# Patient Record
Sex: MALE | Race: WHITE | NOT HISPANIC OR LATINO | Employment: OTHER | URBAN - METROPOLITAN AREA
[De-identification: names, ages, dates, MRNs, and addresses within clinical notes are randomized per-mention and may not be internally consistent; named-entity substitution may affect disease eponyms.]

---

## 2017-01-09 ENCOUNTER — ANTICOAGULATION MONITORING (OUTPATIENT)
Dept: VASCULAR LAB | Facility: MEDICAL CENTER | Age: 82
End: 2017-01-09

## 2017-01-17 ENCOUNTER — HOSPITAL ENCOUNTER (OUTPATIENT)
Dept: LAB | Facility: MEDICAL CENTER | Age: 82
End: 2017-01-17
Attending: NURSE PRACTITIONER
Payer: MEDICARE

## 2017-01-17 DIAGNOSIS — I48.0 PAF (PAROXYSMAL ATRIAL FIBRILLATION) (HCC): ICD-10-CM

## 2017-01-17 LAB
INR PPP: 2.78 (ref 0.87–1.13)
PROTHROMBIN TIME: 30.2 SEC (ref 12–14.6)

## 2017-01-17 PROCEDURE — 85610 PROTHROMBIN TIME: CPT

## 2017-01-17 PROCEDURE — 36415 COLL VENOUS BLD VENIPUNCTURE: CPT

## 2017-01-18 ENCOUNTER — ANTICOAGULATION MONITORING (OUTPATIENT)
Dept: VASCULAR LAB | Facility: MEDICAL CENTER | Age: 82
End: 2017-01-18

## 2017-01-18 NOTE — PROGRESS NOTES
OP Anticoagulation Telephone Note    Date: 1/18/2017  Anticoagulation Summary as of 1/18/2017     INR goal 2.0-3.0   Selected INR 2.78 (1/17/2017)   Maintenance plan 5 mg (5 mg x 1) every day   Weekly total 35 mg   No change documented Melodie Toney, Med Ass't   Plan last modified Mahin Valdez, PHARMD (12/15/2016)   Next INR check 2/14/2017   Target end date Indefinite    Indications   A-fib (CMS-HCC) (Resolved) [I48.91]         Anticoagulation Episode Summary     INR check location Outside Lab    Preferred lab     Send INR reminders to     [facility]       Anticoagulation Care Providers     Provider Role Specialty Phone number    Anastacio Sunshine M.D. Referring Cardiology [phone]    Mahin Valdez Responsible          Anticoagulation Patient Findings   Negatives Missed Doses, Extra Doses, Medication Changes, Antibiotic Use, Diet Changes, Dental/Other Procedures, Hospitalization, Bleeding Gums, Nose Bleeds, Blood in Urine, Blood in Stool, Any Bruising, Other Complaints      Plan:  Left message on patient's answering machine/ voicemail. Instructed patient to call back with any concerns regarding any unusual bleeding or bruising, any medication or diet changes, or any signs or symptoms of thrombosis. Instructed patient to resume medication as outlined above. Patient to follow up in 4 weeks.     Melodie Toney, Medical Assistant    I have reviewed and concur with the above plan     Mahin Valdez, PHARMD

## 2017-01-30 DIAGNOSIS — M25.561 CHRONIC PAIN OF RIGHT KNEE: ICD-10-CM

## 2017-01-30 DIAGNOSIS — G89.29 CHRONIC PAIN OF RIGHT KNEE: ICD-10-CM

## 2017-01-30 RX ORDER — HYDROCODONE BITARTRATE AND ACETAMINOPHEN 10; 325 MG/1; MG/1
1 TABLET ORAL EVERY 8 HOURS PRN
Qty: 90 TAB | Refills: 0 | Status: CANCELLED | OUTPATIENT
Start: 2017-01-30

## 2017-01-30 NOTE — TELEPHONE ENCOUNTER
Was the patient seen in the last year in this department? Yes     Does patient have an active prescription for medications requested? No     Received Request Via: Pharmacy     in Media

## 2017-01-31 RX ORDER — HYDROCODONE BITARTRATE AND ACETAMINOPHEN 10; 325 MG/1; MG/1
1 TABLET ORAL EVERY 8 HOURS PRN
Qty: 90 TAB | Refills: 0 | Status: SHIPPED | OUTPATIENT
Start: 2017-01-31 | End: 2017-02-27 | Stop reason: SDUPTHER

## 2017-02-08 ENCOUNTER — OFFICE VISIT (OUTPATIENT)
Dept: CARDIOLOGY | Facility: MEDICAL CENTER | Age: 82
End: 2017-02-08
Payer: MEDICARE

## 2017-02-08 ENCOUNTER — NON-PROVIDER VISIT (OUTPATIENT)
Dept: CARDIOLOGY | Facility: MEDICAL CENTER | Age: 82
End: 2017-02-08
Payer: MEDICARE

## 2017-02-08 VITALS
HEIGHT: 68 IN | OXYGEN SATURATION: 93 % | DIASTOLIC BLOOD PRESSURE: 80 MMHG | BODY MASS INDEX: 30.46 KG/M2 | HEART RATE: 68 BPM | SYSTOLIC BLOOD PRESSURE: 138 MMHG | WEIGHT: 201 LBS

## 2017-02-08 DIAGNOSIS — Z95.0 CARDIAC PACEMAKER IN SITU: ICD-10-CM

## 2017-02-08 DIAGNOSIS — I25.84 CORONARY ARTERY DISEASE DUE TO CALCIFIED CORONARY LESION: ICD-10-CM

## 2017-02-08 DIAGNOSIS — E78.5 DYSLIPIDEMIA: Chronic | ICD-10-CM

## 2017-02-08 DIAGNOSIS — I25.10 CORONARY ARTERY DISEASE DUE TO CALCIFIED CORONARY LESION: ICD-10-CM

## 2017-02-08 DIAGNOSIS — I48.0 PAF (PAROXYSMAL ATRIAL FIBRILLATION) (HCC): ICD-10-CM

## 2017-02-08 PROCEDURE — 93280 PM DEVICE PROGR EVAL DUAL: CPT | Performed by: INTERNAL MEDICINE

## 2017-02-08 PROCEDURE — 1036F TOBACCO NON-USER: CPT | Performed by: INTERNAL MEDICINE

## 2017-02-08 PROCEDURE — G8598 ASA/ANTIPLAT THER USED: HCPCS | Performed by: INTERNAL MEDICINE

## 2017-02-08 PROCEDURE — G8482 FLU IMMUNIZE ORDER/ADMIN: HCPCS | Performed by: INTERNAL MEDICINE

## 2017-02-08 PROCEDURE — 99214 OFFICE O/P EST MOD 30 MIN: CPT | Performed by: INTERNAL MEDICINE

## 2017-02-08 PROCEDURE — G8419 CALC BMI OUT NRM PARAM NOF/U: HCPCS | Performed by: INTERNAL MEDICINE

## 2017-02-08 PROCEDURE — 4040F PNEUMOC VAC/ADMIN/RCVD: CPT | Mod: 8P | Performed by: INTERNAL MEDICINE

## 2017-02-08 PROCEDURE — 1101F PT FALLS ASSESS-DOCD LE1/YR: CPT | Performed by: INTERNAL MEDICINE

## 2017-02-08 PROCEDURE — G8432 DEP SCR NOT DOC, RNG: HCPCS | Performed by: INTERNAL MEDICINE

## 2017-02-08 NOTE — PROGRESS NOTES
Subjective:   Gennaro Richardson is a 81 y.o. male who presents today for followup of his coronary disease, hyperlipidemia and paroxysmal atrial fibrillation.  He had a pacemaker generator change in September 2016.    He has had no chest discomfort, dyspnea, edema or palpitations. He occasionally notes some orthostatic lightheadedness.          Past Medical History   Diagnosis Date   • Colonic polyps    • Diverticulosis    • CAD (coronary artery disease)    • Arteriosclerosis    • PVD (peripheral vascular disease) (CMS-HCC)    • CAD (coronary artery disease) 6/28/2011   • Pacer at end of battery life 6/28/2011   • A-fib (CMS-HCC) 6/28/2011   • Dyslipidemia 6/28/2011   • Peripheral vascular disease (CMS-HCC) 10/31/2011   • Hematoma of rectus sheath 6/13/2012     Delayed onset bleed, intraabdominal extension, left flank , ct abd active bleed , binder, vit k  6/14 Ex-lap     • Dizzy spells 11/20/2012   • Hematoma of abdominal wall    • Paroxysmal atrial fibrillation (CMS-HCC)    • Cardiac pacemaker in situ 11/19/2013   • PAF (paroxysmal atrial fibrillation) (CMS-HCC) 9/7/2014   • Myocardial infarct (CMS-HCC) 1989   • HERZOG (dyspnea on exertion) 9/7/2014 9/16/16- pt has no c/o     Past Surgical History   Procedure Laterality Date   • Pacemaker insertion     • Abdominal aortic aneurysm     • Colonoscopy with polyp  7/24/08     Performed by RUDOLPH BRENNER at SURGERY [facility] ORS   • Exploratory laparotomy  6/14/2012     Performed by CALLY MERCER at SURGERY [facility] ORS     Family History   Problem Relation Age of Onset   • Cancer Mother      History   Smoking status   • Former Smoker -- 35 years   • Quit date: 01/01/1988   Smokeless tobacco   • Never Used     Allergies   Allergen Reactions   • No Known Drug Allergy      Outpatient Encounter Prescriptions as of 2/8/2017   Medication Sig Dispense Refill   • hydrocodone/acetaminophen (NORCO)  MG Tab Take 1 Tab by mouth every 8 hours as needed (Must last 30  "days). 90 Tab 0   • clobetasol (TEMOVATE) 0.05 % Cream APPLY TO AFFECTED AREA(S) 2 TIMES A DAY. 60 g 1   • warfarin (COUMADIN) 5 MG Tab AS DIRECTED BY COUMADIN CLINIC 90 Tab 3   • atorvastatin (LIPITOR) 40 MG Tab Take 1 Tab by mouth every day. 90 Tab 3   • finasteride (PROSCAR) 5 MG TABS TAKE ONE TABLET BY MOUTH ONE TIME DAILY 90 Tab 2   • tamsulosin (FLOMAX) 0.4 MG capsule Take 1 Cap by mouth every day. 90 Cap 3     No facility-administered encounter medications on file as of 2/8/2017.     ROS       Objective:   /80 mmHg  Pulse 68  Ht 1.727 m (5' 8\")  Wt 91.173 kg (201 lb)  BMI 30.57 kg/m2  SpO2 93%    Physical Exam   Neck: No JVD present.   Cardiovascular: Normal rate, regular rhythm and intact distal pulses.    No murmur heard.  Pulmonary/Chest: Effort normal and breath sounds normal. No respiratory distress. He exhibits no tenderness.   Abdominal: Soft. There is no tenderness.   Musculoskeletal: He exhibits no edema.   Neurological: He has normal strength. He displays no tremor.   Vitals reviewed.    Lab Results   Component Value Date/Time    CHOLESTEROL,TOT 93* 06/08/2016 06:51 AM    LDL 37 06/08/2016 06:51 AM    HDL 39* 06/08/2016 06:51 AM    TRIGLYCERIDES 85 06/08/2016 06:51 AM       Lab Results   Component Value Date/Time    SODIUM 138 09/16/2016 11:45 AM    POTASSIUM 4.3 09/16/2016 11:45 AM    CHLORIDE 108 09/16/2016 11:45 AM    CO2 24 09/16/2016 11:45 AM    GLUCOSE 89 09/16/2016 11:45 AM    BUN 28* 09/16/2016 11:45 AM    CREATININE 0.92 09/16/2016 11:45 AM     Lab Results   Component Value Date/Time    ALKALINE PHOSPHATASE 91 06/08/2016 06:51 AM    AST(SGOT) 19 06/08/2016 06:51 AM    ALT(SGPT) 16 06/08/2016 06:51 AM    TOTAL BILIRUBIN 0.8 06/08/2016 06:51 AM      Pacemaker check: The patient's pacemaker is functioning normally. He does have approximately a percent of the time that he is in atrial fibrillation.      Patient Active Problem List    Diagnosis Date Noted     1. Coronary artery " disease due to calcified coronary lesion:Occluded left circumflex     2. Dyslipidemia     3. Cardiac pacemaker in situ     4. PAF (paroxysmal atrial fibrillation) (CMS-HCC)         Medical Decision Making:  Today's Assessment / Status / Plan:     Mr. Richardson is clinically stable. He is asymptomatic from a cardiovascular standpoint. He does need his lipid and hepatic function rechecked. His pacemaker is functioning normally. He continues on anticoagulation and rate control for his PAF. We discussed the direct dural anticoagulants and he would prefer to stay on warfarin because of the cost. He will follow-up in 6 months.

## 2017-02-08 NOTE — MR AVS SNAPSHOT
"        Gennaro Richardson   2017 1:45 PM   Office Visit   MRN: [id number]    Department:  [facility]   Dept Phone:  [phone]    Description:  Male : 1935   Provider:  Josef Retana M.D.           Reason for Visit     Follow-Up Pt states no recent lab work      Allergies as of 2017     Allergen Noted Reactions    No Known Drug Allergy 2008         You were diagnosed with     Coronary artery disease due to calcified coronary lesion   [4948762]       Dyslipidemia   [067170]       Cardiac pacemaker in situ   [V45.01.ICD-9-CM]       PAF (paroxysmal atrial fibrillation) (CMS-HCC)   [506203]         Vital Signs     Blood Pressure Pulse Height Weight Body Mass Index Oxygen Saturation    138/80 mmHg 68 1.727 m (5' 8\") 91.173 kg (201 lb) 30.57 kg/m2 93%    Smoking Status                   Former Smoker           Basic Information     Date Of Birth Sex Race Ethnicity Preferred Language    1935 Male White Non- English      Your appointments     2017  4:20 PM   Established Patient with Shorty Denny M.D.   [facility] (--)    [address]  [address]  [place] [id number] [phone]           You will be receiving a confirmation call a few days before your appointment from our automated call confirmation system.              Problem List              ICD-10-CM Priority Class Noted - Resolved    Coronary artery disease due to calcified coronary lesion:Occluded left circumflex I25.10, I25.84 High  2011 - Present    Dyslipidemia (Chronic) E78.5 High  2011 - Present    Cardiac pacemaker in situ Z95.0 High  2013 - Present    PVD (peripheral vascular disease) (CMS-HCC) (Chronic) I73.9   2014 - Present    Benign prostatic hypertrophy with lower urinary tract symptoms (LUTS) (Chronic) N40.1 Low  2014 - Present    PAF (paroxysmal atrial fibrillation) (CMS-HCC) I48.0 Medium  2014 - Present    Eczema L30.9 Low  3/17/2015 - " Present    History of MI (myocardial infarction) I25.2   3/17/2015 - Present    Chronic pain of right knee M25.561, G89.29   8/26/2015 - Present    Chronic back pain M54.9, G89.29   11/25/2015 - Present    Encounter for long-term (current) use of other high-risk medications Z79.899   2/26/2016 - Present    Opioid type dependence, continuous (CMS-HCC) F11.20   2/26/2016 - Present      Health Maintenance        Date Due Completion Dates    IMM ZOSTER VACCINE 8/31/1995 ---    IMM PNEUMOCOCCAL 65+ (ADULT) LOW/MEDIUM RISK SERIES (1 of 2 - PCV13) 8/31/2000 ---    COLONOSCOPY 2/18/2019 2/18/2014, 10/23/2012, 7/24/2008    IMM DTaP/Tdap/Td Vaccine (2 - Td) 8/21/2023 8/21/2013            Current Immunizations     Influenza TIV (IM) 10/9/2014, 10/29/2012  3:20 PM, 10/31/2011    Influenza Vaccine Adult HD 10/11/2016    Pneumococcal Vaccine (UF)Historical Data 10/31/2010    Tdap Vaccine 8/21/2013    Tetanus Vaccine 10/31/2010      Below and/or attached are the medications your provider expects you to take. Review all of your home medications and newly ordered medications with your provider and/or pharmacist. Follow medication instructions as directed by your provider and/or pharmacist. Please keep your medication list with you and share with your provider. Update the information when medications are discontinued, doses are changed, or new medications (including over-the-counter products) are added; and carry medication information at all times in the event of emergency situations     Allergies:  NO KNOWN DRUG ALLERGY - (reactions not documented)               Medications  Valid as of: February 08, 2017 -  2:26 PM    Generic Name Brand Name Tablet Size Instructions for use    Atorvastatin Calcium (Tab) LIPITOR 40 MG Take 1 Tab by mouth every day.        Clobetasol Propionate (Cream) TEMOVATE 0.05 % APPLY TO AFFECTED AREA(S) 2 TIMES A DAY.        Finasteride (Tab) PROSCAR 5 MG TAKE ONE TABLET BY MOUTH ONE TIME DAILY         Hydrocodone-Acetaminophen (Tab) NORCO  MG Take 1 Tab by mouth every 8 hours as needed (Must last 30 days).        Tamsulosin HCl (Cap) FLOMAX 0.4 MG Take 1 Cap by mouth every day.        Warfarin Sodium (Tab) COUMADIN 5 MG AS DIRECTED BY COUMADIN CLINIC        .                 Medicines prescribed today were sent to:     [facility] - [place], [place] - [address]    [address] [place] [address]    Phone: [phone] Fax: [phone]    Open 24 Hours?: No      Medication refill instructions:       If your prescription bottle indicates you have medication refills left, it is not necessary to call your provider’s office. Please contact your pharmacy and they will refill your medication.    If your prescription bottle indicates you do not have any refills left, you may request refills at any time through one of the following ways: The online [email or web address] system (except Urgent Care), by calling your provider’s office, or by asking your pharmacy to contact your provider’s office with a refill request. Medication refills are processed only during regular business hours and may not be available until the next business day. Your provider may request additional information or to have a follow-up visit with you prior to refilling your medication.   *Please Note: Medication refills are assigned a new Rx number when refilled electronically. Your pharmacy may indicate that no refills were authorized even though a new prescription for the same medication is available at the pharmacy. Please request the medicine by name with the pharmacy before contacting your provider for a refill.        Your To Do List     Future Labs/Procedures Complete By Expires    COMP METABOLIC PANEL  As directed 2/8/2018    HEPATIC FUNCTION PANEL  As directed 2/8/2018    LIPID PROFILE  As directed 2/8/2018    LIPID PROFILE  As directed 2/8/2018         [email or web address] Access Code: [email or web address]  Expires: 3/10/2017  2:26 PM    [email or web address]  A secure, online tool  to manage your health information     [employer]’s [employer]® is a secure, online tool that connects you to your personalized health information from the privacy of your home -- day or night - making it very easy for you to manage your healthcare. Once the activation process is completed, you can even access your medical information using the [employer] janeen, which is available for free in the Apple Janeen store or Google Play store.     [employer] provides the following levels of access (as shown below):   My Chart Features   [facility] Doctor [facility]  Specialists [facility]  Urgent  Care Non[facility]  Primary Care  Doctor   Email your healthcare team securely and privately 24/7 X X X    Manage appointments: schedule your next appointment; view details of past/upcoming appointments X      Request prescription refills. X      View recent personal medical records, including lab and immunizations X X X X   View health record, including health history, allergies, medications X X X X   Read reports about your outpatient visits, procedures, consult and ER notes X X X X   See your discharge summary, which is a recap of your hospital and/or ER visit that includes your diagnosis, lab results, and care plan. X X       How to register for [employer]:  1. Go to  [email or web address].  2. Click on the Sign Up Now box, which takes you to the New Member Sign Up page. You will need to provide the following information:  a. Enter your [employer] Access Code exactly as it appears at the top of this page. (You will not need to use this code after you’ve completed the sign-up process. If you do not sign up before the expiration date, you must request a new code.)   b. Enter your date of birth.   c. Enter your home email address.   d. Click Submit, and follow the next screen’s instructions.  3. Create a [employer] ID. This will be your [employer] login ID and cannot be changed, so think of one that is secure and easy to remember.  4. Create a  Pocket Gems password. You can change your password at any time.  5. Enter your Password Reset Question and Answer. This can be used at a later time if you forget your password.   6. Enter your e-mail address. This allows you to receive e-mail notifications when new information is available in [employer].  7. Click Sign Up. You can now view your health information.    For assistance activating your [employer] account, call [phone]

## 2017-02-08 NOTE — Clinical Note
[facility]   [address]  [place] [address]  Phone: [phone]  Fax: [phone]              Gennaro Richardson  1935    Encounter Date: 2/8/2017    Josef Retana M.D.          PROGRESS NOTE:  Subjective:   Gennaro Richardson is a 81 y.o. male who presents today for followup of his coronary disease, hyperlipidemia and paroxysmal atrial fibrillation.  He had a pacemaker generator change in September 2016.    He has had no chest discomfort, dyspnea, edema or palpitations. He occasionally notes some orthostatic lightheadedness.          Past Medical History   Diagnosis Date   • Colonic polyps    • Diverticulosis    • CAD (coronary artery disease)    • Arteriosclerosis    • PVD (peripheral vascular disease) (CMS-HCC)    • CAD (coronary artery disease) 6/28/2011   • Pacer at end of battery life 6/28/2011   • A-fib (CMS-HCC) 6/28/2011   • Dyslipidemia 6/28/2011   • Peripheral vascular disease (CMS-HCC) 10/31/2011   • Hematoma of rectus sheath 6/13/2012     Delayed onset bleed, intraabdominal extension, left flank , ct abd active bleed , binder, vit k  6/14 Ex-lap     • Dizzy spells 11/20/2012   • Hematoma of abdominal wall    • Paroxysmal atrial fibrillation (CMS-HCC)    • Cardiac pacemaker in situ 11/19/2013   • PAF (paroxysmal atrial fibrillation) (CMS-HCC) 9/7/2014   • Myocardial infarct (CMS-HCC) 1989   • HERZOG (dyspnea on exertion) 9/7/2014 9/16/16- pt has no c/o     Past Surgical History   Procedure Laterality Date   • Pacemaker insertion     • Abdominal aortic aneurysm     • Colonoscopy with polyp  7/24/08     Performed by RUDOLPH BRENNER at SURGERY [facility] ORS   • Exploratory laparotomy  6/14/2012     Performed by CALLY MERCER at SURGERY [facility] ORS     Family History   Problem Relation Age of Onset   • Cancer Mother      History   Smoking status   • Former Smoker -- 35 years   • Quit date: 01/01/1988   Smokeless tobacco   •  "Never Used     Allergies   Allergen Reactions   • No Known Drug Allergy      Outpatient Encounter Prescriptions as of 2/8/2017   Medication Sig Dispense Refill   • hydrocodone/acetaminophen (NORCO)  MG Tab Take 1 Tab by mouth every 8 hours as needed (Must last 30 days). 90 Tab 0   • clobetasol (TEMOVATE) 0.05 % Cream APPLY TO AFFECTED AREA(S) 2 TIMES A DAY. 60 g 1   • warfarin (COUMADIN) 5 MG Tab AS DIRECTED BY COUMADIN CLINIC 90 Tab 3   • atorvastatin (LIPITOR) 40 MG Tab Take 1 Tab by mouth every day. 90 Tab 3   • finasteride (PROSCAR) 5 MG TABS TAKE ONE TABLET BY MOUTH ONE TIME DAILY 90 Tab 2   • tamsulosin (FLOMAX) 0.4 MG capsule Take 1 Cap by mouth every day. 90 Cap 3     No facility-administered encounter medications on file as of 2/8/2017.     ROS       Objective:   /80 mmHg  Pulse 68  Ht 1.727 m (5' 8\")  Wt 91.173 kg (201 lb)  BMI 30.57 kg/m2  SpO2 93%    Physical Exam   Neck: No JVD present.   Cardiovascular: Normal rate, regular rhythm and intact distal pulses.    No murmur heard.  Pulmonary/Chest: Effort normal and breath sounds normal. No respiratory distress. He exhibits no tenderness.   Abdominal: Soft. There is no tenderness.   Musculoskeletal: He exhibits no edema.   Neurological: He has normal strength. He displays no tremor.   Vitals reviewed.    Lab Results   Component Value Date/Time    CHOLESTEROL,TOT 93* 06/08/2016 06:51 AM    LDL 37 06/08/2016 06:51 AM    HDL 39* 06/08/2016 06:51 AM    TRIGLYCERIDES 85 06/08/2016 06:51 AM       Lab Results   Component Value Date/Time    SODIUM 138 09/16/2016 11:45 AM    POTASSIUM 4.3 09/16/2016 11:45 AM    CHLORIDE 108 09/16/2016 11:45 AM    CO2 24 09/16/2016 11:45 AM    GLUCOSE 89 09/16/2016 11:45 AM    BUN 28* 09/16/2016 11:45 AM    CREATININE 0.92 09/16/2016 11:45 AM     Lab Results   Component Value Date/Time    ALKALINE PHOSPHATASE 91 06/08/2016 06:51 AM    AST(SGOT) 19 06/08/2016 06:51 AM    ALT(SGPT) 16 06/08/2016 06:51 AM    TOTAL " BILIRUBIN 0.8 06/08/2016 06:51 AM      Pacemaker check: The patient's pacemaker is functioning normally. He does have approximately a percent of the time that he is in atrial fibrillation.      Patient Active Problem List    Diagnosis Date Noted     1. Coronary artery disease due to calcified coronary lesion:Occluded left circumflex     2. Dyslipidemia     3. Cardiac pacemaker in situ     4. PAF (paroxysmal atrial fibrillation) (CMS-HCC)         Medical Decision Making:  Today's Assessment / Status / Plan:     Mr. Richardson is clinically stable. He is asymptomatic from a cardiovascular standpoint. He does need his lipid and hepatic function rechecked. His pacemaker is functioning normally. He continues on anticoagulation and rate control for his PAF. We discussed the direct dural anticoagulants and he would prefer to stay on warfarin because of the cost. He will follow-up in 6 months.      Shorty Denny M.D.  [address]  [address] [place] [id number]  VIA In Basket

## 2017-02-09 ENCOUNTER — TELEPHONE (OUTPATIENT)
Dept: CARDIOLOGY | Facility: MEDICAL CENTER | Age: 82
End: 2017-02-09

## 2017-02-09 NOTE — TELEPHONE ENCOUNTER
Dr. Retana would like the patient to switch to a different anticoagulant however the patient is hesitant in paying more than $20/month co-pay    With the patient's plan Pradaxa is on a Tier4  Tier 3 options are Eliquis and Xarelto, when the patient is not in the deductible stage or the doughnut hole the co-pay will be at $45/month    I left a message for the patient to call back to see if he would like to switch to one of these options and apply for patient assistance    Patient Assistance options:  [employer] Patient Assistance Program [phone] (Xarelto)     [employer] Patient [employer] [phone] (ELIQUIS)

## 2017-02-14 ENCOUNTER — HOSPITAL ENCOUNTER (OUTPATIENT)
Dept: LAB | Facility: MEDICAL CENTER | Age: 82
End: 2017-02-14
Attending: NURSE PRACTITIONER
Payer: MEDICARE

## 2017-02-14 DIAGNOSIS — I48.0 PAF (PAROXYSMAL ATRIAL FIBRILLATION) (HCC): ICD-10-CM

## 2017-02-14 LAB
INR PPP: 3 (ref 0.87–1.13)
PROTHROMBIN TIME: 32.1 SEC (ref 12–14.6)

## 2017-02-14 PROCEDURE — 85610 PROTHROMBIN TIME: CPT

## 2017-02-14 PROCEDURE — 36415 COLL VENOUS BLD VENIPUNCTURE: CPT

## 2017-02-15 ENCOUNTER — ANTICOAGULATION MONITORING (OUTPATIENT)
Dept: VASCULAR LAB | Facility: MEDICAL CENTER | Age: 82
End: 2017-02-15

## 2017-02-15 NOTE — PROGRESS NOTES
Anticoagulation Summary as of 2/15/2017     INR goal 2.0-3.0   Selected INR 3.00 (2/14/2017)   Maintenance plan 5 mg (5 mg x 1) every day   Weekly total 35 mg   Plan last modified Mahin Valdez, PHARMD (12/15/2016)   Next INR check 3/29/2017   Target end date Indefinite    Indications   A-fib (CMS-HCC) (Resolved) [I48.91]         Anticoagulation Episode Summary     INR check location Outside Lab    Preferred lab     Send INR reminders to     [facility]       Anticoagulation Care Providers     Provider Role Specialty Phone number    Anastacio Sunshine M.D. Referring Cardiology [phone]    Mahin Valdez Responsible          Anticoagulation Patient Findings    Spoke with patients wife today regarding therapeutic INR of 3.0.  Patient denies any signs/symptoms of bruising or bleeding or any changes in diet and medications.  Instructed patient to call clinic with any questions or concerns.  Pt is to continue with current warfarin dosing regimen.  Follow up in 6 weeks.    Antwon Dejesus, JANIED

## 2017-02-23 ENCOUNTER — TELEPHONE (OUTPATIENT)
Dept: MEDICAL GROUP | Facility: MEDICAL CENTER | Age: 82
End: 2017-02-23

## 2017-02-24 NOTE — TELEPHONE ENCOUNTER
Future Appointments       Provider Department Center    2/27/2017 4:20 PM Shorty Denny M.D. [facility]       Pt. Had labs ordered 2/7/2017 by a different provider and was advised to repeat these in 6 weeks.

## 2017-02-27 ENCOUNTER — OFFICE VISIT (OUTPATIENT)
Dept: MEDICAL GROUP | Facility: MEDICAL CENTER | Age: 82
End: 2017-02-27
Payer: MEDICARE

## 2017-02-27 VITALS
SYSTOLIC BLOOD PRESSURE: 128 MMHG | TEMPERATURE: 98.3 F | DIASTOLIC BLOOD PRESSURE: 80 MMHG | RESPIRATION RATE: 20 BRPM | OXYGEN SATURATION: 97 % | HEART RATE: 76 BPM | BODY MASS INDEX: 31.37 KG/M2 | WEIGHT: 207 LBS | HEIGHT: 68 IN

## 2017-02-27 DIAGNOSIS — M25.561 CHRONIC PAIN OF RIGHT KNEE: ICD-10-CM

## 2017-02-27 DIAGNOSIS — F11.20 OPIOID TYPE DEPENDENCE, CONTINUOUS (HCC): ICD-10-CM

## 2017-02-27 DIAGNOSIS — I48.0 PAF (PAROXYSMAL ATRIAL FIBRILLATION) (HCC): ICD-10-CM

## 2017-02-27 DIAGNOSIS — I73.9 PVD (PERIPHERAL VASCULAR DISEASE) (HCC): Chronic | ICD-10-CM

## 2017-02-27 DIAGNOSIS — G89.29 CHRONIC PAIN OF RIGHT KNEE: ICD-10-CM

## 2017-02-27 DIAGNOSIS — Z79.891 CHRONIC USE OF OPIATE DRUGS THERAPEUTIC PURPOSES: ICD-10-CM

## 2017-02-27 PROCEDURE — G8419 CALC BMI OUT NRM PARAM NOF/U: HCPCS | Performed by: FAMILY MEDICINE

## 2017-02-27 PROCEDURE — G8432 DEP SCR NOT DOC, RNG: HCPCS | Performed by: FAMILY MEDICINE

## 2017-02-27 PROCEDURE — 99214 OFFICE O/P EST MOD 30 MIN: CPT | Performed by: FAMILY MEDICINE

## 2017-02-27 PROCEDURE — 4040F PNEUMOC VAC/ADMIN/RCVD: CPT | Mod: 8P | Performed by: FAMILY MEDICINE

## 2017-02-27 PROCEDURE — 1036F TOBACCO NON-USER: CPT | Performed by: FAMILY MEDICINE

## 2017-02-27 PROCEDURE — G8598 ASA/ANTIPLAT THER USED: HCPCS | Performed by: FAMILY MEDICINE

## 2017-02-27 PROCEDURE — 1101F PT FALLS ASSESS-DOCD LE1/YR: CPT | Performed by: FAMILY MEDICINE

## 2017-02-27 PROCEDURE — G8482 FLU IMMUNIZE ORDER/ADMIN: HCPCS | Performed by: FAMILY MEDICINE

## 2017-02-27 RX ORDER — HYDROCODONE BITARTRATE AND ACETAMINOPHEN 10; 325 MG/1; MG/1
1 TABLET ORAL EVERY 8 HOURS PRN
Qty: 90 TAB | Refills: 0 | Status: SHIPPED | OUTPATIENT
Start: 2017-02-27 | End: 2017-02-27 | Stop reason: SDUPTHER

## 2017-02-27 RX ORDER — HYDROCODONE BITARTRATE AND ACETAMINOPHEN 10; 325 MG/1; MG/1
1 TABLET ORAL EVERY 8 HOURS PRN
Qty: 90 TAB | Refills: 0 | Status: SHIPPED | OUTPATIENT
Start: 2017-02-27 | End: 2017-05-25 | Stop reason: SDUPTHER

## 2017-02-27 ASSESSMENT — LIFESTYLE VARIABLES: HISTORY_ALCOHOL_USE: 0

## 2017-02-27 ASSESSMENT — ENCOUNTER SYMPTOMS: DEPRESSION: 0

## 2017-02-27 NOTE — MR AVS SNAPSHOT
"        Gennaro Richardson   2017 4:20 PM   Office Visit   MRN: [id number]    Department:  [facility]   Dept Phone:  [phone]    Description:  Male : 1935   Provider:  Shorty Denny M.D.           Reason for Visit     Medication Refill patient states he is here for a medication refill      Allergies as of 2017     Allergen Noted Reactions    No Known Drug Allergy 2008         You were diagnosed with     Chronic use of opiate drugs therapeutic purposes   [1200877]       Chronic pain of right knee   [4420554]         Vital Signs     Blood Pressure Pulse Temperature Respirations Height Weight    128/80 mmHg 76 36.8 °C (98.3 °F) 20 1.727 m (5' 7.99\") 93.895 kg (207 lb)    Body Mass Index Oxygen Saturation Smoking Status             31.48 kg/m2 97% Former Smoker         Basic Information     Date Of Birth Sex Race Ethnicity Preferred Language    1935 Male White Non- English      Problem List              ICD-10-CM Priority Class Noted - Resolved    Coronary artery disease due to calcified coronary lesion:Occluded left circumflex I25.10, I25.84 High  2011 - Present    Dyslipidemia (Chronic) E78.5 High  2011 - Present    Cardiac pacemaker in situ Z95.0 High  2013 - Present    PVD (peripheral vascular disease) (CMS-HCC) (Chronic) I73.9   2014 - Present    Benign prostatic hypertrophy with lower urinary tract symptoms (LUTS) (Chronic) N40.1 Low  2014 - Present    PAF (paroxysmal atrial fibrillation) (CMS-HCC) I48.0 Medium  2014 - Present    Eczema L30.9 Low  3/17/2015 - Present    History of MI (myocardial infarction) I25.2   3/17/2015 - Present    Chronic pain of right knee M25.561, G89.29   2015 - Present    Chronic back pain M54.9, G89.29   2015 - Present    Opioid type dependence, continuous (CMS-HCC) F11.20   2016 - Present    Chronic use of opiate drugs therapeutic purposes Z79.899   2017 - Present      Health " Maintenance        Date Due Completion Dates    IMM PNEUMOCOCCAL 65+ (ADULT) LOW/MEDIUM RISK SERIES (1 of 2 - PCV13) 2/25/2018 (Originally 8/31/2000) ---    IMM ZOSTER VACCINE 2/25/2018 (Originally 8/31/1995) ---    COLONOSCOPY 2/18/2019 2/18/2014, 10/23/2012, 7/24/2008    IMM DTaP/Tdap/Td Vaccine (2 - Td) 8/21/2023 8/21/2013            Current Immunizations     Influenza TIV (IM) 10/9/2014, 10/29/2012  3:20 PM, 10/31/2011    Influenza Vaccine Adult HD 10/11/2016    Pneumococcal Vaccine (UF)Historical Data 10/31/2010    Tdap Vaccine 8/21/2013    Tetanus Vaccine 10/31/2010      Below and/or attached are the medications your provider expects you to take. Review all of your home medications and newly ordered medications with your provider and/or pharmacist. Follow medication instructions as directed by your provider and/or pharmacist. Please keep your medication list with you and share with your provider. Update the information when medications are discontinued, doses are changed, or new medications (including over-the-counter products) are added; and carry medication information at all times in the event of emergency situations     Allergies:  NO KNOWN DRUG ALLERGY - (reactions not documented)               Medications  Valid as of: February 27, 2017 -  4:40 PM    Generic Name Brand Name Tablet Size Instructions for use    Atorvastatin Calcium (Tab) LIPITOR 40 MG Take 1 Tab by mouth every day.        Clobetasol Propionate (Cream) TEMOVATE 0.05 % APPLY TO AFFECTED AREA(S) 2 TIMES A DAY.        Finasteride (Tab) PROSCAR 5 MG TAKE ONE TABLET BY MOUTH ONE TIME DAILY        Hydrocodone-Acetaminophen (Tab) NORCO  MG Take 1 Tab by mouth every 8 hours as needed (Must last 30 days).        Tamsulosin HCl (Cap) FLOMAX 0.4 MG Take 1 Cap by mouth every day.        Warfarin Sodium (Tab) COUMADIN 5 MG AS DIRECTED BY COUMADIN CLINIC        .                 Medicines prescribed today were sent to:     [facility] [place]  [place] - [address]    [address] [place] [address]    Phone: [phone] Fax: [phone]    Open 24 Hours?: No      Medication refill instructions:       If your prescription bottle indicates you have medication refills left, it is not necessary to call your provider’s office. Please contact your pharmacy and they will refill your medication.    If your prescription bottle indicates you do not have any refills left, you may request refills at any time through one of the following ways: The online Mobbles system (except Urgent Care), by calling your provider’s office, or by asking your pharmacy to contact your provider’s office with a refill request. Medication refills are processed only during regular business hours and may not be available until the next business day. Your provider may request additional information or to have a follow-up visit with you prior to refilling your medication.   *Please Note: Medication refills are assigned a new Rx number when refilled electronically. Your pharmacy may indicate that no refills were authorized even though a new prescription for the same medication is available at the pharmacy. Please request the medicine by name with the pharmacy before contacting your provider for a refill.        Your To Do List     Future Labs/Procedures Complete By [employer] PAIN MANAGEMENT SCREEN  As directed 2/27/2018    Comments:    Current Meds (name, sig, last dose):   Current outpatient prescriptions:   •  hydrocodone/acetaminophen, 1 Tab, Oral, Q8HRS PRN, 2/27/2017  •  clobetasol, APPLY TO AFFECTED AREA(S) 2 TIMES A DAY., 2/27/2017  •  warfarin, AS DIRECTED BY COUMADIN CLINIC, 2/27/2017  •  atorvastatin, 40 mg, Oral, DAILY, 2/27/2017 at Unknown time  •  finasteride, TAKE ONE TABLET BY MOUTH ONE TIME DAILY, 2/27/2017 at Unknown time  •  tamsulosin, 0.4 mg, Oral, DAILY, 2/27/2017 at Unknown time         Mobbles Access Code: [email or web address]  Expires: 3/10/2017  2:26  PM    "[employer]hart  A secure, online tool to manage your health information     [employer]’s Elecsnet® is a secure, online tool that connects you to your personalized health information from the privacy of your home -- day or night - making it very easy for you to manage your healthcare. Once the activation process is completed, you can even access your medical information using the Elecsnet janeen, which is available for free in the Apple Janeen store or Google Play store.     Elecsnet provides the following levels of access (as shown below):   My Chart Features   [facility] Primary [facility] Doctor [facility]  Specialists [facility]  Urgent  Care Non-[facility]  Primary Care  Doctor   Email your healthcare team securely and privately 24/7 X X X    Manage appointments: schedule your next appointment; view details of past/upcoming appointments X      Request prescription refills. X      View recent personal medical records, including lab and immunizations X X X X   View health record, including health history, allergies, medications X X X X   Read reports about your outpatient visits, procedures, consult and ER notes X X X X   See your discharge summary, which is a recap of your hospital and/or ER visit that includes your diagnosis, lab results, and care plan. X X       How to register for Elecsnet:  1. Go to  [email or web address].  2. Click on the Sign Up Now box, which takes you to the New Member Sign Up page. You will need to provide the following information:  a. Enter your Elecsnet Access Code exactly as it appears at the top of this page. (You will not need to use this code after you’ve completed the sign-up process. If you do not sign up before the expiration date, you must request a new code.)   b. Enter your date of birth.   c. Enter your home email address.   d. Click Submit, and follow the next screen’s instructions.  3. Create a Elecsnet ID. This will be your Elecsnet login ID and cannot be changed, so think of one that is secure and  easy to remember.  4. Create a [employer] password. You can change your password at any time.  5. Enter your Password Reset Question and Answer. This can be used at a later time if you forget your password.   6. Enter your e-mail address. This allows you to receive e-mail notifications when new information is available in [employer].  7. Click Sign Up. You can now view your health information.    For assistance activating your [employer] account, call [phone]

## 2017-02-28 NOTE — PROGRESS NOTES
Chief Complaint   Patient presents with   • Medication Refill     patient states he is here for a medication refill     Multiple medical problems    HISTORY OF PRESENT ILLNESS: Patient is a 81 y.o. male established patient who presents today for the following.      1. Chronic use of opiate drugs therapeutic purposes  This is a chronic and ongoing problem for the patient. We discussed risks benefits alternatives to opiate use. Patient understands, but pain is well controlled with the opiates with improvement in quality of life.        2. Chronic pain of right knee  The patient has chronic arthritic pain.  Has been on pain medications for several years.  Pain is dull, sharp at time with certain movement.  No red flag signs. No loss of bowel or bladder. No discrete change in symptoms. Some occasional radiation into the leg. Discussed extensively the risks benefits and alternatives to the current medication regimen including the interactions with other potential medications. Patient understands this.  Patient understands that they're not to drive while under the influence. Patient understands they're not to take other sedating medications or drink alcohol while taking his medications.  Consequences of Chronic Opiate therapy:    Analgesia:  Improved   Activity:  Improved   Adverse Events:  None   Aberrant Behaviors:  None   Affect/Mood: normal affect and mood.  No signs of AMS  Last CMP:   6 months ago  Appropriate Imaging done:   Yes         3. PVD (peripheral vascular disease) (CMS-HCC)  Chronic and ongoing problem. Continue to monitor. Continue anticoagulation and hypertensive management continue statin    4. Opioid type dependence, continuous (CMS-HCC)  This is a chronic and ongoing problem for the patient. We discussed risks benefits alternatives to opiate use. Patient understands, but pain is well controlled with the opiates with improvement in quality of life.            5. PAF (paroxysmal atrial fibrillation)  (CMS-HCC)  Doing well. Taking medications as prescribed. No chest pain palpitations or shortness of breath.  No bleeding events.        No problem-specific assessment & plan notes found for this encounter.      He  has a past medical history of Colonic polyps; Diverticulosis; CAD (coronary artery disease); Arteriosclerosis; PVD (peripheral vascular disease) (CMS-HCC); CAD (coronary artery disease) (6/28/2011); Pacer at end of battery life (6/28/2011); A-fib (CMS-HCC) (6/28/2011); Dyslipidemia (6/28/2011); Peripheral vascular disease (CMS-HCC) (10/31/2011); Hematoma of rectus sheath (6/13/2012); Dizzy spells (11/20/2012); Hematoma of abdominal wall; Paroxysmal atrial fibrillation (CMS-HCC); Cardiac pacemaker in situ (11/19/2013); PAF (paroxysmal atrial fibrillation) (CMS-HCC) (9/7/2014); Myocardial infarct (CMS-HCC) (1989); and HERZOG (dyspnea on exertion) (9/7/2014).    Patient Active Problem List    Diagnosis Date Noted   • Cardiac pacemaker in situ 11/19/2013     Priority: High   • Coronary artery disease due to calcified coronary lesion:Occluded left circumflex 06/28/2011     Priority: High   • Dyslipidemia 06/28/2011     Priority: High   • PAF (paroxysmal atrial fibrillation) (CMS-HCC) 11/04/2014     Priority: Medium   • Eczema 03/17/2015     Priority: Low   • Benign prostatic hypertrophy with lower urinary tract symptoms (LUTS) 09/17/2014     Priority: Low   • Chronic use of opiate drugs therapeutic purposes 02/27/2017   • Opioid type dependence, continuous (CMS-HCC) 02/26/2016   • Chronic back pain 11/25/2015   • Chronic pain of right knee 08/26/2015   • History of MI (myocardial infarction) 03/17/2015   • PVD (peripheral vascular disease) (CMS-HCC) 04/30/2014       Allergies:No known drug allergy    Current Outpatient Prescriptions   Medication Sig Dispense Refill   • hydrocodone/acetaminophen (NORCO)  MG Tab Take 1 Tab by mouth every 8 hours as needed (Must last 30 days). 90 Tab 0   • clobetasol  "(TEMOVATE) 0.05 % Cream APPLY TO AFFECTED AREA(S) 2 TIMES A DAY. 60 g 1   • warfarin (COUMADIN) 5 MG Tab AS DIRECTED BY COUMADIN CLINIC 90 Tab 3   • atorvastatin (LIPITOR) 40 MG Tab Take 1 Tab by mouth every day. 90 Tab 3   • finasteride (PROSCAR) 5 MG TABS TAKE ONE TABLET BY MOUTH ONE TIME DAILY 90 Tab 2   • tamsulosin (FLOMAX) 0.4 MG capsule Take 1 Cap by mouth every day. 90 Cap 3     No current facility-administered medications for this visit.       Social History   Substance Use Topics   • Smoking status: Former Smoker -- 35 years     Quit date: 01/01/1988   • Smokeless tobacco: Never Used   • Alcohol Use: No       Family History   Problem Relation Age of Onset   • Cancer Mother        Health Maintenance:      Review of Systems   No fever, chills, nausea, vomiting, diarrhea, chest pain or shortness of breath.  See HPI    Exam:  Blood pressure 128/80, pulse 76, temperature 36.8 °C (98.3 °F), resp. rate 20, height 1.727 m (5' 7.99\"), weight 93.895 kg (207 lb), SpO2 97 %. Body mass index is 31.48 kg/(m^2).  Constitutional:  NAD, well appearing.  HEENT:   NC/AT, PERRLA, EOMI, OP clear, no lymphadenopathy, no thyromegaly.    Cardiovascular: Irregular.   No m/r/g. No carotid bruits.       Lungs:   CTAB, no w/r/r, no respiratory distress.  Abdomen: Soft, NT/ND + BS, no masses, no suprapubic tenderness, no hepatomegaly.  Extremities:  2+ DP and radial pulses bilaterally.  No c/c/e.  Skin:  Warm and dry.    Neurologic: Alert & oriented x 3, CN II-XII grossly intact, strength and sensation grossly intact.  No focal deficits noted.  Psychiatric:  Affect normal, mood normal, judgment normal.    Assessment/Plan:     1. Chronic use of opiate drugs therapeutic purposes  This is a chronic and stable problem. UDS is up to date. Pain contract is up-to-date.   reviewed.   Monitor      - Controlled Substance Treatment Agreement  - [facility] PAIN MANAGEMENT SCREEN; Future    2. Chronic pain of right knee  Chronic and stable " problem. Discussed risks benefits alternatives to the medication as discussed above. No red flags. Continue to monitor.  [facility] and [facility] are being [facility].      - hydrocodone/acetaminophen (NORCO)  MG Tab; Take 1 Tab by mouth every 8 hours as needed (Must last 30 days).  Dispense: 90 Tab; Refill: 0    3. PVD (peripheral vascular disease) (CMS-HCC)  Chronic and stable. Continue aspirin and statin and blood pressure management    4. Opioid type dependence, continuous (CMS-HCC)  This is a chronic and stable problem. UDS is up to date. Pain contract is up-to-date.   reviewed.   Monitor        5. PAF (paroxysmal atrial fibrillation) (CMS-HCC)  Stable. Taking medications as prescribed. No chest pain palpitations or shortness of breath.  No bleeding events.  Continue medications and anticoagulation.          Followup: No Follow-up on file.    Please note that this dictation was created using voice recognition software. I have made every reasonable attempt to correct obvious errors, but I expect that there are errors of grammar and possibly content that I did not discover before finalizing the note.

## 2017-03-22 ENCOUNTER — HOSPITAL ENCOUNTER (OUTPATIENT)
Dept: LAB | Facility: MEDICAL CENTER | Age: 82
End: 2017-03-22
Attending: INTERNAL MEDICINE
Payer: MEDICARE

## 2017-03-22 ENCOUNTER — HOSPITAL ENCOUNTER (OUTPATIENT)
Dept: LAB | Facility: MEDICAL CENTER | Age: 82
End: 2017-03-22
Attending: NURSE PRACTITIONER
Payer: MEDICARE

## 2017-03-22 DIAGNOSIS — I25.10 CORONARY ARTERY DISEASE DUE TO CALCIFIED CORONARY LESION: ICD-10-CM

## 2017-03-22 DIAGNOSIS — I48.0 PAF (PAROXYSMAL ATRIAL FIBRILLATION) (HCC): ICD-10-CM

## 2017-03-22 DIAGNOSIS — I25.84 CORONARY ARTERY DISEASE DUE TO CALCIFIED CORONARY LESION: ICD-10-CM

## 2017-03-22 DIAGNOSIS — E78.5 DYSLIPIDEMIA: Chronic | ICD-10-CM

## 2017-03-22 LAB
ALBUMIN SERPL BCP-MCNC: 3.8 G/DL (ref 3.2–4.9)
ALP SERPL-CCNC: 89 U/L (ref 30–99)
ALT SERPL-CCNC: 16 U/L (ref 2–50)
AST SERPL-CCNC: 20 U/L (ref 12–45)
BILIRUB CONJ SERPL-MCNC: 0.3 MG/DL (ref 0.1–0.5)
BILIRUB INDIRECT SERPL-MCNC: 0.8 MG/DL (ref 0–1)
BILIRUB SERPL-MCNC: 1.1 MG/DL (ref 0.1–1.5)
CHOLEST SERPL-MCNC: 105 MG/DL (ref 100–199)
HDLC SERPL-MCNC: 43 MG/DL
INR PPP: 2.63 (ref 0.87–1.13)
LDLC SERPL CALC-MCNC: 46 MG/DL
PROT SERPL-MCNC: 7.4 G/DL (ref 6–8.2)
PROTHROMBIN TIME: 28.9 SEC (ref 12–14.6)
TRIGL SERPL-MCNC: 78 MG/DL (ref 0–149)

## 2017-03-22 PROCEDURE — 85610 PROTHROMBIN TIME: CPT

## 2017-03-27 ENCOUNTER — TELEPHONE (OUTPATIENT)
Dept: MEDICAL GROUP | Facility: MEDICAL CENTER | Age: 82
End: 2017-03-27

## 2017-03-27 NOTE — TELEPHONE ENCOUNTER
"Pharmacy called reg pt's rx for \"[place]\". Pt. Last filled 2/27/2017 (Per pharmacy), 2/27/2017 (Per ). They asked if it'd be ok to fill pt's rx today since today is the 28th day as opposed to the 30th when it was approved to be filled?  Dr. Denny, please advise....  "

## 2017-05-09 RX ORDER — CLOBETASOL PROPIONATE 0.5 MG/G
CREAM TOPICAL
Qty: 60 G | Refills: 2 | Status: SHIPPED | OUTPATIENT
Start: 2017-05-09 | End: 2018-04-21 | Stop reason: SDUPTHER

## 2017-05-17 ENCOUNTER — HOSPITAL ENCOUNTER (OUTPATIENT)
Dept: LAB | Facility: MEDICAL CENTER | Age: 82
End: 2017-05-17
Attending: NURSE PRACTITIONER
Payer: MEDICARE

## 2017-05-17 DIAGNOSIS — I48.0 PAF (PAROXYSMAL ATRIAL FIBRILLATION) (HCC): ICD-10-CM

## 2017-05-17 LAB
INR PPP: 3.1 (ref 0.87–1.13)
PROTHROMBIN TIME: 32.9 SEC (ref 12–14.6)

## 2017-05-17 PROCEDURE — 85610 PROTHROMBIN TIME: CPT

## 2017-05-17 PROCEDURE — 36415 COLL VENOUS BLD VENIPUNCTURE: CPT

## 2017-05-18 ENCOUNTER — ANTICOAGULATION MONITORING (OUTPATIENT)
Dept: VASCULAR LAB | Facility: MEDICAL CENTER | Age: 82
End: 2017-05-18

## 2017-05-18 NOTE — PROGRESS NOTES
Anticoagulation Summary as of 5/18/2017     INR goal 2.0-3.0   Selected INR 3.10! (5/17/2017)   Maintenance plan 5 mg (5 mg x 1) every day   Weekly total 35 mg   Plan last modified Mahin Valdez, PHARMD (12/15/2016)   Next INR check 6/29/2017   Target end date Indefinite    Indications   A-fib (CMS-HCC) (Resolved) [I48.91]         Anticoagulation Episode Summary     INR check location Outside Lab    Preferred lab     Send INR reminders to     [facility]       Anticoagulation Care Providers     Provider Role Specialty Phone number    Anastacio Sunshine M.D. Referring Cardiology [phone]    Mahin Valdez Responsible          Anticoagulation Patient Findings    Patient's INR was SUPRA therapeutic.   Denies any unusual s/s of bleeding, bruising, clotting.  Denies any changes to:   Diet   Medications  Confirmed dosing regimen.    Pt is to reduce today then continue with current warfarin dosing regimen.    Follow up in 6 week(s)    Jus Young, JANIED

## 2017-05-25 ENCOUNTER — OFFICE VISIT (OUTPATIENT)
Dept: MEDICAL GROUP | Facility: MEDICAL CENTER | Age: 82
End: 2017-05-25
Payer: MEDICARE

## 2017-05-25 VITALS
TEMPERATURE: 98 F | HEIGHT: 67 IN | BODY MASS INDEX: 31.39 KG/M2 | HEART RATE: 70 BPM | WEIGHT: 200 LBS | OXYGEN SATURATION: 96 % | RESPIRATION RATE: 20 BRPM | SYSTOLIC BLOOD PRESSURE: 100 MMHG | DIASTOLIC BLOOD PRESSURE: 70 MMHG

## 2017-05-25 DIAGNOSIS — G89.29 CHRONIC PAIN OF RIGHT KNEE: ICD-10-CM

## 2017-05-25 DIAGNOSIS — I73.9 PVD (PERIPHERAL VASCULAR DISEASE) (HCC): Chronic | ICD-10-CM

## 2017-05-25 DIAGNOSIS — M25.561 CHRONIC PAIN OF RIGHT KNEE: ICD-10-CM

## 2017-05-25 DIAGNOSIS — F11.20 OPIOID TYPE DEPENDENCE, CONTINUOUS (HCC): ICD-10-CM

## 2017-05-25 DIAGNOSIS — I48.0 PAF (PAROXYSMAL ATRIAL FIBRILLATION) (HCC): ICD-10-CM

## 2017-05-25 PROCEDURE — 1101F PT FALLS ASSESS-DOCD LE1/YR: CPT | Performed by: FAMILY MEDICINE

## 2017-05-25 PROCEDURE — 4040F PNEUMOC VAC/ADMIN/RCVD: CPT | Mod: 8P | Performed by: FAMILY MEDICINE

## 2017-05-25 PROCEDURE — 1036F TOBACCO NON-USER: CPT | Performed by: FAMILY MEDICINE

## 2017-05-25 PROCEDURE — G8432 DEP SCR NOT DOC, RNG: HCPCS | Performed by: FAMILY MEDICINE

## 2017-05-25 PROCEDURE — G8598 ASA/ANTIPLAT THER USED: HCPCS | Performed by: FAMILY MEDICINE

## 2017-05-25 PROCEDURE — G8419 CALC BMI OUT NRM PARAM NOF/U: HCPCS | Performed by: FAMILY MEDICINE

## 2017-05-25 PROCEDURE — 99214 OFFICE O/P EST MOD 30 MIN: CPT | Performed by: FAMILY MEDICINE

## 2017-05-25 RX ORDER — HYDROCODONE BITARTRATE AND ACETAMINOPHEN 10; 325 MG/1; MG/1
1 TABLET ORAL EVERY 8 HOURS PRN
Qty: 120 TAB | Refills: 0 | Status: SHIPPED | OUTPATIENT
Start: 2017-05-25 | End: 2017-05-25 | Stop reason: SDUPTHER

## 2017-05-25 RX ORDER — HYDROCODONE BITARTRATE AND ACETAMINOPHEN 10; 325 MG/1; MG/1
1 TABLET ORAL EVERY 8 HOURS PRN
Qty: 120 TAB | Refills: 0 | Status: SHIPPED | OUTPATIENT
Start: 2017-05-25 | End: 2017-08-24 | Stop reason: SDUPTHER

## 2017-05-25 NOTE — MR AVS SNAPSHOT
"        Gennaro Richardson   2017 10:00 AM   Office Visit   MRN: [id number]    Department:  [facility]   Dept Phone:  [phone]    Description:  Male : 1935   Provider:  Shorty Denny M.D.           Reason for Visit     Medication Refill patient states he is here for medication refills      Allergies as of 2017     Allergen Noted Reactions    No Known Drug Allergy 2008         You were diagnosed with     Chronic pain of right knee   [[id number]]         Vital Signs     Blood Pressure Pulse Temperature Respirations Height Weight    100/70 mmHg 70 36.7 °C (98 °F) 20 1.702 m (5' 7.01\") 90.719 kg (200 lb)    Body Mass Index Oxygen Saturation Smoking Status             31.32 kg/m2 96% Former Smoker         Basic Information     Date Of Birth Sex Race Ethnicity Preferred Language    1935 Male White Non- English      Your appointments     Aug 24, 2017  9:00 AM   Established Patient with Shorty Denny M.D.   [facility] (--)    [address]  [address]  [place] [address]   [phone]           You will be receiving a confirmation call a few days before your appointment from our automated call confirmation system.              Problem List              ICD-10-CM Priority Class Noted - Resolved    Coronary artery disease due to calcified coronary lesion:Occluded left circumflex I25.10, I25.84 High  2011 - Present    Dyslipidemia (Chronic) E78.5 High  2011 - Present    Cardiac pacemaker in situ Z95.0 High  2013 - Present    PVD (peripheral vascular disease) (CMS-HCC) (Chronic) I73.9   2014 - Present    Benign prostatic hypertrophy with lower urinary tract symptoms (LUTS) (Chronic) N40.1 Low  2014 - Present    PAF (paroxysmal atrial fibrillation) (CMS-HCC) I48.0 Medium  2014 - Present    Eczema L30.9 Low  3/17/2015 - Present    History of MI (myocardial infarction) I25.2   3/17/2015 - Present    Chronic pain of right " knee M25.561, G89.29   8/26/2015 - Present    Chronic back pain M54.9, G89.29   11/25/2015 - Present    Opioid type dependence, continuous (CMS-HCC) F11.20   2/26/2016 - Present    Chronic use of opiate drugs therapeutic purposes Z79.891   2/27/2017 - Present      Health Maintenance        Date Due Completion Dates    IMM PNEUMOCOCCAL 65+ (ADULT) LOW/MEDIUM RISK SERIES (1 of 2 - PCV13) 2/25/2018 (Originally 8/31/2000) ---    IMM ZOSTER VACCINE 2/25/2018 (Originally 8/31/1995) ---    COLONOSCOPY 2/18/2019 2/18/2014, 10/23/2012, 7/24/2008    IMM DTaP/Tdap/Td Vaccine (2 - Td) 8/21/2023 8/21/2013            Current Immunizations     Influenza TIV (IM) 10/9/2014, 10/29/2012  3:20 PM, 10/31/2011    Influenza Vaccine Adult HD 10/11/2016    Pneumococcal Vaccine (UF)Historical Data 10/31/2010    Tdap Vaccine 8/21/2013    Tetanus Vaccine 10/31/2010      Below and/or attached are the medications your provider expects you to take. Review all of your home medications and newly ordered medications with your provider and/or pharmacist. Follow medication instructions as directed by your provider and/or pharmacist. Please keep your medication list with you and share with your provider. Update the information when medications are discontinued, doses are changed, or new medications (including over-the-counter products) are added; and carry medication information at all times in the event of emergency situations     Allergies:  NO KNOWN DRUG ALLERGY - (reactions not documented)               Medications  Valid as of: May 25, 2017 - 10:34 AM    Generic Name Brand Name Tablet Size Instructions for use    Atorvastatin Calcium (Tab) LIPITOR 40 MG Take 1 Tab by mouth every day.        Clobetasol Propionate (Cream) TEMOVATE 0.05 % Apply to affected area(s) 2 times a day.        Finasteride (Tab) PROSCAR 5 MG TAKE ONE TABLET BY MOUTH ONE TIME DAILY        Hydrocodone-Acetaminophen (Tab) NORCO  MG Take 1 Tab by mouth every 8 hours as  needed (Must last 30 days).        Tamsulosin HCl (Cap) FLOMAX 0.4 MG Take 1 Cap by mouth every day.        Warfarin Sodium (Tab) COUMADIN 5 MG AS DIRECTED BY COUMADIN CLINIC        .                 Medicines prescribed today were sent to:     [facility] - [place], [place] - [address]    [address] [place] [address]    Phone: [phone] Fax: [phone]    Open 24 Hours?: No      Medication refill instructions:       If your prescription bottle indicates you have medication refills left, it is not necessary to call your provider’s office. Please contact your pharmacy and they will refill your medication.    If your prescription bottle indicates you do not have any refills left, you may request refills at any time through one of the following ways: The online [employer] system (except Urgent Care), by calling your provider’s office, or by asking your pharmacy to contact your provider’s office with a refill request. Medication refills are processed only during regular business hours and may not be available until the next business day. Your provider may request additional information or to have a follow-up visit with you prior to refilling your medication.   *Please Note: Medication refills are assigned a new Rx number when refilled electronically. Your pharmacy may indicate that no refills were authorized even though a new prescription for the same medication is available at the pharmacy. Please request the medicine by name with the pharmacy before contacting your provider for a refill.           [employer] Access Code: [email or web address]  Expires: 6/24/2017 10:34 AM    [employer]  A secure, online tool to manage your health information     [employer]’s [employer]® is a secure, online tool that connects you to your personalized health information from the privacy of your home -- day or night - making it very easy for you to manage your healthcare. Once the activation process is completed, you can even access your medical  information using the [employer] janeen, which is available for free in the Apple Janeen store or Google Play store.     [employer] provides the following levels of access (as shown below):   My Chart Features   [facility] Doctor [facility]  Specialists [facility]  Urgent  Care Non-[facility]  Primary Care  Doctor   Email your healthcare team securely and privately 24/7 X X X    Manage appointments: schedule your next appointment; view details of past/upcoming appointments X      Request prescription refills. X      View recent personal medical records, including lab and immunizations X X X X   View health record, including health history, allergies, medications X X X X   Read reports about your outpatient visits, procedures, consult and ER notes X X X X   See your discharge summary, which is a recap of your hospital and/or ER visit that includes your diagnosis, lab results, and care plan. X X       How to register for [email or web address]:  1. Go to  [email or web address].  2. Click on the Sign Up Now box, which takes you to the New Member Sign Up page. You will need to provide the following information:  a. Enter your [employer] Access Code exactly as it appears at the top of this page. (You will not need to use this code after you’ve completed the sign-up process. If you do not sign up before the expiration date, you must request a new code.)   b. Enter your date of birth.   c. Enter your home email address.   d. Click Submit, and follow the next screen’s instructions.  3. Create a [employer] ID. This will be your [employer] login ID and cannot be changed, so think of one that is secure and easy to remember.  4. Create a [employer] password. You can change your password at any time.  5. Enter your Password Reset Question and Answer. This can be used at a later time if you forget your password.   6. Enter your e-mail address. This allows you to receive e-mail notifications when new information is available in [employer].  7. Click Sign Up. You can now  view your health information.    For assistance activating your [employer] account, call [phone]

## 2017-05-25 NOTE — PROGRESS NOTES
Chief Complaint   Patient presents with   • Medication Refill     patient states he is here for medication refills     Multiple medical problems    HISTORY OF PRESENT ILLNESS: Patient is a 81 y.o. male established patient who presents today for the following.      1. Chronic pain of right knee  The patient has chronic knee pain.  Has been on pain medications for several years.  Pain is dull, sharp at time with certain movement.  No red flag signs.  No discrete change in symptoms. Some occasional radiation into the leg. Discussed extensively the risks benefits and alternatives to the current medication regimen including the interactions with other potential medications. Patient understands this.  Patient understands that they're not to drive while under the influence. Patient understands they're not to take other sedating medications or drink alcohol while taking his medications.  Consequences of Chronic Opiate therapy:    Analgesia:  Improved   Activity:  Improved   Adverse Events:  None   Aberrant Behaviors:  None   Affect/Mood: normal affect and mood.  No signs of AMS  Last CMP:   6 months ago  Appropriate Imaging done:   Yes         2. Opioid type dependence, continuous (CMS-[facility])  This is a chronic and ongoing problem for the patient. We discussed risks benefits alternatives to opiate use. Patient understands, but pain is well controlled with the opiates with improvement in quality of life.      3. PAF (paroxysmal atrial fibrillation) (CMS-[facility])  Doing well. Taking medications as prescribed. No chest pain palpitations or shortness of breath.  No bleeding events.        4. PVD (peripheral vascular disease) (CMS-[facility])  This is a chronic and stable problem. Patient is taking Coumadin and antihypertensive medications along with a statin as prescribed.      No problem-specific assessment & plan notes found for this encounter.      He  has a past medical history of Colonic polyps; Diverticulosis; CAD (coronary artery  disease); Arteriosclerosis; PVD (peripheral vascular disease) (CMS-HCC); CAD (coronary artery disease) (6/28/2011); Pacer at end of battery life (6/28/2011); A-fib (CMS-HCC) (6/28/2011); Dyslipidemia (6/28/2011); Peripheral vascular disease (CMS-HCC) (10/31/2011); Hematoma of rectus sheath (6/13/2012); Dizzy spells (11/20/2012); Hematoma of abdominal wall; Paroxysmal atrial fibrillation (CMS-HCC); Cardiac pacemaker in situ (11/19/2013); PAF (paroxysmal atrial fibrillation) (CMS-HCC) (9/7/2014); Myocardial infarct (CMS-HCC) (1989); and HERZOG (dyspnea on exertion) (9/7/2014).    Patient Active Problem List    Diagnosis Date Noted   • Cardiac pacemaker in situ 11/19/2013     Priority: High   • Coronary artery disease due to calcified coronary lesion:Occluded left circumflex 06/28/2011     Priority: High   • Dyslipidemia 06/28/2011     Priority: High   • PAF (paroxysmal atrial fibrillation) (CMS-HCC) 11/04/2014     Priority: Medium   • Eczema 03/17/2015     Priority: Low   • Benign prostatic hypertrophy with lower urinary tract symptoms (LUTS) 09/17/2014     Priority: Low   • Chronic use of opiate drugs therapeutic purposes 02/27/2017   • Opioid type dependence, continuous (CMS-HCC) 02/26/2016   • Chronic back pain 11/25/2015   • Chronic pain of right knee 08/26/2015   • History of MI (myocardial infarction) 03/17/2015   • PVD (peripheral vascular disease) (CMS-HCC) 04/30/2014       Allergies:No known drug allergy    Current Outpatient Prescriptions   Medication Sig Dispense Refill   • hydrocodone/acetaminophen (NORCO)  MG Tab Take 1 Tab by mouth every 8 hours as needed (Must last 30 days). 120 Tab 0   • clobetasol (TEMOVATE) 0.05 % Cream Apply to affected area(s) 2 times a day. 60 g 2   • warfarin (COUMADIN) 5 MG Tab AS DIRECTED BY COUMADIN CLINIC 90 Tab 3   • atorvastatin (LIPITOR) 40 MG Tab Take 1 Tab by mouth every day. 90 Tab 3   • finasteride (PROSCAR) 5 MG TABS TAKE ONE TABLET BY MOUTH ONE TIME DAILY 90 Tab 2  "  • tamsulosin (FLOMAX) 0.4 MG capsule Take 1 Cap by mouth every day. 90 Cap 3     No current facility-administered medications for this visit.       Social History   Substance Use Topics   • Smoking status: Former Smoker -- 35 years     Quit date: 01/01/1988   • Smokeless tobacco: Never Used   • Alcohol Use: No       Family History   Problem Relation Age of Onset   • Cancer Mother        Health Maintenance:      Review of Systems   No fever, chills, nausea, vomiting, diarrhea, chest pain or shortness of breath.  See HPI    Exam:  Blood pressure 100/70, pulse 70, temperature 36.7 °C (98 °F), resp. rate 20, height 1.702 m (5' 7.01\"), weight 90.719 kg (200 lb), SpO2 96 %. Body mass index is 31.32 kg/(m^2).  Constitutional:  NAD, well appearing.  HEENT:   NC/AT, PERRLA, EOMI, OP clear, no lymphadenopathy, no thyromegaly.    Cardiovascular: RRR.   No m/r/g. No carotid bruits.       Lungs:   CTAB, no w/r/r, no respiratory distress.  Abdomen: Soft, NT/ND + BS, no masses, no suprapubic tenderness, no hepatomegaly.  Extremities:  2+ DP and radial pulses bilaterally.  No c/c/e.  Patient walks with a limping gait due to right knee pain. Mild effusion.  Skin:  Warm and dry.    Neurologic: Alert & oriented x 3, CN II-XII grossly intact, strength and sensation grossly intact.  No focal deficits noted.  Psychiatric:  Affect normal, mood normal, judgment normal.    Assessment/Plan:     1. Chronic pain of right knee  Chronic and stable problem. Discussed risks benefits alternatives to the medication as discussed above. No red flags. Continue to monitor.  [facility] and [facility] are being [facility] appropriately.      - hydrocodone/acetaminophen (NORCO)  MG Tab; Take 1 Tab by mouth every 8 hours as needed (Must last 30 days).  Dispense: 120 Tab; Refill: 0    2. Opioid type dependence, continuous (CMS-HCC)  This is a chronic and stable problem. UDS is up to date. Pain contract is up-to-date.   " reviewed.   Monitor      3. PAF (paroxysmal atrial fibrillation) (CMS-[facility])  Stable. Taking medications as prescribed. No chest pain palpitations or shortness of breath.  No bleeding events.  Continue medications and anticoagulation.      4. PVD (peripheral vascular disease) (CMS-HCC)  Chronic and stable. Continue anticoagulation, statin        Followup: No Follow-up on file.    Please note that this dictation was created using voice recognition software. I have made every reasonable attempt to correct obvious errors, but I expect that there are errors of grammar and possibly content that I did not discover before finalizing the note.

## 2017-06-06 ENCOUNTER — HOSPITAL ENCOUNTER (OUTPATIENT)
Dept: LAB | Facility: MEDICAL CENTER | Age: 82
End: 2017-06-06
Attending: INTERNAL MEDICINE
Payer: MEDICARE

## 2017-06-06 DIAGNOSIS — I25.84 CORONARY ARTERY DISEASE DUE TO CALCIFIED CORONARY LESION: ICD-10-CM

## 2017-06-06 DIAGNOSIS — I48.0 PAF (PAROXYSMAL ATRIAL FIBRILLATION) (HCC): ICD-10-CM

## 2017-06-06 DIAGNOSIS — E78.5 DYSLIPIDEMIA: Chronic | ICD-10-CM

## 2017-06-06 DIAGNOSIS — I25.10 CORONARY ARTERY DISEASE DUE TO CALCIFIED CORONARY LESION: ICD-10-CM

## 2017-06-06 LAB
ALBUMIN SERPL BCP-MCNC: 3.6 G/DL (ref 3.2–4.9)
ALBUMIN/GLOB SERPL: 1.3 G/DL
ALP SERPL-CCNC: 84 U/L (ref 30–99)
ALT SERPL-CCNC: 17 U/L (ref 2–50)
ANION GAP SERPL CALC-SCNC: 4 MMOL/L (ref 0–11.9)
AST SERPL-CCNC: 20 U/L (ref 12–45)
BILIRUB SERPL-MCNC: 1 MG/DL (ref 0.1–1.5)
BUN SERPL-MCNC: 19 MG/DL (ref 8–22)
CALCIUM SERPL-MCNC: 8.8 MG/DL (ref 8.5–10.5)
CHLORIDE SERPL-SCNC: 108 MMOL/L (ref 96–112)
CHOLEST SERPL-MCNC: 89 MG/DL (ref 100–199)
CO2 SERPL-SCNC: 27 MMOL/L (ref 20–33)
CREAT SERPL-MCNC: 0.96 MG/DL (ref 0.5–1.4)
GFR SERPL CREATININE-BSD FRML MDRD: >60 ML/MIN/1.73 M 2
GLOBULIN SER CALC-MCNC: 2.8 G/DL (ref 1.9–3.5)
GLUCOSE SERPL-MCNC: 89 MG/DL (ref 65–99)
HDLC SERPL-MCNC: 36 MG/DL
LDLC SERPL CALC-MCNC: 35 MG/DL
POTASSIUM SERPL-SCNC: 3.9 MMOL/L (ref 3.6–5.5)
PROT SERPL-MCNC: 6.4 G/DL (ref 6–8.2)
SODIUM SERPL-SCNC: 139 MMOL/L (ref 135–145)
TRIGL SERPL-MCNC: 90 MG/DL (ref 0–149)

## 2017-06-06 PROCEDURE — 80061 LIPID PANEL: CPT

## 2017-06-06 PROCEDURE — 36415 COLL VENOUS BLD VENIPUNCTURE: CPT

## 2017-06-06 PROCEDURE — 80053 COMPREHEN METABOLIC PANEL: CPT

## 2017-06-28 ENCOUNTER — ANTICOAGULATION MONITORING (OUTPATIENT)
Dept: VASCULAR LAB | Facility: MEDICAL CENTER | Age: 82
End: 2017-06-28

## 2017-06-28 ENCOUNTER — HOSPITAL ENCOUNTER (OUTPATIENT)
Dept: LAB | Facility: MEDICAL CENTER | Age: 82
End: 2017-06-28
Attending: NURSE PRACTITIONER
Payer: MEDICARE

## 2017-06-28 LAB
INR PPP: 3.17 (ref 0.87–1.13)
PROTHROMBIN TIME: 33.5 SEC (ref 12–14.6)

## 2017-06-28 PROCEDURE — 85610 PROTHROMBIN TIME: CPT

## 2017-06-28 PROCEDURE — 36415 COLL VENOUS BLD VENIPUNCTURE: CPT

## 2017-06-28 NOTE — PROGRESS NOTES
Anticoagulation Summary as of 6/28/2017     INR goal 2.0-3.0   Selected INR 3.17! (6/28/2017)   Maintenance plan 2.5 mg (5 mg x 0.5) on Wed; 5 mg (5 mg x 1) all other days   Weekly total 32.5 mg   Plan last modified Antwon Dejesus, PHARMD (6/28/2017)   Next INR check 7/26/2017   Target end date Indefinite    Indications   A-fib (CMS-HCC) (Resolved) [I48.91]         Anticoagulation Episode Summary     INR check location Outside Lab    Preferred lab     Send INR reminders to     [facility]       Anticoagulation Care Providers     Provider Role Specialty Phone number    Anastacio uSnshine M.D. Referring Cardiology [phone]    Mahin Valdez Responsible          Anticoagulation Patient Findings   Negatives Missed Doses, Extra Doses, Medication Changes, Antibiotic Use, Diet Changes, Dental/Other Procedures, Hospitalization, Bleeding Gums, Nose Bleeds, Blood in Urine, Blood in Stool, Any Bruising, Other Complaints        Spoke with patient today regarding supratherapeutic INR of 3.17.  Patient denies any signs/symptoms of bruising or bleeding or any changes in diet and medications.  Instructed patient to call clinic with any questions or concerns.  Instructed patient to decrease weekly warfarin regimen by ~7% as detailed above.  Follow up in 4 weeks per patient preference.    Antwon Dejesus, JANIED

## 2017-07-26 ENCOUNTER — ANTICOAGULATION MONITORING (OUTPATIENT)
Dept: VASCULAR LAB | Facility: MEDICAL CENTER | Age: 82
End: 2017-07-26

## 2017-07-26 ENCOUNTER — HOSPITAL ENCOUNTER (OUTPATIENT)
Dept: LAB | Facility: MEDICAL CENTER | Age: 82
End: 2017-07-26
Attending: NURSE PRACTITIONER
Payer: MEDICARE

## 2017-07-26 LAB
INR PPP: 2.98 (ref 0.87–1.13)
PROTHROMBIN TIME: 31.9 SEC (ref 12–14.6)

## 2017-07-26 PROCEDURE — 36415 COLL VENOUS BLD VENIPUNCTURE: CPT

## 2017-07-26 PROCEDURE — 85610 PROTHROMBIN TIME: CPT

## 2017-07-26 NOTE — PROGRESS NOTES
Anticoagulation Summary as of 7/26/2017     INR goal 2.0-3.0   Selected INR 2.98 (7/26/2017)   Maintenance plan 2.5 mg (5 mg x 0.5) on Wed; 5 mg (5 mg x 1) all other days   Weekly total 32.5 mg   Plan last modified Antwon Dejesus, PHARMD (6/28/2017)   Next INR check 9/6/2017   Target end date Indefinite    Indications   A-fib (CMS-HCC) (Resolved) [I48.91]         Anticoagulation Episode Summary     INR check location Outside Lab    Preferred lab     Send INR reminders to     [facility]       Anticoagulation Care Providers     Provider Role Specialty Phone number    Anastacio Sunshine M.D. Referring Cardiology [phone]    Mahin Valdez Responsible          Anticoagulation Patient Findings      Spoke with patient to report a therapeutic INR.    Pt instructed to continue with current warfarin dosing regimen, confirms dosing.   Pt denies any s/s of bleeding, bruising, clotting or any changes to diet or medication.    Will follow up in 6 weeks per pt preference.     Kelsy Bloom, PHARMD

## 2017-08-01 ENCOUNTER — OFFICE VISIT (OUTPATIENT)
Dept: CARDIOLOGY | Facility: MEDICAL CENTER | Age: 82
End: 2017-08-01
Payer: MEDICARE

## 2017-08-01 ENCOUNTER — NON-PROVIDER VISIT (OUTPATIENT)
Dept: CARDIOLOGY | Facility: MEDICAL CENTER | Age: 82
End: 2017-08-01
Payer: MEDICARE

## 2017-08-01 VITALS
HEIGHT: 67 IN | BODY MASS INDEX: 31.86 KG/M2 | SYSTOLIC BLOOD PRESSURE: 116 MMHG | DIASTOLIC BLOOD PRESSURE: 62 MMHG | OXYGEN SATURATION: 94 % | WEIGHT: 203 LBS | HEART RATE: 72 BPM

## 2017-08-01 DIAGNOSIS — Z95.0 CARDIAC PACEMAKER IN SITU: ICD-10-CM

## 2017-08-01 DIAGNOSIS — I25.84 CORONARY ARTERY DISEASE DUE TO CALCIFIED CORONARY LESION: ICD-10-CM

## 2017-08-01 DIAGNOSIS — E78.5 DYSLIPIDEMIA: Chronic | ICD-10-CM

## 2017-08-01 DIAGNOSIS — I25.10 CORONARY ARTERY DISEASE DUE TO CALCIFIED CORONARY LESION: ICD-10-CM

## 2017-08-01 DIAGNOSIS — I48.0 PAF (PAROXYSMAL ATRIAL FIBRILLATION) (HCC): ICD-10-CM

## 2017-08-01 PROCEDURE — 93280 PM DEVICE PROGR EVAL DUAL: CPT | Performed by: INTERNAL MEDICINE

## 2017-08-01 PROCEDURE — 99214 OFFICE O/P EST MOD 30 MIN: CPT | Performed by: NURSE PRACTITIONER

## 2017-08-01 RX ORDER — ATORVASTATIN CALCIUM 40 MG/1
40 TABLET, FILM COATED ORAL DAILY
Qty: 90 TAB | Refills: 3 | Status: SHIPPED | OUTPATIENT
Start: 2017-08-01 | End: 2018-07-17 | Stop reason: SDUPTHER

## 2017-08-01 ASSESSMENT — ENCOUNTER SYMPTOMS
ORTHOPNEA: 0
FALLS: 0
WHEEZING: 0
LOSS OF CONSCIOUSNESS: 0
BLURRED VISION: 0
DOUBLE VISION: 0
FOCAL WEAKNESS: 0
COUGH: 0
DIZZINESS: 1
HEADACHES: 0
SHORTNESS OF BREATH: 0
WEAKNESS: 0
PALPITATIONS: 0

## 2017-08-01 NOTE — PROGRESS NOTES
Subjective:   Gennaro Richardson is a 81 y.o. male who presents today for follow up.    He is patient of Dr. Retana in our clinic, HX: CAD with known left circumflex occlusion, hyperlipidemia, paroxysmal atrial fibrillation, and pacemaker with generator change in September 2016.    Patient occasionally gets positional dizziness. He has to be cautious on changing position. He has been getting dizzy spells for years now.    He has had no episodes of chest pain, palpitations,  shortness of breath, orthopnea, or peripheral edema.    Past Medical History   Diagnosis Date   • Colonic polyps    • Diverticulosis    • CAD (coronary artery disease)    • Arteriosclerosis    • PVD (peripheral vascular disease) (CMS-HCC)    • CAD (coronary artery disease) 6/28/2011   • Pacer at end of battery life 6/28/2011   • A-fib (CMS-HCC) 6/28/2011   • Dyslipidemia 6/28/2011   • Peripheral vascular disease (CMS-HCC) 10/31/2011   • Hematoma of rectus sheath 6/13/2012     Delayed onset bleed, intraabdominal extension, left flank , ct abd active bleed , binder, vit k  6/14 Ex-lap     • Dizzy spells 11/20/2012   • Hematoma of abdominal wall    • Paroxysmal atrial fibrillation (CMS-HCC)    • Cardiac pacemaker in situ 11/19/2013   • PAF (paroxysmal atrial fibrillation) (CMS-HCC) 9/7/2014   • Myocardial infarct (CMS-HCC) 1989   • HERZOG (dyspnea on exertion) 9/7/2014 9/16/16- pt has no c/o     Past Surgical History   Procedure Laterality Date   • Pacemaker insertion     • Abdominal aortic aneurysm     • Colonoscopy with polyp  7/24/08     Performed by RUDOLPH BRENNER at SURGERY [facility] ORS   • Exploratory laparotomy  6/14/2012     Performed by CALLY MERCER at SURGERY [facility] ORS     Family History   Problem Relation Age of Onset   • Cancer Mother      History   Smoking status   • Former Smoker -- 35 years   • Quit date: 01/01/1988   Smokeless tobacco   • Never Used     Allergies   Allergen Reactions   • No Known Drug Allergy   "    Outpatient Encounter Prescriptions as of 8/1/2017   Medication Sig Dispense Refill   • atorvastatin (LIPITOR) 40 MG Tab Take 1 Tab by mouth every day. 90 Tab 3   • clobetasol (TEMOVATE) 0.05 % Cream Apply to affected area(s) 2 times a day. 60 g 2   • warfarin (COUMADIN) 5 MG Tab AS DIRECTED BY COUMADIN CLINIC 90 Tab 3   • finasteride (PROSCAR) 5 MG TABS TAKE ONE TABLET BY MOUTH ONE TIME DAILY 90 Tab 2   • tamsulosin (FLOMAX) 0.4 MG capsule Take 1 Cap by mouth every day. 90 Cap 3   • hydrocodone/acetaminophen (NORCO)  MG Tab Take 1 Tab by mouth every 8 hours as needed (Must last 30 days). 120 Tab 0   • [DISCONTINUED] atorvastatin (LIPITOR) 40 MG Tab Take 1 Tab by mouth every day. 90 Tab 3     No facility-administered encounter medications on file as of 8/1/2017.     Review of Systems   Constitutional: Negative for malaise/fatigue.   Eyes: Negative for blurred vision and double vision.   Respiratory: Negative for cough, shortness of breath and wheezing.    Cardiovascular: Negative for chest pain, palpitations, orthopnea and leg swelling.   Musculoskeletal: Negative for falls.   Neurological: Positive for dizziness. Negative for focal weakness, loss of consciousness, weakness and headaches.   All other systems reviewed and are negative.       Objective:   /62 mmHg  Pulse 72  Ht 1.702 m (5' 7.01\")  Wt 92.08 kg (203 lb)  BMI 31.79 kg/m2  SpO2 94%    Physical Exam   Constitutional: He is oriented to person, place, and time. He appears well-developed and well-nourished.   HENT:   Head: Normocephalic.   Neck: Normal range of motion. No JVD present.   Cardiovascular: Normal rate, regular rhythm and normal heart sounds.    No murmur heard.  Pacemaker present on the left upper chest    Pulmonary/Chest: Effort normal and breath sounds normal. No respiratory distress. He has no wheezes.   Abdominal: Bowel sounds are normal.   Musculoskeletal: He exhibits no edema or tenderness.   Neurological: He is alert " and oriented to person, place, and time.   Skin: Skin is warm and dry.   Psychiatric: His behavior is normal. Judgment normal.   Nursing note and vitals reviewed.      Echocardiography Laboratory  9/7/2014  Normal left ventricular systolic function.  Left ventricular ejection fraction is 50% to 55%.  Asymmetric septal hypertrophy.  Mitral annular calcification.  Mild mitral regurgitation.  Aortic sclerosis without stenosis.  Mild aortic insufficiency.  Mild tricuspid regurgitation.  Right ventricular systolic pressure is estimated to be 34-49 mmHg   consistent with mild to moderate pulmonary hypertension.    Assessment:     1. Coronary artery disease due to calcified coronary lesion:Occluded left circumflex  atorvastatin (LIPITOR) 40 MG Tab   2. PAF (paroxysmal atrial fibrillation) (CMS-[facility])     3. Cardiac pacemaker in situ     4. Dyslipidemia  atorvastatin (LIPITOR) 40 MG Tab       Medical Decision Making:  Today's Assessment / Status / Plan:     1. CAD:  - denies any chest pain or HERZOG  - continue atorvastatin, tolerated well. Denies any myalgia.    2. PAF:  - pacemaker check today reveal mode switch 6% in the last 6 month.  - oral anticoagulation with coumadin, no sign of bleeding noted    3. Pacemaker:  - today  - good battery life and normal function     Follow up in 6 months with Dr. Retana and pacemaker check, sooner as needed.     Dizzy spell, recommend follow up with PCP.    Collaborating Provider: Dr. Swift

## 2017-08-01 NOTE — MR AVS SNAPSHOT
"        Gennaro Richardson   2017 1:20 PM   Office Visit   MRN: [id number]    Department:  [facility]   Dept Phone:  [phone]    Description:  Male : 1935   Provider:  THOMAS Maldonado           Reason for Visit     Follow-Up           Allergies as of 2017     Allergen Noted Reactions    No Known Drug Allergy 2008         You were diagnosed with     Coronary artery disease due to calcified coronary lesion   [9711129]       PAF (paroxysmal atrial fibrillation) (CMS-HCC)   [940493]       Cardiac pacemaker in situ   [V45.01.ICD-9-CM]       Dyslipidemia   [821679]         Vital Signs     Blood Pressure Pulse Height Weight Body Mass Index Oxygen Saturation    116/62 mmHg 72 1.702 m (5' 7.01\") 92.08 kg (203 lb) 31.79 kg/m2 94%    Smoking Status                   Former Smoker           Basic Information     Date Of Birth Sex Race Ethnicity Preferred Language    1935 Male White Non- English      Your appointments     Aug 24, 2017  9:00 AM   Established Patient with Angelica Hauser PA-C   [facility] (--)    [address]  [address]  [place] [address] [phone]           You will be receiving a confirmation call a few days before your appointment from our automated call confirmation system.            2018  1:15 PM   PACER CHECK ONLY with PACER CHECK-[facility] 2   [facility] (--)    [address]  [place] [address] [phone]            2018  1:45 PM   FOLLOW UP with Josef Retana M.D.   [facility] (--)    [address]  [place] [address] [phone]              Problem List              ICD-10-CM Priority Class Noted - Resolved    Coronary artery disease due to calcified coronary lesion:Occluded left circumflex I25.10, I25.84 High  2011 - Present    Dyslipidemia (Chronic) E78.5 High  2011 - Present   " Cardiac pacemaker in situ Z95.0 High  11/19/2013 - Present    PVD (peripheral vascular disease) (CMS-HCC) (Chronic) I73.9   4/30/2014 - Present    Benign prostatic hypertrophy with lower urinary tract symptoms (LUTS) (Chronic) N40.1 Low  9/17/2014 - Present    PAF (paroxysmal atrial fibrillation) (CMS-HCC) I48.0 Medium  11/4/2014 - Present    Eczema L30.9 Low  3/17/2015 - Present    History of MI (myocardial infarction) I25.2   3/17/2015 - Present    Chronic pain of right knee M25.561, G89.29   8/26/2015 - Present    Chronic back pain M54.9, G89.29   11/25/2015 - Present    Opioid type dependence, continuous (CMS-HCC) F11.20   2/26/2016 - Present    Chronic use of opiate drugs therapeutic purposes Z79.891   2/27/2017 - Present      Health Maintenance        Date Due Completion Dates    IMM PNEUMOCOCCAL 65+ (ADULT) LOW/MEDIUM RISK SERIES (1 of 2 - PCV13) 2/25/2018 (Originally 8/31/2000) ---    IMM ZOSTER VACCINE 2/25/2018 (Originally 8/31/1995) ---    IMM INFLUENZA (1) 9/1/2017 10/11/2016, 10/9/2014, 10/29/2012, 10/31/2011    COLONOSCOPY 2/18/2019 2/18/2014, 10/23/2012, 7/24/2008    IMM DTaP/Tdap/Td Vaccine (2 - Td) 8/21/2023 8/21/2013            Current Immunizations     Influenza TIV (IM) 10/9/2014, 10/29/2012  3:20 PM, 10/31/2011    Influenza Vaccine Adult HD 10/11/2016    Pneumococcal Vaccine (UF)Historical Data 10/31/2010    Tdap Vaccine 8/21/2013    Tetanus Vaccine 10/31/2010      Below and/or attached are the medications your provider expects you to take. Review all of your home medications and newly ordered medications with your provider and/or pharmacist. Follow medication instructions as directed by your provider and/or pharmacist. Please keep your medication list with you and share with your provider. Update the information when medications are discontinued, doses are changed, or new medications (including over-the-counter products) are added; and carry medication information at all times in the event of  emergency situations     Allergies:  NO KNOWN DRUG ALLERGY - (reactions not documented)               Medications  Valid as of: August 01, 2017 -  1:34 PM    Generic Name Brand Name Tablet Size Instructions for use    Atorvastatin Calcium (Tab) LIPITOR 40 MG Take 1 Tab by mouth every day.        Clobetasol Propionate (Cream) TEMOVATE 0.05 % Apply to affected area(s) 2 times a day.        Finasteride (Tab) PROSCAR 5 MG TAKE ONE TABLET BY MOUTH ONE TIME DAILY        Hydrocodone-Acetaminophen (Tab) NORCO  MG Take 1 Tab by mouth every 8 hours as needed (Must last 30 days).        Tamsulosin HCl (Cap) FLOMAX 0.4 MG Take 1 Cap by mouth every day.        Warfarin Sodium (Tab) COUMADIN 5 MG AS DIRECTED BY COUMADIN CLINIC        .                 Medicines prescribed today were sent to:     [facility] [place], [place] - [address]    [address] [place] [address]    Phone: [phone] Fax: [phone]    Open 24 Hours?: No      Medication refill instructions:       If your prescription bottle indicates you have medication refills left, it is not necessary to call your provider’s office. Please contact your pharmacy and they will refill your medication.    If your prescription bottle indicates you do not have any refills left, you may request refills at any time through one of the following ways: The online StartMe system (except Urgent Care), by calling your provider’s office, or by asking your pharmacy to contact your provider’s office with a refill request. Medication refills are processed only during regular business hours and may not be available until the next business day. Your provider may request additional information or to have a follow-up visit with you prior to refilling your medication.   *Please Note: Medication refills are assigned a new Rx number when refilled electronically. Your pharmacy may indicate that no refills were authorized even though a new prescription for the same medication is available  at the pharmacy. Please request the medicine by name with the pharmacy before contacting your provider for a refill.           [employer] Access Code: [email or web address]  Expires: 8/31/2017  1:34 PM    [employer]  A secure, online tool to manage your health information     [employer]® is a secure, online tool that connects you to your personalized health information from the privacy of your home -- day or night - making it very easy for you to manage your healthcare. Once the activation process is completed, you can even access your medical information using the [employer] janeen, which is available for free in the Apple Janeen store or Google Play store.     [employer] provides the following levels of access (as shown below):   My Chart Features   [facility] Doctor [facility]  Urgent  Care Non-[facility]  Primary Care  Doctor   Email your healthcare team securely and privately 24/7 X X X    Manage appointments: schedule your next appointment; view details of past/upcoming appointments X      Request prescription refills. X      View recent personal medical records, including lab and immunizations X X X X   View health record, including health history, allergies, medications X X X X   Read reports about your outpatient visits, procedures, consult and ER notes X X X X   See your discharge summary, which is a recap of your hospital and/or ER visit that includes your diagnosis, lab results, and care plan. X X       How to register for [employer]:  1. Go to  [email or web address].  2. Click on the Sign Up Now box, which takes you to the New Member Sign Up page. You will need to provide the following information:  a. Enter your [employer] Access Code exactly as it appears at the top of this page. (You will not need to use this code after you’ve completed the sign-up process. If you do not sign up before the expiration date, you must request a new code.)   b. Enter your date of birth.   c. Enter your home  email address.   d. Click Submit, and follow the next screen’s instructions.  3. Create a [employer] ID. This will be your [employer] login ID and cannot be changed, so think of one that is secure and easy to remember.  4. Create a [employer] password. You can change your password at any time.  5. Enter your Password Reset Question and Answer. This can be used at a later time if you forget your password.   6. Enter your e-mail address. This allows you to receive e-mail notifications when new information is available in [employer].  7. Click Sign Up. You can now view your health information.    For assistance activating your [employer] account, call [phone]

## 2017-08-09 ENCOUNTER — HOSPITAL ENCOUNTER (OUTPATIENT)
Dept: LAB | Facility: MEDICAL CENTER | Age: 82
End: 2017-08-09
Attending: PHYSICIAN ASSISTANT
Payer: MEDICARE

## 2017-08-09 LAB — PSA SERPL-MCNC: 0.27 NG/ML (ref 0–4)

## 2017-08-09 PROCEDURE — 84153 ASSAY OF PSA TOTAL: CPT

## 2017-08-15 ENCOUNTER — PATIENT OUTREACH (OUTPATIENT)
Dept: HEALTH INFORMATION MANAGEMENT | Facility: OTHER | Age: 82
End: 2017-08-15

## 2017-08-15 NOTE — PROGRESS NOTES
Attempt #:2    WebIZ Checked & Epic Updated: yes  HealthConnect Verified: yes  Verify PCP: yes    Communication Preference Obtained: yes     Review Care Team: yes    Annual Wellness Visit Scheduling  1. Scheduling Status:Scheduled       Care Gap Scheduling (Attempt to Schedule EACH Overdue Care Gap!)     Health Maintenance Due   Topic Date Due   • Annual Wellness Visit  Scheduled          Intelli[employer]Thart Activation: sent activation code  Teradici Janeen: no  Virtual Visits: no  Opt In to Text Messages: no

## 2017-08-24 ENCOUNTER — OFFICE VISIT (OUTPATIENT)
Dept: MEDICAL GROUP | Facility: MEDICAL CENTER | Age: 82
End: 2017-08-24
Payer: MEDICARE

## 2017-08-24 ENCOUNTER — TELEPHONE (OUTPATIENT)
Dept: MEDICAL GROUP | Facility: MEDICAL CENTER | Age: 82
End: 2017-08-24

## 2017-08-24 VITALS
OXYGEN SATURATION: 94 % | TEMPERATURE: 97.8 F | HEIGHT: 67 IN | BODY MASS INDEX: 32.49 KG/M2 | SYSTOLIC BLOOD PRESSURE: 112 MMHG | HEART RATE: 65 BPM | DIASTOLIC BLOOD PRESSURE: 68 MMHG | RESPIRATION RATE: 18 BRPM | WEIGHT: 207 LBS

## 2017-08-24 DIAGNOSIS — F11.20 OPIOID TYPE DEPENDENCE, CONTINUOUS (HCC): ICD-10-CM

## 2017-08-24 DIAGNOSIS — N40.1 BENIGN PROSTATIC HYPERPLASIA WITH LOWER URINARY TRACT SYMPTOMS, UNSPECIFIED MORPHOLOGY: Chronic | ICD-10-CM

## 2017-08-24 DIAGNOSIS — G89.29 CHRONIC PAIN OF RIGHT KNEE: ICD-10-CM

## 2017-08-24 DIAGNOSIS — M25.561 CHRONIC PAIN OF RIGHT KNEE: ICD-10-CM

## 2017-08-24 DIAGNOSIS — M54.50 CHRONIC BILATERAL LOW BACK PAIN WITHOUT SCIATICA: ICD-10-CM

## 2017-08-24 DIAGNOSIS — E78.5 DYSLIPIDEMIA: Chronic | ICD-10-CM

## 2017-08-24 DIAGNOSIS — Z79.891 CHRONIC USE OF OPIATE DRUGS THERAPEUTIC PURPOSES: ICD-10-CM

## 2017-08-24 DIAGNOSIS — G89.29 CHRONIC BILATERAL LOW BACK PAIN WITHOUT SCIATICA: ICD-10-CM

## 2017-08-24 DIAGNOSIS — I73.9 PVD (PERIPHERAL VASCULAR DISEASE) (HCC): Chronic | ICD-10-CM

## 2017-08-24 DIAGNOSIS — I48.0 PAF (PAROXYSMAL ATRIAL FIBRILLATION) (HCC): ICD-10-CM

## 2017-08-24 PROCEDURE — 99214 OFFICE O/P EST MOD 30 MIN: CPT | Performed by: PHYSICIAN ASSISTANT

## 2017-08-24 RX ORDER — HYDROCODONE BITARTRATE AND ACETAMINOPHEN 10; 325 MG/1; MG/1
1 TABLET ORAL EVERY 6 HOURS PRN
Qty: 120 TAB | Refills: 0 | Status: SHIPPED | OUTPATIENT
Start: 2017-08-24 | End: 2017-09-23

## 2017-08-24 RX ORDER — HYDROCODONE BITARTRATE AND ACETAMINOPHEN 10; 325 MG/1; MG/1
1 TABLET ORAL EVERY 6 HOURS PRN
Qty: 120 TAB | Refills: 0 | Status: SHIPPED | OUTPATIENT
Start: 2017-10-24 | End: 2017-11-23

## 2017-08-24 RX ORDER — HYDROCODONE BITARTRATE AND ACETAMINOPHEN 10; 325 MG/1; MG/1
1 TABLET ORAL EVERY 6 HOURS PRN
Qty: 120 TAB | Refills: 0 | Status: SHIPPED | OUTPATIENT
Start: 2017-09-24 | End: 2017-10-24

## 2017-08-24 ASSESSMENT — PAIN SCALES - GENERAL: PAINLEVEL: 7=MODERATE-SEVERE PAIN

## 2017-08-24 NOTE — PROGRESS NOTES
Chief Complaint/HPI:  Gennaro Richardson is a 81 y.o.  who presents for follow up/evaluation of chronic pain and medication management.    Location of pain  1. Right knee  2. Lower back      Location  1( as above) 2( as above) 3 ( as above)   Onset, When did your symptoms start? chronic chronic    Quality: constant or intermittent constant constant    Descriptors: aching, burning, throbbing,shooting,sharp,stabbing,squeezing,pressure,soreness aching aching    Changed since last visit? no no    Present level(1-10), average in last week 3-4 3-4    Best level in the last week      Worst level in the last week      Worsens with: activity activity    Improves with: medication medication    Associated symptoms      Is the amount of pain relief you are now obtaining from your current pain relievers enough to make a real difference in your life? Are you satisfied with your current level of pain control? yes yes          Since last assessment level of function(ADL) has    Physical Functioning: not changed   Mood:not changed     Family Relationships:not changed   Sleep pattern:not changed   Social Relationships:not changed   Overall functioning:not changed     Potential aberrant behavior Unkempt/impaired:  No Changes route of administration:  No Abusing alcohol or illicit drugs:  No    Involved in accident:  No Use medication in response to situational stress:  No Purposeful over sedation:  No      Requesting early renewals:  No Insists on certain med by name:  No Negative mood change:  No    Appears intoxicated:  No Multiple prescribers:  No Reports lost or stolen meds:  No    Hoarding Medication:  No Arrested by police:  No Contact with street culture:  No               Comments:           ROS  Any side effects from current medications?               Nausea:none        Vomiting:none        Constipation:none        Itching:none        Mental status changes:none      Sweating:none        Fatigue:none        Drowsiness:none    "     Overall severity of side effects:none            Current medications, problem list, allergies, tobacco and alcohol use were reviewed in [facility] today      PHYSICAL EXAMINATION:  /68 mmHg  Pulse 65  Temp(Src) 36.6 °C (97.8 °F)  Resp 18  Ht 1.702 m (5' 7.01\")  Wt 93.895 kg (207 lb)  BMI 32.41 kg/m2  SpO2 94%  General: Well-developed, well-nourished,well kept, no apparent distress, pleasant and cooperative with the examination.  Skin: Warm and dry. No rashes or lesions in visible areas.  Extremities: No cyanosis, clubbing or edema  Musculoskeletal: right knee with brace, obvious bilat bowing with standing  Psychiatric: Alert and oriented x3, Normal affect and mood.    Aware reviewed yes, scanned into EPIC.      ASSESSMENT/Plan:  1. Chronic pain of right knee    - hydrocodone/acetaminophen (NORCO)  MG Tab; Take 1 Tab by mouth every 6 hours as needed (Must last 30 days) for up to 30 days.  Dispense: 120 Tab; Refill: 0  - hydrocodone/acetaminophen (NORCO)  MG Tab; Take 1 Tab by mouth every 6 hours as needed for Moderate Pain or Severe Pain for up to 30 days.  Dispense: 120 Tab; Refill: 0  - hydrocodone/acetaminophen (NORCO)  MG Tab; Take 1 Tab by mouth every 6 hours as needed for Moderate Pain or Severe Pain for up to 30 days.  Dispense: 120 Tab; Refill: 0    2. Opioid type dependence, continuous (CMS-[facility])    - hydrocodone/acetaminophen (NORCO)  MG Tab; Take 1 Tab by mouth every 6 hours as needed (Must last 30 days) for up to 30 days.  Dispense: 120 Tab; Refill: 0  - hydrocodone/acetaminophen (NORCO)  MG Tab; Take 1 Tab by mouth every 6 hours as needed for Moderate Pain or Severe Pain for up to 30 days.  Dispense: 120 Tab; Refill: 0  - hydrocodone/acetaminophen (NORCO)  MG Tab; Take 1 Tab by mouth every 6 hours as needed for Moderate Pain or Severe Pain for up to 30 days.  Dispense: 120 Tab; Refill: 0    3. Chronic bilateral low back pain without sciatica    - " hydrocodone/acetaminophen (NORCO)  MG Tab; Take 1 Tab by mouth every 6 hours as needed (Must last 30 days) for up to 30 days.  Dispense: 120 Tab; Refill: 0  - hydrocodone/acetaminophen (NORCO)  MG Tab; Take 1 Tab by mouth every 6 hours as needed for Moderate Pain or Severe Pain for up to 30 days.  Dispense: 120 Tab; Refill: 0  - hydrocodone/acetaminophen (NORCO)  MG Tab; Take 1 Tab by mouth every 6 hours as needed for Moderate Pain or Severe Pain for up to 30 days.  Dispense: 120 Tab; Refill: 0      F/u 3 months

## 2017-08-24 NOTE — ASSESSMENT & PLAN NOTE
reviewed. No sign of aberrant behavior. Last millenium 2/2017. No early refills or lost prescriptions.

## 2017-08-24 NOTE — PROGRESS NOTES
Subjective:   Gennaro Richardson is a 81 y.o. male here today for pain medication refill and follow up on chronic conditions    PVD (peripheral vascular disease)  Current with cardiology. No change    PAF (paroxysmal atrial fibrillation)  Current with cardiology and warfarin clinic    Opioid type dependence, continuous   reviewed. No early refills. Only one provider (Dr. Denny). Pain states pain is well controlled and would like to continue current regimen.    Dyslipidemia  Labs up to date. On 40mg lipitor per cardiology    Chronic use of opiate drugs therapeutic purposes   reviewed. No sign of aberrant behavior. Last millenium 2/2017. No early refills or lost prescriptions.    Chronic pain of right knee  No change. Wearing brace. Pain well controlled on norco    Chronic back pain  No change. States pain well controlled with the norco    Benign prostatic hypertrophy with lower urinary tract symptoms (LUTS)  Doing well on finasteride and flomax. Had recent f/u with urology     Recent labs and cardiology note reviewed    Current medicines (including changes today)  Current Outpatient Prescriptions   Medication Sig Dispense Refill   • hydrocodone/acetaminophen (NORCO)  MG Tab Take 1 Tab by mouth every 6 hours as needed (Must last 30 days) for up to 30 days. 120 Tab 0   • [START ON 9/24/2017] hydrocodone/acetaminophen (NORCO)  MG Tab Take 1 Tab by mouth every 6 hours as needed for Moderate Pain or Severe Pain for up to 30 days. 120 Tab 0   • [START ON 10/24/2017] hydrocodone/acetaminophen (NORCO)  MG Tab Take 1 Tab by mouth every 6 hours as needed for Moderate Pain or Severe Pain for up to 30 days. 120 Tab 0   • atorvastatin (LIPITOR) 40 MG Tab Take 1 Tab by mouth every day. 90 Tab 3   • clobetasol (TEMOVATE) 0.05 % Cream Apply to affected area(s) 2 times a day. 60 g 2   • warfarin (COUMADIN) 5 MG Tab AS DIRECTED BY COUMADIN CLINIC 90 Tab 3   • finasteride (PROSCAR) 5 MG TABS TAKE ONE  "TABLET BY MOUTH ONE TIME DAILY 90 Tab 2   • tamsulosin (FLOMAX) 0.4 MG capsule Take 1 Cap by mouth every day. (Patient taking differently: Take 0.4 mg by mouth 2 Times a Day.) 90 Cap 3     No current facility-administered medications for this visit.     He  has a past medical history of Colonic polyps; Diverticulosis; CAD (coronary artery disease); Arteriosclerosis; PVD (peripheral vascular disease) (CMS-HCC); CAD (coronary artery disease) (6/28/2011); Pacer at end of battery life (6/28/2011); A-fib (CMS-HCC) (6/28/2011); Dyslipidemia (6/28/2011); Peripheral vascular disease (CMS-HCC) (10/31/2011); Hematoma of rectus sheath (6/13/2012); Dizzy spells (11/20/2012); Hematoma of abdominal wall; Paroxysmal atrial fibrillation (CMS-HCC); Cardiac pacemaker in situ (11/19/2013); PAF (paroxysmal atrial fibrillation) (CMS-HCC) (9/7/2014); Myocardial infarct (CMS-HCC) (1989); and HERZOG (dyspnea on exertion) (9/7/2014).    ROS   No fever/chills. No weight change. No headache/dizziness. No focal weakness. No sore throat, nasal congestion, ear pain. No chest pain, no shortness of breath, difficulty breathing. No n/v/d/c or abdominal pain. No urinary complaint. No rash or skin lesion.     Objective:     Blood pressure 112/68, pulse 65, temperature 36.6 °C (97.8 °F), resp. rate 18, height 1.702 m (5' 7.01\"), weight 93.895 kg (207 lb), SpO2 94 %. Body mass index is 32.41 kg/(m^2).   Physical Exam:  Constitutional: WDWN, NAD  Skin: Warm, dry, good turgor, no rashes in visible areas.  Eye: Equal, round and reactive, conjunctiva clear, lids normal.  ENMT: Lips without lesions, good dentition, oropharynx clear.  Neck: Trachea midline, no masses, no thyromegaly. No cervical or supraclavicular lymphadenopathy  Respiratory: Unlabored respiratory effort, lungs clear to auscultation, no wheezes, no ronchi.  Cardiovascular: Normal S1, S2 no leg edema.  Psych: Alert and oriented x3, normal affect and mood.        Assessment and Plan:   The " following treatment plan was discussed    1. Chronic pain of right knee    - hydrocodone/acetaminophen (NORCO)  MG Tab; Take 1 Tab by mouth every 6 hours as needed (Must last 30 days) for up to 30 days.  Dispense: 120 Tab; Refill: 0  - hydrocodone/acetaminophen (NORCO)  MG Tab; Take 1 Tab by mouth every 6 hours as needed for Moderate Pain or Severe Pain for up to 30 days.  Dispense: 120 Tab; Refill: 0  - hydrocodone/acetaminophen (NORCO)  MG Tab; Take 1 Tab by mouth every 6 hours as needed for Moderate Pain or Severe Pain for up to 30 days.  Dispense: 120 Tab; Refill: 0    2. Opioid type dependence, continuous (CMS-[facility])    - hydrocodone/acetaminophen (NORCO)  MG Tab; Take 1 Tab by mouth every 6 hours as needed (Must last 30 days) for up to 30 days.  Dispense: 120 Tab; Refill: 0  - hydrocodone/acetaminophen (NORCO)  MG Tab; Take 1 Tab by mouth every 6 hours as needed for Moderate Pain or Severe Pain for up to 30 days.  Dispense: 120 Tab; Refill: 0  - hydrocodone/acetaminophen (NORCO)  MG Tab; Take 1 Tab by mouth every 6 hours as needed for Moderate Pain or Severe Pain for up to 30 days.  Dispense: 120 Tab; Refill: 0    3. Chronic bilateral low back pain without sciatica    - hydrocodone/acetaminophen (NORCO)  MG Tab; Take 1 Tab by mouth every 6 hours as needed (Must last 30 days) for up to 30 days.  Dispense: 120 Tab; Refill: 0  - hydrocodone/acetaminophen (NORCO)  MG Tab; Take 1 Tab by mouth every 6 hours as needed for Moderate Pain or Severe Pain for up to 30 days.  Dispense: 120 Tab; Refill: 0  - hydrocodone/acetaminophen (NORCO)  MG Tab; Take 1 Tab by mouth every 6 hours as needed for Moderate Pain or Severe Pain for up to 30 days.  Dispense: 120 Tab; Refill: 0    4. PVD (peripheral vascular disease) (CMS-[facility])      5. PAF (paroxysmal atrial fibrillation) (CMS-[facility])      6. Dyslipidemia      7. Chronic use of opiate drugs therapeutic purposes  [facility] Aware  web site evaluation: I have obtained and reviewed patient utilization report from [facility] pharmacy database prior to writing prescription for controlled substance II, III or IV. Based on the report and my clinical assessment the prescription is medically necessary.   Patient is cautioned on sedation potential of narcotic medication; no drinking, driving or operating heavy machinery while on this medication.    8. Benign prostatic hyperplasia with lower urinary tract symptoms, unspecified morphology        Followup: Return in about 3 months (around 11/24/2017) for Pain Med Refill.

## 2017-08-24 NOTE — MR AVS SNAPSHOT
"        Gennaro Richardson   2017 9:00 AM   Office Visit   MRN: [id number]    Department:  [facility]   Dept Phone:  [phone]    Description:  Male : 1935   Provider:  Angelica Hauser PA-C           Reason for Visit     Follow-Up Rt. knee and lower back chronic pain      Allergies as of 2017     Allergen Noted Reactions    No Known Drug Allergy 2008         You were diagnosed with     Chronic pain of right knee   [0054847]       Opioid type dependence, continuous (CMS-[facility])   [304.01.ICD-9-CM]       Chronic bilateral low back pain without sciatica   [8091428]       PVD (peripheral vascular disease) (CMS-[facility])   [002626]       PAF (paroxysmal atrial fibrillation) (CMS-[facility])   [141409]       Dyslipidemia   [659272]       Chronic use of opiate drugs therapeutic purposes   [0686793]       Benign prostatic hyperplasia with lower urinary tract symptoms, unspecified morphology   [8399323]         Vital Signs     Blood Pressure Pulse Temperature Respirations Height Weight    112/68 mmHg 65 36.6 °C (97.8 °F) 18 1.702 m (5' 7.01\") 93.895 kg (207 lb)    Body Mass Index Oxygen Saturation Smoking Status             32.41 kg/m2 94% Former Smoker         Basic Information     Date Of Birth Sex Race Ethnicity Preferred Language    1935 Male White Non- English      Your appointments     Sep 06, 2017 11:00 AM   ANNUAL WELLNESS with Angelica Hauser PA-C, [facility]    [facility] (--)    [address]  [address]  [place] [facility] [id number] [phone]            2017  2:00 PM   Established Patient with Angelica Hauser PA-C   [facility] (--)    [address]  [address]  [place] [address] [phone]           You will be receiving a confirmation call a few days before your appointment from our automated call confirmation system.            2018  1:15 PM   PACER CHECK ONLY with PACER CHECK-CAM B 2   [facility]" [facility] (--)    [address]  [place] [address]   [phone]            Jan 03, 2018  1:30 PM   FOLLOW UP with Josef Retana M.D.   [facility] (--)    [address]  [place] [address]   [phone]              Problem List              ICD-10-CM Priority Class Noted - Resolved    Coronary artery disease due to calcified coronary lesion:Occluded left circumflex I25.10, I25.84 High  6/28/2011 - Present    Dyslipidemia (Chronic) E78.5 High  6/28/2011 - Present    Cardiac pacemaker in situ Z95.0 High  11/19/2013 - Present    PVD (peripheral vascular disease) (CMS-HCC) (Chronic) I73.9   4/30/2014 - Present    Benign prostatic hypertrophy with lower urinary tract symptoms (LUTS) (Chronic) N40.1 Low  9/17/2014 - Present    PAF (paroxysmal atrial fibrillation) (CMS-HCC) I48.0 Medium  11/4/2014 - Present    Eczema L30.9 Low  3/17/2015 - Present    History of MI (myocardial infarction) I25.2   3/17/2015 - Present    Chronic pain of right knee M25.561, G89.29   8/26/2015 - Present    Chronic back pain M54.9, G89.29   11/25/2015 - Present    Opioid type dependence, continuous (CMS-HCC) F11.20   2/26/2016 - Present    Chronic use of opiate drugs therapeutic purposes Z79.891   2/27/2017 - Present      Health Maintenance        Date Due Completion Dates    IMM INFLUENZA (1) 9/1/2017 10/11/2016, 10/9/2014, 10/29/2012, 10/31/2011    IMM PNEUMOCOCCAL 65+ (ADULT) LOW/MEDIUM RISK SERIES (1 of 2 - PCV13) 2/25/2018 (Originally 8/31/2000) ---    IMM ZOSTER VACCINE 2/25/2018 (Originally 8/31/1995) ---    COLONOSCOPY 2/18/2019 2/18/2014, 10/23/2012, 7/24/2008    IMM DTaP/Tdap/Td Vaccine (2 - Td) 8/21/2023 8/21/2013            Current Immunizations     Influenza TIV (IM) 10/9/2014, 10/29/2012  3:20 PM, 10/31/2011    Influenza Vaccine Adult HD 10/11/2016    Pneumococcal Vaccine (UF)Historical Data 10/31/2010    Tdap Vaccine 8/21/2013       Tetanus Vaccine 10/31/2010      Below and/or attached are the medications your provider expects you to take. Review all of your home medications and newly ordered medications with your provider and/or pharmacist. Follow medication instructions as directed by your provider and/or pharmacist. Please keep your medication list with you and share with your provider. Update the information when medications are discontinued, doses are changed, or new medications (including over-the-counter products) are added; and carry medication information at all times in the event of emergency situations     Allergies:  NO KNOWN DRUG ALLERGY - (reactions not documented)               Medications  Valid as of: August 24, 2017 -  9:25 AM    Generic Name Brand Name Tablet Size Instructions for use    Atorvastatin Calcium (Tab) LIPITOR 40 MG Take 1 Tab by mouth every day.        Clobetasol Propionate (Cream) TEMOVATE 0.05 % Apply to affected area(s) 2 times a day.        Finasteride (Tab) PROSCAR 5 MG TAKE ONE TABLET BY MOUTH ONE TIME DAILY        Hydrocodone-Acetaminophen (Tab) NORCO  MG Take 1 Tab by mouth every 6 hours as needed (Must last 30 days) for up to 30 days.        Hydrocodone-Acetaminophen (Tab) NORCO  MG Take 1 Tab by mouth every 6 hours as needed for Moderate Pain or Severe Pain for up to 30 days.        Hydrocodone-Acetaminophen (Tab) NORCO  MG Take 1 Tab by mouth every 6 hours as needed for Moderate Pain or Severe Pain for up to 30 days.        Tamsulosin HCl (Cap) FLOMAX 0.4 MG Take 1 Cap by mouth every day.        Warfarin Sodium (Tab) COUMADIN 5 MG AS DIRECTED BY COUMADIN CLINIC        .                 Medicines prescribed today were sent to:     [facility] - [address] [address]    Phone: [phone] Fax: [phone]    Open 24 Hours?: No      Medication refill instructions:       If your prescription bottle indicates you have medication refills left, it  is not necessary to call your provider’s office. Please contact your pharmacy and they will refill your medication.    If your prescription bottle indicates you do not have any refills left, you may request refills at any time through one of the following ways: The online [employer] system (except Urgent Care), by calling your provider’s office, or by asking your pharmacy to contact your provider’s office with a refill request. Medication refills are processed only during regular business hours and may not be available until the next business day. Your provider may request additional information or to have a follow-up visit with you prior to refilling your medication.   *Please Note: Medication refills are assigned a new Rx number when refilled electronically. Your pharmacy may indicate that no refills were authorized even though a new prescription for the same medication is available at the pharmacy. Please request the medicine by name with the pharmacy before contacting your provider for a refill.           [employer] Access Code: [email or web address]  Expires: 8/31/2017  1:34 PM    [employer]  A secure, online tool to manage your health information     [employer]® is a secure, online tool that connects you to your personalized health information from the privacy of your home -- day or night - making it very easy for you to manage your healthcare. Once the activation process is completed, you can even access your medical information using the [employer] janeen, which is available for free in the Apple Janeen store or Google Play store.     [employer] provides the following levels of access (as shown below):   My Chart Features   [facility] Primary [facility] Doctor [facility]  Specialists [facility]  Urgent  Care Non-[facility]  Primary Care  Doctor   Email your healthcare team securely and privately 24/7 X X X    Manage appointments: schedule your next appointment; view details of past/upcoming appointments X      Request prescription refills. X       View recent personal medical records, including lab and immunizations X X X X   View health record, including health history, allergies, medications X X X X   Read reports about your outpatient visits, procedures, consult and ER notes X X X X   See your discharge summary, which is a recap of your hospital and/or ER visit that includes your diagnosis, lab results, and care plan. X X       How to register for [employer]:  1. Go to  [email or web address].  2. Click on the Sign Up Now box, which takes you to the New Member Sign Up page. You will need to provide the following information:  a. Enter your [employer] Access Code exactly as it appears at the top of this page. (You will not need to use this code after you’ve completed the sign-up process. If you do not sign up before the expiration date, you must request a new code.)   b. Enter your date of birth.   c. Enter your home email address.   d. Click Submit, and follow the next screen’s instructions.  3. Create a VeriShow ID. This will be your [employer] login ID and cannot be changed, so think of one that is secure and easy to remember.  4. Create a [employer] password. You can change your password at any time.  5. Enter your Password Reset Question and Answer. This can be used at a later time if you forget your password.   6. Enter your e-mail address. This allows you to receive e-mail notifications when new information is available in [employer].  7. Click Sign Up. You can now view your health information.    For assistance activating your [employer] account, call [phone]

## 2017-08-24 NOTE — TELEPHONE ENCOUNTER
Future Appointments       Provider Department    9/6/2017 11:00 AM Angelica Hauser PA-C; [facility]    11/27/2017 2:00 PM Angelica Hauser PA-C [facility]    1/3/2018 1:15 PM PACER CHECK-CAM B 2 [facility] and [facility] B    1/3/2018 1:30 PM Josef Retana M.D. [facility] B        ANNUAL WELLNESS VISIT PRE-VISIT PLANNING     1.  Reviewed note from last office visit with PCP: YES Last office visit: 8/24/2017    2.  If any orders were placed at last visit, do we have Results/Consult Notes?        •  Labs - Labs were not ordered at last office visit.        •  Imaging - Imaging was not ordered at last office visit.        •  Referrals - No referrals were ordered at last office visit.     3.  Immunizations were updated in [employer] using [employer]?: Yes       •  [employer] Recommendations:PREVNAR (PCV13)  and ZOSTAVAX (Shingles)       •  Is patient due for Tdap? NO       •  Is patient due for Shingles? YES. Patient was notified of copay.     4.  Patient is due for the following Health Maintenance Topics:   Health Maintenance Due   Topic Date Due   • Annual Wellness Visit  08/22/2014   • IMM INFLUENZA (1) 09/01/2017       - Patient is up-to-date on all Health Maintenance topics. No records have been requested at this time.    5.  Reviewed/Updated the following with patient:       •   Preferred Pharmacy? YES       •   Preferred Lab? YES       •   Medications? Reviewed medications at his mabel for today.       •   Social History?Updated today at the mabel.       •   Family History?Updated today at the mabel.    6.  Care Team Updated:       •   DME Company (gait device, O2, CPAP, etc.): N\A       •   Other Specialists (eye doctor, derm, GYN, cardiology, endo, etc): YES       •   Last eye exam:will review at the mabel.    7. DPA/Advanced Directive:  Pt. Will be given a packet at the mabel.    8.  Patient has the following  Care Path diagnoses on Problem List:  NONE    9.  Specialty Comments was updated with diagnosis information provided by [facility]: YES     10.  Patient will be advised: “This is a free wellness visit. The provider will screen for medical conditions to help you stay healthy. If you have other concerns to address you may be asked to discuss these at a separate visit or there may be an additional fee.”     11.  Patient was informed to arrive 15 min prior to their scheduled appointment and bring in their medication bottles?  YES

## 2017-08-24 NOTE — ASSESSMENT & PLAN NOTE
reviewed. No early refills. Only one provider (Dr. Denny). Pain states pain is well controlled and would like to continue current regimen.

## 2017-09-05 ENCOUNTER — HOSPITAL ENCOUNTER (OUTPATIENT)
Dept: LAB | Facility: MEDICAL CENTER | Age: 82
End: 2017-09-05
Attending: NURSE PRACTITIONER
Payer: MEDICARE

## 2017-09-05 PROCEDURE — 36415 COLL VENOUS BLD VENIPUNCTURE: CPT

## 2017-09-05 PROCEDURE — 85610 PROTHROMBIN TIME: CPT

## 2017-09-06 ENCOUNTER — OFFICE VISIT (OUTPATIENT)
Dept: MEDICAL GROUP | Facility: MEDICAL CENTER | Age: 82
End: 2017-09-06
Payer: MEDICARE

## 2017-09-06 ENCOUNTER — ANTICOAGULATION MONITORING (OUTPATIENT)
Dept: VASCULAR LAB | Facility: MEDICAL CENTER | Age: 82
End: 2017-09-06

## 2017-09-06 VITALS
RESPIRATION RATE: 16 BRPM | HEART RATE: 64 BPM | OXYGEN SATURATION: 95 % | BODY MASS INDEX: 32.36 KG/M2 | TEMPERATURE: 98.5 F | HEIGHT: 67 IN | WEIGHT: 206.2 LBS | SYSTOLIC BLOOD PRESSURE: 110 MMHG | DIASTOLIC BLOOD PRESSURE: 70 MMHG

## 2017-09-06 DIAGNOSIS — F11.20 OPIOID TYPE DEPENDENCE, CONTINUOUS (HCC): ICD-10-CM

## 2017-09-06 DIAGNOSIS — L30.9 ECZEMA, UNSPECIFIED TYPE: ICD-10-CM

## 2017-09-06 DIAGNOSIS — M54.50 CHRONIC BILATERAL LOW BACK PAIN WITHOUT SCIATICA: ICD-10-CM

## 2017-09-06 DIAGNOSIS — Z79.891 CHRONIC USE OF OPIATE DRUGS THERAPEUTIC PURPOSES: ICD-10-CM

## 2017-09-06 DIAGNOSIS — Z23 NEED FOR PNEUMOCOCCAL VACCINATION: ICD-10-CM

## 2017-09-06 DIAGNOSIS — I73.9 PVD (PERIPHERAL VASCULAR DISEASE) (HCC): Chronic | ICD-10-CM

## 2017-09-06 DIAGNOSIS — Z95.0 CARDIAC PACEMAKER IN SITU: ICD-10-CM

## 2017-09-06 DIAGNOSIS — N40.1 BENIGN PROSTATIC HYPERPLASIA WITH LOWER URINARY TRACT SYMPTOMS, UNSPECIFIED MORPHOLOGY: Chronic | ICD-10-CM

## 2017-09-06 DIAGNOSIS — I25.10 CORONARY ARTERY DISEASE DUE TO CALCIFIED CORONARY LESION: ICD-10-CM

## 2017-09-06 DIAGNOSIS — I48.0 PAF (PAROXYSMAL ATRIAL FIBRILLATION) (HCC): ICD-10-CM

## 2017-09-06 DIAGNOSIS — M25.561 CHRONIC PAIN OF RIGHT KNEE: ICD-10-CM

## 2017-09-06 DIAGNOSIS — I25.84 CORONARY ARTERY DISEASE DUE TO CALCIFIED CORONARY LESION: ICD-10-CM

## 2017-09-06 DIAGNOSIS — Z00.00 MEDICARE ANNUAL WELLNESS VISIT, SUBSEQUENT: ICD-10-CM

## 2017-09-06 DIAGNOSIS — G89.29 CHRONIC PAIN OF RIGHT KNEE: ICD-10-CM

## 2017-09-06 DIAGNOSIS — Z23 NEED FOR INFLUENZA VACCINATION: ICD-10-CM

## 2017-09-06 DIAGNOSIS — I25.2 HISTORY OF MI (MYOCARDIAL INFARCTION): ICD-10-CM

## 2017-09-06 DIAGNOSIS — E78.5 DYSLIPIDEMIA: Chronic | ICD-10-CM

## 2017-09-06 DIAGNOSIS — G89.29 CHRONIC BILATERAL LOW BACK PAIN WITHOUT SCIATICA: ICD-10-CM

## 2017-09-06 DIAGNOSIS — E66.9 OBESITY (BMI 30-39.9): ICD-10-CM

## 2017-09-06 LAB
INR PPP: 4.18 (ref 0.87–1.13)
PROTHROMBIN TIME: 41.6 SEC (ref 12–14.6)

## 2017-09-06 PROCEDURE — 90670 PCV13 VACCINE IM: CPT | Performed by: PHYSICIAN ASSISTANT

## 2017-09-06 PROCEDURE — 85610 PROTHROMBIN TIME: CPT

## 2017-09-06 PROCEDURE — 90662 IIV NO PRSV INCREASED AG IM: CPT | Performed by: PHYSICIAN ASSISTANT

## 2017-09-06 PROCEDURE — 36415 COLL VENOUS BLD VENIPUNCTURE: CPT

## 2017-09-06 PROCEDURE — G0009 ADMIN PNEUMOCOCCAL VACCINE: HCPCS | Performed by: PHYSICIAN ASSISTANT

## 2017-09-06 PROCEDURE — G0008 ADMIN INFLUENZA VIRUS VAC: HCPCS | Performed by: PHYSICIAN ASSISTANT

## 2017-09-06 PROCEDURE — G0439 PPPS, SUBSEQ VISIT: HCPCS | Mod: 25 | Performed by: PHYSICIAN ASSISTANT

## 2017-09-06 ASSESSMENT — PATIENT HEALTH QUESTIONNAIRE - PHQ9
SUM OF ALL RESPONSES TO PHQ QUESTIONS 1-9: 0
CLINICAL INTERPRETATION OF PHQ2 SCORE: 0

## 2017-09-06 NOTE — PROGRESS NOTES
Anticoagulation Summary  As of 9/6/2017    INR goal:   2.0-3.0   TTR:   78.0 % (2.2 y)   Today's INR:   4.18!   Maintenance plan:   2.5 mg (5 mg x 0.5) on Wed; 5 mg (5 mg x 1) all other days   Weekly total:   32.5 mg   Plan last modified:   Antwon Dejesus PharmD (6/28/2017)   Next INR check:   9/20/2017   Target end date:   Indefinite    Indications    A-fib (CMS-HCC) (Resolved) [I48.91]             Anticoagulation Episode Summary     INR check location:   Outside Lab    Preferred lab:       Send INR reminders to:       Comments:   [facility]       Anticoagulation Care Providers     Provider Role Specialty Phone number    Anastacio Sunshine M.D. Referring Cardiology [phone]    Mahin Valdez Responsible          Anticoagulation Patient Findings  Patient Findings     Negatives:   Signs/symptoms of thrombosis, Signs/symptoms of bleeding, Laboratory test error suspected, Change in health, Change in alcohol use, Change in activity, Upcoming invasive procedure, Emergency department visit, Upcoming dental procedure, Missed doses, Extra doses, Change in medications, Change in diet/appetite, Hospital admission, Bruising, Other complaints        Spoke with patient today regarding supratherapeutic INR of 4.18.  Patient denies any signs/symptoms of bruising or bleeding or any changes in diet and medications.  Instructed patient to call clinic with any questions or concerns.  Instructed patient to HOLD X 1, 2.5mg X 1, then resume current warfarin regimen.  Follow up in 2 weeks.    Antwon Dejesus PharmD

## 2017-09-06 NOTE — PROGRESS NOTES
Chief Complaint   Patient presents with   • Annual Wellness Visit         HPI:  Gennaro is a 82 y.o. here for Medicare Annual Wellness Visit    Problem List Items Addressed This Visit     Dyslipidemia (Chronic)     Chronic, stable on atorvastatin, labs up to date         PVD (peripheral vascular disease) (CMS-HCC) (Chronic)     Chronic, stable, on coumadin, managed by coumadin clinic         Benign prostatic hypertrophy with lower urinary tract symptoms (LUTS) (Chronic)     Chronic, stable on 2 meds, followed by urology         Coronary artery disease due to calcified coronary lesion:Occluded left circumflex     Followed in cardiology, on statin         Cardiac pacemaker in situ    PAF (paroxysmal atrial fibrillation) (CMS-HCC)     Chronic, stable, on coumadin managed by coumadin clinic         History of MI (myocardial infarction)     No current chest pain, SOB, difficulty breathing. Has regular f/u with cardiology         Chronic pain of right knee    Opioid type dependence, continuous (CMS-HCC)     Chronic, stable, no change         Chronic use of opiate drugs therapeutic purposes    Obesity (BMI 30-39.9)     Chronic, stable, works on diet and exercise as tolerated         Relevant Orders    Patient identified as having weight management issue.  Appropriate orders and counseling given.    Eczema      Other Visit Diagnoses     Medicare annual wellness visit, subsequent        Need for pneumococcal vaccination        Relevant Orders    PNEUMOCOCCAL CONJUGATE VACCINE 13-VALENT    Need for influenza vaccination        Relevant Orders    INFLUENZA VACCINE, HIGH DOSE (65+ ONLY)    Chronic bilateral low back pain without sciatica              Patient Active Problem List    Diagnosis Date Noted   • Cardiac pacemaker in situ 11/19/2013     Priority: High   • Coronary artery disease due to calcified coronary lesion:Occluded left circumflex 06/28/2011     Priority: High   • Dyslipidemia 06/28/2011     Priority: High   • PAF  (paroxysmal atrial fibrillation) (CMS-HCC) 11/04/2014     Priority: Medium   • Eczema 03/17/2015     Priority: Low   • Benign prostatic hypertrophy with lower urinary tract symptoms (LUTS) 09/17/2014     Priority: Low   • Obesity (BMI 30-39.9) 09/06/2017   • Chronic use of opiate drugs therapeutic purposes 02/27/2017   • Opioid type dependence, continuous (CMS-HCC) 02/26/2016   • Chronic back pain 11/25/2015   • Chronic pain of right knee 08/26/2015   • History of MI (myocardial infarction) 03/17/2015   • PVD (peripheral vascular disease) (CMS-HCC) 04/30/2014       Current Outpatient Prescriptions   Medication Sig Dispense Refill   • hydrocodone/acetaminophen (NORCO)  MG Tab Take 1 Tab by mouth every 6 hours as needed (Must last 30 days) for up to 30 days. 120 Tab 0   • [START ON 9/24/2017] hydrocodone/acetaminophen (NORCO)  MG Tab Take 1 Tab by mouth every 6 hours as needed for Moderate Pain or Severe Pain for up to 30 days. 120 Tab 0   • [START ON 10/24/2017] hydrocodone/acetaminophen (NORCO)  MG Tab Take 1 Tab by mouth every 6 hours as needed for Moderate Pain or Severe Pain for up to 30 days. 120 Tab 0   • atorvastatin (LIPITOR) 40 MG Tab Take 1 Tab by mouth every day. 90 Tab 3   • clobetasol (TEMOVATE) 0.05 % Cream Apply to affected area(s) 2 times a day. 60 g 2   • warfarin (COUMADIN) 5 MG Tab AS DIRECTED BY COUMADIN CLINIC 90 Tab 3   • finasteride (PROSCAR) 5 MG TABS TAKE ONE TABLET BY MOUTH ONE TIME DAILY 90 Tab 2   • tamsulosin (FLOMAX) 0.4 MG capsule Take 1 Cap by mouth every day. (Patient taking differently: Take 0.4 mg by mouth 2 Times a Day.) 90 Cap 3     No current facility-administered medications for this visit.         Patient is taking medications as noted in medication list.  Current supplements as per medication list.     Allergies: No known drug allergy    Current social contact/activities: Pt. Enjoys spending time with his sister.     Is patient current with immunizations? Pt.  Is due for Prevnar 13, shingles and Influenza. Pt. Would like to have Prevnar 13 and influenza today.     He  reports that he quit smoking about 29 years ago. He quit after 35.00 years of use. He has never used smokeless tobacco. He reports that he does not drink alcohol or use drugs.  Counseling given: No        DPA/Advanced directive: Patient does not have an Advanced Directive.  A packet and workshop information was given on Advanced Directives.    ROS:    Gait: Uses no assistive device   Ostomy: no   Other tubes: no   Amputations: no   Chronic oxygen use no   Last eye exam > 1 year ago   Wears hearing aids: no   : Denies incontinence.       Depression Screening    Little interest or pleasure in doing things?  0 - not at all  Feeling down, depressed, or hopeless? 0 - not at all  Patient Health Questionnaire Score: 0    If depressive symptoms identified deferred to follow up visit unless specifically addressed in assessment and plan.    Interpretation of PHQ-9 Total Score   Score Severity   1-4 No Depression   5-9 Mild Depression   10-14 Moderate Depression   15-19 Moderately Severe Depression   20-27 Severe Depression    Screening for Cognitive Impairment    Three Minute Recall (apple, watch, meredith)  3/3    Draw clock face with all 12 numbers set to the hand to show 10 minutes past 11 o'clock  1 5/5  If cognitive concerns identified, deferred for follow up unless specifically addressed in assessment and plan.    Fall Risk Assessment    Has the patient had two or more falls in the last year or any fall with injury in the last year?  No  If fall risk identified, deferred for follow up unless specifically addressed in assessment and plan.    Safety Assessment    Throw rugs on floor.  Yes  Handrails on all stairs.  Yes  Good lighting in all hallways.  Yes  Difficulty hearing.  No  Patient counseled about all safety risks that were identified.    Functional Assessment ADLs    Are there any barriers preventing you  from cooking for yourself or meeting nutritional needs?  No.    Are there any barriers preventing you from driving safely or obtaining transportation?  No.    Are there any barriers preventing you from using a telephone or calling for help?  No.    Are there any barriers preventing you from shopping?  No.    Are there any barriers preventing you from taking care of your own finances?  No.    Are there any barriers preventing you from managing your medications?  No.    Are you currently engaging any exercise or physical activity?  Yes.  Pt. Walks every day for a couple of miles a day.    Health Maintenance Summary                Annual Wellness Visit Overdue 8/22/2014      Done 8/21/2013     IMM INFLUENZA Overdue 9/1/2017      Done 10/11/2016 Imm Admin: Influenza Vaccine Adult HD     Patient has more history with this topic...    IMM PNEUMOCOCCAL 65+ (ADULT) LOW/MEDIUM RISK SERIES Postponed 2/25/2018 Originally 8/31/2000. Patient Refused    IMM ZOSTER VACCINE Postponed 2/25/2018 Originally 8/31/1995. Patient Refused    URINE DRUG SCREEN Next Due 2/22/2018      Done 2/27/2017 [facility] PAIN MANAGEMENT SCREEN    COLONOSCOPY Next Due 2/18/2019      Done 2/18/2014 AMB REFERRAL TO GI FOR COLONOSCOPY     Patient has more history with this topic...    IMM DTaP/Tdap/Td Vaccine Next Due 8/21/2023      Done 8/21/2013 Imm Admin: Tdap Vaccine          Patient Care Team:  Shorty Denny M.D. as PCP - General (Family Medicine)  Josef Retana M.D. as Consulting Physician (Cardiology)  ЕКАТЕРИНА Lugo as Consulting Physician (Urology)    Social History   Substance Use Topics   • Smoking status: Former Smoker     Years: 35.00     Quit date: 1/1/1988   • Smokeless tobacco: Never Used   • Alcohol use No     Family History   Problem Relation Age of Onset   • Cancer Mother      He  has a past medical history of A-fib (CMS-HCC) (6/28/2011); Arteriosclerosis; CAD (coronary artery disease); CAD (coronary artery disease)  "(6/28/2011); Cardiac pacemaker in situ (11/19/2013); Colonic polyps; Diverticulosis; Dizzy spells (11/20/2012); HERZOG (dyspnea on exertion) (9/7/2014); Dyslipidemia (6/28/2011); Hematoma of abdominal wall; Hematoma of rectus sheath (6/13/2012); Myocardial infarct (CMS-HCC) (1989); Pacer at end of battery life (6/28/2011); PAF (paroxysmal atrial fibrillation) (CMS-HCC) (9/7/2014); Paroxysmal atrial fibrillation (CMS-HCC); Peripheral vascular disease (CMS-HCC) (10/31/2011); and PVD (peripheral vascular disease) (CMS-HCC).   Past Surgical History:   Procedure Laterality Date   • EXPLORATORY LAPAROTOMY  6/14/2012    Performed by CALLY MERCER at SURGERY [facility] ORS   • COLONOSCOPY WITH POLYP  7/24/08    Performed by RUDOLPH BRENNER at SURGERY [facility] ORS   • ABDOMINAL AORTIC ANEURYSM     • PACEMAKER INSERTION             Exam:     Blood pressure 110/70, pulse 64, temperature 36.9 °C (98.5 °F), resp. rate 16, height 1.708 m (5' 7.25\"), weight 93.5 kg (206 lb 3.2 oz), SpO2 95 %. Body mass index is 32.06 kg/m².    Hearing fair.    Dentition fair  Alert, oriented in no acute distress.  Eye contact is good, speech goal directed, affect calm      Assessment and Plan. The following treatment and monitoring plan is recommended:    1. Medicare annual wellness visit, subsequent     2. Need for pneumococcal vaccination  PNEUMOCOCCAL CONJUGATE VACCINE 13-VALENT   3. Need for influenza vaccination  INFLUENZA VACCINE, HIGH DOSE (65+ ONLY)   4. Obesity (BMI 30-39.9)  Patient identified as having weight management issue.  Appropriate orders and counseling given.   5. PVD (peripheral vascular disease) (CMS-HCC)     6. PAF (paroxysmal atrial fibrillation) (CMS-HCC)     7. Opioid type dependence, continuous (CMS-HCC)     8. History of MI (myocardial infarction)     9. Dyslipidemia     10. Coronary artery disease due to calcified coronary lesion:Occluded left circumflex     11. Benign prostatic hyperplasia with lower urinary " tract symptoms, unspecified morphology     12. Cardiac pacemaker in situ     13. Chronic bilateral low back pain without sciatica     14. Chronic pain of right knee     15. Chronic use of opiate drugs therapeutic purposes     16. Eczema, unspecified type       1. F/u one year  2. Given as directed  3. Given as directed  4. Cont to work on diet and exerices  5. F/u with vascular and cardiology, cont current meds  6. PAF, cont with coumadin, coumadin clinic, cardiology  7. F/u in November, cont current meds  8. ER precautions discussed, f/u with cardiology as scheduled  9. Cont statin as rec by cardiology  10. CAD - cont statin as rec by cardiology  11. Cont meds, PSA up to date, f/u with urology as scheduled  12. F/u with cardiology  13. Cont current meds  14. Cont current meds  15. Cont current meds  16. No med at this time    Services suggested: No services needed at this time  Health Care Screening recommendations as per orders if indicated.  Referrals offered: PT/OT/Nutrition counseling/Behavioral Health/Smoking cessation as per orders if indicated.    Discussion today about general wellness and lifestyle habits:    · Prevent falls and reduce trip hazards; Cautioned about securing or removing rugs.  · Have a working fire alarm and carbon monoxide detector;   · Engage in regular physical activity and social activities       Follow-up: Return for has appointment november 2017.

## 2017-09-19 ENCOUNTER — HOSPITAL ENCOUNTER (OUTPATIENT)
Dept: LAB | Facility: MEDICAL CENTER | Age: 82
End: 2017-09-19
Attending: NURSE PRACTITIONER
Payer: MEDICARE

## 2017-09-19 DIAGNOSIS — I48.0 PAF (PAROXYSMAL ATRIAL FIBRILLATION) (HCC): ICD-10-CM

## 2017-09-19 LAB
INR PPP: 3.45 (ref 0.87–1.13)
PROTHROMBIN TIME: 35.8 SEC (ref 12–14.6)

## 2017-09-19 PROCEDURE — 85610 PROTHROMBIN TIME: CPT

## 2017-09-19 PROCEDURE — 36415 COLL VENOUS BLD VENIPUNCTURE: CPT

## 2017-09-20 ENCOUNTER — ANTICOAGULATION MONITORING (OUTPATIENT)
Dept: VASCULAR LAB | Facility: MEDICAL CENTER | Age: 82
End: 2017-09-20

## 2017-09-20 NOTE — PROGRESS NOTES
Anticoagulation Summary  As of 9/20/2017    INR goal:   2.0-3.0   TTR:   76.7 % (2.2 y)   Today's INR:   3.45! (9/19/2017)   Maintenance plan:   2.5 mg (5 mg x 0.5) on Wed, Sat; 5 mg (5 mg x 1) all other days   Weekly total:   30 mg   Plan last modified:   Jus Young, PharmD (9/20/2017)   Next INR check:   10/4/2017   Target end date:   Indefinite    Indications    A-fib (CMS-HCC) (Resolved) [I48.91]             Anticoagulation Episode Summary     INR check location:   Outside Lab    Preferred lab:       Send INR reminders to:       Comments:   [facility]       Anticoagulation Care Providers     Provider Role Specialty Phone number    Anastacio Sunshine M.D. Referring Cardiology [phone]    Mahin Valdez Responsible          Anticoagulation Patient Findings    Left voicemail message to report a SUPRA therapeutic INR of 3.45.  Pt to begin reduced warfarin dosing regimen. Requested pt contact the clinic for any s/s of unusual bleeding, bruising, clotting or any changes to diet or medication. FU 2 weeks.    Jus Young, PharmD

## 2017-09-29 ENCOUNTER — TELEPHONE (OUTPATIENT)
Dept: VASCULAR LAB | Facility: MEDICAL CENTER | Age: 82
End: 2017-09-29

## 2017-09-29 DIAGNOSIS — Z79.01 CHRONIC ANTICOAGULATION: ICD-10-CM

## 2017-09-29 RX ORDER — WARFARIN SODIUM 5 MG/1
TABLET ORAL
Qty: 90 TAB | Refills: 3 | Status: SHIPPED | OUTPATIENT
Start: 2017-09-29 | End: 2018-09-16 | Stop reason: SDUPTHER

## 2017-10-03 ENCOUNTER — HOSPITAL ENCOUNTER (OUTPATIENT)
Dept: LAB | Facility: MEDICAL CENTER | Age: 82
End: 2017-10-03
Attending: NURSE PRACTITIONER
Payer: MEDICARE

## 2017-10-03 DIAGNOSIS — I48.0 PAF (PAROXYSMAL ATRIAL FIBRILLATION) (HCC): ICD-10-CM

## 2017-10-03 LAB
INR PPP: 2.07 (ref 0.87–1.13)
PROTHROMBIN TIME: 23.9 SEC (ref 12–14.6)

## 2017-10-03 PROCEDURE — 36415 COLL VENOUS BLD VENIPUNCTURE: CPT

## 2017-10-03 PROCEDURE — 85610 PROTHROMBIN TIME: CPT

## 2017-10-10 ENCOUNTER — ANTICOAGULATION MONITORING (OUTPATIENT)
Dept: VASCULAR LAB | Facility: MEDICAL CENTER | Age: 82
End: 2017-10-10

## 2017-10-10 NOTE — PROGRESS NOTES
Anticoagulation Summary  As of 10/10/2017    INR goal:   2.0-3.0   TTR:   76.6 % (2.3 y)   Today's INR:   2.07 (10/3/2017)   Maintenance plan:   2.5 mg (5 mg x 0.5) on Wed, Sat; 5 mg (5 mg x 1) all other days   Weekly total:   30 mg   Plan last modified:   Jus Young, PharmD (9/20/2017)   Next INR check:      Target end date:   Indefinite    Indications    A-fib (CMS-HCC) (Resolved) [I48.91]             Anticoagulation Episode Summary     INR check location:   Outside Lab    Preferred lab:       Send INR reminders to:       Comments:   [facility]       Anticoagulation Care Providers     Provider Role Specialty Phone number    Anastacio Sunshine M.D. Referring Cardiology [phone]    Mahin Valdez Responsible          Anticoagulation Patient Findings    See note by Gi Leach 04/29/2014  Verito Zafar, PharmD

## 2017-10-18 ENCOUNTER — HOSPITAL ENCOUNTER (OUTPATIENT)
Dept: LAB | Facility: MEDICAL CENTER | Age: 82
End: 2017-10-18
Attending: NURSE PRACTITIONER
Payer: MEDICARE

## 2017-10-18 ENCOUNTER — APPOINTMENT (OUTPATIENT)
Dept: LAB | Facility: MEDICAL CENTER | Age: 82
End: 2017-10-18
Payer: MEDICARE

## 2017-10-18 DIAGNOSIS — I48.0 PAF (PAROXYSMAL ATRIAL FIBRILLATION) (HCC): ICD-10-CM

## 2017-10-18 LAB
INR PPP: 2.66 (ref 0.87–1.13)
PROTHROMBIN TIME: 29.2 SEC (ref 12–14.6)

## 2017-10-18 PROCEDURE — 85610 PROTHROMBIN TIME: CPT

## 2017-10-18 PROCEDURE — 36415 COLL VENOUS BLD VENIPUNCTURE: CPT

## 2017-10-19 ENCOUNTER — ANTICOAGULATION MONITORING (OUTPATIENT)
Dept: VASCULAR LAB | Facility: MEDICAL CENTER | Age: 82
End: 2017-10-19

## 2017-10-19 NOTE — PROGRESS NOTES
Anticoagulation Summary  As of 10/19/2017    INR goal:   2.0-3.0   TTR:   77.0 % (2.3 y)   Today's INR:   2.66 (10/18/2017)   Maintenance plan:   2.5 mg (5 mg x 0.5) on Wed, Sat; 5 mg (5 mg x 1) all other days   Weekly total:   30 mg   Plan last modified:   Jus Young, PharmD (9/20/2017)   Next INR check:   11/16/2017   Target end date:   Indefinite    Indications    A-fib (CMS-HCC) (Resolved) [I48.91]             Anticoagulation Episode Summary     INR check location:   Outside Lab    Preferred lab:       Send INR reminders to:       Comments:   [facility]       Anticoagulation Care Providers     Provider Role Specialty Phone number    Anastacio Sunshine M.D. Referring Cardiology [phone]    Mahin Valdez Responsible          Anticoagulation Patient Findings    Spoke with patient to report a therapeutic INR.    Pt instructed to continue with current warfarin dosing regimen, confirms dosing.   Pt denies any s/s of bleeding, bruising, clotting or any changes to diet or medication.    Will follow up in 4 weeks.     Kelsy Bloom, PharmD

## 2017-11-15 ENCOUNTER — HOSPITAL ENCOUNTER (OUTPATIENT)
Dept: LAB | Facility: MEDICAL CENTER | Age: 82
End: 2017-11-15
Attending: NURSE PRACTITIONER
Payer: MEDICARE

## 2017-11-15 DIAGNOSIS — I48.0 PAF (PAROXYSMAL ATRIAL FIBRILLATION) (HCC): ICD-10-CM

## 2017-11-15 LAB
INR PPP: 2.41 (ref 0.87–1.13)
PROTHROMBIN TIME: 25.9 SEC (ref 12–14.6)

## 2017-11-15 PROCEDURE — 36415 COLL VENOUS BLD VENIPUNCTURE: CPT

## 2017-11-15 PROCEDURE — 85610 PROTHROMBIN TIME: CPT

## 2017-11-16 ENCOUNTER — ANTICOAGULATION MONITORING (OUTPATIENT)
Dept: VASCULAR LAB | Facility: MEDICAL CENTER | Age: 82
End: 2017-11-16

## 2017-11-16 NOTE — PROGRESS NOTES
OP Anticoagulation Telephone Note    Date: 11/16/2017  Anticoagulation Summary  As of 11/16/2017    INR goal:   2.0-3.0   TTR:   77.7 % (2.4 y)   Today's INR:   2.41 (11/15/2017)   Maintenance plan:   2.5 mg (5 mg x 0.5) on Wed, Sat; 5 mg (5 mg x 1) all other days   Weekly total:   30 mg   No change documented:   Melodie Toney, Med Ass't   Plan last modified:   Jus Young, PharmD (9/20/2017)   Next INR check:   12/13/2017   Target end date:   Indefinite    Indications    A-fib (CMS-HCC) (Resolved) [I48.91]             Anticoagulation Episode Summary     INR check location:   Outside Lab    Preferred lab:       Send INR reminders to:       Comments:   [facility]       Anticoagulation Care Providers     Provider Role Specialty Phone number    Anastacio Sunshine M.D. Referring Cardiology [phone]    Mahin Valdez Responsible          Anticoagulation Patient Findings  Patient Findings     Negatives:   Signs/symptoms of thrombosis, Signs/symptoms of bleeding, Laboratory test error suspected, Change in health, Change in alcohol use, Change in activity, Upcoming invasive procedure, Emergency department visit, Upcoming dental procedure, Missed doses, Extra doses, Change in medications, Change in diet/appetite, Hospital admission, Bruising, Other complaints      Plan:  Spoke with patient on the phone. Patient is therapeutic today. Patient denies any changes in medications or diet. Patient denies any signs or symptoms of bleeding or clotting. Instructed patient to call clinic if any unusual bleeding or bruising occurs. Will continue dosing as outlined above. Will follow-up with patient in 4 weeks.    Melodie Toney, Medical Assistant    I have reviewed and concur with the above plan     Mahin Valdez, PharmD

## 2017-11-27 ENCOUNTER — OFFICE VISIT (OUTPATIENT)
Dept: MEDICAL GROUP | Facility: MEDICAL CENTER | Age: 82
End: 2017-11-27
Payer: MEDICARE

## 2017-11-27 VITALS
SYSTOLIC BLOOD PRESSURE: 130 MMHG | HEART RATE: 60 BPM | TEMPERATURE: 98.4 F | HEIGHT: 67 IN | OXYGEN SATURATION: 94 % | BODY MASS INDEX: 33.43 KG/M2 | WEIGHT: 213 LBS | DIASTOLIC BLOOD PRESSURE: 60 MMHG

## 2017-11-27 DIAGNOSIS — M54.50 CHRONIC BILATERAL LOW BACK PAIN WITHOUT SCIATICA: ICD-10-CM

## 2017-11-27 DIAGNOSIS — F11.20 OPIOID TYPE DEPENDENCE, CONTINUOUS (HCC): ICD-10-CM

## 2017-11-27 DIAGNOSIS — G89.29 CHRONIC PAIN OF RIGHT KNEE: ICD-10-CM

## 2017-11-27 DIAGNOSIS — M25.561 CHRONIC PAIN OF RIGHT KNEE: ICD-10-CM

## 2017-11-27 DIAGNOSIS — G89.29 CHRONIC BILATERAL LOW BACK PAIN WITHOUT SCIATICA: ICD-10-CM

## 2017-11-27 PROCEDURE — 99214 OFFICE O/P EST MOD 30 MIN: CPT | Performed by: PHYSICIAN ASSISTANT

## 2017-11-27 RX ORDER — HYDROCODONE BITARTRATE AND ACETAMINOPHEN 10; 325 MG/1; MG/1
1 TABLET ORAL EVERY 6 HOURS PRN
Qty: 120 TAB | Refills: 0 | Status: SHIPPED | OUTPATIENT
Start: 2018-01-27 | End: 2018-02-26

## 2017-11-27 RX ORDER — HYDROCODONE BITARTRATE AND ACETAMINOPHEN 10; 325 MG/1; MG/1
1 TABLET ORAL EVERY 6 HOURS PRN
Qty: 120 TAB | Refills: 0 | Status: SHIPPED | OUTPATIENT
Start: 2017-11-27 | End: 2017-12-27

## 2017-11-27 RX ORDER — HYDROCODONE BITARTRATE AND ACETAMINOPHEN 10; 325 MG/1; MG/1
1 TABLET ORAL EVERY 6 HOURS PRN
Qty: 120 TAB | Refills: 0 | Status: SHIPPED | OUTPATIENT
Start: 2017-12-27 | End: 2018-01-26

## 2017-11-27 NOTE — ASSESSMENT & PLAN NOTE
Takes norco 10/325 4 times a day. No lost prescriptions. No early refill. States it works well for pain. Denies dizziness, weakness, or any other adverse reaction to medication. Can't take NSAIDs as he is on coumadin. Not able to have knee replacement surgery d/t heart disease. Has tried PT which didn't help pain. Last UDS 2/2017. Controlled substance agreement up to date.

## 2017-11-27 NOTE — PROGRESS NOTES
Subjective:   Gennaro Richardson is a 82 y.o. male here today for pain medication, chronic pain    Chronic back pain  Degenerative disc disease, chronic, no surgery    Chronic pain of right knee  Chronic. Arthritis. Would need replacement, not appropriate for surgery.    Opioid type dependence, continuous  Takes norco 10/325 4 times a day. No lost prescriptions. No early refill. States it works well for pain. Denies dizziness, weakness, or any other adverse reaction to medication. Can't take NSAIDs as he is on coumadin. Not able to have knee replacement surgery d/t heart disease. Has tried PT which didn't help pain. Last UDS 2/2017. Controlled substance agreement up to date.       Current medicines (including changes today)  Current Outpatient Prescriptions   Medication Sig Dispense Refill   • hydrocodone/acetaminophen (NORCO)  MG Tab Take 1 Tab by mouth every 6 hours as needed for Severe Pain for up to 30 days. 120 Tab 0   • [START ON 12/27/2017] hydrocodone/acetaminophen (NORCO)  MG Tab Take 1 Tab by mouth every 6 hours as needed for Severe Pain for up to 30 days. 120 Tab 0   • [START ON 1/27/2018] hydrocodone/acetaminophen (NORCO)  MG Tab Take 1 Tab by mouth every 6 hours as needed for up to 30 days. 120 Tab 0   • warfarin (COUMADIN) 5 MG Tab Take 1/2-1 tab po q day or as directed by clinic 90 Tab 3   • atorvastatin (LIPITOR) 40 MG Tab Take 1 Tab by mouth every day. 90 Tab 3   • finasteride (PROSCAR) 5 MG TABS TAKE ONE TABLET BY MOUTH ONE TIME DAILY 90 Tab 2   • tamsulosin (FLOMAX) 0.4 MG capsule Take 1 Cap by mouth every day. (Patient taking differently: Take 0.4 mg by mouth 2 Times a Day.) 90 Cap 3   • clobetasol (TEMOVATE) 0.05 % Cream Apply to affected area(s) 2 times a day. 60 g 2     No current facility-administered medications for this visit.      He  has a past medical history of A-fib (CMS-[facility]) (6/28/2011); Arteriosclerosis; CAD (coronary artery disease); CAD (coronary artery disease)  "(6/28/2011); Cardiac pacemaker in situ (11/19/2013); Colonic polyps; Diverticulosis; Dizzy spells (11/20/2012); HERZOG (dyspnea on exertion) (9/7/2014); Dyslipidemia (6/28/2011); Hematoma of abdominal wall; Hematoma of rectus sheath (6/13/2012); Myocardial infarct (1989); Pacer at end of battery life (6/28/2011); PAF (paroxysmal atrial fibrillation) (CMS-HCC) (9/7/2014); Paroxysmal atrial fibrillation (CMS-HCC); Peripheral vascular disease (CMS-HCC) (10/31/2011); and PVD (peripheral vascular disease) (CMS-HCC).    ROS   No fever/chills. No weight change. No headache/dizziness. No focal weakness. No sore throat, nasal congestion, ear pain. No chest pain, no shortness of breath, difficulty breathing. No n/v/d/c or abdominal pain. No urinary complaint. No rash or skin lesion.      Objective:     Blood pressure 130/60, pulse 60, temperature 36.9 °C (98.4 °F), height 1.702 m (5' 7\"), weight 96.6 kg (213 lb), SpO2 94 %. Body mass index is 33.36 kg/m².   Physical Exam:  Constitutional: WDWN, NAD  Skin: Warm, dry, good turgor, no rashes in visible areas.  Psych: Alert and oriented x3, normal affect and mood.        Assessment and Plan:   The following treatment plan was discussed    [facility] Aware web site evaluation: I have obtained and reviewed patient utilization report from [facility] pharmacy database prior to writing prescription for controlled substance II, III or IV. Based on the report and my clinical assessment the prescription is medically necessary.   Patient is cautioned on sedation potential of narcotic medication; no drinking, driving or operating heavy machinery while on this medication.    1. Chronic bilateral low back pain without sciatica    - hydrocodone/acetaminophen (NORCO)  MG Tab; Take 1 Tab by mouth every 6 hours as needed for Severe Pain for up to 30 days.  Dispense: 120 Tab; Refill: 0  - hydrocodone/acetaminophen (NORCO)  MG Tab; Take 1 Tab by mouth every 6 hours as needed for Severe " Pain for up to 30 days.  Dispense: 120 Tab; Refill: 0  - hydrocodone/acetaminophen (NORCO)  MG Tab; Take 1 Tab by mouth every 6 hours as needed for up to 30 days.  Dispense: 120 Tab; Refill: 0    2. Chronic pain of right knee    - hydrocodone/acetaminophen (NORCO)  MG Tab; Take 1 Tab by mouth every 6 hours as needed for Severe Pain for up to 30 days.  Dispense: 120 Tab; Refill: 0  - hydrocodone/acetaminophen (NORCO)  MG Tab; Take 1 Tab by mouth every 6 hours as needed for Severe Pain for up to 30 days.  Dispense: 120 Tab; Refill: 0  - hydrocodone/acetaminophen (NORCO)  MG Tab; Take 1 Tab by mouth every 6 hours as needed for up to 30 days.  Dispense: 120 Tab; Refill: 0    3. Opioid type dependence, continuous (CMS-HCC)    - hydrocodone/acetaminophen (NORCO)  MG Tab; Take 1 Tab by mouth every 6 hours as needed for Severe Pain for up to 30 days.  Dispense: 120 Tab; Refill: 0  - hydrocodone/acetaminophen (NORCO)  MG Tab; Take 1 Tab by mouth every 6 hours as needed for Severe Pain for up to 30 days.  Dispense: 120 Tab; Refill: 0  - hydrocodone/acetaminophen (NORCO)  MG Tab; Take 1 Tab by mouth every 6 hours as needed for up to 30 days.  Dispense: 120 Tab; Refill: 0      Followup: Return in about 3 months (around 2/27/2018).

## 2017-12-13 ENCOUNTER — HOSPITAL ENCOUNTER (OUTPATIENT)
Dept: LAB | Facility: MEDICAL CENTER | Age: 82
End: 2017-12-13
Attending: NURSE PRACTITIONER
Payer: MEDICARE

## 2017-12-13 ENCOUNTER — ANTICOAGULATION MONITORING (OUTPATIENT)
Dept: VASCULAR LAB | Facility: MEDICAL CENTER | Age: 82
End: 2017-12-13

## 2017-12-13 DIAGNOSIS — I48.0 PAF (PAROXYSMAL ATRIAL FIBRILLATION) (HCC): ICD-10-CM

## 2017-12-13 LAB
INR PPP: 3.48 (ref 0.87–1.13)
PROTHROMBIN TIME: 34.7 SEC (ref 12–14.6)

## 2017-12-13 PROCEDURE — 85610 PROTHROMBIN TIME: CPT

## 2017-12-13 PROCEDURE — 36415 COLL VENOUS BLD VENIPUNCTURE: CPT

## 2017-12-14 NOTE — PROGRESS NOTES
Anticoagulation Summary  As of 12/13/2017    INR goal:   2.0-3.0   TTR:   77.0 % (2.5 y)   Today's INR:   3.48!   Maintenance plan:   2.5 mg (5 mg x 0.5) on Wed, Sat; 5 mg (5 mg x 1) all other days   Weekly total:   30 mg   Plan last modified:   Jus Young, PharmD (9/20/2017)   Next INR check:   1/3/2018   Target end date:   Indefinite    Indications    A-fib (CMS-HCC) (Resolved) [I48.91]             Anticoagulation Episode Summary     INR check location:   Outside Lab    Preferred lab:       Send INR reminders to:       Comments:   [facility]       Anticoagulation Care Providers     Provider Role Specialty Phone number    Anastacio Sunshine M.D. Referring Cardiology [phone]    Mahin Valdez Responsible          Anticoagulation Patient Findings      HPI:  Gennaro Richardson, on anticoagulation therapy with warfarin for Afib.   Changes to current medical/health status since last appt: none.   Denies signs/symptoms of bleeding and/or thrombosis since the last appt.    Denies any interval changes to diet  Denies any interval changes to medications since last appt.   Denies any complications or cost restrictions with current therapy.   Denies alcohol or cranberry use.     A/P   INR  SUPRA-therapeutic.   Hold today then Pt is to continue with current warfarin dosing regimen.     Next INR in 3 week(s).    Jus Young, PharmD

## 2018-01-03 ENCOUNTER — NON-PROVIDER VISIT (OUTPATIENT)
Dept: CARDIOLOGY | Facility: MEDICAL CENTER | Age: 83
End: 2018-01-03
Payer: MEDICARE

## 2018-01-03 ENCOUNTER — OFFICE VISIT (OUTPATIENT)
Dept: CARDIOLOGY | Facility: MEDICAL CENTER | Age: 83
End: 2018-01-03
Payer: MEDICARE

## 2018-01-03 VITALS
BODY MASS INDEX: 33.9 KG/M2 | HEIGHT: 67 IN | SYSTOLIC BLOOD PRESSURE: 120 MMHG | HEART RATE: 66 BPM | WEIGHT: 216 LBS | DIASTOLIC BLOOD PRESSURE: 58 MMHG | OXYGEN SATURATION: 92 %

## 2018-01-03 DIAGNOSIS — I49.5 SICK SINUS SYNDROME (HCC): ICD-10-CM

## 2018-01-03 DIAGNOSIS — Z95.0 CARDIAC PACEMAKER IN SITU: ICD-10-CM

## 2018-01-03 DIAGNOSIS — E78.5 DYSLIPIDEMIA: Chronic | ICD-10-CM

## 2018-01-03 DIAGNOSIS — I25.84 CORONARY ARTERY DISEASE DUE TO CALCIFIED CORONARY LESION: ICD-10-CM

## 2018-01-03 DIAGNOSIS — I25.10 CORONARY ARTERY DISEASE DUE TO CALCIFIED CORONARY LESION: ICD-10-CM

## 2018-01-03 DIAGNOSIS — I48.0 PAF (PAROXYSMAL ATRIAL FIBRILLATION) (HCC): ICD-10-CM

## 2018-01-03 PROCEDURE — 93280 PM DEVICE PROGR EVAL DUAL: CPT | Performed by: INTERNAL MEDICINE

## 2018-01-03 PROCEDURE — 99214 OFFICE O/P EST MOD 30 MIN: CPT | Performed by: INTERNAL MEDICINE

## 2018-01-03 NOTE — LETTER
[facility]   [address], [address]  [place] [address]  Phone: [phone]  Fax: [phone]              Gennaro Richardson  1935    Encounter Date: 1/3/2018    Josef Retana M.D.          PROGRESS NOTE:  Subjective:   Gennaro Richardson is a 81 y.o. male who presents today for followup of his coronary disease, hyperlipidemia and paroxysmal atrial fibrillation.  He had a pacemaker generator change in September 2016.    He has had no chest discomfort, dyspnea, edema or palpitations. He was having some difficulty with orthostatic lightheadedness. However, this seemed to resolve about a month ago.      Past Medical History:   asdfasdfads Date   • A-fib (CMS-HCC) 6/28/2011   • Arteriosclerosis    • CAD (coronary artery disease)    • CAD (coronary artery disease) 6/28/2011   • Cardiac pacemaker in situ 11/19/2013   • Colonic polyps    • Diverticulosis    • Dizzy spells 11/20/2012   • HERZOG (dyspnea on exertion) 9/7/2014 9/16/16- pt has no c/o   • Dyslipidemia 6/28/2011   • Hematoma of abdominal wall    • Hematoma of rectus sheath 6/13/2012    Delayed onset bleed, intraabdominal extension, left flank , ct abd active bleed , binder, vit k  6/14 Ex-lap     • Myocardial infarct 1989   • Pacer at end of battery life 6/28/2011   • PAF (paroxysmal atrial fibrillation) (CMS-HCC) 9/7/2014   • Paroxysmal atrial fibrillation (CMS-HCC)    • Peripheral vascular disease (CMS-HCC) 10/31/2011   • PVD (peripheral vascular disease) (CMS-HCC)      Past Surgical History:   Procedure Laterality Date   • ABDOMINAL AORTIC ANEURYSM     • COLONOSCOPY WITH POLYP  7/24/08    Performed by RUDOLPH BRENNER at SURGERY [facility] ORS   • EXPLORATORY LAPAROTOMY  6/14/2012    Performed by CALLY MERCER at SURGERY [facility] ORS   • PACEMAKER INSERTION       Family History   Problem Relation Age of Onset   • Cancer Mother      History   Smoking Status   • Former Smoker   • Years: 35.00    "  • Quit date: 1/1/1988   Smokeless Tobacco   • Never Used     Allergies   Allergen Reactions   • No Known Drug Allergy      Outpatient Encounter Prescriptions as of 1/3/2018   Medication Sig Dispense Refill   • hydrocodone/acetaminophen (NORCO)  MG Tab Take 1 Tab by mouth every 6 hours as needed for Severe Pain for up to 30 days. 120 Tab 0   • warfarin (COUMADIN) 5 MG Tab Take 1/2-1 tab po q day or as directed by clinic 90 Tab 3   • atorvastatin (LIPITOR) 40 MG Tab Take 1 Tab by mouth every day. 90 Tab 3   • clobetasol (TEMOVATE) 0.05 % Cream Apply to affected area(s) 2 times a day. 60 g 2   • finasteride (PROSCAR) 5 MG TABS TAKE ONE TABLET BY MOUTH ONE TIME DAILY 90 Tab 2   • tamsulosin (FLOMAX) 0.4 MG capsule Take 1 Cap by mouth every day. (Patient taking differently: Take 0.4 mg by mouth 2 Times a Day.) 90 Cap 3   • [START ON 1/27/2018] hydrocodone/acetaminophen (NORCO)  MG Tab Take 1 Tab by mouth every 6 hours as needed for up to 30 days. 120 Tab 0     No facility-administered encounter medications on file as of 1/3/2018.      ROS       Objective:   /58   Pulse 66   Ht 1.702 m (5' 7\")   Wt 98 kg (216 lb)   SpO2 92%   BMI 33.83 kg/m²      Physical Exam   Neck: No JVD present.   Cardiovascular: Normal rate, regular rhythm and intact distal pulses.    No murmur heard.  Pulmonary/Chest: Effort normal and breath sounds normal. No respiratory distress. He exhibits no tenderness.   Abdominal: Soft. There is no tenderness.   Musculoskeletal: He exhibits edema (1+ pretibial).   Neurological: He has normal strength. He displays no tremor.   Vitals reviewed.    Lab Results   Component Value Date/Time    CHOLSTRLTOT 89 (L) 06/06/2017 07:02 AM    LDL 35 06/06/2017 07:02 AM    HDL 36 (A) 06/06/2017 07:02 AM    TRIGLYCERIDE 90 06/06/2017 07:02 AM       Lab Results   Component Value Date/Time    SODIUM 139 06/06/2017 07:02 AM    POTASSIUM 3.9 06/06/2017 07:02 AM    CHLORIDE 108 06/06/2017 07:02 AM    CO2 " 27 06/06/2017 07:02 AM    GLUCOSE 89 06/06/2017 07:02 AM    BUN 19 06/06/2017 07:02 AM    CREATININE 0.96 06/06/2017 07:02 AM    CREATININE 1.3 12/01/2008 10:45 AM     Lab Results   Component Value Date/Time    ALKPHOSPHAT 84 06/06/2017 07:02 AM    ASTSGOT 20 06/06/2017 07:02 AM    ALTSGPT 17 06/06/2017 07:02 AM    TBILIRUBIN 1.0 06/06/2017 07:02 AM      Pacemaker check: The patient's pacemaker is functioning normally. He does have approximately a percent of the time that he is in atrial fibrillation.      Patient Active Problem List    Diagnosis Date Noted     1. Coronary artery disease due to calcified coronary lesion:Occluded left circumflex     2. Dyslipidemia     3. Cardiac pacemaker in situ     4. PAF (paroxysmal atrial fibrillation) (CMS-[facility])         Medical Decision Making:  Today's Assessment / Status / Plan:     Mr. Richardson is clinically stable. He is asymptomatic from a cardiovascular standpoint. He did not get his lipid status rechecked after his last follow-up. We will reorder a lipid and CMP. He had his pacemaker checked in August and it was functioning normally. He continues on anticoagulation for his PAF. He was most switching about 6% of the time at his last pacemaker check. He will otherwise follow-up in about 6 months.      ANN Wilson.JuvenalC.  [address]  [address]  [facility] [address]  VIA In Basket

## 2018-01-03 NOTE — PROGRESS NOTES
Subjective:   Gennaro Richardson is a 82 y.o. male who presents today for followup of his coronary disease, hyperlipidemia and paroxysmal atrial fibrillation.  He had a pacemaker generator change in September 2016.    He has had no chest discomfort, dyspnea, edema or palpitations. He was having some difficulty with orthostatic lightheadedness. However, this seemed to resolve about a month ago.      Past Medical History:   asdfasdfads Date   • A-fib (CMS-HCC) 6/28/2011   • Arteriosclerosis    • CAD (coronary artery disease)    • CAD (coronary artery disease) 6/28/2011   • Cardiac pacemaker in situ 11/19/2013   • Colonic polyps    • Diverticulosis    • Dizzy spells 11/20/2012   • HERZOG (dyspnea on exertion) 9/7/2014 9/16/16- pt has no c/o   • Dyslipidemia 6/28/2011   • Hematoma of abdominal wall    • Hematoma of rectus sheath 6/13/2012    Delayed onset bleed, intraabdominal extension, left flank , ct abd active bleed , binder, vit k  6/14 Ex-lap     • Myocardial infarct 1989   • Pacer at end of battery life 6/28/2011   • PAF (paroxysmal atrial fibrillation) (CMS-HCC) 9/7/2014   • Paroxysmal atrial fibrillation (CMS-HCC)    • Peripheral vascular disease (CMS-HCC) 10/31/2011   • PVD (peripheral vascular disease) (CMS-HCC)      Past Surgical History:   Procedure Laterality Date   • ABDOMINAL AORTIC ANEURYSM     • COLONOSCOPY WITH POLYP  7/24/08    Performed by RUDOLPH BRENNER at SURGERY [facility] ORS   • EXPLORATORY LAPAROTOMY  6/14/2012    Performed by CALLY MERCER at SURGERY [facility] ORS   • PACEMAKER INSERTION       Family History   Problem Relation Age of Onset   • Cancer Mother      History   Smoking Status   • Former Smoker   • Years: 35.00   • Quit date: 1/1/1988   Smokeless Tobacco   • Never Used     Allergies   Allergen Reactions   • No Known Drug Allergy      Outpatient Encounter Prescriptions as of 1/3/2018   Medication Sig Dispense Refill   • hydrocodone/acetaminophen (NORCO)  MG Tab Take 1  "Tab by mouth every 6 hours as needed for Severe Pain for up to 30 days. 120 Tab 0   • warfarin (COUMADIN) 5 MG Tab Take 1/2-1 tab po q day or as directed by clinic 90 Tab 3   • atorvastatin (LIPITOR) 40 MG Tab Take 1 Tab by mouth every day. 90 Tab 3   • clobetasol (TEMOVATE) 0.05 % Cream Apply to affected area(s) 2 times a day. 60 g 2   • finasteride (PROSCAR) 5 MG TABS TAKE ONE TABLET BY MOUTH ONE TIME DAILY 90 Tab 2   • tamsulosin (FLOMAX) 0.4 MG capsule Take 1 Cap by mouth every day. (Patient taking differently: Take 0.4 mg by mouth 2 Times a Day.) 90 Cap 3   • [START ON 1/27/2018] hydrocodone/acetaminophen (NORCO)  MG Tab Take 1 Tab by mouth every 6 hours as needed for up to 30 days. 120 Tab 0     No facility-administered encounter medications on file as of 1/3/2018.      ROS       Objective:   /58   Pulse 66   Ht 1.702 m (5' 7\")   Wt 98 kg (216 lb)   SpO2 92%   BMI 33.83 kg/m²     Physical Exam   Neck: No JVD present.   Cardiovascular: Normal rate, regular rhythm and intact distal pulses.    No murmur heard.  Pulmonary/Chest: Effort normal and breath sounds normal. No respiratory distress. He exhibits no tenderness.   Abdominal: Soft. There is no tenderness.   Musculoskeletal: He exhibits edema (1+ pretibial).   Neurological: He has normal strength. He displays no tremor.   Vitals reviewed.    Lab Results   Component Value Date/Time    CHOLSTRLTOT 89 (L) 06/06/2017 07:02 AM    LDL 35 06/06/2017 07:02 AM    HDL 36 (A) 06/06/2017 07:02 AM    TRIGLYCERIDE 90 06/06/2017 07:02 AM       Lab Results   Component Value Date/Time    SODIUM 139 06/06/2017 07:02 AM    POTASSIUM 3.9 06/06/2017 07:02 AM    CHLORIDE 108 06/06/2017 07:02 AM    CO2 27 06/06/2017 07:02 AM    GLUCOSE 89 06/06/2017 07:02 AM    BUN 19 06/06/2017 07:02 AM    CREATININE 0.96 06/06/2017 07:02 AM    CREATININE 1.3 12/01/2008 10:45 AM     Lab Results   Component Value Date/Time    ALKPHOSPHAT 84 06/06/2017 07:02 AM    ASTSGOT 20 " 06/06/2017 07:02 AM    ALTSGPT 17 06/06/2017 07:02 AM    TBILIRUBIN 1.0 06/06/2017 07:02 AM      Pacemaker check: The patient's pacemaker is functioning normally. He does have approximately a percent of the time that he is in atrial fibrillation.      Patient Active Problem List    Diagnosis Date Noted     1. Coronary artery disease due to calcified coronary lesion:Occluded left circumflex     2. Dyslipidemia     3. Cardiac pacemaker in situ     4. PAF (paroxysmal atrial fibrillation) (CMS-[facility])         Medical Decision Making:  Today's Assessment / Status / Plan:     Mr. Richardson is clinically stable. He is asymptomatic from a cardiovascular standpoint. He did not get his lipid status rechecked after his last follow-up. We will reorder a lipid and CMP. He had his pacemaker was checked in August and it was functioning normally. He continues on anticoagulation for his PAF. He was most switching about 6% of the time at his last pacemaker check. He will otherwise follow-up in about 6 months.

## 2018-01-17 ENCOUNTER — HOSPITAL ENCOUNTER (OUTPATIENT)
Dept: LAB | Facility: MEDICAL CENTER | Age: 83
End: 2018-01-17
Attending: INTERNAL MEDICINE
Payer: MEDICARE

## 2018-01-17 ENCOUNTER — HOSPITAL ENCOUNTER (OUTPATIENT)
Dept: LAB | Facility: MEDICAL CENTER | Age: 83
End: 2018-01-17
Attending: NURSE PRACTITIONER
Payer: MEDICARE

## 2018-01-17 DIAGNOSIS — E78.5 DYSLIPIDEMIA: Chronic | ICD-10-CM

## 2018-01-17 DIAGNOSIS — I25.10 CORONARY ARTERY DISEASE DUE TO CALCIFIED CORONARY LESION: ICD-10-CM

## 2018-01-17 DIAGNOSIS — I48.0 PAF (PAROXYSMAL ATRIAL FIBRILLATION) (HCC): ICD-10-CM

## 2018-01-17 DIAGNOSIS — I25.84 CORONARY ARTERY DISEASE DUE TO CALCIFIED CORONARY LESION: ICD-10-CM

## 2018-01-17 LAB
ALBUMIN SERPL BCP-MCNC: 3.9 G/DL (ref 3.2–4.9)
ALBUMIN/GLOB SERPL: 1.3 G/DL
ALP SERPL-CCNC: 66 U/L (ref 30–99)
ALT SERPL-CCNC: 16 U/L (ref 2–50)
ANION GAP SERPL CALC-SCNC: 7 MMOL/L (ref 0–11.9)
AST SERPL-CCNC: 21 U/L (ref 12–45)
BILIRUB SERPL-MCNC: 1.2 MG/DL (ref 0.1–1.5)
BUN SERPL-MCNC: 23 MG/DL (ref 8–22)
CALCIUM SERPL-MCNC: 8.7 MG/DL (ref 8.5–10.5)
CHLORIDE SERPL-SCNC: 109 MMOL/L (ref 96–112)
CHOLEST SERPL-MCNC: 95 MG/DL (ref 100–199)
CO2 SERPL-SCNC: 23 MMOL/L (ref 20–33)
CREAT SERPL-MCNC: 1.04 MG/DL (ref 0.5–1.4)
GLOBULIN SER CALC-MCNC: 3.1 G/DL (ref 1.9–3.5)
GLUCOSE SERPL-MCNC: 85 MG/DL (ref 65–99)
HDLC SERPL-MCNC: 39 MG/DL
INR PPP: 2.23 (ref 0.87–1.13)
LDLC SERPL CALC-MCNC: 36 MG/DL
POTASSIUM SERPL-SCNC: 4 MMOL/L (ref 3.6–5.5)
PROT SERPL-MCNC: 7 G/DL (ref 6–8.2)
PROTHROMBIN TIME: 24.4 SEC (ref 12–14.6)
SODIUM SERPL-SCNC: 139 MMOL/L (ref 135–145)
TRIGL SERPL-MCNC: 101 MG/DL (ref 0–149)

## 2018-01-17 PROCEDURE — 80061 LIPID PANEL: CPT

## 2018-01-17 PROCEDURE — 36415 COLL VENOUS BLD VENIPUNCTURE: CPT

## 2018-01-17 PROCEDURE — 85610 PROTHROMBIN TIME: CPT

## 2018-01-17 PROCEDURE — 80053 COMPREHEN METABOLIC PANEL: CPT

## 2018-01-18 ENCOUNTER — ANTICOAGULATION MONITORING (OUTPATIENT)
Dept: VASCULAR LAB | Facility: MEDICAL CENTER | Age: 83
End: 2018-01-18

## 2018-01-18 NOTE — PROGRESS NOTES
Anticoagulation Summary  As of 1/18/2018    INR goal:   2.0-3.0   TTR:   76.5 % (2.6 y)   Today's INR:   2.23 (1/17/2018)   Maintenance plan:   2.5 mg (5 mg x 0.5) on Wed, Sat; 5 mg (5 mg x 1) all other days   Weekly total:   30 mg   Plan last modified:   Jus Young, PharmD (9/20/2017)   Next INR check:   3/1/2018   Target end date:   Indefinite    Indications    A-fib (CMS-HCC) (Resolved) [I48.91]             Anticoagulation Episode Summary     INR check location:   Outside Lab    Preferred lab:       Send INR reminders to:       Comments:   [facility]       Anticoagulation Care Providers     Provider Role Specialty Phone number    Anastacio Sunshine M.D. Referring Cardiology [phone]    Mahin Valdez Responsible          Anticoagulation Patient Findings      Spoke with patient to report a therapeutic INR.    Pt instructed to continue with current warfarin dosing regimen, confirms dosing.   Pt denies any s/s of bleeding, bruising, clotting or any changes to diet or medication.    Will follow up in 6 weeks.     Kelsy Bloom, PharmD

## 2018-02-01 ENCOUNTER — PATIENT OUTREACH (OUTPATIENT)
Dept: HEALTH INFORMATION MANAGEMENT | Facility: OTHER | Age: 83
End: 2018-02-01

## 2018-02-01 NOTE — PROGRESS NOTES
1. Attempt #: 1    2. [facility] Verified: yes    3. Verify PCP: yes    4. Care Team Updated:       •   DME Company (gait device, O2, CPAP, etc.): YES       •   Other Specialists (eye doctor, derm, GYN, cardiology, endo, etc): YES    5.  Reviewed/Updated the following with patient:       •   Communication Preference Obtained? YES       •   Preferred Pharmacy? YES       •   Preferred Lab? YES       •   Family History (document living status of immediate family members and if + hx of cancer, diabetes, hypertension, hyperlipidemia, heart attack, stroke) YES. Was Abstract Encounter opened and chart updated? YES    6. [employer] Activation: declined    7. [employer] Janeen: no    8. Annual Wellness Visit Scheduling  Scheduling Status:Scheduled      9. Care Gap Scheduling (Attempt to Schedule EACH Overdue Care Gap!)     Health Maintenance Due   Topic Date Due   • URINE DRUG SCREEN  02/22/2018        Scheduled patient for Annual Wellness Visit    10. Patient was advised: “This is a free wellness visit. The provider will screen for medical conditions to help you stay healthy. If you have other concerns to address you may be asked to discuss these at a separate visit or there may be an additional fee.”     11. Patient was informed to arrive 15 min prior to their scheduled appointment and bring in their medication bottles.

## 2018-02-26 ENCOUNTER — TELEPHONE (OUTPATIENT)
Dept: HEALTH INFORMATION MANAGEMENT | Facility: OTHER | Age: 83
End: 2018-02-26

## 2018-02-27 ENCOUNTER — OFFICE VISIT (OUTPATIENT)
Dept: MEDICAL GROUP | Facility: MEDICAL CENTER | Age: 83
End: 2018-02-27
Payer: MEDICARE

## 2018-02-27 VITALS
TEMPERATURE: 98.6 F | SYSTOLIC BLOOD PRESSURE: 122 MMHG | WEIGHT: 213 LBS | HEIGHT: 67 IN | BODY MASS INDEX: 33.43 KG/M2 | DIASTOLIC BLOOD PRESSURE: 68 MMHG | RESPIRATION RATE: 16 BRPM | HEART RATE: 81 BPM | OXYGEN SATURATION: 90 %

## 2018-02-27 DIAGNOSIS — Z79.891 CHRONIC USE OF OPIATE DRUGS THERAPEUTIC PURPOSES: ICD-10-CM

## 2018-02-27 DIAGNOSIS — G89.29 CHRONIC PAIN OF RIGHT KNEE: ICD-10-CM

## 2018-02-27 DIAGNOSIS — M54.50 CHRONIC BILATERAL LOW BACK PAIN WITHOUT SCIATICA: ICD-10-CM

## 2018-02-27 DIAGNOSIS — M25.561 CHRONIC PAIN OF RIGHT KNEE: ICD-10-CM

## 2018-02-27 DIAGNOSIS — G89.29 CHRONIC BILATERAL LOW BACK PAIN WITHOUT SCIATICA: ICD-10-CM

## 2018-02-27 PROCEDURE — 99214 OFFICE O/P EST MOD 30 MIN: CPT | Performed by: PHYSICIAN ASSISTANT

## 2018-02-27 RX ORDER — HYDROCODONE BITARTRATE AND ACETAMINOPHEN 10; 325 MG/1; MG/1
1 TABLET ORAL EVERY 6 HOURS PRN
Qty: 120 TAB | Refills: 0 | Status: SHIPPED | OUTPATIENT
Start: 2018-03-27 | End: 2018-04-26

## 2018-02-27 RX ORDER — HYDROCODONE BITARTRATE AND ACETAMINOPHEN 10; 325 MG/1; MG/1
1 TABLET ORAL EVERY 6 HOURS PRN
Qty: 120 TAB | Refills: 0 | Status: SHIPPED | OUTPATIENT
Start: 2018-04-27 | End: 2018-05-25 | Stop reason: SDUPTHER

## 2018-02-27 RX ORDER — HYDROCODONE BITARTRATE AND ACETAMINOPHEN 10; 325 MG/1; MG/1
1 TABLET ORAL EVERY 6 HOURS PRN
Qty: 120 TAB | Refills: 0 | Status: SHIPPED | OUTPATIENT
Start: 2018-02-27 | End: 2018-03-29

## 2018-02-27 NOTE — PROGRESS NOTES
Subjective:   Gennaro Richardson is a 82 y.o. male here today for chronic pain and opiate drug prescription    Chronic pain of right knee  Chronic, no change. No recent falls. Only option is replacement and he doesn't qualify d/t heart disease.    Chronic back pain  Chronic, arthritis, stable on current. Can't take NSAIDs as he is on coumadin. Can't have surgery d/t heart disease.    Chronic use of opiate drugs therapeutic purposes  Chronic back and knee  Current pain control: good, 4/10  Adverse effects: denies any adverse reactions or side effects  Physical functioning: fair - chronic heart disease and age prevent very much activity  Mood: good  Family and social relationships: good  Sleep pattern: good  Overall functioning: good  Nonnarcotic treatments that are being used: none - unable to take NSAIDs d/t chronic coumadin tx    Pain management agreement initiated and signed on: 2/27/2018  Last dose of narcotic medication: this morning  Most recent urine drug screen done 2/2017, which was reviewed today and is consistent with prescribed medications without any concerns.  Aberrant Behaviors: None  I have reviewed the medical records, the Prescription Monitoring Program and I have determined that norco is medically indicated.    I have advised patient to keep medication in a safe place and to not drive with medication.         Current medicines (including changes today)  Current Outpatient Prescriptions   Medication Sig Dispense Refill   • HYDROcodone/acetaminophen (NORCO)  MG Tab Take 1 Tab by mouth every 6 hours as needed for up to 30 days. 120 Tab 0   • [START ON 3/27/2018] HYDROcodone/acetaminophen (NORCO)  MG Tab Take 1 Tab by mouth every 6 hours as needed for up to 30 days. 120 Tab 0   • [START ON 4/27/2018] HYDROcodone/acetaminophen (NORCO)  MG Tab Take 1 Tab by mouth every 6 hours as needed for up to 30 days. 120 Tab 0   • warfarin (COUMADIN) 5 MG Tab Take 1/2-1 tab po q day or as  "directed by clinic 90 Tab 3   • atorvastatin (LIPITOR) 40 MG Tab Take 1 Tab by mouth every day. 90 Tab 3   • finasteride (PROSCAR) 5 MG TABS TAKE ONE TABLET BY MOUTH ONE TIME DAILY 90 Tab 2   • tamsulosin (FLOMAX) 0.4 MG capsule Take 1 Cap by mouth every day. (Patient taking differently: Take 0.4 mg by mouth 2 Times a Day.) 90 Cap 3   • clobetasol (TEMOVATE) 0.05 % Cream Apply to affected area(s) 2 times a day. 60 g 2     No current facility-administered medications for this visit.      He  has a past medical history of A-fib (CMS-HCC) (6/28/2011); Arteriosclerosis; CAD (coronary artery disease); CAD (coronary artery disease) (6/28/2011); Cardiac pacemaker in situ (11/19/2013); Colonic polyps; Diverticulosis; Dizzy spells (11/20/2012); HERZOG (dyspnea on exertion) (9/7/2014); Dyslipidemia (6/28/2011); Hematoma of abdominal wall; Hematoma of rectus sheath (6/13/2012); Myocardial infarct (1989); Pacer at end of battery life (6/28/2011); PAF (paroxysmal atrial fibrillation) (CMS-HCC) (9/7/2014); Paroxysmal atrial fibrillation (CMS-HCC); Peripheral vascular disease (CMS-HCC) (10/31/2011); and PVD (peripheral vascular disease) (CMS-HCC).    ROS   No fever/chills. No weight change. No headache/dizziness. No focal weakness. No sore throat, nasal congestion, ear pain. No chest pain, no shortness of breath, difficulty breathing. No n/v/d/c or abdominal pain.      Objective:     Blood pressure 122/68, pulse 81, temperature 37 °C (98.6 °F), resp. rate 16, height 1.702 m (5' 7\"), weight 96.6 kg (213 lb), SpO2 90 %. Body mass index is 33.36 kg/m².   Physical Exam:  Constitutional: WDWN, NAD  Skin: Warm, dry, good turgor, no rashes in visible areas.  Eye: Equal, round and reactive, conjunctiva clear, lids normal.  ENMT: Lips without lesions, good dentition, oropharynx clear.  Neck: Trachea midline, no masses, no thyromegaly. No cervical or supraclavicular lymphadenopathy  Respiratory: Unlabored respiratory effort, lungs clear to " auscultation, no wheezes, no ronchi.  Cardiovascular: Normal S1, S2  Psych: Alert and oriented x3, normal affect and mood.        Assessment and Plan:   The following treatment plan was discussed    1. Chronic use of opiate drugs therapeutic purposes  [facility] Aware web site evaluation: I have obtained and reviewed patient utilization report from [facility] pharmacy database prior to writing prescription for controlled substance II, III or IV. Based on the report and my clinical assessment the prescription is medically necessary.   Patient is cautioned on sedation potential of narcotic medication; no drinking, driving or operating heavy machinery while on this medication.  - CONTROLLED SUBSTANCE TREATMENT AGREEMENT  - HYDROcodone/acetaminophen (NORCO)  MG Tab; Take 1 Tab by mouth every 6 hours as needed for up to 30 days.  Dispense: 120 Tab; Refill: 0  - HYDROcodone/acetaminophen (NORCO)  MG Tab; Take 1 Tab by mouth every 6 hours as needed for up to 30 days.  Dispense: 120 Tab; Refill: 0  - HYDROcodone/acetaminophen (NORCO)  MG Tab; Take 1 Tab by mouth every 6 hours as needed for up to 30 days.  Dispense: 120 Tab; Refill: 0  - CONSENT FOR OPIATE PRESCRIPTION    2. Chronic bilateral low back pain without sciatica    - HYDROcodone/acetaminophen (NORCO)  MG Tab; Take 1 Tab by mouth every 6 hours as needed for up to 30 days.  Dispense: 120 Tab; Refill: 0  - HYDROcodone/acetaminophen (NORCO)  MG Tab; Take 1 Tab by mouth every 6 hours as needed for up to 30 days.  Dispense: 120 Tab; Refill: 0  - HYDROcodone/acetaminophen (NORCO)  MG Tab; Take 1 Tab by mouth every 6 hours as needed for up to 30 days.  Dispense: 120 Tab; Refill: 0  - CONSENT FOR OPIATE PRESCRIPTION    3. Chronic pain of right knee    - HYDROcodone/acetaminophen (NORCO)  MG Tab; Take 1 Tab by mouth every 6 hours as needed for up to 30 days.  Dispense: 120 Tab; Refill: 0  - HYDROcodone/acetaminophen (NORCO)   MG Tab; Take 1 Tab by mouth every 6 hours as needed for up to 30 days.  Dispense: 120 Tab; Refill: 0  - HYDROcodone/acetaminophen (NORCO)  MG Tab; Take 1 Tab by mouth every 6 hours as needed for up to 30 days.  Dispense: 120 Tab; Refill: 0  - CONSENT FOR OPIATE PRESCRIPTION      Followup: Return in about 3 months (around 5/27/2018).

## 2018-02-27 NOTE — ASSESSMENT & PLAN NOTE
Chronic back and knee  Current pain control: good, 4/10  Adverse effects: denies any adverse reactions or side effects  Physical functioning: fair - chronic heart disease and age prevent very much activity  Mood: good  Family and social relationships: good  Sleep pattern: good  Overall functioning: good  Nonnarcotic treatments that are being used: none - unable to take NSAIDs d/t chronic coumadin tx    Pain management agreement initiated and signed on: 2/27/2018  Last dose of narcotic medication: this morning  Most recent urine drug screen done 2/2017, which was reviewed today and is consistent with prescribed medications without any concerns.  Aberrant Behaviors: None  I have reviewed the medical records, the Prescription Monitoring Program and I have determined that norco is medically indicated.    I have advised patient to keep medication in a safe place and to not drive with medication.

## 2018-02-27 NOTE — ASSESSMENT & PLAN NOTE
Chronic, arthritis, stable on current. Can't take NSAIDs as he is on coumadin. Can't have surgery d/t heart disease.

## 2018-02-27 NOTE — ASSESSMENT & PLAN NOTE
Chronic, no change. No recent falls. Only option is replacement and he doesn't qualify d/t heart disease.

## 2018-02-28 ENCOUNTER — HOSPITAL ENCOUNTER (OUTPATIENT)
Dept: LAB | Facility: MEDICAL CENTER | Age: 83
End: 2018-02-28
Attending: NURSE PRACTITIONER
Payer: MEDICARE

## 2018-02-28 DIAGNOSIS — I48.0 PAF (PAROXYSMAL ATRIAL FIBRILLATION) (HCC): ICD-10-CM

## 2018-02-28 LAB
INR PPP: 2.31 (ref 0.87–1.13)
PROTHROMBIN TIME: 25.1 SEC (ref 12–14.6)

## 2018-02-28 PROCEDURE — 85610 PROTHROMBIN TIME: CPT

## 2018-02-28 PROCEDURE — 36415 COLL VENOUS BLD VENIPUNCTURE: CPT

## 2018-03-01 ENCOUNTER — ANTICOAGULATION MONITORING (OUTPATIENT)
Dept: VASCULAR LAB | Facility: MEDICAL CENTER | Age: 83
End: 2018-03-01

## 2018-03-01 NOTE — PROGRESS NOTES
OP Anticoagulation Service Note    Date: 3/1/2018  Anticoagulation Summary  As of 3/1/2018    INR goal:   2.0-3.0   TTR:   77.5 % (2.7 y)   Today's INR:   2.31 (2/28/2018)   Maintenance plan:   2.5 mg (5 mg x 0.5) on Wed, Sat; 5 mg (5 mg x 1) all other days   Weekly total:   30 mg   No change documented:   Bishnu Camejo, Med Ass't   Plan last modified:   Shila GibsonD (9/20/2017)   Next INR check:   4/12/2018   Target end date:   Indefinite    Indications    A-fib (CMS-HCC) (Resolved) [I48.91]             Anticoagulation Episode Summary     INR check location:   Outside Lab    Preferred lab:       Send INR reminders to:       Comments:   [facility]       Anticoagulation Care Providers     Provider Role Specialty Phone number    Anastacio Sunshine M.D. Referring Cardiology [phone]    Mahin Valdez Responsible          Anticoagulation Patient Findings  Patient Findings     Negatives:   Signs/symptoms of thrombosis, Signs/symptoms of bleeding, Laboratory test error suspected, Change in health, Change in alcohol use, Change in activity, Upcoming invasive procedure, Emergency department visit, Upcoming dental procedure, Missed doses, Extra doses, Change in medications, Change in diet/appetite, Hospital admission, Bruising, Other complaints         Plan: Left patient a message. Patient is therapeutic and will remain on the same dose. Patient was instructed to call back if needed to report any unusual bleeding or bruising or any changes to medication or diet. Patient is to be checked again in 6 weeks.             Bishnu Camejo    I have reviewed and agree with the plan above on 03/01/2018      Verito Zafar, ShilaD

## 2018-04-10 ENCOUNTER — HOSPITAL ENCOUNTER (OUTPATIENT)
Dept: LAB | Facility: MEDICAL CENTER | Age: 83
End: 2018-04-10
Attending: NURSE PRACTITIONER
Payer: MEDICARE

## 2018-04-10 DIAGNOSIS — I48.0 PAF (PAROXYSMAL ATRIAL FIBRILLATION) (HCC): ICD-10-CM

## 2018-04-10 LAB
INR PPP: 2.56 (ref 0.87–1.13)
PROTHROMBIN TIME: 27.2 SEC (ref 12–14.6)

## 2018-04-10 PROCEDURE — 36415 COLL VENOUS BLD VENIPUNCTURE: CPT

## 2018-04-10 PROCEDURE — 85610 PROTHROMBIN TIME: CPT

## 2018-04-11 LAB — INR PPP: 2.56 (ref 2–3.5)

## 2018-04-16 ENCOUNTER — TELEPHONE (OUTPATIENT)
Dept: MEDICAL GROUP | Facility: MEDICAL CENTER | Age: 83
End: 2018-04-16

## 2018-04-16 NOTE — TELEPHONE ENCOUNTER
Future Appointments       Provider Department [place]    4/19/2018 1:00 PM Angelica Hauser P.A.-C.; [facility]  [facility]     5/25/2018 1:00 PM Angelica Hauser P.A.-C. [facility]     6/27/2018 2:15 PM PACER CHECK-[facility] 2 [facility]     6/27/2018 2:45 PM Josef Retana M.D. [facility]          ANNUAL WELLNESS VISIT PRE-VISIT PLANNING WITH OUTREACH    1.  Immunizations were updated in Epic using WebIZ?:Yes       •  WebIZ Recommendations: TD and ZOSTAVAX (Shingles)       •  Is patient due for Tdap? NO       •  Is patient due for Shingles?YES. Patient was not notified of copay/out of pocket cost.    2.  MDX printed for Provider? YES

## 2018-04-19 ENCOUNTER — OFFICE VISIT (OUTPATIENT)
Dept: MEDICAL GROUP | Facility: MEDICAL CENTER | Age: 83
End: 2018-04-19
Payer: MEDICARE

## 2018-04-19 VITALS
TEMPERATURE: 98 F | DIASTOLIC BLOOD PRESSURE: 68 MMHG | HEIGHT: 67 IN | BODY MASS INDEX: 33.59 KG/M2 | OXYGEN SATURATION: 92 % | WEIGHT: 214 LBS | SYSTOLIC BLOOD PRESSURE: 112 MMHG | HEART RATE: 60 BPM

## 2018-04-19 DIAGNOSIS — I48.0 PAF (PAROXYSMAL ATRIAL FIBRILLATION) (HCC): ICD-10-CM

## 2018-04-19 DIAGNOSIS — M54.50 CHRONIC BILATERAL LOW BACK PAIN WITHOUT SCIATICA: ICD-10-CM

## 2018-04-19 DIAGNOSIS — G89.29 CHRONIC BILATERAL LOW BACK PAIN WITHOUT SCIATICA: ICD-10-CM

## 2018-04-19 DIAGNOSIS — E78.5 DYSLIPIDEMIA: Chronic | ICD-10-CM

## 2018-04-19 DIAGNOSIS — N40.1 BENIGN PROSTATIC HYPERPLASIA WITH LOWER URINARY TRACT SYMPTOMS, SYMPTOM DETAILS UNSPECIFIED: Chronic | ICD-10-CM

## 2018-04-19 DIAGNOSIS — I73.9 PVD (PERIPHERAL VASCULAR DISEASE) (HCC): Chronic | ICD-10-CM

## 2018-04-19 DIAGNOSIS — F11.20 OPIOID TYPE DEPENDENCE, CONTINUOUS (HCC): ICD-10-CM

## 2018-04-19 DIAGNOSIS — Z95.0 CARDIAC PACEMAKER IN SITU: ICD-10-CM

## 2018-04-19 DIAGNOSIS — M25.561 CHRONIC PAIN OF RIGHT KNEE: ICD-10-CM

## 2018-04-19 DIAGNOSIS — Z79.891 CHRONIC USE OF OPIATE DRUGS THERAPEUTIC PURPOSES: ICD-10-CM

## 2018-04-19 DIAGNOSIS — G89.29 CHRONIC PAIN OF RIGHT KNEE: ICD-10-CM

## 2018-04-19 DIAGNOSIS — I25.84 CORONARY ARTERY DISEASE DUE TO CALCIFIED CORONARY LESION: ICD-10-CM

## 2018-04-19 DIAGNOSIS — I25.10 CORONARY ARTERY DISEASE DUE TO CALCIFIED CORONARY LESION: ICD-10-CM

## 2018-04-19 PROCEDURE — G0439 PPPS, SUBSEQ VISIT: HCPCS | Performed by: PHYSICIAN ASSISTANT

## 2018-04-19 ASSESSMENT — PATIENT HEALTH QUESTIONNAIRE - PHQ9: CLINICAL INTERPRETATION OF PHQ2 SCORE: 0

## 2018-04-19 ASSESSMENT — ACTIVITIES OF DAILY LIVING (ADL): BATHING_REQUIRES_ASSISTANCE: 0

## 2018-04-19 NOTE — PROGRESS NOTES
Chief Complaint   Patient presents with   • Annual Wellness Visit         HPI:  Gennaro is a 82 y.o. here for Medicare Annual Wellness Visit    Problem List Items Addressed This Visit     Dyslipidemia (Chronic)     Chronic, stable on current statin, labs up to date         PVD (peripheral vascular disease) (CMS-HCC) (Chronic)     Chronic, stable on coumadin, managed by coumadin clinic         Benign prostatic hypertrophy with lower urinary tract symptoms (LUTS) (Chronic)     Chronic, stable, managed by urology, taking flomax and finasteride         Coronary artery disease due to calcified coronary lesion:Occluded left circumflex    Cardiac pacemaker in situ     Chronic, stable, managed by cardiology         PAF (paroxysmal atrial fibrillation) (CMS-HCC)     Chronic, stable, no new symptoms, on coumadin, managed by coumadin clinic         Chronic pain of right knee     Getting worse, wearing brace, refuses ortho appointment because he doesn't want to go to any more appointments. Asked about more pain medication but explained that he is at the highest dose and frequency that I am able to prescribe.         Chronic back pain    Opioid type dependence, continuous (CMS-HCC)     Chronic, stable on current - actually worsening pain but refuses ortho or pain management referrals. Will continue current med         Chronic use of opiate drugs therapeutic purposes          Patient Active Problem List    Diagnosis Date Noted   • Cardiac pacemaker in situ 11/19/2013     Priority: High   • Coronary artery disease due to calcified coronary lesion:Occluded left circumflex 06/28/2011     Priority: High   • Dyslipidemia 06/28/2011     Priority: High   • PAF (paroxysmal atrial fibrillation) (CMS-HCC) 11/04/2014     Priority: Medium   • Eczema 03/17/2015     Priority: Low   • Benign prostatic hypertrophy with lower urinary tract symptoms (LUTS) 09/17/2014     Priority: Low   • Obesity (BMI 30-39.9) 09/06/2017   • Chronic use of opiate  drugs therapeutic purposes 02/27/2017   • Opioid type dependence, continuous (CMS-HCC) 02/26/2016   • Chronic back pain 11/25/2015   • Chronic pain of right knee 08/26/2015   • History of MI (myocardial infarction) 03/17/2015   • PVD (peripheral vascular disease) (CMS-HCC) 04/30/2014       Current Outpatient Prescriptions   Medication Sig Dispense Refill   • [START ON 4/27/2018] HYDROcodone/acetaminophen (NORCO)  MG Tab Take 1 Tab by mouth every 6 hours as needed for up to 30 days. 120 Tab 0   • warfarin (COUMADIN) 5 MG Tab Take 1/2-1 tab po q day or as directed by clinic 90 Tab 3   • atorvastatin (LIPITOR) 40 MG Tab Take 1 Tab by mouth every day. 90 Tab 3   • finasteride (PROSCAR) 5 MG TABS TAKE ONE TABLET BY MOUTH ONE TIME DAILY 90 Tab 2   • tamsulosin (FLOMAX) 0.4 MG capsule Take 1 Cap by mouth every day. (Patient taking differently: Take 0.4 mg by mouth 2 Times a Day.) 90 Cap 3   • HYDROcodone/acetaminophen (NORCO)  MG Tab Take 1 Tab by mouth every 6 hours as needed for up to 30 days. 120 Tab 0   • clobetasol (TEMOVATE) 0.05 % Cream Apply to affected area(s) 2 times a day. 60 g 2     No current facility-administered medications for this visit.         Patient is taking medications as noted in medication list.  Current supplements as per medication list.     Allergies: No known drug allergy    Current social contact/activities: Pt states that he goes out to dinner occasionally with his sister.    Patient's perception of their health: fair    Is patient current with immunizations? Yes.    He  reports that he quit smoking about 30 years ago. He quit after 35.00 years of use. He has never used smokeless tobacco. He reports that he does not drink alcohol or use drugs.  Counseling given: No        DPA/Advanced directive: Patient does not have an Advanced Directive.  A packet and workshop information was given on Advanced Directives.    ROS:    Gait: Uses no assistive device   Ostomy: No   Other tubes: No    Amputations: No   Chronic oxygen use: No   Last eye exam: April 2018   Wears hearing aids: No   : Reports urinary leakage during the last 6 months that has not interfered at all with their daily activities or sleep.        Depression Screening  Little interest or pleasure in doing things?  0 - not at all  Feeling down, depressed, or hopeless? 0 - not at all  Patient Health Questionnaire Score: 0    If depressive symptoms identified deferred to follow up visit unless specifically addressed in assessment and plan.    Interpretation of PHQ-9 Total Score   Score Severity   1-4 No Depression   5-9 Mild Depression   10-14 Moderate Depression   15-19 Moderately Severe Depression   20-27 Severe Depression    Screening for Cognitive Impairment  Three Minute Recall (apple, watch, meredith)  1/3    Draw clock face with all 12 numbers set to the hand to show 10 minutes past 11 o'clock  1 5/5  If cognitive concerns identified, deferred for follow up unless specifically addressed in assessment and plan.    Fall Risk Assessment  Has the patient had two or more falls in the last year or any fall with injury in the last year?  No  If fall risk identified, deferred for follow up unless specifically addressed in assessment and plan.    Safety Assessment  Throw rugs on floor.  Yes  Handrails on all stairs.  Yes  Good lighting in all hallways.  Yes  Difficulty hearing.  No  Patient counseled about all safety risks that were identified.    Functional Assessment ADLs  Are there any barriers preventing you from cooking for yourself or meeting nutritional needs?  No.    Are there any barriers preventing you from driving safely or obtaining transportation?  No.    Are there any barriers preventing you from using a telephone or calling for help?  No.    Are there any barriers preventing you from shopping?  No.    Are there any barriers preventing you from taking care of your own finances?  No.    Are there any barriers preventing you from  managing your medications?  No.    Are there any barriers preventing you from showering, bathing or dressing yourself?  No.    Are you currently engaging any exercise or physical activity?  Yes.  Pt states that he walks a couple of miles daily.     Health Maintenance Summary                URINE DRUG SCREEN Overdue 2/22/2018      Done 2/27/2017 [facility] PAIN MANAGEMENT SCREEN    IMM PNEUMOCOCCAL 65+ (ADULT) LOW/MEDIUM RISK SERIES Next Due 9/6/2018      Done 9/6/2017 Imm Admin: Pneumococcal Conjugate Vaccine (Prevnar/PCV-13)    Annual Wellness Visit Next Due 9/7/2018      Done 9/6/2017 Visit Dx: Medicare annual wellness visit, subsequent     Patient has more history with this topic...    COLONOSCOPY Next Due 2/18/2019      Done 2/18/2014 AMB REFERRAL TO GI FOR COLONOSCOPY     Patient has more history with this topic...    IMM DTaP/Tdap/Td Vaccine Next Due 8/21/2023      Done 8/21/2013 Imm Admin: Tdap Vaccine          Patient Care Team:  Angelica Hauser P.A.-C. as PCP - General (Family Medicine)  Josef Retana M.D. as Consulting Physician (Cardiology)  ЕКАТЕРИНА Lugo as Consulting Physician (Urology)    Social History   Substance Use Topics   • Smoking status: Former Smoker     Years: 35.00     Quit date: 1/1/1988   • Smokeless tobacco: Never Used   • Alcohol use No     Family History   Problem Relation Age of Onset   • Cancer Mother      He  has a past medical history of A-fib (CMS-HCC) (6/28/2011); Arteriosclerosis; CAD (coronary artery disease); CAD (coronary artery disease) (6/28/2011); Cardiac pacemaker in situ (11/19/2013); Colonic polyps; Diverticulosis; Dizzy spells (11/20/2012); HERZOG (dyspnea on exertion) (9/7/2014); Dyslipidemia (6/28/2011); Hematoma of abdominal wall; Hematoma of rectus sheath (6/13/2012); Myocardial infarct (1989); Pacer at end of battery life (6/28/2011); PAF (paroxysmal atrial fibrillation) (CMS-HCC) (9/7/2014); Paroxysmal atrial fibrillation (CMS-HCC); Peripheral vascular  "disease (CMS-HCC) (10/31/2011); and PVD (peripheral vascular disease) (CMS-HCC).   Past Surgical History:   Procedure Laterality Date   • EXPLORATORY LAPAROTOMY  6/14/2012    Performed by CALLY MERCER at SURGERY [facility] ORS   • COLONOSCOPY WITH POLYP  7/24/08    Performed by RUDOLPH BRENNER at SURGERY [facility] ORS   • ABDOMINAL AORTIC ANEURYSM     • PACEMAKER INSERTION           Exam:   Blood pressure 112/68, pulse 60, temperature 36.7 °C (98 °F), height 1.702 m (5' 7\"), weight 97.1 kg (214 lb), SpO2 92 %. Body mass index is 33.52 kg/m².    Hearing fair.    Dentition fair  Alert, oriented in no acute distress.  Eye contact is good, speech goal directed, affect calm      Assessment and Plan. The following treatment and monitoring plan is recommended:    1. Dyslipidemia     2. PVD (peripheral vascular disease) (CMS-HCC)     3. Benign prostatic hyperplasia with lower urinary tract symptoms, symptom details unspecified     4. Cardiac pacemaker in situ     5. PAF (paroxysmal atrial fibrillation) (CMS-HCC)     6. Chronic pain of right knee     7. Opioid type dependence, continuous (CMS-HCC)     8. Chronic bilateral low back pain without sciatica     9. Chronic use of opiate drugs therapeutic purposes     10. Coronary artery disease due to calcified coronary lesion       Continue current meds. F/u with specialists as scheduled.    Services suggested: No services needed at this time  Health Care Screening: Age-appropriate preventive services recommended by USPTF and ACIP covered by Medicare were discussed today. Services ordered if indicated and agreed upon by the patient.  Referrals offered: PT/OT/Nutrition counseling/Behavioral Health/Smoking cessation as per orders if indicated.    Discussion today about general wellness and lifestyle habits:    · Prevent falls and reduce trip hazards; Cautioned about securing or removing rugs.  · Have a working fire alarm and carbon monoxide detector;   · Engage in " regular physical activity and social activities     Follow-up: Return in about 2 months (around 6/19/2018).

## 2018-04-20 NOTE — ASSESSMENT & PLAN NOTE
Chronic, stable on current - actually worsening pain but refuses ortho or pain management referrals. Will continue current med

## 2018-04-20 NOTE — ASSESSMENT & PLAN NOTE
Getting worse, wearing brace, refuses ortho appointment because he doesn't want to go to any more appointments. Asked about more pain medication but explained that he is at the highest dose and frequency that I am able to prescribe.

## 2018-04-23 RX ORDER — CLOBETASOL PROPIONATE 0.5 MG/G
CREAM TOPICAL
Qty: 60 G | Refills: 1 | Status: SHIPPED | OUTPATIENT
Start: 2018-04-23 | End: 2019-01-06 | Stop reason: SDUPTHER

## 2018-04-26 ENCOUNTER — ANTICOAGULATION MONITORING (OUTPATIENT)
Dept: VASCULAR LAB | Facility: MEDICAL CENTER | Age: 83
End: 2018-04-26

## 2018-04-26 NOTE — PROGRESS NOTES
Anticoagulation Summary  As of 4/26/2018    INR goal:   2.0-3.0   TTR:   78.4 % (2.8 y)   Today's INR:   2.56 (4/11/2018)   Warfarin maintenance plan:   2.5 mg (5 mg x 0.5) on Wed, Sat; 5 mg (5 mg x 1) all other days   Weekly warfarin total:   30 mg   Plan last modified:   Jus Young, PharmD (9/20/2017)   Next INR check:   6/6/2018   Target end date:   Indefinite    Indications    A-fib (CMS-HCC) (Resolved) [I48.91]             Anticoagulation Episode Summary     INR check location:   Outside Lab    Preferred lab:       Send INR reminders to:       Comments:   [facility]       Anticoagulation Care Providers     Provider Role Specialty Phone number    Anastacio Sunshine M.D. Referring Cardiology [phone]    Mahin Valdez Responsible          Anticoagulation Patient Findings    See note by kelvin Zafar, PharmD

## 2018-05-25 ENCOUNTER — OFFICE VISIT (OUTPATIENT)
Dept: MEDICAL GROUP | Facility: MEDICAL CENTER | Age: 83
End: 2018-05-25
Payer: MEDICARE

## 2018-05-25 VITALS
DIASTOLIC BLOOD PRESSURE: 60 MMHG | HEART RATE: 57 BPM | OXYGEN SATURATION: 91 % | SYSTOLIC BLOOD PRESSURE: 120 MMHG | WEIGHT: 214.4 LBS | HEIGHT: 67 IN | RESPIRATION RATE: 14 BRPM | BODY MASS INDEX: 33.65 KG/M2

## 2018-05-25 DIAGNOSIS — Z79.891 CHRONIC USE OF OPIATE DRUGS THERAPEUTIC PURPOSES: ICD-10-CM

## 2018-05-25 DIAGNOSIS — I25.84 CORONARY ARTERY DISEASE DUE TO CALCIFIED CORONARY LESION: ICD-10-CM

## 2018-05-25 DIAGNOSIS — G89.29 CHRONIC PAIN OF RIGHT KNEE: ICD-10-CM

## 2018-05-25 DIAGNOSIS — G89.29 CHRONIC BILATERAL LOW BACK PAIN WITHOUT SCIATICA: ICD-10-CM

## 2018-05-25 DIAGNOSIS — M25.561 CHRONIC PAIN OF RIGHT KNEE: ICD-10-CM

## 2018-05-25 DIAGNOSIS — F11.20 OPIOID TYPE DEPENDENCE, CONTINUOUS (HCC): ICD-10-CM

## 2018-05-25 DIAGNOSIS — M54.50 CHRONIC BILATERAL LOW BACK PAIN WITHOUT SCIATICA: ICD-10-CM

## 2018-05-25 DIAGNOSIS — I25.10 CORONARY ARTERY DISEASE DUE TO CALCIFIED CORONARY LESION: ICD-10-CM

## 2018-05-25 PROCEDURE — 99214 OFFICE O/P EST MOD 30 MIN: CPT | Performed by: PHYSICIAN ASSISTANT

## 2018-05-25 RX ORDER — HYDROCODONE BITARTRATE AND ACETAMINOPHEN 10; 325 MG/1; MG/1
1 TABLET ORAL EVERY 6 HOURS PRN
Qty: 120 TAB | Refills: 0 | Status: SHIPPED | OUTPATIENT
Start: 2018-07-25 | End: 2018-08-24

## 2018-05-25 RX ORDER — HYDROCODONE BITARTRATE AND ACETAMINOPHEN 10; 325 MG/1; MG/1
1 TABLET ORAL EVERY 6 HOURS PRN
Qty: 120 TAB | Refills: 0 | Status: SHIPPED | OUTPATIENT
Start: 2018-05-25 | End: 2018-06-24

## 2018-05-25 RX ORDER — HYDROCODONE BITARTRATE AND ACETAMINOPHEN 10; 325 MG/1; MG/1
1 TABLET ORAL EVERY 6 HOURS PRN
Qty: 120 TAB | Refills: 0 | Status: SHIPPED | OUTPATIENT
Start: 2018-05-25 | End: 2018-05-25 | Stop reason: SDUPTHER

## 2018-05-25 RX ORDER — HYDROCODONE BITARTRATE AND ACETAMINOPHEN 10; 325 MG/1; MG/1
1 TABLET ORAL EVERY 6 HOURS PRN
Qty: 120 TAB | Refills: 0 | Status: SHIPPED | OUTPATIENT
Start: 2018-06-25 | End: 2018-07-25

## 2018-05-25 NOTE — ASSESSMENT & PLAN NOTE
Chronic back and knee pain  Current pain control: fair, 4/10, mostly right knee, doesn't want to do injections at this point, just managing with knee brace  Adverse effects: denies any adverse reactions or side effects  Physical functioning: fair  Mood: good  Family and social relationships: good  Sleep pattern: good  Overall functioning: good      Pain management agreement initiated and signed on: 2/27/2018  Last dose of narcotic medication: this morning  Aberrant Behaviors: None  I have reviewed the medical records, the Prescription Monitoring Program and I have determined that is medically indicated.    I have advised patient to keep medication in a safe place and to not drive with medication.

## 2018-05-25 NOTE — PROGRESS NOTES
Subjective:   Gennaro Richardson is a 82 y.o. male here today for pain medication follow up    Coronary artery disease due to calcified coronary lesion:Occluded left circumflex  On medication for BP, blood thinner, statin.     Opioid type dependence, continuous  Chronic back and knee pain  Current pain control: fair, 4/10, mostly right knee, doesn't want to do injections at this point, just managing with knee brace  Adverse effects: denies any adverse reactions or side effects  Physical functioning: fair  Mood: good  Family and social relationships: good  Sleep pattern: good  Overall functioning: good      Pain management agreement initiated and signed on: 2/27/2018  Last dose of narcotic medication: this morning  Aberrant Behaviors: None  I have reviewed the medical records, the Prescription Monitoring Program and I have determined that is medically indicated.    I have advised patient to keep medication in a safe place and to not drive with medication.         Current medicines (including changes today)  Current Outpatient Prescriptions   Medication Sig Dispense Refill   • HYDROcodone/acetaminophen (NORCO)  MG Tab Take 1 Tab by mouth every 6 hours as needed for up to 30 days. 120 Tab 0   • [START ON 6/25/2018] HYDROcodone/acetaminophen (NORCO)  MG Tab Take 1 Tab by mouth every 6 hours as needed for up to 30 days. 120 Tab 0   • [START ON 7/25/2018] HYDROcodone/acetaminophen (NORCO)  MG Tab Take 1 Tab by mouth every 6 hours as needed for up to 30 days. 120 Tab 0   • clobetasol (TEMOVATE) 0.05 % Cream Apply TOPICALLY to affected area  twice daily 60 g 1   • warfarin (COUMADIN) 5 MG Tab Take 1/2-1 tab po q day or as directed by clinic 90 Tab 3   • atorvastatin (LIPITOR) 40 MG Tab Take 1 Tab by mouth every day. 90 Tab 3   • finasteride (PROSCAR) 5 MG TABS TAKE ONE TABLET BY MOUTH ONE TIME DAILY 90 Tab 2   • tamsulosin (FLOMAX) 0.4 MG capsule Take 1 Cap by mouth every day. (Patient taking  "differently: Take 0.4 mg by mouth 2 Times a Day.) 90 Cap 3     No current facility-administered medications for this visit.      He  has a past medical history of A-fib ([facility]) (6/28/2011); Arteriosclerosis; CAD (coronary artery disease); CAD (coronary artery disease) (6/28/2011); Cardiac pacemaker in situ (11/19/2013); Colonic polyps; Diverticulosis; Dizzy spells (11/20/2012); HERZOG (dyspnea on exertion) (9/7/2014); Dyslipidemia (6/28/2011); Hematoma of abdominal wall; Hematoma of rectus sheath (6/13/2012); Myocardial infarct ([facility]) (1989); Pacer at end of battery life (6/28/2011); PAF (paroxysmal atrial fibrillation) ([facility]) (9/7/2014); Paroxysmal atrial fibrillation ([facility]); Peripheral vascular disease ([facility]) (10/31/2011); and PVD (peripheral vascular disease) ([facility]).    ROS   No fever/chills. No weight change. No headache/dizziness. No focal weakness. No sore throat, nasal congestion, ear pain. No chest pain, no shortness of breath, difficulty breathing. No n/v/d/c or abdominal pain. No urinary complaint. No rash or skin lesion.      Objective:     Blood pressure 120/60, pulse (!) 57, resp. rate 14, height 1.702 m (5' 7\"), weight 97.3 kg (214 lb 6.4 oz), SpO2 91 %. Body mass index is 33.58 kg/m².   Physical Exam:  Constitutional: WDWN, NAD  Skin: Warm, dry, good turgor, no rashes in visible areas.  Psych: Alert and oriented x3, normal affect and mood.        Assessment and Plan:   The following treatment plan was discussed    1. Chronic use of opiate drugs therapeutic purposes  Consent on file.   [facility] Aware web site evaluation: I have obtained and reviewed patient utilization report from [facility] pharmacy database prior to writing prescription for controlled substance II, III or IV. Based on the report and my clinical assessment the prescription is medically necessary.   Patient is cautioned on sedation potential of narcotic medication; no drinking, driving or operating heavy machinery while on this " medication.  - HYDROcodone/acetaminophen (NORCO)  MG Tab; Take 1 Tab by mouth every 6 hours as needed for up to 30 days.  Dispense: 120 Tab; Refill: 0  - HYDROcodone/acetaminophen (NORCO)  MG Tab; Take 1 Tab by mouth every 6 hours as needed for up to 30 days.  Dispense: 120 Tab; Refill: 0  - HYDROcodone/acetaminophen (NORCO)  MG Tab; Take 1 Tab by mouth every 6 hours as needed for up to 30 days.  Dispense: 120 Tab; Refill: 0    2. Chronic bilateral low back pain without sciatica    - HYDROcodone/acetaminophen (NORCO)  MG Tab; Take 1 Tab by mouth every 6 hours as needed for up to 30 days.  Dispense: 120 Tab; Refill: 0  - HYDROcodone/acetaminophen (NORCO)  MG Tab; Take 1 Tab by mouth every 6 hours as needed for up to 30 days.  Dispense: 120 Tab; Refill: 0  - HYDROcodone/acetaminophen (NORCO)  MG Tab; Take 1 Tab by mouth every 6 hours as needed for up to 30 days.  Dispense: 120 Tab; Refill: 0    3. Chronic pain of right knee    - HYDROcodone/acetaminophen (NORCO)  MG Tab; Take 1 Tab by mouth every 6 hours as needed for up to 30 days.  Dispense: 120 Tab; Refill: 0  - HYDROcodone/acetaminophen (NORCO)  MG Tab; Take 1 Tab by mouth every 6 hours as needed for up to 30 days.  Dispense: 120 Tab; Refill: 0  - HYDROcodone/acetaminophen (NORCO)  MG Tab; Take 1 Tab by mouth every 6 hours as needed for up to 30 days.  Dispense: 120 Tab; Refill: 0    4. Coronary artery disease due to calcified coronary lesion:Occluded left circumflex      5. Opioid type dependence, continuous (HCC)        Followup: Return in about 3 months (around 8/25/2018).

## 2018-06-06 ENCOUNTER — HOSPITAL ENCOUNTER (OUTPATIENT)
Dept: LAB | Facility: MEDICAL CENTER | Age: 83
End: 2018-06-06
Attending: NURSE PRACTITIONER
Payer: MEDICARE

## 2018-06-06 LAB
INR PPP: 3.2 (ref 0.87–1.13)
PROTHROMBIN TIME: 32.5 SEC (ref 12–14.6)

## 2018-06-06 PROCEDURE — 36415 COLL VENOUS BLD VENIPUNCTURE: CPT

## 2018-06-06 PROCEDURE — 85610 PROTHROMBIN TIME: CPT

## 2018-06-07 ENCOUNTER — ANTICOAGULATION MONITORING (OUTPATIENT)
Dept: VASCULAR LAB | Facility: MEDICAL CENTER | Age: 83
End: 2018-06-07

## 2018-06-07 NOTE — PROGRESS NOTES
Anticoagulation Summary  As of 6/7/2018    INR goal:   2.0-3.0   TTR:   77.9 % (3 y)   Today's INR:   3.20! (6/6/2018)   Warfarin maintenance plan:   2.5 mg (5 mg x 0.5) on Mon, Wed, Fri; 5 mg (5 mg x 1) all other days   Weekly warfarin total:   27.5 mg   Plan last modified:   Verito Zafar PharmD (6/7/2018)   Next INR check:   6/20/2018   Target end date:   Indefinite    Indications    A-fib (HCC) (Resolved) [I48.91]             Anticoagulation Episode Summary     INR check location:   Outside Lab    Preferred lab:       Send INR reminders to:       Comments:   [facility]       Anticoagulation Care Providers     Provider Role Specialty Phone number    Anastacio Sunshine M.D. Referring Cardiology [phone]    Mahin Valdez Responsible          Anticoagulation Patient Findings    Spoke with Philip to report a supra therapeutic INR of 3.2.  Will reduce weekly dose by 8%. Pt denies any unusual s/s of bleeding, bruising, clotting or any changes to diet or medications.  Follow up in 2 weeks, to reduce risk of adverse events related to this high risk medication,  Warfarin.    Verito Zafar, PharmD

## 2018-06-21 ENCOUNTER — HOSPITAL ENCOUNTER (OUTPATIENT)
Dept: LAB | Facility: MEDICAL CENTER | Age: 83
End: 2018-06-21
Attending: NURSE PRACTITIONER
Payer: MEDICARE

## 2018-06-21 ENCOUNTER — ANTICOAGULATION MONITORING (OUTPATIENT)
Dept: VASCULAR LAB | Facility: MEDICAL CENTER | Age: 83
End: 2018-06-21

## 2018-06-21 LAB
INR PPP: 2.85 (ref 0.87–1.13)
PROTHROMBIN TIME: 29.6 SEC (ref 12–14.6)

## 2018-06-21 PROCEDURE — 36415 COLL VENOUS BLD VENIPUNCTURE: CPT

## 2018-06-21 PROCEDURE — 85610 PROTHROMBIN TIME: CPT

## 2018-06-22 NOTE — PROGRESS NOTES
OP Anticoagulation Telephone Note    Date: 6/21/2018  Anticoagulation Summary  As of 6/21/2018    INR goal:   2.0-3.0   TTR:   77.4 % (3 y)   Today's INR:   2.85   Warfarin maintenance plan:   2.5 mg (5 mg x 0.5) on Mon, Wed, Fri; 5 mg (5 mg x 1) all other days   Weekly warfarin total:   27.5 mg   No change documented:   Melodie Toney, Med Ass't   Plan last modified:   Verito Zafar, PharmD (6/7/2018)   Next INR check:   7/5/2018   Target end date:   Indefinite    Indications    A-fib (HCC) (Resolved) [I48.91]             Anticoagulation Episode Summary     INR check location:   Outside Lab    Preferred lab:       Send INR reminders to:       Comments:   [facility]       Anticoagulation Care Providers     Provider Role Specialty Phone number    Anastacio Sunshine M.D. Referring Cardiology [phone]    Mahin Valdez Responsible          Anticoagulation Patient Findings  Patient Findings     Negatives:   Signs/symptoms of thrombosis, Signs/symptoms of bleeding, Laboratory test error suspected, Change in health, Change in alcohol use, Change in activity, Upcoming invasive procedure, Emergency department visit, Upcoming dental procedure, Missed doses, Extra doses, Change in medications, Change in diet/appetite, Hospital admission, Bruising, Other complaints      Plan:  Spoke with patient's wife on the phone. Patient is therapeutic today. Patient denies any changes in medications or diet. Patient denies any signs or symptoms of bleeding or clotting. Instructed patient to call clinic if any unusual bleeding or bruising occurs. Will continue dosing as outlined above. Will follow-up with patient in 2 weeks.    Melodie Toney, Medical Assistant    I have reviewed and agree with the plan above on 06/21/2018      Verito Zafar, PharmD

## 2018-06-27 ENCOUNTER — NON-PROVIDER VISIT (OUTPATIENT)
Dept: CARDIOLOGY | Facility: MEDICAL CENTER | Age: 83
End: 2018-06-27
Payer: MEDICARE

## 2018-06-27 ENCOUNTER — OFFICE VISIT (OUTPATIENT)
Dept: CARDIOLOGY | Facility: MEDICAL CENTER | Age: 83
End: 2018-06-27
Payer: MEDICARE

## 2018-06-27 VITALS
SYSTOLIC BLOOD PRESSURE: 108 MMHG | WEIGHT: 209 LBS | HEIGHT: 67 IN | HEART RATE: 66 BPM | OXYGEN SATURATION: 93 % | DIASTOLIC BLOOD PRESSURE: 60 MMHG | BODY MASS INDEX: 32.8 KG/M2

## 2018-06-27 DIAGNOSIS — Z95.0 CARDIAC PACEMAKER IN SITU: ICD-10-CM

## 2018-06-27 DIAGNOSIS — E78.5 DYSLIPIDEMIA: Chronic | ICD-10-CM

## 2018-06-27 DIAGNOSIS — I25.84 CORONARY ARTERY DISEASE DUE TO CALCIFIED CORONARY LESION: ICD-10-CM

## 2018-06-27 DIAGNOSIS — I49.5 SICK SINUS SYNDROME (HCC): ICD-10-CM

## 2018-06-27 DIAGNOSIS — I25.10 CORONARY ARTERY DISEASE DUE TO CALCIFIED CORONARY LESION: ICD-10-CM

## 2018-06-27 DIAGNOSIS — I48.0 PAF (PAROXYSMAL ATRIAL FIBRILLATION) (HCC): ICD-10-CM

## 2018-06-27 PROCEDURE — 99214 OFFICE O/P EST MOD 30 MIN: CPT | Performed by: INTERNAL MEDICINE

## 2018-06-27 PROCEDURE — 93280 PM DEVICE PROGR EVAL DUAL: CPT | Performed by: INTERNAL MEDICINE

## 2018-06-27 NOTE — LETTER
[facility]   [address], [address]  [place] [address]  Phone: [phone]  Fax: [phone]              Gennaro Richardson  1935    Encounter Date: 6/27/2018    Josef Retana M.D.          PROGRESS NOTE:  Chief Complaint   Patient presents with   • Coronary Artery Disease     F/V: 6 MO/LABS IN EPIC       Subjective:   Gennaro Richardson is a 82 y.o. male who presents today for followup of his coronary disease, hyperlipidemia and paroxysmal atrial fibrillation.  He had a pacemaker generator change in September 2016.    He denies any chest discomfort, dyspnea, edema, palpitations or lightheadedness.  He tries to go for a walk on a daily basis.      Past Medical History:   Diagnosis Date   • A-fib (Formerly [facility]) 6/28/2011   • Arteriosclerosis    • CAD (coronary artery disease)    • CAD (coronary artery disease) 6/28/2011   • Cardiac pacemaker in situ 11/19/2013   • Colonic polyps    • Diverticulosis    • Dizzy spells 11/20/2012   • HERZOG (dyspnea on exertion) 9/7/2014 9/16/16- pt has no c/o   • Dyslipidemia 6/28/2011   • Hematoma of abdominal wall    • Hematoma of rectus sheath 6/13/2012    Delayed onset bleed, intraabdominal extension, left flank , ct abd active bleed , binder, vit k  6/14 Ex-lap     • Myocardial infarct (Formerly [facility]) 1989   • Pacer at end of battery life 6/28/2011   • PAF (paroxysmal atrial fibrillation) (Formerly [facility]) 9/7/2014   • Paroxysmal atrial fibrillation (Formerly [facility])    • Peripheral vascular disease (Formerly [facility]) 10/31/2011   • PVD (peripheral vascular disease) (Formerly [facility])      Past Surgical History:   Procedure Laterality Date   • EXPLORATORY LAPAROTOMY  6/14/2012    Performed by CALLY MERCER at SURGERY [facility] ORS   • COLONOSCOPY WITH POLYP  7/24/08    Performed by RUDOLPH BRENNER at SURGERY [facility] ORS   • ABDOMINAL AORTIC ANEURYSM     • PACEMAKER INSERTION       Family History   Problem Relation Age of Onset   • Cancer Mother      Social History      "    Social History   • Marital status: Single     Spouse name: N/A   • Number of children: N/A   • Years of education: N/A     Occupational History   • Not on file.     Social History Main Topics   • Smoking status: Former Smoker     Years: 35.00     Quit date: 1/1/1988   • Smokeless tobacco: Never Used   • Alcohol use No   • Drug use: No   • Sexual activity: No      Comment: , has children, retired.     Other Topics Concern   • Not on file     Social History Narrative   • No narrative on file     Allergies   Allergen Reactions   • No Known Drug Allergy      Outpatient Encounter Prescriptions as of 6/27/2018   Medication Sig Dispense Refill   • HYDROcodone/acetaminophen (NORCO)  MG Tab Take 1 Tab by mouth every 6 hours as needed for up to 30 days. 120 Tab 0   • clobetasol (TEMOVATE) 0.05 % Cream Apply TOPICALLY to affected area  twice daily 60 g 1   • warfarin (COUMADIN) 5 MG Tab Take 1/2-1 tab po q day or as directed by clinic 90 Tab 3   • atorvastatin (LIPITOR) 40 MG Tab Take 1 Tab by mouth every day. 90 Tab 3   • finasteride (PROSCAR) 5 MG TABS TAKE ONE TABLET BY MOUTH ONE TIME DAILY 90 Tab 2   • tamsulosin (FLOMAX) 0.4 MG capsule Take 1 Cap by mouth every day. (Patient taking differently: Take 0.4 mg by mouth 2 Times a Day.) 90 Cap 3   • [START ON 7/25/2018] HYDROcodone/acetaminophen (NORCO)  MG Tab Take 1 Tab by mouth every 6 hours as needed for up to 30 days. 120 Tab 0     No facility-administered encounter medications on file as of 6/27/2018.      ROS     Objective:   /60   Pulse 66   Ht 1.702 m (5' 7\")   Wt 94.8 kg (209 lb)   SpO2 93%   BMI 32.73 kg/m²      Physical Exam   Constitutional: He appears well-developed and well-nourished.   Neck: No JVD present.   Cardiovascular: Normal rate and regular rhythm.    No murmur heard.  Pulmonary/Chest: Effort normal and breath sounds normal. He has no rales.   Abdominal: Soft. There is no tenderness.   Musculoskeletal: He exhibits edema " (1+ pretibial).     Lab Results   Component Value Date/Time    CHOLSTRLTOT 95 (L) 01/17/2018 11:11 AM    LDL 36 01/17/2018 11:11 AM    HDL 39 (A) 01/17/2018 11:11 AM    TRIGLYCERIDE 101 01/17/2018 11:11 AM       Lab Results   Component Value Date/Time    SODIUM 139 01/17/2018 11:11 AM    POTASSIUM 4.0 01/17/2018 11:11 AM    CHLORIDE 109 01/17/2018 11:11 AM    CO2 23 01/17/2018 11:11 AM    GLUCOSE 85 01/17/2018 11:11 AM    BUN 23 (H) 01/17/2018 11:11 AM    CREATININE 1.04 01/17/2018 11:11 AM    CREATININE 1.3 12/01/2008 10:45 AM     Lab Results   Component Value Date/Time    ALKPHOSPHAT 66 01/17/2018 11:11 AM    ASTSGOT 21 01/17/2018 11:11 AM    ALTSGPT 16 01/17/2018 11:11 AM    TBILIRUBIN 1.2 01/17/2018 11:11 AM      Lab Results   Component Value Date/Time    BNPBTYPENAT 93 09/06/2014 01:46 PM          Assessment:     1. Coronary artery disease due to calcified coronary lesion:Occluded left circumflex     2. Dyslipidemia     3. Cardiac pacemaker in situ     4. PAF (paroxysmal atrial fibrillation) (HCC)         Medical Decision Making:  Today's Assessment / Status / Plan:     Mr. Richardson is clinically stable.  His lipid status is been under excellent control.  His pacemaker check today shows 6 years of battery life remaining.  He continues have episodes of PAF but is asymptomatic from this.  His lipid status appears to be under excellent control.  He is asymptomatic from a cardiovascular standpoint.  He will follow-up in 6 months with repeat lab work.  He has not had his LV function reassessed in about 4 years and an echocardiogram will be obtained prior to his follow-up appointment.      ANN Wilson.-C.  [address]  [address]  [place] [facility] [address]  VIA In Basket

## 2018-06-27 NOTE — PROGRESS NOTES
Chief Complaint   Patient presents with   • Coronary Artery Disease     F/V: 6 MO/LABS IN EPIC       Subjective:   Gennaro Richardson is a 82 y.o. male who presents today for followup of his coronary disease, hyperlipidemia and paroxysmal atrial fibrillation.  He had a pacemaker generator change in September 2016.    He denies any chest discomfort, dyspnea, edema, palpitations or lightheadedness.  He tries to go for a walk on a daily basis.      Past Medical History:   Diagnosis Date   • A-fib ([facility]) 6/28/2011   • Arteriosclerosis    • CAD (coronary artery disease)    • CAD (coronary artery disease) 6/28/2011   • Cardiac pacemaker in situ 11/19/2013   • Colonic polyps    • Diverticulosis    • Dizzy spells 11/20/2012   • HERZOG (dyspnea on exertion) 9/7/2014 9/16/16- pt has no c/o   • Dyslipidemia 6/28/2011   • Hematoma of abdominal wall    • Hematoma of rectus sheath 6/13/2012    Delayed onset bleed, intraabdominal extension, left flank , ct abd active bleed , binder, vit k  6/14 Ex-lap     • Myocardial infarct ([facility]) 1989   • Pacer at end of battery life 6/28/2011   • PAF (paroxysmal atrial fibrillation) ([facility]) 9/7/2014   • Paroxysmal atrial fibrillation ([facility])    • Peripheral vascular disease ([facility]) 10/31/2011   • PVD (peripheral vascular disease) ([facility])      Past Surgical History:   Procedure Laterality Date   • EXPLORATORY LAPAROTOMY  6/14/2012    Performed by CALLY MERCER at SURGERY [facility] ORS   • COLONOSCOPY WITH POLYP  7/24/08    Performed by RUDOLPH BRENNER at SURGERY [facility] ORS   • ABDOMINAL AORTIC ANEURYSM     • PACEMAKER INSERTION       Family History   Problem Relation Age of Onset   • Cancer Mother      Social History     Social History   • Marital status: Single     Spouse name: N/A   • Number of children: N/A   • Years of education: N/A     Occupational History   • Not on file.     Social History Main Topics   • Smoking status: Former Smoker     Years: 35.00     Quit date: 1/1/1988   •  "Smokeless tobacco: Never Used   • Alcohol use No   • Drug use: No   • Sexual activity: No      Comment: , has children, retired.     Other Topics Concern   • Not on file     Social History Narrative   • No narrative on file     Allergies   Allergen Reactions   • No Known Drug Allergy      Outpatient Encounter Prescriptions as of 6/27/2018   Medication Sig Dispense Refill   • HYDROcodone/acetaminophen (NORCO)  MG Tab Take 1 Tab by mouth every 6 hours as needed for up to 30 days. 120 Tab 0   • clobetasol (TEMOVATE) 0.05 % Cream Apply TOPICALLY to affected area  twice daily 60 g 1   • warfarin (COUMADIN) 5 MG Tab Take 1/2-1 tab po q day or as directed by clinic 90 Tab 3   • atorvastatin (LIPITOR) 40 MG Tab Take 1 Tab by mouth every day. 90 Tab 3   • finasteride (PROSCAR) 5 MG TABS TAKE ONE TABLET BY MOUTH ONE TIME DAILY 90 Tab 2   • tamsulosin (FLOMAX) 0.4 MG capsule Take 1 Cap by mouth every day. (Patient taking differently: Take 0.4 mg by mouth 2 Times a Day.) 90 Cap 3   • [START ON 7/25/2018] HYDROcodone/acetaminophen (NORCO)  MG Tab Take 1 Tab by mouth every 6 hours as needed for up to 30 days. 120 Tab 0     No facility-administered encounter medications on file as of 6/27/2018.      ROS     Objective:   /60   Pulse 66   Ht 1.702 m (5' 7\")   Wt 94.8 kg (209 lb)   SpO2 93%   BMI 32.73 kg/m²     Physical Exam   Constitutional: He appears well-developed and well-nourished.   Neck: No JVD present.   Cardiovascular: Normal rate and regular rhythm.    No murmur heard.  Pulmonary/Chest: Effort normal and breath sounds normal. He has no rales.   Abdominal: Soft. There is no tenderness.   Musculoskeletal: He exhibits edema (1+ pretibial).     Lab Results   Component Value Date/Time    CHOLSTRLTOT 95 (L) 01/17/2018 11:11 AM    LDL 36 01/17/2018 11:11 AM    HDL 39 (A) 01/17/2018 11:11 AM    TRIGLYCERIDE 101 01/17/2018 11:11 AM       Lab Results   Component Value Date/Time    SODIUM 139 " 01/17/2018 11:11 AM    POTASSIUM 4.0 01/17/2018 11:11 AM    CHLORIDE 109 01/17/2018 11:11 AM    CO2 23 01/17/2018 11:11 AM    GLUCOSE 85 01/17/2018 11:11 AM    BUN 23 (H) 01/17/2018 11:11 AM    CREATININE 1.04 01/17/2018 11:11 AM    CREATININE 1.3 12/01/2008 10:45 AM     Lab Results   Component Value Date/Time    ALKPHOSPHAT 66 01/17/2018 11:11 AM    ASTSGOT 21 01/17/2018 11:11 AM    ALTSGPT 16 01/17/2018 11:11 AM    TBILIRUBIN 1.2 01/17/2018 11:11 AM      Lab Results   Component Value Date/Time    BNPBTYPENAT 93 09/06/2014 01:46 PM          Assessment:     1. Coronary artery disease due to calcified coronary lesion:Occluded left circumflex     2. Dyslipidemia     3. Cardiac pacemaker in situ     4. PAF (paroxysmal atrial fibrillation) ([facility])         Medical Decision Making:  Today's Assessment / Status / Plan:     Mr. Richardson is clinically stable.  His lipid status is been under excellent control.  His pacemaker check today shows 6 years of battery life remaining.  He continues have episodes of PAF but is asymptomatic from this. He is asymptomatic from a cardiovascular standpoint.  He will follow-up in 6 months with repeat lab work.  He has not had his LV function reassessed in about 4 years and an echocardiogram will be obtained prior to his follow-up appointment.

## 2018-07-05 ENCOUNTER — HOSPITAL ENCOUNTER (OUTPATIENT)
Dept: LAB | Facility: MEDICAL CENTER | Age: 83
End: 2018-07-05
Attending: NURSE PRACTITIONER
Payer: MEDICARE

## 2018-07-05 DIAGNOSIS — Z79.01 CHRONIC ANTICOAGULATION: ICD-10-CM

## 2018-07-05 LAB
INR PPP: 2.71 (ref 0.87–1.13)
PROTHROMBIN TIME: 28.5 SEC (ref 12–14.6)

## 2018-07-05 PROCEDURE — 85610 PROTHROMBIN TIME: CPT

## 2018-07-05 PROCEDURE — 36415 COLL VENOUS BLD VENIPUNCTURE: CPT

## 2018-07-06 ENCOUNTER — ANTICOAGULATION MONITORING (OUTPATIENT)
Dept: VASCULAR LAB | Facility: MEDICAL CENTER | Age: 83
End: 2018-07-06

## 2018-07-06 NOTE — PROGRESS NOTES
Anticoagulation Summary  As of 7/6/2018    INR goal:   2.0-3.0   TTR:   77.7 % (3 y)   Today's INR:   2.71 (7/5/2018)   Warfarin maintenance plan:   2.5 mg (5 mg x 0.5) on Mon, Wed, Fri; 5 mg (5 mg x 1) all other days   Weekly warfarin total:   27.5 mg   Plan last modified:   Shila RogersD (6/7/2018)   Next INR check:   8/3/2018   Target end date:   Indefinite    Indications    A-fib (HCC) (Resolved) [I48.91]             Anticoagulation Episode Summary     INR check location:   Outside Lab    Preferred lab:       Send INR reminders to:       Comments:   [facility]       Anticoagulation Care Providers     Provider Role Specialty Phone number    Anastacio Sunshine M.D. Referring Cardiology [phone]    Mahin Valdez Responsible          Anticoagulation Patient Findings  Patient Findings     Negatives:   Signs/symptoms of thrombosis, Signs/symptoms of bleeding, Laboratory test error suspected, Change in health, Change in alcohol use, Change in activity, Upcoming invasive procedure, Emergency department visit, Upcoming dental procedure, Missed doses, Extra doses, Change in medications, Change in diet/appetite, Hospital admission, Bruising, Other complaints        Spoke with patient today regarding therapeutic INR of 2.71.  Patient denies any signs/symptoms of bruising or bleeding or any changes in diet and medications.  Instructed patient to call clinic with any questions or concerns.  Pt is to continue with current warfarin dosing regimen.  Follow up in 4 weeks, to reduce risk of adverse events related to this high risk medication,  Warfarin.    Antwon Dejesus, PharmD

## 2018-07-17 DIAGNOSIS — E78.5 DYSLIPIDEMIA: Chronic | ICD-10-CM

## 2018-07-17 DIAGNOSIS — I25.10 CORONARY ARTERY DISEASE DUE TO CALCIFIED CORONARY LESION: ICD-10-CM

## 2018-07-17 DIAGNOSIS — I25.84 CORONARY ARTERY DISEASE DUE TO CALCIFIED CORONARY LESION: ICD-10-CM

## 2018-07-18 RX ORDER — ATORVASTATIN CALCIUM 40 MG/1
TABLET, FILM COATED ORAL
Qty: 90 TAB | Refills: 3 | Status: SHIPPED | OUTPATIENT
Start: 2018-07-18 | End: 2019-04-15 | Stop reason: SDUPTHER

## 2018-08-01 ENCOUNTER — ANTICOAGULATION MONITORING (OUTPATIENT)
Dept: VASCULAR LAB | Facility: MEDICAL CENTER | Age: 83
End: 2018-08-01

## 2018-08-01 ENCOUNTER — HOSPITAL ENCOUNTER (OUTPATIENT)
Dept: LAB | Facility: MEDICAL CENTER | Age: 83
End: 2018-08-01
Attending: NURSE PRACTITIONER
Payer: MEDICARE

## 2018-08-01 DIAGNOSIS — Z79.01 CHRONIC ANTICOAGULATION: ICD-10-CM

## 2018-08-01 LAB
INR PPP: 2.92 (ref 0.87–1.13)
INR PPP: 2.92 (ref 2–3.5)
PROTHROMBIN TIME: 30.2 SEC (ref 12–14.6)

## 2018-08-01 PROCEDURE — 36415 COLL VENOUS BLD VENIPUNCTURE: CPT

## 2018-08-01 PROCEDURE — 85610 PROTHROMBIN TIME: CPT

## 2018-08-02 NOTE — PROGRESS NOTES
Anticoagulation Summary  As of 8/1/2018    INR goal:   2.0-3.0   TTR:   78.2 % (3.1 y)   Today's INR:   2.92   Warfarin maintenance plan:   2.5 mg (5 mg x 0.5) on Mon, Wed, Fri; 5 mg (5 mg x 1) all other days   Weekly warfarin total:   27.5 mg   Plan last modified:   Verito Zafar, PharmD (6/7/2018)   Next INR check:   8/29/2018   Target end date:   Indefinite    Indications    A-fib (HCC) (Resolved) [I48.91]             Anticoagulation Episode Summary     INR check location:   Outside Lab    Preferred lab:       Send INR reminders to:       Comments:   [facility]       Anticoagulation Care Providers     Provider Role Specialty Phone number    Anastacio Sunshine M.D. Referring Cardiology [phone]    Mahin Valdez Responsible          Anticoagulation Patient Findings        Spoke with patient by phone.     INR  is-therapeutic.    Changes to current medical/health status since last appt: none.   Denies signs/symptoms of bleeding and/or thrombosis since the last appt.   Denies any interval changes to diet  Denies any interval changes to medications since last appt.   Denies any complications or cost restrictions with current therapy  Follow up appointment in 4 week(s).      Continue current medication regimen.         Patient is on a high risk medication and therefore requires close monitoring and follow up.     CHEST guidelines recommend frequent INR monitoring at regular intervals (a few days up to a max of 12 weeks) to ensure they are on the proper dose of warfarin and not having any complications from therapy.  INRs can dramatically change over a short time period due to diet, medications, and medical conditions.   The patient instructed to go to the ER for falls with a head injury,  blood in urine or stool or any bleeding that last longer than 20 min.        KELLI Herrera.

## 2018-08-27 ENCOUNTER — OFFICE VISIT (OUTPATIENT)
Dept: MEDICAL GROUP | Facility: MEDICAL CENTER | Age: 83
End: 2018-08-27
Payer: MEDICARE

## 2018-08-27 ENCOUNTER — HOSPITAL ENCOUNTER (OUTPATIENT)
Facility: MEDICAL CENTER | Age: 83
End: 2018-08-27
Attending: PHYSICIAN ASSISTANT
Payer: MEDICARE

## 2018-08-27 VITALS
OXYGEN SATURATION: 91 % | DIASTOLIC BLOOD PRESSURE: 68 MMHG | HEART RATE: 60 BPM | WEIGHT: 209.4 LBS | RESPIRATION RATE: 16 BRPM | HEIGHT: 67 IN | SYSTOLIC BLOOD PRESSURE: 130 MMHG | BODY MASS INDEX: 32.87 KG/M2

## 2018-08-27 DIAGNOSIS — Z79.891 CHRONIC USE OF OPIATE DRUGS THERAPEUTIC PURPOSES: ICD-10-CM

## 2018-08-27 DIAGNOSIS — G89.29 CHRONIC BILATERAL LOW BACK PAIN WITHOUT SCIATICA: ICD-10-CM

## 2018-08-27 DIAGNOSIS — M54.50 CHRONIC BILATERAL LOW BACK PAIN WITHOUT SCIATICA: ICD-10-CM

## 2018-08-27 DIAGNOSIS — M25.561 CHRONIC PAIN OF RIGHT KNEE: ICD-10-CM

## 2018-08-27 DIAGNOSIS — G89.29 CHRONIC PAIN OF RIGHT KNEE: ICD-10-CM

## 2018-08-27 PROCEDURE — 80307 DRUG TEST PRSMV CHEM ANLYZR: CPT

## 2018-08-27 PROCEDURE — 99214 OFFICE O/P EST MOD 30 MIN: CPT | Performed by: PHYSICIAN ASSISTANT

## 2018-08-27 PROCEDURE — G0480 DRUG TEST DEF 1-7 CLASSES: HCPCS

## 2018-08-27 RX ORDER — HYDROCODONE BITARTRATE AND ACETAMINOPHEN 10; 325 MG/1; MG/1
1 TABLET ORAL EVERY 6 HOURS PRN
Qty: 120 TAB | Refills: 0 | Status: SHIPPED | OUTPATIENT
Start: 2018-09-27 | End: 2018-10-27

## 2018-08-27 RX ORDER — HYDROCODONE BITARTRATE AND ACETAMINOPHEN 10; 325 MG/1; MG/1
1 TABLET ORAL EVERY 6 HOURS PRN
Qty: 120 TAB | Refills: 0 | Status: SHIPPED | OUTPATIENT
Start: 2018-10-27 | End: 2018-11-26

## 2018-08-27 RX ORDER — HYDROCODONE BITARTRATE AND ACETAMINOPHEN 10; 325 MG/1; MG/1
1 TABLET ORAL EVERY 6 HOURS PRN
Qty: 120 TAB | Refills: 0 | Status: SHIPPED | OUTPATIENT
Start: 2018-08-27 | End: 2018-11-27 | Stop reason: SDUPTHER

## 2018-08-28 NOTE — ASSESSMENT & PLAN NOTE
Chronic low back, right knee pain - severe degenerative joint disease, not candidate for surgery, no improvement with PT or steroid injections  Current pain control: good, 4-8/10, would like more pain medication but understands this is the max I can prescribe, he doesn't want to see a pain specialist or other specialist  Adverse effects: denies any side effects  Physical functioning: good, able to ambulate, doesn't want to use a cane although he does have some at home  Mood: good, lives with sister, they go out for errands and/or casino most days  Family and social relationships: excellent  Sleep pattern: good  Overall functioning: good  Nonnarcotic treatments that are being used: not able to take NSAIDs. Wears brace on right knee    Pain management agreement initiated and signed on: 2/2018  Last dose of narcotic medication: this am  Most recent urine drug screen done today,   Aberrant Behaviors: None  I have reviewed the medical records, the Prescription Monitoring Program and I have determined that is medically indicated.    I have advised patient to keep medication in a safe place and to not drive with medication.

## 2018-08-28 NOTE — PROGRESS NOTES
Subjective:   Gennaro Richardson is a 82 y.o. male here today for chronic narcotic medication follow up    Chronic use of opiate drugs therapeutic purposes  Chronic low back, right knee pain - severe degenerative joint disease, not candidate for surgery, no improvement with PT or steroid injections  Current pain control: good, 4-8/10, would like more pain medication but understands this is the max I can prescribe, he doesn't want to see a pain specialist or other specialist  Adverse effects: denies any side effects  Physical functioning: good, able to ambulate, doesn't want to use a cane although he does have some at home  Mood: good, lives with sister, they go out for errands and/or casino most days  Family and social relationships: excellent  Sleep pattern: good  Overall functioning: good  Nonnarcotic treatments that are being used: not able to take NSAIDs. Wears brace on right knee    Pain management agreement initiated and signed on: 2/2018  Last dose of narcotic medication: this am  Most recent urine drug screen done today,   Aberrant Behaviors: None  I have reviewed the medical records, the Prescription Monitoring Program and I have determined that is medically indicated.    I have advised patient to keep medication in a safe place and to not drive with medication.      Chronic pain of right knee  No change, wearing brace,not interested in further PT or steroid injections at this time    Chronic back pain  Stable, no new weakness, no recent falls, no urinary or bowel incontinence.       Current medicines (including changes today)  Current Outpatient Prescriptions   Medication Sig Dispense Refill   • HYDROcodone/acetaminophen (NORCO)  MG Tab Take 1 Tab by mouth every 6 hours as needed for up to 30 days. 120 Tab 0   • [START ON 9/27/2018] HYDROcodone/acetaminophen (NORCO)  MG Tab Take 1 Tab by mouth every 6 hours as needed for up to 30 days. 120 Tab 0   • [START ON 10/27/2018]  "HYDROcodone/acetaminophen (NORCO)  MG Tab Take 1 Tab by mouth every 6 hours as needed for up to 30 days. 120 Tab 0   • atorvastatin (LIPITOR) 40 MG Tab TAKE ONE TABLET BY MOUTH ONE TIME DAILY  90 Tab 3   • clobetasol (TEMOVATE) 0.05 % Cream Apply TOPICALLY to affected area  twice daily 60 g 1   • warfarin (COUMADIN) 5 MG Tab Take 1/2-1 tab po q day or as directed by clinic 90 Tab 3   • finasteride (PROSCAR) 5 MG TABS TAKE ONE TABLET BY MOUTH ONE TIME DAILY 90 Tab 2     No current facility-administered medications for this visit.      He  has a past medical history of A-fib (formerly [facility]) (6/28/2011); Arteriosclerosis; CAD (coronary artery disease); CAD (coronary artery disease) (6/28/2011); Cardiac pacemaker in situ (11/19/2013); Colonic polyps; Diverticulosis; Dizzy spells (11/20/2012); HERZOG (dyspnea on exertion) (9/7/2014); Dyslipidemia (6/28/2011); Hematoma of abdominal wall; Hematoma of rectus sheath (6/13/2012); Myocardial infarct (formerly [facility]) (1989); Pacer at end of battery life (6/28/2011); PAF (paroxysmal atrial fibrillation) (formerly [facility]) (9/7/2014); Paroxysmal atrial fibrillation (formerly [facility]); Peripheral vascular disease (formerly [facility]) (10/31/2011); and PVD (peripheral vascular disease) (formerly [facility]).    ROS   No fever/chills. No weight change. No headache/dizziness. No focal weakness. No sore throat, nasal congestion, ear pain. No chest pain, no shortness of breath, difficulty breathing. No n/v/d/c or abdominal pain. No urinary complaint. No rash or skin lesion.      Objective:     Blood pressure 130/68, pulse 60, resp. rate 16, height 1.702 m (5' 7\"), weight 95 kg (209 lb 6.4 oz), SpO2 91 %. Body mass index is 32.8 kg/m².   Physical Exam:  Constitutional: WDWN, NAD  Skin: Warm, dry, good turgor, no rashes in visible areas.  Psych: Alert and oriented x3, normal affect and mood.        Assessment and Plan:   The following treatment plan was discussed    1. Chronic use of opiate drugs therapeutic purposes  [place]  Aware web site evaluation: I " have obtained and reviewed patient utilization report from [facility] pharmacy database prior to writing prescription for controlled substance II, III or IV. Based on the report and my clinical assessment the prescription is medically necessary.   Patient is cautioned on sedation potential of narcotic medication; no drinking, driving or operating heavy machinery while on this medication.  - HYDROcodone/acetaminophen (NORCO)  MG Tab; Take 1 Tab by mouth every 6 hours as needed for up to 30 days.  Dispense: 120 Tab; Refill: 0  - HYDROcodone/acetaminophen (NORCO)  MG Tab; Take 1 Tab by mouth every 6 hours as needed for up to 30 days.  Dispense: 120 Tab; Refill: 0  - HYDROcodone/acetaminophen (NORCO)  MG Tab; Take 1 Tab by mouth every 6 hours as needed for up to 30 days.  Dispense: 120 Tab; Refill: 0  - PAIN MANAGEMENT PANEL, SCRN W/ RFLX TO QNT; Future    2. Chronic pain of right knee      3. Chronic bilateral low back pain without sciatica        Followup: Return in about 3 months (around 11/27/2018).

## 2018-08-29 ENCOUNTER — HOSPITAL ENCOUNTER (OUTPATIENT)
Dept: LAB | Facility: MEDICAL CENTER | Age: 83
End: 2018-08-29
Attending: NURSE PRACTITIONER
Payer: MEDICARE

## 2018-08-29 DIAGNOSIS — Z79.01 CHRONIC ANTICOAGULATION: ICD-10-CM

## 2018-08-29 LAB
AMPHET CTO UR CFM-MCNC: NEGATIVE NG/ML
BARBITURATES CTO UR CFM-MCNC: NEGATIVE NG/ML
BENZODIAZ CTO UR CFM-MCNC: NEGATIVE NG/ML
BUPRENORPHINE UR-MCNC: NEGATIVE NG/ML
CANNABINOIDS CTO UR CFM-MCNC: NEGATIVE NG/ML
CARISOPRODOL UR-MCNC: NEGATIVE NG/ML
COCAINE CTO UR CFM-MCNC: NEGATIVE NG/ML
DRUG SCREEN COMMENT UR-IMP: NORMAL
ETHYL GLUCURONIDE UR QL SCN: NEGATIVE NG/ML
FENTANYL UR-MCNC: NEGATIVE NG/ML
INR PPP: 3.67 (ref 0.87–1.13)
MEPERIDINE CTO UR SCN-MCNC: NEGATIVE NG/ML
METHADONE CTO UR CFM-MCNC: NEGATIVE NG/ML
OPIATES UR QL SCN: POSITIVE NG/ML
OXYCDOXYM URSCRN 2005102: NEGATIVE NG/ML
PCP CTO UR CFM-MCNC: NEGATIVE NG/ML
PROPOXYPH CTO UR CFM-MCNC: NEGATIVE NG/ML
PROTHROMBIN TIME: 36.2 SEC (ref 12–14.6)
TAPENTADOL UR-MCNC: NEGATIVE NG/ML
TRAMADOL CTO UR SCN-MCNC: NEGATIVE NG/ML
ZOLPIDEM UR-MCNC: NEGATIVE NG/ML

## 2018-08-29 PROCEDURE — 36415 COLL VENOUS BLD VENIPUNCTURE: CPT

## 2018-08-29 PROCEDURE — 85610 PROTHROMBIN TIME: CPT

## 2018-08-30 ENCOUNTER — ANTICOAGULATION MONITORING (OUTPATIENT)
Dept: VASCULAR LAB | Facility: MEDICAL CENTER | Age: 83
End: 2018-08-30

## 2018-08-30 NOTE — PROGRESS NOTES
OP Telephone Anticoagulation Service Note    Date: 8/30/2018      Anticoagulation Summary  As of 8/30/2018    INR goal:   2.0-3.0   TTR:   76.5 % (3.2 y)   Today's INR:   3.67! (8/29/2018)   Warfarin maintenance plan:   2.5 mg (5 mg x 0.5) on Mon, Wed, Fri; 5 mg (5 mg x 1) all other days   Weekly warfarin total:   27.5 mg   Plan last modified:   Shila RogersD (6/7/2018)   Next INR check:   9/13/2018   Target end date:   Indefinite    Indications    A-fib (HCC) (Resolved) [I48.91]             Anticoagulation Episode Summary     INR check location:   Outside Lab    Preferred lab:       Send INR reminders to:       Comments:   [facility]       Anticoagulation Care Providers     Provider Role Specialty Phone number    Anastacio Sunshine M.D. Referring Cardiology [phone]    Mahin Valdez Responsible          Anticoagulation Patient Findings        Plan: Spoke with patient on the phone. Patient is supratherapeutic today. Confirmed dosing. No missed tablets in the last week. Patient denies any changes in medications or diet. Patient denies any signs or symptoms of bleeding or clotting. Instructed patient to call clinic if any unusual bleeding or bruising occurs. Will HOLD x 1  warfarin then continue dosing as outlined. Will follow-up with patient in 2 week(s).          Christen Rosales, PharmD

## 2018-08-31 LAB
6MAM UR CFM-MCNC: <10 NG/ML
CODEINE UR CFM-MCNC: <20 NG/ML
HYDROCODONE UR CFM-MCNC: >4000 NG/ML
HYDROMORPHONE UR CFM-MCNC: 275 NG/ML
MORPHINE UR CFM-MCNC: <20 NG/ML
NORHYDROCODONE UR CFM-MCNC: >4000 NG/ML
NOROXYCODONE UR CFM-MCNC: <20 NG/ML
OPIATES UR NOROXYM Q0836: <20 NG/ML
OXYCODONE UR CFM-MCNC: <20 NG/ML
OXYMORPHONE UR CFM-MCNC: <20 NG/ML

## 2018-09-12 ENCOUNTER — ANTICOAGULATION MONITORING (OUTPATIENT)
Dept: VASCULAR LAB | Facility: MEDICAL CENTER | Age: 83
End: 2018-09-12

## 2018-09-12 ENCOUNTER — HOSPITAL ENCOUNTER (OUTPATIENT)
Dept: LAB | Facility: MEDICAL CENTER | Age: 83
End: 2018-09-12
Attending: NURSE PRACTITIONER
Payer: MEDICARE

## 2018-09-12 ENCOUNTER — HOSPITAL ENCOUNTER (OUTPATIENT)
Dept: LAB | Facility: MEDICAL CENTER | Age: 83
End: 2018-09-12
Attending: PHYSICIAN ASSISTANT
Payer: MEDICARE

## 2018-09-12 DIAGNOSIS — Z79.01 CHRONIC ANTICOAGULATION: ICD-10-CM

## 2018-09-12 LAB
INR PPP: 2.36 (ref 0.87–1.13)
PROTHROMBIN TIME: 25.5 SEC (ref 12–14.6)
PSA SERPL-MCNC: 0.24 NG/ML (ref 0–4)

## 2018-09-12 PROCEDURE — 84153 ASSAY OF PSA TOTAL: CPT

## 2018-09-12 PROCEDURE — 85610 PROTHROMBIN TIME: CPT

## 2018-09-12 PROCEDURE — 36415 COLL VENOUS BLD VENIPUNCTURE: CPT

## 2018-09-12 NOTE — PROGRESS NOTES
OP Anticoagulation Telephone Note    Date: 9/12/2018  Anticoagulation Summary  As of 9/12/2018    INR goal:   2.0-3.0   TTR:   76.2 % (3.2 y)   Today's INR:   2.36   Warfarin maintenance plan:   2.5 mg (5 mg x 0.5) on Mon, Wed, Fri; 5 mg (5 mg x 1) all other days   Weekly warfarin total:   27.5 mg   No change documented:   Melodie Toney, Med Ass't   Plan last modified:   Verito Zafar, PharmD (6/7/2018)   Next INR check:   9/26/2018   Target end date:   Indefinite    Indications    A-fib (HCC) (Resolved) [I48.91]             Anticoagulation Episode Summary     INR check location:   Outside Lab    Preferred lab:       Send INR reminders to:       Comments:   [facility]       Anticoagulation Care Providers     Provider Role Specialty Phone number    Anastacio Sunshine M.D. Referring Cardiology [phone]    Mahin Valdez Responsible          Anticoagulation Patient Findings  Patient Findings     Negatives:   Signs/symptoms of thrombosis, Signs/symptoms of bleeding, Laboratory test error suspected, Change in health, Change in alcohol use, Change in activity, Upcoming invasive procedure, Emergency department visit, Upcoming dental procedure, Missed doses, Extra doses, Change in medications, Change in diet/appetite, Hospital admission, Bruising, Other complaints      Plan:  Spoke with patient on the phone. Patient is therapeutic today. Patient denies any changes in medications or diet. Patient denies any signs or symptoms of bleeding or clotting. Instructed patient to call clinic if any unusual bleeding or bruising occurs. Will continue dosing as outlined above. Will follow-up with patient in 2 weeks.    Melodie Toney, Medical Assistant    I have reviewed and am in agreement with the above stated plan on 9-12-18.  Antwon Dejesus, PharmD

## 2018-09-16 DIAGNOSIS — Z79.01 CHRONIC ANTICOAGULATION: ICD-10-CM

## 2018-09-17 RX ORDER — WARFARIN SODIUM 5 MG/1
TABLET ORAL
Qty: 90 TAB | Refills: 1 | Status: SHIPPED | OUTPATIENT
Start: 2018-09-17 | End: 2019-04-04 | Stop reason: SDUPTHER

## 2018-09-26 ENCOUNTER — HOSPITAL ENCOUNTER (OUTPATIENT)
Dept: LAB | Facility: MEDICAL CENTER | Age: 83
End: 2018-09-26
Attending: NURSE PRACTITIONER
Payer: MEDICARE

## 2018-09-26 DIAGNOSIS — Z79.01 CHRONIC ANTICOAGULATION: ICD-10-CM

## 2018-09-26 LAB
INR PPP: 2.54 (ref 0.87–1.13)
PROTHROMBIN TIME: 27.4 SEC (ref 12–14.6)

## 2018-09-26 PROCEDURE — 36415 COLL VENOUS BLD VENIPUNCTURE: CPT

## 2018-09-26 PROCEDURE — 85610 PROTHROMBIN TIME: CPT

## 2018-09-27 ENCOUNTER — ANTICOAGULATION MONITORING (OUTPATIENT)
Dept: VASCULAR LAB | Facility: MEDICAL CENTER | Age: 83
End: 2018-09-27

## 2018-09-27 NOTE — PROGRESS NOTES
Subjective:      Gennaro Richardson is a 83 y.o. male who presents with No chief complaint on file.            HPI    ROS       Objective:     There were no vitals taken for this visit.     Physical Exam            Assessment/Plan:     There are no diagnoses linked to this encounter.

## 2018-09-27 NOTE — PROGRESS NOTES
Anticoagulation Summary  As of 9/27/2018    INR goal:   2.0-3.0   TTR:   76.5 % (3.3 y)   Today's INR:   2.54 (9/26/2018)   Warfarin maintenance plan:   2.5 mg (5 mg x 0.5) on Mon, Wed, Fri; 5 mg (5 mg x 1) all other days   Weekly warfarin total:   27.5 mg   Plan last modified:   Verito Zafar, PharmD (6/7/2018)   Next INR check:      Target end date:   Indefinite    Indications    A-fib (HCC) (Resolved) [I48.91]             Anticoagulation Episode Summary     INR check location:   Outside Lab    Preferred lab:       Send INR reminders to:       Comments:   [facility]       Anticoagulation Care Providers     Provider Role Specialty Phone number    Anastacio Sunshine M.D. Referring Cardiology [phone]    Mahin Valdez Responsible          Anticoagulation Patient Findings    INR therapeutic at 2.54.  Spoke w/ pt on phone.  Verified regimen w/ pt.  Continue current regimen.  NO s/s bleeding reported per pt.  NO changed in diet reported per pt.  NO new medications reported per pt.  Check INR in 4 weeks (10/25).  Instructed pt to call clinic at [phone] if there are any questions.  Pt stated understanding.    Eddie Valdez  2019 Doctor of Pharmacy Candidate

## 2018-10-24 ENCOUNTER — HOSPITAL ENCOUNTER (OUTPATIENT)
Dept: LAB | Facility: MEDICAL CENTER | Age: 83
End: 2018-10-24
Attending: NURSE PRACTITIONER
Payer: MEDICARE

## 2018-10-24 DIAGNOSIS — Z79.01 CHRONIC ANTICOAGULATION: ICD-10-CM

## 2018-10-24 LAB
INR PPP: 2.67 (ref 0.87–1.13)
PROTHROMBIN TIME: 28.5 SEC (ref 12–14.6)

## 2018-10-24 PROCEDURE — 36415 COLL VENOUS BLD VENIPUNCTURE: CPT

## 2018-10-24 PROCEDURE — 85610 PROTHROMBIN TIME: CPT

## 2018-10-25 ENCOUNTER — ANTICOAGULATION MONITORING (OUTPATIENT)
Dept: VASCULAR LAB | Facility: MEDICAL CENTER | Age: 83
End: 2018-10-25

## 2018-10-25 NOTE — PROGRESS NOTES
Anticoagulation Summary  As of 10/25/2018    INR goal:   2.0-3.0   TTR:   77.0 % (3.3 y)   Today's INR:   2.67 (10/24/2018)   Warfarin maintenance plan:   2.5 mg (5 mg x 0.5) on Mon, Wed, Fri; 5 mg (5 mg x 1) all other days   Weekly warfarin total:   27.5 mg   Plan last modified:   Shila RogersD (6/7/2018)   Next INR check:   12/5/2018   Target end date:   Indefinite    Indications    A-fib (HCC) (Resolved) [I48.91]             Anticoagulation Episode Summary     INR check location:   Outside Lab    Preferred lab:       Send INR reminders to:       Comments:   [phone]      Anticoagulation Care Providers     Provider Role Specialty Phone number    Anastacio Sunshine M.D. Referring Cardiology [phone]    Mahin Valdez Responsible          Anticoagulation Patient Findings    Spoke with patient to report a therapeutic INR.    Pt instructed to continue with current warfarin dosing regimen, confirms dosing.   Pt denies any s/s of bleeding, bruising, clotting or any changes to diet or medication.    Will follow up in 6 week(s).     Kelsy Bloom, PharmD

## 2018-11-27 ENCOUNTER — HOSPITAL ENCOUNTER (OUTPATIENT)
Dept: LAB | Facility: MEDICAL CENTER | Age: 83
End: 2018-11-27
Attending: INTERNAL MEDICINE
Payer: MEDICARE

## 2018-11-27 ENCOUNTER — OFFICE VISIT (OUTPATIENT)
Dept: MEDICAL GROUP | Facility: MEDICAL CENTER | Age: 83
End: 2018-11-27
Payer: MEDICARE

## 2018-11-27 VITALS
HEART RATE: 60 BPM | DIASTOLIC BLOOD PRESSURE: 58 MMHG | HEIGHT: 67 IN | OXYGEN SATURATION: 91 % | BODY MASS INDEX: 33.65 KG/M2 | SYSTOLIC BLOOD PRESSURE: 108 MMHG | WEIGHT: 214.4 LBS | RESPIRATION RATE: 14 BRPM

## 2018-11-27 DIAGNOSIS — I73.9 PVD (PERIPHERAL VASCULAR DISEASE) (HCC): Chronic | ICD-10-CM

## 2018-11-27 DIAGNOSIS — I25.10 CORONARY ARTERY DISEASE DUE TO CALCIFIED CORONARY LESION: ICD-10-CM

## 2018-11-27 DIAGNOSIS — M25.561 CHRONIC PAIN OF RIGHT KNEE: ICD-10-CM

## 2018-11-27 DIAGNOSIS — E78.5 DYSLIPIDEMIA: Chronic | ICD-10-CM

## 2018-11-27 DIAGNOSIS — Z79.891 CHRONIC USE OF OPIATE DRUGS THERAPEUTIC PURPOSES: ICD-10-CM

## 2018-11-27 DIAGNOSIS — G89.29 CHRONIC PAIN OF RIGHT KNEE: ICD-10-CM

## 2018-11-27 DIAGNOSIS — Z23 NEED FOR INFLUENZA VACCINATION: ICD-10-CM

## 2018-11-27 DIAGNOSIS — G89.29 CHRONIC BILATERAL LOW BACK PAIN WITHOUT SCIATICA: ICD-10-CM

## 2018-11-27 DIAGNOSIS — I25.84 CORONARY ARTERY DISEASE DUE TO CALCIFIED CORONARY LESION: ICD-10-CM

## 2018-11-27 DIAGNOSIS — I48.0 PAF (PAROXYSMAL ATRIAL FIBRILLATION) (HCC): ICD-10-CM

## 2018-11-27 DIAGNOSIS — M54.50 CHRONIC BILATERAL LOW BACK PAIN WITHOUT SCIATICA: ICD-10-CM

## 2018-11-27 DIAGNOSIS — N40.1 BENIGN PROSTATIC HYPERPLASIA WITH LOWER URINARY TRACT SYMPTOMS, SYMPTOM DETAILS UNSPECIFIED: Chronic | ICD-10-CM

## 2018-11-27 DIAGNOSIS — F11.20 OPIOID TYPE DEPENDENCE, CONTINUOUS (HCC): ICD-10-CM

## 2018-11-27 LAB
CHOLEST SERPL-MCNC: 91 MG/DL (ref 100–199)
HDLC SERPL-MCNC: 36 MG/DL
LDLC SERPL CALC-MCNC: 38 MG/DL
TRIGL SERPL-MCNC: 83 MG/DL (ref 0–149)

## 2018-11-27 PROCEDURE — 90662 IIV NO PRSV INCREASED AG IM: CPT | Performed by: PHYSICIAN ASSISTANT

## 2018-11-27 PROCEDURE — 80053 COMPREHEN METABOLIC PANEL: CPT

## 2018-11-27 PROCEDURE — 80061 LIPID PANEL: CPT

## 2018-11-27 PROCEDURE — G0008 ADMIN INFLUENZA VIRUS VAC: HCPCS | Performed by: PHYSICIAN ASSISTANT

## 2018-11-27 PROCEDURE — 36415 COLL VENOUS BLD VENIPUNCTURE: CPT

## 2018-11-27 PROCEDURE — 99214 OFFICE O/P EST MOD 30 MIN: CPT | Mod: 25 | Performed by: PHYSICIAN ASSISTANT

## 2018-11-27 RX ORDER — HYDROCODONE BITARTRATE AND ACETAMINOPHEN 10; 325 MG/1; MG/1
1 TABLET ORAL EVERY 6 HOURS PRN
Qty: 120 TAB | Refills: 0 | Status: SHIPPED | OUTPATIENT
Start: 2018-11-27 | End: 2019-02-27 | Stop reason: SDUPTHER

## 2018-11-27 RX ORDER — HYDROCODONE BITARTRATE AND ACETAMINOPHEN 10; 325 MG/1; MG/1
1 TABLET ORAL EVERY 6 HOURS PRN
Qty: 120 TAB | Refills: 0 | Status: SHIPPED | OUTPATIENT
Start: 2018-12-27 | End: 2019-02-27 | Stop reason: SDUPTHER

## 2018-11-27 RX ORDER — HYDROCODONE BITARTRATE AND ACETAMINOPHEN 10; 325 MG/1; MG/1
1 TABLET ORAL EVERY 6 HOURS PRN
Qty: 120 TAB | Refills: 0 | Status: SHIPPED | OUTPATIENT
Start: 2019-01-27 | End: 2019-02-27 | Stop reason: SDUPTHER

## 2018-11-27 NOTE — ASSESSMENT & PLAN NOTE
Chronic low back and right knee pain  Current pain control: good, 4/10  Adverse effects: denies any adverse reactions or side effects  Physical functioning: good  Mood: good  Family and social relationships: good  Sleep pattern: good  Overall functioning: good  Nonnarcotic treatments that are being used: none    Pain management agreement initiated and signed on: 2/2018  Last dose of narcotic medication: this morning  Most recent urine drug screen done 10/27/2018, which was reviewed today and is consistent with prescribed medications without any concerns.  Aberrant Behaviors:none  I have reviewed the medical records, the Prescription Monitoring Program and I have determined that norco 10/325mg is medically indicated.    I have advised patient to keep medication in a safe place and to not drive with medication.

## 2018-11-27 NOTE — PROGRESS NOTES
Subjective:   Gennaro Richardson is a 83 y.o. male here today for follow up on chronic medical conditions    Chronic use of opiate drugs therapeutic purposes  Chronic low back and right knee pain  Current pain control: good, 4/10  Adverse effects: denies any adverse reactions or side effects  Physical functioning: good  Mood: good  Family and social relationships: good  Sleep pattern: good  Overall functioning: good  Nonnarcotic treatments that are being used: none    Pain management agreement initiated and signed on: 2/2018  Last dose of narcotic medication: this morning  Most recent urine drug screen done 10/27/2018, which was reviewed today and is consistent with prescribed medications without any concerns.  Aberrant Behaviors:none  I have reviewed the medical records, the Prescription Monitoring Program and I have determined that norco 10/325mg is medically indicated.    I have advised patient to keep medication in a safe place and to not drive with medication.      Dyslipidemia  Chronic, stable on current, labs draw with Dr. Retana this morning    PVD (peripheral vascular disease)  Chronic, stable on statin, BP controlled, on coumadin, managed by cardiology and coumadin clinic    Benign prostatic hypertrophy with lower urinary tract symptoms (LUTS)  Chronic, stable on current, followed by Guadalupe Wong at [facility]    PAF (paroxysmal atrial fibrillation)  Chronic, stable on current coumadin, no dizziness or SOB, managed by coumadin clinic and cardiology    Chronic pain of right knee  Stable, no falls, wearing brace    Chronic back pain  Chronic, stable, no new weakness or numbness, no falls       Current medicines (including changes today)  Current Outpatient Prescriptions   Medication Sig Dispense Refill   • [START ON 1/27/2019] HYDROcodone/acetaminophen (NORCO)  MG Tab Take 1 Tab by mouth every 6 hours as needed for up to 30 days. 120 Tab 0   • [START ON 12/27/2018] HYDROcodone/acetaminophen  "(NORCO)  MG Tab Take 1 Tab by mouth every 6 hours as needed for up to 30 days. 120 Tab 0   • HYDROcodone/acetaminophen (NORCO)  MG Tab Take 1 Tab by mouth every 6 hours as needed for up to 30 days. 120 Tab 0   • warfarin (COUMADIN) 5 MG Tab Take 1/2 to 1 tablet by mouth one time daily or as directed by clinic 90 Tab 1   • atorvastatin (LIPITOR) 40 MG Tab TAKE ONE TABLET BY MOUTH ONE TIME DAILY  90 Tab 3   • clobetasol (TEMOVATE) 0.05 % Cream Apply TOPICALLY to affected area  twice daily 60 g 1   • finasteride (PROSCAR) 5 MG TABS TAKE ONE TABLET BY MOUTH ONE TIME DAILY 90 Tab 2     No current facility-administered medications for this visit.      He  has a past medical history of A-fib ([facility]) (6/28/2011); Arteriosclerosis; CAD (coronary artery disease); CAD (coronary artery disease) (6/28/2011); Cardiac pacemaker in situ (11/19/2013); Colonic polyps; Diverticulosis; Dizzy spells (11/20/2012); HERZOG (dyspnea on exertion) (9/7/2014); Dyslipidemia (6/28/2011); Hematoma of abdominal wall; Hematoma of rectus sheath (6/13/2012); Myocardial infarct ([facility]) (1989); Pacer at end of battery life (6/28/2011); PAF (paroxysmal atrial fibrillation) ([facility]) (9/7/2014); Paroxysmal atrial fibrillation ([facility]); Peripheral vascular disease ([facility]) (10/31/2011); and PVD (peripheral vascular disease) ([facility]).    ROS   No fever/chills. No weight change. No headache/dizziness. No focal weakness. No sore throat, nasal congestion, ear pain. No chest pain, no shortness of breath, difficulty breathing. No n/v/d/c or abdominal pain. No urinary complaint. No rash or skin lesion.      Objective:     Blood pressure 108/58, pulse 60, resp. rate 14, height 1.702 m (5' 7\"), weight 97.3 kg (214 lb 6.4 oz), SpO2 91 %. Body mass index is 33.58 kg/m².   Physical Exam:  Constitutional: WDWN, NAD  Skin: Warm, dry, good turgor, no rashes in visible areas.  Psych: Alert and oriented x3, normal affect and mood.      Assessment and Plan:   The following " treatment plan was discussed    1. Opioid type dependence, continuous (HCC)      2. Chronic use of opiate drugs therapeutic purposes  [facility] Aware web site evaluation: I have obtained and reviewed patient utilization report from [facility] pharmacy database prior to writing prescription for controlled substance II, III or IV. Based on the report and my clinical assessment the prescription is medically necessary.   Patient is cautioned on sedation potential of narcotic medication; no drinking, driving or operating heavy machinery while on this medication.  - INFLUENZA VACCINE, HIGH DOSE (65+ ONLY)  - HYDROcodone/acetaminophen (NORCO)  MG Tab; Take 1 Tab by mouth every 6 hours as needed for up to 30 days.  Dispense: 120 Tab; Refill: 0  - HYDROcodone/acetaminophen (NORCO)  MG Tab; Take 1 Tab by mouth every 6 hours as needed for up to 30 days.  Dispense: 120 Tab; Refill: 0  - HYDROcodone/acetaminophen (NORCO)  MG Tab; Take 1 Tab by mouth every 6 hours as needed for up to 30 days.  Dispense: 120 Tab; Refill: 0    3. Chronic bilateral low back pain without sciatica      4. Chronic pain of right knee      5. Need for influenza vaccination    - INFLUENZA VACCINE, HIGH DOSE (65+ ONLY)    6. Dyslipidemia      7. PVD (peripheral vascular disease) ([facility])      8. Benign prostatic hyperplasia with lower urinary tract symptoms, symptom details unspecified      9. PAF (paroxysmal atrial fibrillation) ([facility])        Followup: Return in about 3 months (around 2/27/2019).

## 2018-11-27 NOTE — ASSESSMENT & PLAN NOTE
Chronic, stable on current coumadin, no dizziness or SOB, managed by coumadin clinic and cardiology

## 2018-11-28 LAB
ALBUMIN SERPL BCP-MCNC: 3.5 G/DL (ref 3.2–4.9)
ALBUMIN/GLOB SERPL: 1.2 G/DL
ALP SERPL-CCNC: 76 U/L (ref 30–99)
ALT SERPL-CCNC: 19 U/L (ref 2–50)
ANION GAP SERPL CALC-SCNC: 5 MMOL/L (ref 0–11.9)
AST SERPL-CCNC: 15 U/L (ref 12–45)
BILIRUB SERPL-MCNC: 1.2 MG/DL (ref 0.1–1.5)
BUN SERPL-MCNC: 23 MG/DL (ref 8–22)
CALCIUM SERPL-MCNC: 8.7 MG/DL (ref 8.5–10.5)
CHLORIDE SERPL-SCNC: 109 MMOL/L (ref 96–112)
CO2 SERPL-SCNC: 26 MMOL/L (ref 20–33)
CREAT SERPL-MCNC: 1.14 MG/DL (ref 0.5–1.4)
GLOBULIN SER CALC-MCNC: 3 G/DL (ref 1.9–3.5)
GLUCOSE SERPL-MCNC: 94 MG/DL (ref 65–99)
POTASSIUM SERPL-SCNC: 4 MMOL/L (ref 3.6–5.5)
PROT SERPL-MCNC: 6.5 G/DL (ref 6–8.2)
SODIUM SERPL-SCNC: 140 MMOL/L (ref 135–145)

## 2018-12-06 ENCOUNTER — HOSPITAL ENCOUNTER (OUTPATIENT)
Dept: LAB | Facility: MEDICAL CENTER | Age: 83
End: 2018-12-06
Attending: NURSE PRACTITIONER
Payer: MEDICARE

## 2018-12-06 LAB
INR PPP: 2.02 (ref 0.87–1.13)
PROTHROMBIN TIME: 22.9 SEC (ref 12–14.6)

## 2018-12-06 PROCEDURE — 36415 COLL VENOUS BLD VENIPUNCTURE: CPT

## 2018-12-06 PROCEDURE — 85610 PROTHROMBIN TIME: CPT

## 2018-12-07 ENCOUNTER — ANTICOAGULATION MONITORING (OUTPATIENT)
Dept: VASCULAR LAB | Facility: MEDICAL CENTER | Age: 83
End: 2018-12-07

## 2018-12-08 NOTE — PROGRESS NOTES
Anticoagulation Summary  As of 12/7/2018    INR goal:   2.0-3.0   TTR:   77.8 % (3.5 y)   Today's INR:   2.02 (12/6/2018)   Warfarin maintenance plan:   2.5 mg (5 mg x 0.5) on Mon, Wed, Fri; 5 mg (5 mg x 1) all other days   Weekly warfarin total:   27.5 mg   Plan last modified:   Verito Zafar, PharmD (6/7/2018)   Next INR check:   2/1/2019   Target end date:   Indefinite    Indications    A-fib (HCC) (Resolved) [I48.91]             Anticoagulation Episode Summary     INR check location:   Outside Lab    Preferred lab:       Send INR reminders to:       Comments:   [phone]      Anticoagulation Care Providers     Provider Role Specialty Phone number    Anastacio Sunshine M.D. Referring Cardiology [phone]    Mahin Valdez Responsible          Anticoagulation Patient Findings        Spoke to patient on the phone.   INR  therapeutic.   Denies signs/symptoms of bleeding and/or thrombosis.   Denies changes to diet or medications.   Follow up appointment in 8 week(s).    Continue weekly warfarin dose as noted      Mahin Valdez, PharmD

## 2018-12-12 ENCOUNTER — HOSPITAL ENCOUNTER (OUTPATIENT)
Dept: CARDIOLOGY | Facility: MEDICAL CENTER | Age: 83
End: 2018-12-12
Attending: INTERNAL MEDICINE
Payer: MEDICARE

## 2018-12-12 DIAGNOSIS — I48.0 PAF (PAROXYSMAL ATRIAL FIBRILLATION) (HCC): ICD-10-CM

## 2018-12-12 DIAGNOSIS — I25.10 CORONARY ARTERY DISEASE DUE TO CALCIFIED CORONARY LESION: ICD-10-CM

## 2018-12-12 DIAGNOSIS — I25.84 CORONARY ARTERY DISEASE DUE TO CALCIFIED CORONARY LESION: ICD-10-CM

## 2018-12-12 LAB
LV EJECT FRACT  99904: 60
LV EJECT FRACT MOD 2C 99903: 60.94
LV EJECT FRACT MOD 4C 99902: 63.9
LV EJECT FRACT MOD BP 99901: 66.38

## 2018-12-12 PROCEDURE — 93306 TTE W/DOPPLER COMPLETE: CPT | Mod: 26 | Performed by: INTERNAL MEDICINE

## 2018-12-12 PROCEDURE — 93306 TTE W/DOPPLER COMPLETE: CPT

## 2018-12-13 ENCOUNTER — OFFICE VISIT (OUTPATIENT)
Dept: CARDIOLOGY | Facility: MEDICAL CENTER | Age: 83
End: 2018-12-13
Payer: MEDICARE

## 2018-12-13 VITALS
HEART RATE: 62 BPM | WEIGHT: 213 LBS | DIASTOLIC BLOOD PRESSURE: 78 MMHG | BODY MASS INDEX: 33.43 KG/M2 | SYSTOLIC BLOOD PRESSURE: 136 MMHG | OXYGEN SATURATION: 95 % | HEIGHT: 67 IN

## 2018-12-13 DIAGNOSIS — I25.10 CORONARY ARTERY DISEASE DUE TO CALCIFIED CORONARY LESION: ICD-10-CM

## 2018-12-13 DIAGNOSIS — I73.9 PVD (PERIPHERAL VASCULAR DISEASE) (HCC): Chronic | ICD-10-CM

## 2018-12-13 DIAGNOSIS — E78.5 DYSLIPIDEMIA: Chronic | ICD-10-CM

## 2018-12-13 DIAGNOSIS — I25.84 CORONARY ARTERY DISEASE DUE TO CALCIFIED CORONARY LESION: ICD-10-CM

## 2018-12-13 DIAGNOSIS — I48.0 PAF (PAROXYSMAL ATRIAL FIBRILLATION) (HCC): ICD-10-CM

## 2018-12-13 PROCEDURE — 99214 OFFICE O/P EST MOD 30 MIN: CPT | Performed by: INTERNAL MEDICINE

## 2018-12-13 RX ORDER — ISOSORBIDE MONONITRATE 30 MG/1
30 TABLET, EXTENDED RELEASE ORAL EVERY MORNING
Qty: 30 TAB | Refills: 11 | Status: SHIPPED | OUTPATIENT
Start: 2018-12-13 | End: 2019-10-03

## 2018-12-13 NOTE — LETTER
[facility]   [address], [address]  [place] [address]  Phone: [phone]  Fax: [phone]              Gennaro Richardson  1935    Encounter Date: 12/13/2018    Josef Retana M.D.          PROGRESS NOTE:  Chief Complaint   Patient presents with   • Coronary Artery Disease     x6mon. f/v   • Atrial Fibrillation       Subjective:   Gennaro Richardson is a 83 y.o. male who presents today for followup of his coronary disease, hyperlipidemia and paroxysmal atrial fibrillation.  He had a pacemaker generator change in September 2016.    He has had no chest discomfort or dyspnea.  He is walking about a mile to mile and a half daily without difficulty.  He denies any PND, orthopnea or edema.  He said no palpitations or lightheadedness..    He has significant fatigue if he does such activities as bring in his groceries from a car.      Past Medical History:   Diagnosis Date   • A-fib ([facility]) 6/28/2011   • Arteriosclerosis    • CAD (coronary artery disease)    • CAD (coronary artery disease) 6/28/2011   • Cardiac pacemaker in situ 11/19/2013   • Colonic polyps    • Diverticulosis    • Dizzy spells 11/20/2012   • HERZOG (dyspnea on exertion) 9/7/2014 9/16/16- pt has no c/o   • Dyslipidemia 6/28/2011   • Hematoma of abdominal wall    • Hematoma of rectus sheath 6/13/2012    Delayed onset bleed, intraabdominal extension, left flank , ct abd active bleed , binder, vit k  6/14 Ex-lap     • Myocardial infarct ([facility]) 1989   • Pacer at end of battery life 6/28/2011   • PAF (paroxysmal atrial fibrillation) ([facility]) 9/7/2014   • Paroxysmal atrial fibrillation ([facility])    • Peripheral vascular disease ([facility]) 10/31/2011   • PVD (peripheral vascular disease) ([facility])      Past Surgical History:   Procedure Laterality Date   • EXPLORATORY LAPAROTOMY  6/14/2012    Performed by CALLY MERCER at SURGERY [facility]   • COLONOSCOPY WITH POLYP  7/24/08    Performed by RUDOLPH BRENNER  "P at SURGERY [facility]   • ABDOMINAL AORTIC ANEURYSM     • PACEMAKER INSERTION       Family History   Problem Relation Age of Onset   • Cancer Mother      Social History     Social History   • Marital status: Single     Spouse name: N/A   • Number of children: N/A   • Years of education: N/A     Occupational History   • Not on file.     Social History Main Topics   • Smoking status: Former Smoker     Years: 35.00     Quit date: 1/1/1988   • Smokeless tobacco: Never Used   • Alcohol use No   • Drug use: No   • Sexual activity: No      Comment: , has children, retired.     Other Topics Concern   • Not on file     Social History Narrative   • No narrative on file     Allergies   Allergen Reactions   • No Known Drug Allergy      Outpatient Encounter Prescriptions as of 12/13/2018   Medication Sig Dispense Refill   • [START ON 1/27/2019] HYDROcodone/acetaminophen (NORCO)  MG Tab Take 1 Tab by mouth every 6 hours as needed for up to 30 days. 120 Tab 0   • warfarin (COUMADIN) 5 MG Tab Take 1/2 to 1 tablet by mouth one time daily or as directed by clinic 90 Tab 1   • atorvastatin (LIPITOR) 40 MG Tab TAKE ONE TABLET BY MOUTH ONE TIME DAILY  90 Tab 3   • clobetasol (TEMOVATE) 0.05 % Cream Apply TOPICALLY to affected area  twice daily 60 g 1   • finasteride (PROSCAR) 5 MG TABS TAKE ONE TABLET BY MOUTH ONE TIME DAILY 90 Tab 2   • [START ON 12/27/2018] HYDROcodone/acetaminophen (NORCO)  MG Tab Take 1 Tab by mouth every 6 hours as needed for up to 30 days. 120 Tab 0   • HYDROcodone/acetaminophen (NORCO)  MG Tab Take 1 Tab by mouth every 6 hours as needed for up to 30 days. 120 Tab 0     No facility-administered encounter medications on file as of 12/13/2018.      ROS     Objective:   /78 (BP Location: Left arm, Patient Position: Sitting, BP Cuff Size: Adult)   Pulse 62   Ht 1.702 m (5' 7\")   Wt 96.6 kg (213 lb)   SpO2 95%   BMI 33.36 kg/m²      Physical Exam   Constitutional: He " appears well-developed and well-nourished.   Neck: No JVD present.   Cardiovascular: Normal rate and regular rhythm.    No murmur heard.  Pulmonary/Chest: Effort normal and breath sounds normal. He has no rales.   Abdominal: Soft. There is no tenderness.   Musculoskeletal: He exhibits no edema.     Lab Results   Component Value Date/Time    CHOLSTRLTOT 91 (L) 11/27/2018 09:04 AM    LDL 38 11/27/2018 09:04 AM    HDL 36 (A) 11/27/2018 09:04 AM    TRIGLYCERIDE 83 11/27/2018 09:04 AM       Lab Results   Component Value Date/Time    SODIUM 140 11/27/2018 09:04 AM    POTASSIUM 4.0 11/27/2018 09:04 AM    CHLORIDE 109 11/27/2018 09:04 AM    CO2 26 11/27/2018 09:04 AM    GLUCOSE 94 11/27/2018 09:04 AM    BUN 23 (H) 11/27/2018 09:04 AM    CREATININE 1.14 11/27/2018 09:04 AM    CREATININE 1.3 12/01/2008 10:45 AM     Lab Results   Component Value Date/Time    ALKPHOSPHAT 76 11/27/2018 09:04 AM    ASTSGOT 15 11/27/2018 09:04 AM    ALTSGPT 19 11/27/2018 09:04 AM    TBILIRUBIN 1.2 11/27/2018 09:04 AM      Lab Results   Component Value Date/Time    BNPBTYPENAT 93 09/06/2014 01:46 PM      Transthoracic  Echo Report      Echocardiography Laboratory    CONCLUSIONS  Normal left ventricular chamber size.  Asymmetric septal hypertrophy  (sigmoid septum) with otherwise normal septal and PW thickness. Normal   left ventricular systolic function. Left ventricular ejection fraction   is visually estimated to be 60%. Normal regional wall motion.   Indeterminate diastolic function due to poor quality data.   Moderately dilated left atrium.  Compared to the report of the study done 9/2014 - there has been an   increase in LA size.     REINIER DAUGHERTY  Exam Date:         12/12/2018     Assessment:     1. Coronary artery disease due to calcified coronary lesion:Occluded left circumflex     2. PVD (peripheral vascular disease) ([facility])     3. Dyslipidemia     4. PAF (paroxysmal atrial fibrillation) ([facility])         Medical Decision Making:  Today's  Assessment / Status / Plan:     Mr. Richardson is having difficulty with significant fatigue with moderate exertion.  He has no difficulty with a slow walk.  At this time, we will give him a therapeutic trial of isosorbide mononitrate.  He will follow-up in a few months.  If he has improvement with the nitrate, we will consider further evaluation with myocardial perfusion imaging.  His lipid status is under excellent control.      Angelica Hauser P.A.-C.  [address]  [address] [place] [id number]  VIA In Basket

## 2018-12-14 ENCOUNTER — NON-PROVIDER VISIT (OUTPATIENT)
Dept: CARDIOLOGY | Facility: MEDICAL CENTER | Age: 83
End: 2018-12-14
Payer: MEDICARE

## 2018-12-14 DIAGNOSIS — Z95.0 CARDIAC PACEMAKER IN SITU: ICD-10-CM

## 2018-12-14 DIAGNOSIS — I49.5 SICK SINUS SYNDROME (HCC): ICD-10-CM

## 2018-12-14 PROCEDURE — 93280 PM DEVICE PROGR EVAL DUAL: CPT | Performed by: INTERNAL MEDICINE

## 2019-01-07 RX ORDER — CLOBETASOL PROPIONATE 0.5 MG/G
CREAM TOPICAL
Qty: 60 G | Refills: 1 | Status: SHIPPED | OUTPATIENT
Start: 2019-01-07 | End: 2019-08-05 | Stop reason: SDUPTHER

## 2019-01-23 ENCOUNTER — HOSPITAL ENCOUNTER (OUTPATIENT)
Dept: LAB | Facility: MEDICAL CENTER | Age: 84
End: 2019-01-23
Attending: NURSE PRACTITIONER
Payer: MEDICARE

## 2019-01-23 DIAGNOSIS — Z79.01 CHRONIC ANTICOAGULATION: ICD-10-CM

## 2019-01-23 LAB
INR PPP: 2.83 (ref 0.87–1.13)
PROTHROMBIN TIME: 29.8 SEC (ref 12–14.6)

## 2019-01-23 PROCEDURE — 36415 COLL VENOUS BLD VENIPUNCTURE: CPT

## 2019-01-23 PROCEDURE — 85610 PROTHROMBIN TIME: CPT

## 2019-01-24 ENCOUNTER — ANTICOAGULATION MONITORING (OUTPATIENT)
Dept: VASCULAR LAB | Facility: MEDICAL CENTER | Age: 84
End: 2019-01-24

## 2019-01-24 NOTE — PROGRESS NOTES
Anticoagulation Summary  As of 1/24/2019    INR goal:   2.0-3.0   TTR:   78.6 % (3.6 y)   Today's INR:   2.83 (1/23/2019)   Warfarin maintenance plan:   2.5 mg (5 mg x 0.5) on Mon, Wed, Fri; 5 mg (5 mg x 1) all other days   Weekly warfarin total:   27.5 mg   Plan last modified:   Verito Zafar, PharmD (6/7/2018)   Next INR check:   3/21/2019   Target end date:   Indefinite    Indications    A-fib (HCC) (Resolved) [I48.91]             Anticoagulation Episode Summary     INR check location:   Outside Lab    Preferred lab:       Send INR reminders to:       Comments:   [phone]      Anticoagulation Care Providers     Provider Role Specialty Phone number    Anastacio Sunshine M.D. Referring Cardiology [phone]    Mahin Valdez Responsible          Anticoagulation Patient Findings      Spoke with patient to report a therapeutic INR.    Pt instructed to continue with current warfarin dosing regimen, confirms dosing.   Pt denies any s/s of bleeding, bruising, clotting or any changes to diet or medication.    Will follow up in 8 week(s).     Poli Cooper, Pharmacy Intern

## 2019-02-21 ENCOUNTER — TELEPHONE (OUTPATIENT)
Dept: MEDICAL GROUP | Facility: MEDICAL CENTER | Age: 84
End: 2019-02-21

## 2019-02-21 NOTE — TELEPHONE ENCOUNTER
ESTABLISHED PATIENT PRE-VISIT PLANNING     Patient was NOT contacted to complete PVP.     Note: Patient will not be contacted if there is no indication to call.     1.  Reviewed notes from the last few office visits within the medical group: Yes    2.  If any orders were placed at last visit or intended to be done for this visit (i.e. 6 mos follow-up), do we have Results/Consult Notes?        •  Labs - Labs were not ordered at last office visit.   Note: If patient appointment is for lab review and patient did not complete labs, check with provider if OK to reschedule patient until labs completed.       •  Imaging - Imaging was not ordered at last office visit.       •  Referrals - No referrals were ordered at last office visit.    3. Is this appointment scheduled as a Hospital Follow-Up? No    4.  Immunizations were updated in Epic using WebIZ?: Epic matches WebIZ       •  Web Iz Recommendations: PNEUMOVAX (PPSV23), TD and SHINGRIX (Shingles)    5.  Patient is due for the following Health Maintenance Topics:   Health Maintenance Due   Topic Date Due   • IMM HEP B VACCINE (1 of 3 - Risk 3-dose series) 08/31/1954   • IMM ZOSTER VACCINES (1 of 2) 08/31/1985   • IMM PNEUMOCOCCAL 65+ (ADULT) LOW/MEDIUM RISK SERIES (2 of 2 - PPSV23) 09/06/2018   • Annual Wellness Visit  09/07/2018   • COLONOSCOPY  02/18/2019       - Patient is up-to-date on all Health Maintenance topics. No records have been requested at this time.    6. Orders for overdue Health Maintenance topics pended in Pre-Charting? NO    7.  AHA (MDX) form printed for Provider? YES    8.  Patient was NOT informed to arrive 15 min prior to their scheduled appointment and bring in their medication bottles.

## 2019-02-27 ENCOUNTER — OFFICE VISIT (OUTPATIENT)
Dept: MEDICAL GROUP | Facility: MEDICAL CENTER | Age: 84
End: 2019-02-27
Payer: MEDICARE

## 2019-02-27 VITALS
BODY MASS INDEX: 33.12 KG/M2 | OXYGEN SATURATION: 93 % | RESPIRATION RATE: 16 BRPM | SYSTOLIC BLOOD PRESSURE: 128 MMHG | HEART RATE: 64 BPM | WEIGHT: 211 LBS | HEIGHT: 67 IN | DIASTOLIC BLOOD PRESSURE: 68 MMHG

## 2019-02-27 DIAGNOSIS — M47.26 OSTEOARTHRITIS OF SPINE WITH RADICULOPATHY, LUMBAR REGION: ICD-10-CM

## 2019-02-27 DIAGNOSIS — Z95.0 CARDIAC PACEMAKER IN SITU: ICD-10-CM

## 2019-02-27 DIAGNOSIS — I25.84 CORONARY ATHEROSCLEROSIS DUE TO CALCIFIED CORONARY LESION: ICD-10-CM

## 2019-02-27 DIAGNOSIS — I25.10 CORONARY ATHEROSCLEROSIS DUE TO CALCIFIED CORONARY LESION: ICD-10-CM

## 2019-02-27 DIAGNOSIS — M17.11 PRIMARY OSTEOARTHRITIS OF RIGHT KNEE: ICD-10-CM

## 2019-02-27 DIAGNOSIS — E66.9 OBESITY (BMI 30-39.9): ICD-10-CM

## 2019-02-27 DIAGNOSIS — Z79.891 CHRONIC USE OF OPIATE DRUGS THERAPEUTIC PURPOSES: ICD-10-CM

## 2019-02-27 DIAGNOSIS — F11.20 OPIOID TYPE DEPENDENCE, CONTINUOUS (HCC): ICD-10-CM

## 2019-02-27 DIAGNOSIS — I73.9 PVD (PERIPHERAL VASCULAR DISEASE) (HCC): Chronic | ICD-10-CM

## 2019-02-27 DIAGNOSIS — I48.0 PAF (PAROXYSMAL ATRIAL FIBRILLATION) (HCC): ICD-10-CM

## 2019-02-27 PROCEDURE — 99214 OFFICE O/P EST MOD 30 MIN: CPT | Performed by: PHYSICIAN ASSISTANT

## 2019-02-27 RX ORDER — HYDROCODONE BITARTRATE AND ACETAMINOPHEN 10; 325 MG/1; MG/1
1 TABLET ORAL EVERY 6 HOURS PRN
Qty: 120 TAB | Refills: 0 | Status: SHIPPED | OUTPATIENT
Start: 2019-02-27 | End: 2019-05-24 | Stop reason: SDUPTHER

## 2019-02-27 RX ORDER — HYDROCODONE BITARTRATE AND ACETAMINOPHEN 10; 325 MG/1; MG/1
1 TABLET ORAL EVERY 6 HOURS PRN
Qty: 120 TAB | Refills: 0 | Status: SHIPPED | OUTPATIENT
Start: 2019-03-27 | End: 2019-05-24 | Stop reason: SDUPTHER

## 2019-02-27 RX ORDER — HYDROCODONE BITARTRATE AND ACETAMINOPHEN 10; 325 MG/1; MG/1
1 TABLET ORAL EVERY 6 HOURS PRN
Qty: 120 TAB | Refills: 0 | Status: SHIPPED | OUTPATIENT
Start: 2019-04-27 | End: 2019-05-24 | Stop reason: SDUPTHER

## 2019-02-27 ASSESSMENT — PATIENT HEALTH QUESTIONNAIRE - PHQ9: CLINICAL INTERPRETATION OF PHQ2 SCORE: 0

## 2019-02-27 NOTE — ASSESSMENT & PLAN NOTE
Chronic, stable, dx years ago by ortho, not surgical candidate per patient, no new leg weakness, pain in back is worse with walking, if a lot of walking then sciatic pain but normally just chooses not to walk too much

## 2019-02-27 NOTE — ASSESSMENT & PLAN NOTE
Chronic, stable, on current statin, coumadin, followed by cardiology, no new extremity pain, swelling or discoloration

## 2019-02-27 NOTE — ASSESSMENT & PLAN NOTE
Not working on diet or exercise. Eats well balance of protein, carbs, some vegetables. Admits to too much candy

## 2019-02-27 NOTE — ASSESSMENT & PLAN NOTE
Chronic, diagnosed many years ago by ortho. Not good candidate for surgical correction. Wears brace. Can't take NSAIDs as he is on coumadin. Stable on current norco.

## 2019-02-27 NOTE — ASSESSMENT & PLAN NOTE
The patient describes chronic back and right knee pain.  Preliminary diagnosis: right knee osteoarthritis, lumbar spine djd  Treatment plan: pain management  Diagnostic evaluations completed: xray     I have chosen to use a controlled substance for this patient instead of an alternative treatment because he is unable to have surgery, he has tried/failed and is unwilling to try PT again because he doesn't like to go out, he can't take NSAIDs due to chronic coumadin use .    Current pain control: under good control, rated 3-4/10.   Adverse effects from medication: .none  States where the person has previously resided or had CS filled in: [place]  Physical functioning: fair, doesn't do a lot but can do what he feels like doing   Overall functioning: .good   Mood and Mental Health: .excellent  Family and social relationships: . Lives with sister, good relationship  Sleep pattern: states he sleeps very well  Nonnarcotic treatments that are being used: wears brace on knee when he needs to walk    Pain management agreement initiated and signed on: renewed today   Consent for opiate prescription signed on : renewed today  Last dose of narcotic medication: this morning  Most recent urine drug screen done: 10/2018      Controlled Substance Assessment:     - Is the medication, if previously prescribed, working as intended and expected to treat   the patients symptoms: yes    - Does the patient have a history of substance abuse: no    - has the patient demonstrated aberrant behavior or intoxication: no    - Is there reason to believe that the patient is not using medication as prescribed or diverting the medication: no    - Is there reason to believe that the patient is currently misusing this medication or addicted to the controlled substance: no    - Is it necessary to verify that controlled substances, other that those authorized under the treatment plan, are not present in the body of this patient: no    - Are there any blood or  urine test that indicate inappropriate use of the medication or other controlled substances : no     - I have reviewed the . Does the  report irregular/inconsistent medication use or indicate that the medication is being used inappropriately or that the patient is using another controlled substance not prescribed or known to me: no     - Is there reason to believe that the patient is using other drugs, including alcohol, prescription or illicit drugs, that may interact negatively with controlled substance or have not been prescribed by a practitioner who is treating the patient: no    - Are there any known early refill attempts or claims that medication has been lost or stolen: no    - Are there are any major changes in the patient's mental or physical health that would affect the medical appropriateness of this medication: no    - Has the patient increased the dose of medication without provider authorization: no    - Has the patient been reluctant to cooperate with any exam, analysis or test recommended by me: reluctant to go to PT or other specialist as he doesn't drive and his sister has to take him to appointments, also financially difficult to have a lot of copays    - Has the patient demonstrated reluctance to stop or reduce use of the medicine: yes, feels he is well controlled now    - Has the patient requested or demanded a controlled substance that is likely to be abused or cause dependency or addiction: just wants to stay on current    - Is there any other evidence that the patient is chronically using opioids, misusing, abusing, illegally using or addicted to any drug or failing to comply with the instructions of the practitioner concerning the use of the controlled substance: no    - Are there any other factors that the practitioner determines is necessary to make an informed professional judgment concerning the medical appropriateness of the prescription: no      I have reviewed the medical  record and medical history of this patient, examined the patient and reviewed the Prescription Monitoring Program and I have determined that  is medically indicated. Review of the medial records demonstrates . I discussed risks and benefits of the pain medication. I discussed proper use, storage and disposal of the pain medication. I have advised patient to keep medication in a safe place and to not drive with medication.    Opioid Risk Tool:

## 2019-02-27 NOTE — PROGRESS NOTES
Subjective:   Gennaro Richardson is a 83 y.o. male here today for follow up on chronic conditions, doing well overall, no new illness or recent falls.    Cardiac pacemaker in situ  Chronic, stable, placed to treat sick sinus syndrome, followed by cardiology    Chronic use of opiate drugs therapeutic purposes  The patient describes chronic back and right knee pain.  Preliminary diagnosis: right knee osteoarthritis, lumbar spine djd  Treatment plan: pain management  Diagnostic evaluations completed: xray     I have chosen to use a controlled substance for this patient instead of an alternative treatment because he is unable to have surgery, he has tried/failed and is unwilling to try PT again because he doesn't like to go out, he can't take NSAIDs due to chronic coumadin use .    Current pain control: under good control, rated 3-4/10.   Adverse effects from medication: .none  States where the person has previously resided or had CS filled in: [place]  Physical functioning: fair, doesn't do a lot but can do what he feels like doing   Overall functioning: .good   Mood and Mental Health: .excellent  Family and social relationships: . Lives with sister, good relationship  Sleep pattern: states he sleeps very well  Nonnarcotic treatments that are being used: wears brace on knee when he needs to walk    Pain management agreement initiated and signed on: renewed today   Consent for opiate prescription signed on : renewed today  Last dose of narcotic medication: this morning  Most recent urine drug screen done: 10/2018      Controlled Substance Assessment:     - Is the medication, if previously prescribed, working as intended and expected to treat   the patients symptoms: yes    - Does the patient have a history of substance abuse: no    - has the patient demonstrated aberrant behavior or intoxication: no    - Is there reason to believe that the patient is not using medication as prescribed or diverting the medication: no    -  Is there reason to believe that the patient is currently misusing this medication or addicted to the controlled substance: no    - Is it necessary to verify that controlled substances, other that those authorized under the treatment plan, are not present in the body of this patient: no    - Are there any blood or urine test that indicate inappropriate use of the medication or other controlled substances : no     - I have reviewed the . Does the  report irregular/inconsistent medication use or indicate that the medication is being used inappropriately or that the patient is using another controlled substance not prescribed or known to me: no     - Is there reason to believe that the patient is using other drugs, including alcohol, prescription or illicit drugs, that may interact negatively with controlled substance or have not been prescribed by a practitioner who is treating the patient: no    - Are there any known early refill attempts or claims that medication has been lost or stolen: no    - Are there are any major changes in the patient's mental or physical health that would affect the medical appropriateness of this medication: no    - Has the patient increased the dose of medication without provider authorization: no    - Has the patient been reluctant to cooperate with any exam, analysis or test recommended by me: reluctant to go to PT or other specialist as he doesn't drive and his sister has to take him to appointments, also financially difficult to have a lot of copays    - Has the patient demonstrated reluctance to stop or reduce use of the medicine: yes, feels he is well controlled now    - Has the patient requested or demanded a controlled substance that is likely to be abused or cause dependency or addiction: just wants to stay on current    - Is there any other evidence that the patient is chronically using opioids, misusing, abusing, illegally using or addicted to any drug or failing to comply  with the instructions of the practitioner concerning the use of the controlled substance: no    - Are there any other factors that the practitioner determines is necessary to make an informed professional judgment concerning the medical appropriateness of the prescription: no      I have reviewed the medical record and medical history of this patient, examined the patient and reviewed the Prescription Monitoring Program and I have determined that  is medically indicated. Review of the medial records demonstrates . I discussed risks and benefits of the pain medication. I discussed proper use, storage and disposal of the pain medication. I have advised patient to keep medication in a safe place and to not drive with medication.    Opioid Risk Tool:      Primary osteoarthritis of right knee  Chronic, diagnosed many years ago by ortho. Not good candidate for surgical correction. Wears brace. Can't take NSAIDs as he is on coumadin. Stable on current norco.    PVD (peripheral vascular disease)  Chronic, stable, on current statin, coumadin, followed by cardiology, no new extremity pain, swelling or discoloration    PAF (paroxysmal atrial fibrillation)  Chronic, stable, managed by cardiology, no dizziness or SOB,     Osteoarthritis of spine with radiculopathy, lumbar region  Chronic, stable, dx years ago by ortho, not surgical candidate per patient, no new leg weakness, pain in back is worse with walking, if a lot of walking then sciatic pain but normally just chooses not to walk too much    Opioid type dependence, continuous  Chronic, stable on current, taking meds as prescribed, no new symptoms    Obesity (BMI 30-39.9)  Not working on diet or exercise. Eats well balance of protein, carbs, some vegetables. Admits to too much candy       Current medicines (including changes today)  Current Outpatient Prescriptions   Medication Sig Dispense Refill   • [START ON 4/27/2019] HYDROcodone/acetaminophen (NORCO)  MG Tab Take  "1 Tab by mouth every 6 hours as needed for up to 30 days. 120 Tab 0   • [START ON 3/27/2019] HYDROcodone/acetaminophen (NORCO)  MG Tab Take 1 Tab by mouth every 6 hours as needed for up to 30 days. 120 Tab 0   • HYDROcodone/acetaminophen (NORCO)  MG Tab Take 1 Tab by mouth every 6 hours as needed for up to 30 days. 120 Tab 0   • isosorbide mononitrate SR (IMDUR) 30 MG TABLET SR 24 HR Take 1 Tab by mouth every morning. 30 Tab 11   • warfarin (COUMADIN) 5 MG Tab Take 1/2 to 1 tablet by mouth one time daily or as directed by clinic 90 Tab 1   • atorvastatin (LIPITOR) 40 MG Tab TAKE ONE TABLET BY MOUTH ONE TIME DAILY  90 Tab 3   • finasteride (PROSCAR) 5 MG TABS TAKE ONE TABLET BY MOUTH ONE TIME DAILY 90 Tab 2   • clobetasol (TEMOVATE) 0.05 % Cream Apply to affected area  twice daily 60 g 1     No current facility-administered medications for this visit.      He  has a past medical history of A-fib ([facility]) (6/28/2011); Arteriosclerosis; CAD (coronary artery disease); CAD (coronary artery disease) (6/28/2011); Cardiac pacemaker in situ (11/19/2013); Colonic polyps; Diverticulosis; Dizzy spells (11/20/2012); HERZOG (dyspnea on exertion) (9/7/2014); Dyslipidemia (6/28/2011); Hematoma of abdominal wall; Hematoma of rectus sheath (6/13/2012); Myocardial infarct ([facility]) (1989); Pacer at end of battery life (6/28/2011); PAF (paroxysmal atrial fibrillation) ([facility]) (9/7/2014); Paroxysmal atrial fibrillation ([facility]); Peripheral vascular disease ([facility]) (10/31/2011); and PVD (peripheral vascular disease) ([facility]).    ROS   No fever/chills. No weight change. No headache/dizziness. No focal weakness. No sore throat, nasal congestion, ear pain. No chest pain, no shortness of breath, difficulty breathing. No n/v/d/c or abdominal pain. No urinary complaint. No rash or skin lesion.        Objective:     Blood pressure 128/68, pulse 64, resp. rate 16, height 1.702 m (5' 7\"), weight 95.7 kg (211 lb), SpO2 93 %. Body mass index is 33.05 " kg/m².   Physical Exam:  Constitutional: WDWN, NAD  Skin: Warm, dry, good turgor, no rashes in visible areas.  Respiratory: Unlabored respiratory effort, lungs clear to auscultation, no wheezes, no ronchi.  Cardiovascular: Normal S1, S2,  Psych: Alert and oriented x3, normal affect and mood.      Assessment and Plan:   The following treatment plan was discussed    1. Coronary atherosclerosis due to calcified coronary lesion  Cont current meds    2. Cardiac pacemaker in situ  Cont regular f/uw with cardiology    3. Chronic use of opiate drugs therapeutic purposes    - Controlled Substance Treatment Agreement  - HYDROcodone/acetaminophen (NORCO)  MG Tab; Take 1 Tab by mouth every 6 hours as needed for up to 30 days.  Dispense: 120 Tab; Refill: 0  - HYDROcodone/acetaminophen (NORCO)  MG Tab; Take 1 Tab by mouth every 6 hours as needed for up to 30 days.  Dispense: 120 Tab; Refill: 0  - HYDROcodone/acetaminophen (NORCO)  MG Tab; Take 1 Tab by mouth every 6 hours as needed for up to 30 days.  Dispense: 120 Tab; Refill: 0  - Consent for Opiate Prescription    4. Primary osteoarthritis of right knee    - Controlled Substance Treatment Agreement  - HYDROcodone/acetaminophen (NORCO)  MG Tab; Take 1 Tab by mouth every 6 hours as needed for up to 30 days.  Dispense: 120 Tab; Refill: 0  - HYDROcodone/acetaminophen (NORCO)  MG Tab; Take 1 Tab by mouth every 6 hours as needed for up to 30 days.  Dispense: 120 Tab; Refill: 0  - HYDROcodone/acetaminophen (NORCO)  MG Tab; Take 1 Tab by mouth every 6 hours as needed for up to 30 days.  Dispense: 120 Tab; Refill: 0    5. Osteoarthritis of spine with radiculopathy, lumbar region    - Controlled Substance Treatment Agreement  - HYDROcodone/acetaminophen (NORCO)  MG Tab; Take 1 Tab by mouth every 6 hours as needed for up to 30 days.  Dispense: 120 Tab; Refill: 0  - HYDROcodone/acetaminophen (NORCO)  MG Tab; Take 1 Tab by mouth every 6 hours  as needed for up to 30 days.  Dispense: 120 Tab; Refill: 0  - HYDROcodone/acetaminophen (NORCO)  MG Tab; Take 1 Tab by mouth every 6 hours as needed for up to 30 days.  Dispense: 120 Tab; Refill: 0    6. PVD (peripheral vascular disease) ([facility])  Cont current    7. PAF (paroxysmal atrial fibrillation) (HCC)  Cont current    8. Opioid type dependence, continuous (HCC)  Cont current    9. Obesity (BMI 30-39.9)        Followup: Return in about 3 months (around 5/27/2019).

## 2019-03-20 ENCOUNTER — HOSPITAL ENCOUNTER (OUTPATIENT)
Dept: LAB | Facility: MEDICAL CENTER | Age: 84
End: 2019-03-20
Attending: NURSE PRACTITIONER
Payer: MEDICARE

## 2019-03-20 DIAGNOSIS — Z79.01 CHRONIC ANTICOAGULATION: ICD-10-CM

## 2019-03-20 LAB
INR PPP: 2.4 (ref 0.87–1.13)
PROTHROMBIN TIME: 26.2 SEC (ref 12–14.6)

## 2019-03-20 PROCEDURE — 85610 PROTHROMBIN TIME: CPT

## 2019-03-20 PROCEDURE — 36415 COLL VENOUS BLD VENIPUNCTURE: CPT

## 2019-03-21 ENCOUNTER — ANTICOAGULATION MONITORING (OUTPATIENT)
Dept: VASCULAR LAB | Facility: MEDICAL CENTER | Age: 84
End: 2019-03-21

## 2019-03-21 NOTE — PROGRESS NOTES
Anticoagulation Summary  As of 3/21/2019    INR goal:   2.0-3.0   TTR:   79.5 % (3.7 y)   INR used for dosin.40 (3/20/2019)   Warfarin maintenance plan:   2.5 mg (5 mg x 0.5) every Mon, Wed, Fri; 5 mg (5 mg x 1) all other days   Weekly warfarin total:   27.5 mg   Plan last modified:   Verito Zafar, PharmD (2018)   Next INR check:   2019   Target end date:   Indefinite    Indications    A-fib (HCC) (Resolved) [I48.91]             Anticoagulation Episode Summary     INR check location:   Outside Lab    Preferred lab:       Send INR reminders to:       Comments:   [phone]      Anticoagulation Care Providers     Provider Role Specialty Phone number    Anastacio Sunshine M.D. Referring Cardiology [phone]    Mahin Valdez Responsible          Anticoagulation Patient Findings    Spoke with Gennaro to report a therapeutic INR of 2.8. Continue current dosing regimen.  Follow up in 10 weeks, to reduce the risk of adverse events related to this high risk medication, warfarin.    Verito Zafar, Clinical Pharmacist

## 2019-03-27 ENCOUNTER — OFFICE VISIT (OUTPATIENT)
Dept: CARDIOLOGY | Facility: MEDICAL CENTER | Age: 84
End: 2019-03-27
Payer: MEDICARE

## 2019-03-27 ENCOUNTER — NON-PROVIDER VISIT (OUTPATIENT)
Dept: CARDIOLOGY | Facility: MEDICAL CENTER | Age: 84
End: 2019-03-27
Payer: MEDICARE

## 2019-03-27 VITALS
SYSTOLIC BLOOD PRESSURE: 90 MMHG | OXYGEN SATURATION: 91 % | WEIGHT: 209 LBS | BODY MASS INDEX: 32.8 KG/M2 | DIASTOLIC BLOOD PRESSURE: 52 MMHG | HEART RATE: 70 BPM | HEIGHT: 67 IN

## 2019-03-27 DIAGNOSIS — Z95.0 CARDIAC PACEMAKER IN SITU: ICD-10-CM

## 2019-03-27 DIAGNOSIS — I25.10 CORONARY ARTERY DISEASE DUE TO CALCIFIED CORONARY LESION: ICD-10-CM

## 2019-03-27 DIAGNOSIS — I73.9 PVD (PERIPHERAL VASCULAR DISEASE) (HCC): Chronic | ICD-10-CM

## 2019-03-27 DIAGNOSIS — I25.84 CORONARY ARTERY DISEASE DUE TO CALCIFIED CORONARY LESION: ICD-10-CM

## 2019-03-27 DIAGNOSIS — I48.0 PAF (PAROXYSMAL ATRIAL FIBRILLATION) (HCC): ICD-10-CM

## 2019-03-27 DIAGNOSIS — E78.5 DYSLIPIDEMIA: Chronic | ICD-10-CM

## 2019-03-27 PROCEDURE — 93279 PRGRMG DEV EVAL PM/LDLS PM: CPT | Performed by: INTERNAL MEDICINE

## 2019-03-27 PROCEDURE — 99214 OFFICE O/P EST MOD 30 MIN: CPT | Performed by: INTERNAL MEDICINE

## 2019-03-27 NOTE — PROGRESS NOTES
Chief Complaint   Patient presents with   • Coronary Artery Disease     F/V: 3 MO       Subjective:   Gennaro Richardson is a 83 y.o. male who presents today for followup of his coronary disease, hyperlipidemia and paroxysmal atrial fibrillation.  He had a pacemaker generator change in September 2016.    He is walking 5-6 days a week.  He walks about a mile and a half.  He does have some dyspnea on exertion if he walks fast or uphill.  He denies any PND orthopnea.  He has had no edema, palpitations or lightheadedness.    Past Medical History:   Diagnosis Date   • A-fib (HCC) 6/28/2011   • Arteriosclerosis    • CAD (coronary artery disease)    • CAD (coronary artery disease) 6/28/2011   • Cardiac pacemaker in situ 11/19/2013   • Colonic polyps    • Diverticulosis    • Dizzy spells 11/20/2012   • HERZOG (dyspnea on exertion) 9/7/2014 9/16/16- pt has no c/o   • Dyslipidemia 6/28/2011   • Hematoma of abdominal wall    • Hematoma of rectus sheath 6/13/2012    Delayed onset bleed, intraabdominal extension, left flank , ct abd active bleed , binder, vit k  6/14 Ex-lap     • Myocardial infarct ([facility]) 1989   • Pacer at end of battery life 6/28/2011   • PAF (paroxysmal atrial fibrillation) ([facility]) 9/7/2014   • Paroxysmal atrial fibrillation ([facility])    • Peripheral vascular disease ([facility]) 10/31/2011   • PVD (peripheral vascular disease) ([facility])      Past Surgical History:   Procedure Laterality Date   • EXPLORATORY LAPAROTOMY  6/14/2012    Performed by CALLY MERCER at SURGERY [facility] ORS   • COLONOSCOPY WITH POLYP  7/24/08    Performed by RUDOLPH BRENNER at SURGERY [facility] ORS   • ABDOMINAL AORTIC ANEURYSM     • PACEMAKER INSERTION       Family History   Problem Relation Age of Onset   • Cancer Mother      Social History     Social History   • Marital status: Single     Spouse name: N/A   • Number of children: N/A   • Years of education: N/A     Occupational History   • Not on file.     Social History Main Topics  "  • Smoking status: Former Smoker     Years: 35.00     Quit date: 1/1/1988   • Smokeless tobacco: Never Used   • Alcohol use No   • Drug use: No   • Sexual activity: No      Comment: , has children, retired.     Other Topics Concern   • Not on file     Social History Narrative   • No narrative on file     Allergies   Allergen Reactions   • No Known Drug Allergy      Outpatient Encounter Prescriptions as of 3/27/2019   Medication Sig Dispense Refill   • clobetasol (TEMOVATE) 0.05 % Cream Apply to affected area  twice daily 60 g 1   • isosorbide mononitrate SR (IMDUR) 30 MG TABLET SR 24 HR Take 1 Tab by mouth every morning. 30 Tab 11   • warfarin (COUMADIN) 5 MG Tab Take 1/2 to 1 tablet by mouth one time daily or as directed by clinic 90 Tab 1   • atorvastatin (LIPITOR) 40 MG Tab TAKE ONE TABLET BY MOUTH ONE TIME DAILY  90 Tab 3   • finasteride (PROSCAR) 5 MG TABS TAKE ONE TABLET BY MOUTH ONE TIME DAILY 90 Tab 2   • [START ON 4/27/2019] HYDROcodone/acetaminophen (NORCO)  MG Tab Take 1 Tab by mouth every 6 hours as needed for up to 30 days. 120 Tab 0   • HYDROcodone/acetaminophen (NORCO)  MG Tab Take 1 Tab by mouth every 6 hours as needed for up to 30 days. (Patient not taking: Reported on 3/27/2019) 120 Tab 0   • HYDROcodone/acetaminophen (NORCO)  MG Tab Take 1 Tab by mouth every 6 hours as needed for up to 30 days. (Patient not taking: Reported on 3/27/2019) 120 Tab 0     No facility-administered encounter medications on file as of 3/27/2019.      ROS     Objective:   BP (!) 90/52 (BP Location: Right arm, Patient Position: Sitting, BP Cuff Size: Adult)   Pulse 70   Ht 1.702 m (5' 7\")   Wt 94.8 kg (209 lb)   SpO2 91%   BMI 32.73 kg/m²     Physical Exam   Constitutional: He appears well-developed and well-nourished.   Neck: No JVD present.   Cardiovascular: Normal rate and regular rhythm.    No murmur heard.  Pulmonary/Chest: Effort normal and breath sounds normal. He has no rales. "   Abdominal: Soft. There is no tenderness.   Musculoskeletal: He exhibits no edema.     Lab Results   Component Value Date/Time    CHOLSTRLTOT 91 (L) 11/27/2018 09:04 AM    LDL 38 11/27/2018 09:04 AM    HDL 36 (A) 11/27/2018 09:04 AM    TRIGLYCERIDE 83 11/27/2018 09:04 AM       Lab Results   Component Value Date/Time    SODIUM 140 11/27/2018 09:04 AM    POTASSIUM 4.0 11/27/2018 09:04 AM    CHLORIDE 109 11/27/2018 09:04 AM    CO2 26 11/27/2018 09:04 AM    GLUCOSE 94 11/27/2018 09:04 AM    BUN 23 (H) 11/27/2018 09:04 AM    CREATININE 1.14 11/27/2018 09:04 AM    CREATININE 1.3 12/01/2008 10:45 AM     Lab Results   Component Value Date/Time    ALKPHOSPHAT 76 11/27/2018 09:04 AM    ASTSGOT 15 11/27/2018 09:04 AM    ALTSGPT 19 11/27/2018 09:04 AM    TBILIRUBIN 1.2 11/27/2018 09:04 AM      Lab Results   Component Value Date/Time    BNPBTYPENAT 93 09/06/2014 01:46 PM          Assessment:     1. Coronary artery disease due to calcified coronary lesion:Occluded left circumflex     2. PVD (peripheral vascular disease) ([facility])     3. Dyslipidemia     4. Cardiac pacemaker in situ     5. PAF (paroxysmal atrial fibrillation) ([facility])         Medical Decision Making:  Today's Assessment / Status / Plan:     Mr. Richardson is clinically stable.  He is asymptomatic from a cardiovascular standpoint and his lipid status is under excellent control.  His pacemaker was checked today and he appears to be in persistent atrial fibrillation at the present time.  However, mode switching 10% of the time was noted.  He will follow-up in about 6 months with repeat lab work.

## 2019-03-27 NOTE — LETTER
[facility]   [address], [facility] [address]  [place] [address]  Phone: [phone]  Fax: [phone]              Gennaro Richardson  1935    Encounter Date: 3/27/2019    Josef Retana M.D.          PROGRESS NOTE:  Chief Complaint   Patient presents with   • Coronary Artery Disease     F/V: 3 MO       Subjective:   Gennaro Richardson is a 83 y.o. male who presents today for followup of his coronary disease, hyperlipidemia and paroxysmal atrial fibrillation.  He had a pacemaker generator change in September 2016.    He is walking 5-6 days a week.  He walks about a mile and a half.  He does have some dyspnea on exertion if he walks fast or uphill.  He denies any PND orthopnea.  He has had no edema, palpitations or lightheadedness.    Past Medical History:   Diagnosis Date   • A-fib ([facility]) 6/28/2011   • Arteriosclerosis    • CAD (coronary artery disease)    • CAD (coronary artery disease) 6/28/2011   • Cardiac pacemaker in situ 11/19/2013   • Colonic polyps    • Diverticulosis    • Dizzy spells 11/20/2012   • HERZOG (dyspnea on exertion) 9/7/2014 9/16/16- pt has no c/o   • Dyslipidemia 6/28/2011   • Hematoma of abdominal wall    • Hematoma of rectus sheath 6/13/2012    Delayed onset bleed, intraabdominal extension, left flank , ct abd active bleed , binder, vit k  6/14 Ex-lap     • Myocardial infarct ([facility]) 1989   • Pacer at end of battery life 6/28/2011   • PAF (paroxysmal atrial fibrillation) ([facility]) 9/7/2014   • Paroxysmal atrial fibrillation ([facility])    • Peripheral vascular disease ([facility]) 10/31/2011   • PVD (peripheral vascular disease) ([facility])      Past Surgical History:   Procedure Laterality Date   • EXPLORATORY LAPAROTOMY  6/14/2012    Performed by CALLY MERCER at SURGERY [facility] ORS   • COLONOSCOPY WITH POLYP  7/24/08    Performed by RUDOLPH BRENNER at SURGERY [facility] ORS   • ABDOMINAL AORTIC ANEURYSM     • PACEMAKER INSERTION       Family  "History   Problem Relation Age of Onset   • Cancer Mother      Social History     Social History   • Marital status: Single     Spouse name: N/A   • Number of children: N/A   • Years of education: N/A     Occupational History   • Not on file.     Social History Main Topics   • Smoking status: Former Smoker     Years: 35.00     Quit date: 1/1/1988   • Smokeless tobacco: Never Used   • Alcohol use No   • Drug use: No   • Sexual activity: No      Comment: , has children, retired.     Other Topics Concern   • Not on file     Social History Narrative   • No narrative on file     Allergies   Allergen Reactions   • No Known Drug Allergy      Outpatient Encounter Prescriptions as of 3/27/2019   Medication Sig Dispense Refill   • clobetasol (TEMOVATE) 0.05 % Cream Apply to affected area  twice daily 60 g 1   • isosorbide mononitrate SR (IMDUR) 30 MG TABLET SR 24 HR Take 1 Tab by mouth every morning. 30 Tab 11   • warfarin (COUMADIN) 5 MG Tab Take 1/2 to 1 tablet by mouth one time daily or as directed by clinic 90 Tab 1   • atorvastatin (LIPITOR) 40 MG Tab TAKE ONE TABLET BY MOUTH ONE TIME DAILY  90 Tab 3   • finasteride (PROSCAR) 5 MG TABS TAKE ONE TABLET BY MOUTH ONE TIME DAILY 90 Tab 2   • [START ON 4/27/2019] HYDROcodone/acetaminophen (NORCO)  MG Tab Take 1 Tab by mouth every 6 hours as needed for up to 30 days. 120 Tab 0   • HYDROcodone/acetaminophen (NORCO)  MG Tab Take 1 Tab by mouth every 6 hours as needed for up to 30 days. (Patient not taking: Reported on 3/27/2019) 120 Tab 0   • HYDROcodone/acetaminophen (NORCO)  MG Tab Take 1 Tab by mouth every 6 hours as needed for up to 30 days. (Patient not taking: Reported on 3/27/2019) 120 Tab 0     No facility-administered encounter medications on file as of 3/27/2019.      ROS     Objective:   BP (!) 90/52 (BP Location: Right arm, Patient Position: Sitting, BP Cuff Size: Adult)   Pulse 70   Ht 1.702 m (5' 7\")   Wt 94.8 kg (209 lb)   SpO2 91%   " BMI 32.73 kg/m²      Physical Exam   Constitutional: He appears well-developed and well-nourished.   Neck: No JVD present.   Cardiovascular: Normal rate and regular rhythm.    No murmur heard.  Pulmonary/Chest: Effort normal and breath sounds normal. He has no rales.   Abdominal: Soft. There is no tenderness.   Musculoskeletal: He exhibits no edema.     Lab Results   Component Value Date/Time    CHOLSTRLTOT 91 (L) 11/27/2018 09:04 AM    LDL 38 11/27/2018 09:04 AM    HDL 36 (A) 11/27/2018 09:04 AM    TRIGLYCERIDE 83 11/27/2018 09:04 AM       Lab Results   Component Value Date/Time    SODIUM 140 11/27/2018 09:04 AM    POTASSIUM 4.0 11/27/2018 09:04 AM    CHLORIDE 109 11/27/2018 09:04 AM    CO2 26 11/27/2018 09:04 AM    GLUCOSE 94 11/27/2018 09:04 AM    BUN 23 (H) 11/27/2018 09:04 AM    CREATININE 1.14 11/27/2018 09:04 AM    CREATININE 1.3 12/01/2008 10:45 AM     Lab Results   Component Value Date/Time    ALKPHOSPHAT 76 11/27/2018 09:04 AM    ASTSGOT 15 11/27/2018 09:04 AM    ALTSGPT 19 11/27/2018 09:04 AM    TBILIRUBIN 1.2 11/27/2018 09:04 AM      Lab Results   Component Value Date/Time    BNPBTYPENAT 93 09/06/2014 01:46 PM          Assessment:     1. Coronary artery disease due to calcified coronary lesion:Occluded left circumflex     2. PVD (peripheral vascular disease) ([facility])     3. Dyslipidemia     4. Cardiac pacemaker in situ     5. PAF (paroxysmal atrial fibrillation) ([facility])         Medical Decision Making:  Today's Assessment / Status / Plan:     Mr. Richardson is clinically stable.  He is asymptomatic from a cardiovascular standpoint and his lipid status is under excellent control.  His pacemaker was checked today and he appears to be in persistent atrial fibrillation at the present time.  However, most switching 10% of the time was noted.  He will follow-up in about 6 months with repeat lab work.      Angelica Hauser P.A.-C.  [address]  [address]  [facility] [facility] [place] [address]  VIA In Basket

## 2019-04-15 DIAGNOSIS — E78.5 DYSLIPIDEMIA: Chronic | ICD-10-CM

## 2019-04-15 DIAGNOSIS — I25.10 CORONARY ARTERY DISEASE DUE TO CALCIFIED CORONARY LESION: ICD-10-CM

## 2019-04-15 DIAGNOSIS — I25.84 CORONARY ARTERY DISEASE DUE TO CALCIFIED CORONARY LESION: ICD-10-CM

## 2019-04-16 RX ORDER — ATORVASTATIN CALCIUM 40 MG/1
TABLET, FILM COATED ORAL
Qty: 100 TAB | Refills: 3 | Status: SHIPPED | OUTPATIENT
Start: 2019-04-16 | End: 2020-09-02

## 2019-05-17 ENCOUNTER — TELEPHONE (OUTPATIENT)
Dept: MEDICAL GROUP | Facility: MEDICAL CENTER | Age: 84
End: 2019-05-17

## 2019-05-17 NOTE — TELEPHONE ENCOUNTER
ESTABLISHED PATIENT PRE-VISIT PLANNING     Patient was NOT contacted to complete PVP.     Note: Patient will not be contacted if there is no indication to call.     1.  Reviewed notes from the last few office visits within the medical group: Yes    2.  If any orders were placed at last visit or intended to be done for this visit (i.e. 6 mos follow-up), do we have Results/Consult Notes?        •  Labs - Labs were not ordered at last office visit.   Note: If patient appointment is for lab review and patient did not complete labs, check with provider if OK to reschedule patient until labs completed.       •  Imaging - Imaging was not ordered at last office visit.       •  Referrals - No referrals were ordered at last office visit.    3. Is this appointment scheduled as a Hospital Follow-Up? No    4.  Immunizations were updated in Epic using WebIZ?: Epic matches WebIZ       •  Web Iz Recommendations: PNEUMOVAX (PPSV23), VARICELLA (Chicken Pox)  and SHINGRIX (Shingles)    5.  Patient is due for the following Health Maintenance Topics:   Health Maintenance Due   Topic Date Due   • IMM HEP B VACCINE (1 of 3 - Risk 3-dose series) 08/31/1954   • IMM ZOSTER VACCINES (1 of 2) 08/31/1985   • IMM PNEUMOCOCCAL 65+ (ADULT) LOW/MEDIUM RISK SERIES (2 of 2 - PPSV23) 09/06/2018   • Annual Wellness Visit  09/07/2018       6. Orders for overdue Health Maintenance topics pended in Pre-Charting? NO    7.  AHA (MDX) form printed for Provider? No, already completed    8.  Patient was NOT informed to arrive 15 min prior to their scheduled appointment and bring in their medication bottles.

## 2019-05-24 ENCOUNTER — OFFICE VISIT (OUTPATIENT)
Dept: MEDICAL GROUP | Facility: MEDICAL CENTER | Age: 84
End: 2019-05-24
Payer: MEDICARE

## 2019-05-24 ENCOUNTER — HOSPITAL ENCOUNTER (OUTPATIENT)
Facility: MEDICAL CENTER | Age: 84
End: 2019-05-24
Attending: PHYSICIAN ASSISTANT
Payer: MEDICARE

## 2019-05-24 ENCOUNTER — ANTICOAGULATION MONITORING (OUTPATIENT)
Dept: VASCULAR LAB | Facility: MEDICAL CENTER | Age: 84
End: 2019-05-24

## 2019-05-24 VITALS
DIASTOLIC BLOOD PRESSURE: 68 MMHG | HEIGHT: 67 IN | BODY MASS INDEX: 32.27 KG/M2 | SYSTOLIC BLOOD PRESSURE: 122 MMHG | RESPIRATION RATE: 14 BRPM | OXYGEN SATURATION: 93 % | HEART RATE: 60 BPM | WEIGHT: 205.6 LBS

## 2019-05-24 DIAGNOSIS — N41.0 ACUTE PROSTATITIS: ICD-10-CM

## 2019-05-24 DIAGNOSIS — N40.1 BENIGN PROSTATIC HYPERPLASIA WITH URINARY HESITANCY: ICD-10-CM

## 2019-05-24 DIAGNOSIS — M17.11 PRIMARY OSTEOARTHRITIS OF RIGHT KNEE: ICD-10-CM

## 2019-05-24 DIAGNOSIS — I48.0 PAF (PAROXYSMAL ATRIAL FIBRILLATION) (HCC): ICD-10-CM

## 2019-05-24 DIAGNOSIS — Z79.891 CHRONIC USE OF OPIATE DRUGS THERAPEUTIC PURPOSES: ICD-10-CM

## 2019-05-24 DIAGNOSIS — R39.11 BENIGN PROSTATIC HYPERPLASIA WITH URINARY HESITANCY: ICD-10-CM

## 2019-05-24 DIAGNOSIS — M47.26 OSTEOARTHRITIS OF SPINE WITH RADICULOPATHY, LUMBAR REGION: ICD-10-CM

## 2019-05-24 PROCEDURE — 87086 URINE CULTURE/COLONY COUNT: CPT

## 2019-05-24 PROCEDURE — 87077 CULTURE AEROBIC IDENTIFY: CPT

## 2019-05-24 PROCEDURE — 87186 SC STD MICRODIL/AGAR DIL: CPT

## 2019-05-24 PROCEDURE — 99214 OFFICE O/P EST MOD 30 MIN: CPT | Performed by: PHYSICIAN ASSISTANT

## 2019-05-24 RX ORDER — TAMSULOSIN HYDROCHLORIDE 0.4 MG/1
0.4 CAPSULE ORAL DAILY
Qty: 30 CAP | Refills: 5 | Status: SHIPPED | OUTPATIENT
Start: 2019-05-24 | End: 2019-06-23

## 2019-05-24 RX ORDER — HYDROCODONE BITARTRATE AND ACETAMINOPHEN 10; 325 MG/1; MG/1
1 TABLET ORAL EVERY 6 HOURS PRN
Qty: 120 TAB | Refills: 0 | Status: SHIPPED | OUTPATIENT
Start: 2019-06-27 | End: 2019-08-30 | Stop reason: SDUPTHER

## 2019-05-24 RX ORDER — CIPROFLOXACIN 500 MG/1
500 TABLET, FILM COATED ORAL 2 TIMES DAILY
Qty: 14 TAB | Refills: 0 | Status: SHIPPED | OUTPATIENT
Start: 2019-05-24 | End: 2019-05-31

## 2019-05-24 RX ORDER — HYDROCODONE BITARTRATE AND ACETAMINOPHEN 10; 325 MG/1; MG/1
1 TABLET ORAL EVERY 6 HOURS PRN
Qty: 120 TAB | Refills: 0 | Status: SHIPPED | OUTPATIENT
Start: 2019-05-27 | End: 2019-08-30 | Stop reason: SDUPTHER

## 2019-05-24 RX ORDER — HYDROCODONE BITARTRATE AND ACETAMINOPHEN 10; 325 MG/1; MG/1
1 TABLET ORAL EVERY 6 HOURS PRN
Qty: 120 TAB | Refills: 0 | Status: SHIPPED | OUTPATIENT
Start: 2019-07-27 | End: 2019-08-30 | Stop reason: SDUPTHER

## 2019-05-24 NOTE — ASSESSMENT & PLAN NOTE
Patient has hx of BPH on terazosin. Recently having problems urinating and some cloudy urine. Worried about infection as he has had this before. No fever/chills. No blood in urine. Also having problems with constipation

## 2019-05-24 NOTE — PROGRESS NOTES
Subjective:   Gennaro Richardson is a 83 y.o. male here today for follow up on chronic conditions    PAF (paroxysmal atrial fibrillation)  Chronic, stable on current warfarin, managed by coumadin clinic, f/u recently with cardiology    Acute prostatitis  Patient has hx of BPH on terazosin. Recently having problems urinating and some cloudy urine. Worried about infection as he has had this before. No fever/chills. No blood in urine. Also having problems with constipation    Chronic use of opiate drugs therapeutic purposes  The patient describes right knee pain.  Preliminary diagnosis: severe osteoarthritis  Treatment plan: pain control, maintain function  Diagnostic evaluations completed: xray    I have chosen to use a controlled substance for this patient instead of an alternative treatment because can't take NSAIDs.    Current pain control: good, rated 3/10.   Adverse effects from medication: constipation.  States where the person has previously resided or had CS filled in: none.  Physical functioning: good, able to walk 1-2 miles daily.   Overall functioning: good.  Mood and Mental Health: good.  Family and social relationships: good.   Sleep pattern: good.    Pain management agreement initiated and signed on: 2/2019.   Consent for opiate prescription signed on : 2/2019.  Last dose of narcotic medication: this morning.   Most recent urine drug screen done . 8/2018      Controlled Substance Assessment:     - Is the medication, if previously prescribed, working as intended and expected to treat   the patients symptoms: yes.     - Does the patient have a history of substance abuse: no.     - has the patient demonstrated aberrant behavior or intoxication: no.     - Is there reason to believe that the patient is not using medication as prescribed or diverting the medication: no.     - Is there reason to believe that the patient is currently misusing this medication or addicted to the controlled substance: no.     -  Is it necessary to verify that controlled substances, other that those authorized under the treatment plan, are not present in the body of this patient: no.    - Are there any blood or urine test that indicate inappropriate use of the medication or other controlled substances : no.     - I have reviewed the . Does the  report irregular/inconsistent medication use or indicate that the medication is being used inappropriately or that the patient is using another controlled substance not prescribed or known to me: no.     - Is there reason to believe that the patient is using other drugs, including alcohol, prescription or illicit drugs, that may interact negatively with controlled substance or have not been prescribed by a practitioner who is treating the patient: no.     - Are there any known early refill attempts or claims that medication has been lost or stolen: no.     - Are there are any major changes in the patient's mental or physical health that would affect the medical appropriateness of this medication: no.     - Has the patient increased the dose of medication without provider authorization: no.    - Has the patient been reluctant to cooperate with any exam, analysis or test recommended by me: no.     - Has the patient demonstrated reluctance to stop or reduce use of the medicine: no.     - Has the patient requested or demanded a controlled substance that is likely to be abused or cause dependency or addiction: no.     - Is there any other evidence that the patient is chronically using opioids, misusing, abusing, illegally using or addicted to any drug or failing to comply with the instructions of the practitioner concerning the use of the controlled substance: no    - Are there any other factors that the practitioner determines is necessary to make an informed professional judgment concerning the medical appropriateness of the prescription: no.       I have reviewed the medical record and medical  history of this patient, examined the patient and reviewed the Prescription Monitoring Program and I have determined that norco is medically indicated. Review of the medial records demonstrates compliance. I discussed risks and benefits of the pain medication. I discussed proper use, storage and disposal of the pain medication. I have advised patient to keep medication in a safe place and to not drive with medication.         Current medicines (including changes today)  Current Outpatient Prescriptions   Medication Sig Dispense Refill   • [START ON 7/27/2019] HYDROcodone/acetaminophen (NORCO)  MG Tab Take 1 Tab by mouth every 6 hours as needed for up to 30 days. 120 Tab 0   • [START ON 6/27/2019] HYDROcodone/acetaminophen (NORCO)  MG Tab Take 1 Tab by mouth every 6 hours as needed for up to 30 days. 120 Tab 0   • [START ON 5/27/2019] HYDROcodone/acetaminophen (NORCO)  MG Tab Take 1 Tab by mouth every 6 hours as needed for up to 30 days. 120 Tab 0   • tamsulosin (FLOMAX) 0.4 MG capsule Take 1 Cap by mouth every day for 30 days. 30 Cap 5   • ciprofloxacin (CIPRO) 500 MG Tab Take 1 Tab by mouth 2 times a day for 7 days. 14 Tab 0   • atorvastatin (LIPITOR) 40 MG Tab Take 1 tab daily 100 Tab 3   • warfarin (COUMADIN) 5 MG Tab Take 1/2 to 1 tablet by mouth one time daily or as directed by clinic 90 Tab 1   • clobetasol (TEMOVATE) 0.05 % Cream Apply to affected area  twice daily 60 g 1   • isosorbide mononitrate SR (IMDUR) 30 MG TABLET SR 24 HR Take 1 Tab by mouth every morning. 30 Tab 11   • finasteride (PROSCAR) 5 MG TABS TAKE ONE TABLET BY MOUTH ONE TIME DAILY 90 Tab 2     No current facility-administered medications for this visit.      He  has a past medical history of A-fib ([facility]) (6/28/2011); Arteriosclerosis; CAD (coronary artery disease); CAD (coronary artery disease) (6/28/2011); Cardiac pacemaker in situ (11/19/2013); Colonic polyps; Diverticulosis; Dizzy spells (11/20/2012); HERZOG (dyspnea on  "exertion) (9/7/2014); Dyslipidemia (6/28/2011); Hematoma of abdominal wall; Hematoma of rectus sheath (6/13/2012); Myocardial infarct ([facility]) (1989); Pacer at end of battery life (6/28/2011); PAF (paroxysmal atrial fibrillation) ([facility]) (9/7/2014); Paroxysmal atrial fibrillation ([facility]); Peripheral vascular disease ([facility]) (10/31/2011); and PVD (peripheral vascular disease) ([facility]).    ROS   No fever/chills. No weight change. No headache/dizziness. No focal weakness. No sore throat, nasal congestion, ear pain. No chest pain, no shortness of breath, difficulty breathing. No rash or skin lesion. No new joint pain or swelling.     Objective:     /68 (BP Location: Left arm, Patient Position: Sitting, BP Cuff Size: Adult)   Pulse 60   Resp 14   Ht 1.702 m (5' 7\")   Wt 93.3 kg (205 lb 9.6 oz)   SpO2 93%  Body mass index is 32.2 kg/m².   Physical Exam:  Constitutional: WDWN, NAD  Skin: Warm, dry, good turgor, no rashes in visible areas.  Respiratory: Unlabored respiratory effort, lungs clear to auscultation, no wheezes, no ronchi.  Cardiovascular: Normal S1, S2, no murmur, no leg edema.  Psych: Alert and oriented x3, normal affect and mood.    Assessment and Plan:   The following treatment plan was discussed    1. Chronic use of opiate drugs therapeutic purposes  [facility] Aware web site evaluation: I have obtained and reviewed patient utilization report from [facility] pharmacy database prior to writing prescription for controlled substance II, III or IV. Based on the report and my clinical assessment the prescription is medically necessary.   Patient is cautioned on sedation potential of narcotic medication; no drinking, driving or operating heavy machinery while on this medication.  - HYDROcodone/acetaminophen (NORCO)  MG Tab; Take 1 Tab by mouth every 6 hours as needed for up to 30 days.  Dispense: 120 Tab; Refill: 0  - HYDROcodone/acetaminophen (NORCO)  MG Tab; Take 1 Tab by mouth every 6 hours as " needed for up to 30 days.  Dispense: 120 Tab; Refill: 0  - HYDROcodone/acetaminophen (NORCO)  MG Tab; Take 1 Tab by mouth every 6 hours as needed for up to 30 days.  Dispense: 120 Tab; Refill: 0    2. Primary osteoarthritis of right knee    - HYDROcodone/acetaminophen (NORCO)  MG Tab; Take 1 Tab by mouth every 6 hours as needed for up to 30 days.  Dispense: 120 Tab; Refill: 0  - HYDROcodone/acetaminophen (NORCO)  MG Tab; Take 1 Tab by mouth every 6 hours as needed for up to 30 days.  Dispense: 120 Tab; Refill: 0  - HYDROcodone/acetaminophen (NORCO)  MG Tab; Take 1 Tab by mouth every 6 hours as needed for up to 30 days.  Dispense: 120 Tab; Refill: 0    3. Osteoarthritis of spine with radiculopathy, lumbar region    - HYDROcodone/acetaminophen (NORCO)  MG Tab; Take 1 Tab by mouth every 6 hours as needed for up to 30 days.  Dispense: 120 Tab; Refill: 0  - HYDROcodone/acetaminophen (NORCO)  MG Tab; Take 1 Tab by mouth every 6 hours as needed for up to 30 days.  Dispense: 120 Tab; Refill: 0  - HYDROcodone/acetaminophen (NORCO)  MG Tab; Take 1 Tab by mouth every 6 hours as needed for up to 30 days.  Dispense: 120 Tab; Refill: 0    4. Benign prostatic hyperplasia with urinary hesitancy    - tamsulosin (FLOMAX) 0.4 MG capsule; Take 1 Cap by mouth every day for 30 days.  Dispense: 30 Cap; Refill: 5    5. Acute prostatitis  Discussed risk of elevated INR, patient has visit with coumadin clinic on Monday  - ciprofloxacin (CIPRO) 500 MG Tab; Take 1 Tab by mouth 2 times a day for 7 days.  Dispense: 14 Tab; Refill: 0    6. PAF (paroxysmal atrial fibrillation) ([facility])        Followup: Return in about 3 months (around 8/24/2019).

## 2019-05-24 NOTE — ASSESSMENT & PLAN NOTE
The patient describes right knee pain.  Preliminary diagnosis: severe osteoarthritis  Treatment plan: pain control, maintain function  Diagnostic evaluations completed: xray    I have chosen to use a controlled substance for this patient instead of an alternative treatment because can't take NSAIDs.    Current pain control: good, rated 3/10.   Adverse effects from medication: constipation.  States where the person has previously resided or had CS filled in: none.  Physical functioning: good, able to walk 1-2 miles daily.   Overall functioning: good.  Mood and Mental Health: good.  Family and social relationships: good.   Sleep pattern: good.    Pain management agreement initiated and signed on: 2/2019.   Consent for opiate prescription signed on : 2/2019.  Last dose of narcotic medication: this morning.   Most recent urine drug screen done . 8/2018      Controlled Substance Assessment:     - Is the medication, if previously prescribed, working as intended and expected to treat   the patients symptoms: yes.     - Does the patient have a history of substance abuse: no.     - has the patient demonstrated aberrant behavior or intoxication: no.     - Is there reason to believe that the patient is not using medication as prescribed or diverting the medication: no.     - Is there reason to believe that the patient is currently misusing this medication or addicted to the controlled substance: no.     - Is it necessary to verify that controlled substances, other that those authorized under the treatment plan, are not present in the body of this patient: no.    - Are there any blood or urine test that indicate inappropriate use of the medication or other controlled substances : no.     - I have reviewed the . Does the  report irregular/inconsistent medication use or indicate that the medication is being used inappropriately or that the patient is using another controlled substance not prescribed or known to me: no.      - Is there reason to believe that the patient is using other drugs, including alcohol, prescription or illicit drugs, that may interact negatively with controlled substance or have not been prescribed by a practitioner who is treating the patient: no.     - Are there any known early refill attempts or claims that medication has been lost or stolen: no.     - Are there are any major changes in the patient's mental or physical health that would affect the medical appropriateness of this medication: no.     - Has the patient increased the dose of medication without provider authorization: no.    - Has the patient been reluctant to cooperate with any exam, analysis or test recommended by me: no.     - Has the patient demonstrated reluctance to stop or reduce use of the medicine: no.     - Has the patient requested or demanded a controlled substance that is likely to be abused or cause dependency or addiction: no.     - Is there any other evidence that the patient is chronically using opioids, misusing, abusing, illegally using or addicted to any drug or failing to comply with the instructions of the practitioner concerning the use of the controlled substance: no    - Are there any other factors that the practitioner determines is necessary to make an informed professional judgment concerning the medical appropriateness of the prescription: no.       I have reviewed the medical record and medical history of this patient, examined the patient and reviewed the Prescription Monitoring Program and I have determined that norco is medically indicated. Review of the medial records demonstrates compliance. I discussed risks and benefits of the pain medication. I discussed proper use, storage and disposal of the pain medication. I have advised patient to keep medication in a safe place and to not drive with medication.

## 2019-05-24 NOTE — PROGRESS NOTES
05/24/19 - 128p  Tried to call pt regarding DDI with warfarin and newly prescribed Cipro.   Phone rings and rings, no VM.     Will try again later.     Pt on warfarin and due for next INR on 5/30/19, however this DDI would require a warfarin dose reduction.     Kelsy Bloom, PharmD

## 2019-05-24 NOTE — PROGRESS NOTES
Spoke with Gennaro, to discuss DDI with warfarin/cipro.  Pt will get INR tested 05/28/2019  Verito Zafar, Clinical Pharmacist

## 2019-05-26 LAB
BACTERIA UR CULT: ABNORMAL
BACTERIA UR CULT: ABNORMAL
SIGNIFICANT IND 70042: ABNORMAL
SITE SITE: ABNORMAL
SOURCE SOURCE: ABNORMAL

## 2019-05-28 ENCOUNTER — HOSPITAL ENCOUNTER (OUTPATIENT)
Dept: LAB | Facility: MEDICAL CENTER | Age: 84
End: 2019-05-28
Attending: NURSE PRACTITIONER
Payer: MEDICARE

## 2019-05-28 DIAGNOSIS — Z79.01 CHRONIC ANTICOAGULATION: ICD-10-CM

## 2019-05-28 LAB
INR PPP: 3.69 (ref 0.87–1.13)
PROTHROMBIN TIME: 36.6 SEC (ref 12–14.6)

## 2019-05-28 PROCEDURE — 36415 COLL VENOUS BLD VENIPUNCTURE: CPT

## 2019-05-28 PROCEDURE — 85610 PROTHROMBIN TIME: CPT

## 2019-05-29 ENCOUNTER — ANTICOAGULATION MONITORING (OUTPATIENT)
Dept: VASCULAR LAB | Facility: MEDICAL CENTER | Age: 84
End: 2019-05-29

## 2019-06-03 ENCOUNTER — TELEPHONE (OUTPATIENT)
Dept: MEDICAL GROUP | Facility: MEDICAL CENTER | Age: 84
End: 2019-06-03

## 2019-06-03 NOTE — TELEPHONE ENCOUNTER
1. Caller Name: Gennaro Richardson                      Call Back Number: [phone] (home)     2. Message: Pt called this AM regarding medication prescribed for prostatitis, patient states that his symptoms have not gotten better and he is still having difficulty urinating    Pt scheduled appt for Friday, please advise if we can change medicine without office visit or if pt needs to come in    3. Patient approves office to leave a detailed voicemail/MyChart message: no

## 2019-06-03 NOTE — TELEPHONE ENCOUNTER
I think the cipro is the correct antibiotic but he can increase the flomax I prescribed from 1 tab a day to 2 tabs a day and see if that helps. If he is unable to urinate he needs to go to the ER. Thanks! Angelica Hauser PA-C

## 2019-06-05 ENCOUNTER — TELEPHONE (OUTPATIENT)
Dept: MEDICAL GROUP | Facility: MEDICAL CENTER | Age: 84
End: 2019-06-05

## 2019-06-05 NOTE — TELEPHONE ENCOUNTER
ESTABLISHED PATIENT PRE-VISIT PLANNING     Patient was NOT contacted to complete PVP.     Note: Patient will not be contacted if there is no indication to call.     1.  Reviewed notes from the last few office visits within the medical group: Yes    2.  If any orders were placed at last visit or intended to be done for this visit (i.e. 6 mos follow-up), do we have Results/Consult Notes?        •  Labs - Labs ordered, completed on 05/24/2019 and results are in chart.   Note: If patient appointment is for lab review and patient did not complete labs, check with provider if OK to reschedule patient until labs completed.       •  Imaging - Imaging was not ordered at last office visit.       •  Referrals - No referrals were ordered at last office visit.    3. Is this appointment scheduled as a Hospital Follow-Up? No    4.  Immunizations were updated in Epic using WebIZ?: Epic matches WebIZ       •  Web Iz Recommendations: PNEUMOVAX (PPSV23), VARICELLA (Chicken Pox)  and SHINGRIX (Shingles)    5.  Patient is due for the following Health Maintenance Topics:   Health Maintenance Due   Topic Date Due   • IMM HEP B VACCINE (1 of 3 - Risk 3-dose series) 08/31/1954   • IMM ZOSTER VACCINES (1 of 2) 08/31/1985   • IMM PNEUMOCOCCAL 65+ (ADULT) LOW/MEDIUM RISK SERIES (2 of 2 - PPSV23) 09/06/2018   • Annual Wellness Visit  09/07/2018   • URINE DRUG SCREEN  08/22/2019       6. Orders for overdue Health Maintenance topics pended in Pre-Charting? NO    7.  AHA (MDX) form printed for Provider? No, already completed    8.  Patient was NOT informed to arrive 15 min prior to their scheduled appointment and bring in their medication bottles.

## 2019-06-07 ENCOUNTER — OFFICE VISIT (OUTPATIENT)
Dept: MEDICAL GROUP | Facility: MEDICAL CENTER | Age: 84
End: 2019-06-07
Payer: MEDICARE

## 2019-06-07 ENCOUNTER — HOSPITAL ENCOUNTER (OUTPATIENT)
Facility: MEDICAL CENTER | Age: 84
End: 2019-06-07
Attending: PHYSICIAN ASSISTANT
Payer: MEDICARE

## 2019-06-07 VITALS
DIASTOLIC BLOOD PRESSURE: 64 MMHG | HEART RATE: 71 BPM | RESPIRATION RATE: 14 BRPM | OXYGEN SATURATION: 93 % | BODY MASS INDEX: 32.52 KG/M2 | HEIGHT: 67 IN | WEIGHT: 207.2 LBS | SYSTOLIC BLOOD PRESSURE: 116 MMHG

## 2019-06-07 DIAGNOSIS — N41.0 ACUTE PROSTATITIS: ICD-10-CM

## 2019-06-07 PROCEDURE — 99213 OFFICE O/P EST LOW 20 MIN: CPT | Performed by: PHYSICIAN ASSISTANT

## 2019-06-07 PROCEDURE — 87086 URINE CULTURE/COLONY COUNT: CPT

## 2019-06-07 RX ORDER — TAMSULOSIN HYDROCHLORIDE 0.4 MG/1
0.4 CAPSULE ORAL 2 TIMES DAILY
Qty: 60 CAP | Refills: 0 | Status: SHIPPED | OUTPATIENT
Start: 2019-06-07 | End: 2019-07-07

## 2019-06-07 RX ORDER — CIPROFLOXACIN 500 MG/1
TABLET, FILM COATED ORAL
Qty: 20 TAB | Refills: 0 | Status: SHIPPED | OUTPATIENT
Start: 2019-06-07 | End: 2019-06-27 | Stop reason: SDUPTHER

## 2019-06-07 NOTE — PROGRESS NOTES
Subjective:   Gennaro Richardson is a 83 y.o. male here today for follow up on acute prostatitis    Acute prostatitis  Not sure he is taking medications properly, still having symptoms, will re-prescribe with specific instructions, repeat urine culture       Current medicines (including changes today)  Current Outpatient Prescriptions   Medication Sig Dispense Refill   • ciprofloxacin (CIPRO) 500 MG Tab TAKE ONE TABLET AT 8 AM AND ONE TABLET AT 6PM EVERY DAY FOR 10 DAYS 20 Tab 0   • tamsulosin (FLOMAX) 0.4 MG capsule Take 1 Cap by mouth 2 Times a Day for 30 days. One at 8am and one at 6 pm 60 Cap 0   • [START ON 7/27/2019] HYDROcodone/acetaminophen (NORCO)  MG Tab Take 1 Tab by mouth every 6 hours as needed for up to 30 days. 120 Tab 0   • [START ON 6/27/2019] HYDROcodone/acetaminophen (NORCO)  MG Tab Take 1 Tab by mouth every 6 hours as needed for up to 30 days. 120 Tab 0   • HYDROcodone/acetaminophen (NORCO)  MG Tab Take 1 Tab by mouth every 6 hours as needed for up to 30 days. 120 Tab 0   • tamsulosin (FLOMAX) 0.4 MG capsule Take 1 Cap by mouth every day for 30 days. 30 Cap 5   • atorvastatin (LIPITOR) 40 MG Tab Take 1 tab daily 100 Tab 3   • warfarin (COUMADIN) 5 MG Tab Take 1/2 to 1 tablet by mouth one time daily or as directed by clinic 90 Tab 1   • clobetasol (TEMOVATE) 0.05 % Cream Apply to affected area  twice daily 60 g 1   • isosorbide mononitrate SR (IMDUR) 30 MG TABLET SR 24 HR Take 1 Tab by mouth every morning. 30 Tab 11   • finasteride (PROSCAR) 5 MG TABS TAKE ONE TABLET BY MOUTH ONE TIME DAILY 90 Tab 2     No current facility-administered medications for this visit.      He  has a past medical history of A-fib (HCC) (6/28/2011); Arteriosclerosis; CAD (coronary artery disease); CAD (coronary artery disease) (6/28/2011); Cardiac pacemaker in situ (11/19/2013); Colonic polyps; Diverticulosis; Dizzy spells (11/20/2012); HERZOG (dyspnea on exertion) (9/7/2014); Dyslipidemia (6/28/2011);  "Hematoma of abdominal wall; Hematoma of rectus sheath (6/13/2012); Myocardial infarct ([facility]) (1989); Pacer at end of battery life (6/28/2011); PAF (paroxysmal atrial fibrillation) ([facility]) (9/7/2014); Paroxysmal atrial fibrillation ([facility]); Peripheral vascular disease ([facility]) (10/31/2011); and PVD (peripheral vascular disease) ([facility]).    ROS   No fever/chills. No n/v/d/c or abdominal pain. No rash or skin lesion. No joint redness or swelling.       Objective:     /64 (BP Location: Right arm, Patient Position: Sitting, BP Cuff Size: Adult)   Pulse 71   Resp 14   Ht 1.702 m (5' 7\")   Wt 94 kg (207 lb 3.2 oz)   SpO2 93%  Body mass index is 32.45 kg/m².   Physical Exam:  Constitutional: WDWN, NAD  Skin: Warm, dry, good turgor, no rashes in visible areas.  Psych: Alert and oriented x3, normal affect and mood.    Assessment and Plan:   The following treatment plan was discussed    1. Acute prostatitis    - URINE CULTURE(NEW); Future  - ciprofloxacin (CIPRO) 500 MG Tab; TAKE ONE TABLET AT 8 AM AND ONE TABLET AT 6PM EVERY DAY FOR 10 DAYS  Dispense: 20 Tab; Refill: 0  - tamsulosin (FLOMAX) 0.4 MG capsule; Take 1 Cap by mouth 2 Times a Day for 30 days. One at 8am and one at 6 pm  Dispense: 60 Cap; Refill: 0    Followup: No Follow-up on file.         "

## 2019-06-07 NOTE — PATIENT INSTRUCTIONS
Take Flomax 0.4mg one in the morning at 8am and one in the evening at 6pm. Do not take additional Flomax.    Take Ciprofloxacin 500mg one in the morning at 8am and one in the evening at 6pm

## 2019-06-07 NOTE — ASSESSMENT & PLAN NOTE
Not sure he is taking medications properly, still having symptoms, will re-prescribe with specific instructions, repeat urine culture

## 2019-06-09 LAB
BACTERIA UR CULT: NORMAL
SIGNIFICANT IND 70042: NORMAL
SITE SITE: NORMAL
SOURCE SOURCE: NORMAL

## 2019-06-12 ENCOUNTER — HOSPITAL ENCOUNTER (OUTPATIENT)
Dept: LAB | Facility: MEDICAL CENTER | Age: 84
End: 2019-06-12
Attending: NURSE PRACTITIONER
Payer: MEDICARE

## 2019-06-12 DIAGNOSIS — Z79.01 CHRONIC ANTICOAGULATION: ICD-10-CM

## 2019-06-12 LAB
INR PPP: 1.52 (ref 0.87–1.13)
PROTHROMBIN TIME: 18.7 SEC (ref 12–14.6)

## 2019-06-12 PROCEDURE — 36415 COLL VENOUS BLD VENIPUNCTURE: CPT

## 2019-06-12 PROCEDURE — 85610 PROTHROMBIN TIME: CPT

## 2019-06-13 ENCOUNTER — ANTICOAGULATION MONITORING (OUTPATIENT)
Dept: MEDICAL GROUP | Facility: MEDICAL CENTER | Age: 84
End: 2019-06-13

## 2019-06-13 NOTE — PROGRESS NOTES
Anticoagulation Summary  As of 2019    INR goal:   2.0-3.0   TTR:   77.6 % (4 y)   INR used for dosin.52! (2019)   Warfarin maintenance plan:   2.5 mg (5 mg x 0.5) every Mon, Wed, Fri; 5 mg (5 mg x 1) all other days   Weekly warfarin total:   27.5 mg   Plan last modified:   Verito Zafar, PharmD (2018)   Next INR check:   2019   Target end date:   Indefinite    Indications    A-fib (HCC) (Resolved) [I48.91]             Anticoagulation Episode Summary     INR check location:       Preferred lab:   [facility]    Send INR reminders to:       Comments:   [phone]        Anticoagulation Care Providers     Provider Role Specialty Phone number    Anastacio Sunshine M.D. Referring Cardiology [phone]    Mahin Valdez Responsible          Anticoagulation Patient Findings          HPI:  Gennaro Richardson, on anticoagulation therapy with warfarin for Afib.   Changes to current medical/health status since last appt: Infection hasn't cleared.   Denies signs/symptoms of bleeding and/or thrombosis since the last appt.    Denies any interval changes to diet  Pt started another course of ciprofloxacin  to .  Recently missed a couple doses of warfarin on accident.   Denies any complications or cost restrictions with current therapy.     A/P   INR  SUB-therapeutic.   Pt is to continue with current warfarin dosing regimen given DDI with Cipro    Next INR in 4 days.     Jus Young, PharmD

## 2019-06-17 ENCOUNTER — HOSPITAL ENCOUNTER (OUTPATIENT)
Dept: LAB | Facility: MEDICAL CENTER | Age: 84
End: 2019-06-17
Attending: NURSE PRACTITIONER
Payer: MEDICARE

## 2019-06-17 DIAGNOSIS — Z79.01 CHRONIC ANTICOAGULATION: ICD-10-CM

## 2019-06-17 LAB
INR PPP: 2.04 (ref 0.87–1.13)
PROTHROMBIN TIME: 23.7 SEC (ref 12–14.6)

## 2019-06-17 PROCEDURE — 36415 COLL VENOUS BLD VENIPUNCTURE: CPT

## 2019-06-17 PROCEDURE — 85610 PROTHROMBIN TIME: CPT

## 2019-06-18 ENCOUNTER — ANTICOAGULATION MONITORING (OUTPATIENT)
Dept: VASCULAR LAB | Facility: MEDICAL CENTER | Age: 84
End: 2019-06-18

## 2019-06-18 NOTE — PROGRESS NOTES
Anticoagulation Summary  As of 2019    INR goal:   2.0-3.0   TTR:   77.4 % (4 y)   INR used for dosin.04 (2019)   Warfarin maintenance plan:   2.5 mg (5 mg x 0.5) every Mon, Wed, Fri; 5 mg (5 mg x 1) all other days   Weekly warfarin total:   27.5 mg   No change documented:   Noreen Rhodes, Med Ass't   Plan last modified:   Verito Zafar, PharmD (2018)   Next INR check:   2019   Target end date:   Indefinite    Indications    A-fib (HCC) (Resolved) [I48.91]             Anticoagulation Episode Summary     INR check location:       Preferred lab:   [facility]    Send INR reminders to:       Comments:   [phone]        Anticoagulation Care Providers     Provider Role Specialty Phone number    Anastacio Sunshine M.D. Referring Cardiology [phone]    Mahin Valdez Responsible          Anticoagulation Patient Findings  Patient Findings     Negatives:   Signs/symptoms of thrombosis, Signs/symptoms of bleeding, Laboratory test error suspected, Change in health, Change in alcohol use, Change in activity, Upcoming invasive procedure, Emergency department visit, Upcoming dental procedure, Missed doses, Extra doses, Change in medications, Change in diet/appetite, Hospital admission, Bruising, Other complaints        Spoke with patient to report a therapeutic INR.  Pt instructed to continue with current warfarin dosing regimen. Pt denies any s/s of bleeding, bruising, clotting or any changes to diet or medication.  Will follow up in 1 week.    Noreen Rhodes, Med Ass't     I have reviewed and concur with the above plan on 2019.  Verito Zafar, Clinical Pharmacist, CDE, CACP

## 2019-06-24 ENCOUNTER — HOSPITAL ENCOUNTER (OUTPATIENT)
Dept: LAB | Facility: MEDICAL CENTER | Age: 84
End: 2019-06-24
Attending: NURSE PRACTITIONER
Payer: MEDICARE

## 2019-06-24 DIAGNOSIS — Z79.01 CHRONIC ANTICOAGULATION: ICD-10-CM

## 2019-06-24 LAB
INR PPP: 2.21 (ref 0.87–1.13)
PROTHROMBIN TIME: 25.3 SEC (ref 12–14.6)

## 2019-06-24 PROCEDURE — 36415 COLL VENOUS BLD VENIPUNCTURE: CPT

## 2019-06-24 PROCEDURE — 85610 PROTHROMBIN TIME: CPT

## 2019-06-25 ENCOUNTER — ANTICOAGULATION MONITORING (OUTPATIENT)
Dept: VASCULAR LAB | Facility: MEDICAL CENTER | Age: 84
End: 2019-06-25

## 2019-06-25 NOTE — PROGRESS NOTES
Anticoagulation Summary  As of 2019    INR goal:   2.0-3.0   TTR:   77.5 % (4 y)   INR used for dosin.21 (2019)   Warfarin maintenance plan:   2.5 mg (5 mg x 0.5) every Mon, Wed, Fri; 5 mg (5 mg x 1) all other days   Weekly warfarin total:   27.5 mg   Plan last modified:   Verito Zafar, PharmD (2018)   Next INR check:   2019   Target end date:   Indefinite    Indications    A-fib (HCC) (Resolved) [I48.91]             Anticoagulation Episode Summary     INR check location:       Preferred lab:   [facility]    Send INR reminders to:       Comments:   [phone]        Anticoagulation Care Providers     Provider Role Specialty Phone number    Anastacio Sunshine M.D. Referring Cardiology [phone]    Mahin Valdez Responsible          Anticoagulation Patient Findings     Called patient to inform the last INR reading of 2.21. Patient is within goal range of 2.0-3.0 and was told to continue current regimen of 5mg on Tuesday, Thursday, Saturday, and  and 2.5mg on Monday, Wednesday, and Friday. No changes in diet or medications. Patient will follow up in 2 weeks for INR and monitoring.     Birdie Davies, Pharmacy Intern

## 2019-06-27 DIAGNOSIS — N41.0 ACUTE PROSTATITIS: ICD-10-CM

## 2019-06-28 ENCOUNTER — TELEPHONE (OUTPATIENT)
Dept: VASCULAR LAB | Facility: MEDICAL CENTER | Age: 84
End: 2019-06-28

## 2019-06-28 RX ORDER — CIPROFLOXACIN 500 MG/1
TABLET, FILM COATED ORAL
Qty: 20 TAB | Refills: 0 | Status: SHIPPED | OUTPATIENT
Start: 2019-06-28 | End: 2019-10-03

## 2019-06-28 NOTE — TELEPHONE ENCOUNTER
[facility]    Called pt to make a plan around new DDI. Pt states he is no longer going to take ciprofloxacin.  Requested pt to contact the clinic if he does start or stop any medications.    Jus Young, ShilaD

## 2019-07-16 ENCOUNTER — HOSPITAL ENCOUNTER (OUTPATIENT)
Dept: LAB | Facility: MEDICAL CENTER | Age: 84
End: 2019-07-16
Attending: NURSE PRACTITIONER
Payer: MEDICARE

## 2019-07-16 ENCOUNTER — ANTICOAGULATION MONITORING (OUTPATIENT)
Dept: VASCULAR LAB | Facility: MEDICAL CENTER | Age: 84
End: 2019-07-16

## 2019-07-16 DIAGNOSIS — Z79.01 CHRONIC ANTICOAGULATION: ICD-10-CM

## 2019-07-16 LAB
INR PPP: 2.18 (ref 0.87–1.13)
PROTHROMBIN TIME: 25 SEC (ref 12–14.6)

## 2019-07-16 PROCEDURE — 85610 PROTHROMBIN TIME: CPT

## 2019-07-16 PROCEDURE — 36415 COLL VENOUS BLD VENIPUNCTURE: CPT

## 2019-07-17 NOTE — PROGRESS NOTES
Anticoagulation Summary  As of 2019    INR goal:   2.0-3.0   TTR:   77.8 % (4.1 y)   INR used for dosin.18 (2019)   Warfarin maintenance plan:   2.5 mg (5 mg x 0.5) every Mon, Wed, Fri; 5 mg (5 mg x 1) all other days   Weekly warfarin total:   27.5 mg   Plan last modified:   Shila RogersD (2018)   Next INR check:   2019   Target end date:   Indefinite    Indications    A-fib (HCC) (Resolved) [I48.91]             Anticoagulation Episode Summary     INR check location:       Preferred lab:   [facility]    Send INR reminders to:       Comments:   [phone]        Anticoagulation Care Providers     Provider Role Specialty Phone number    Anastacio Sunshine M.D. Referring Cardiology [phone]    Mahin Valdez Responsible          Anticoagulation Patient Findings  Patient Findings     Negatives:   Signs/symptoms of thrombosis, Signs/symptoms of bleeding, Laboratory test error suspected, Change in health, Change in alcohol use, Change in activity, Upcoming invasive procedure, Emergency department visit, Upcoming dental procedure, Missed doses, Extra doses, Change in medications, Change in diet/appetite, Hospital admission, Bruising, Other complaints        Spoke with patient today regarding therapeutic INR of 2.18.  Patient denies any signs/symptoms of bruising or bleeding or any changes in diet and medications.  Instructed patient to call clinic with any questions or concerns.  Pt is to continue with current warfarin dosing regimen.  Follow up in 4 weeks, to reduce risk of adverse events related to this high risk medication,  Warfarin.    Antwon Dejesus, PharmD, BCACP

## 2019-08-07 RX ORDER — CLOBETASOL PROPIONATE 0.5 MG/G
CREAM TOPICAL
Qty: 60 G | Refills: 0 | Status: SHIPPED | OUTPATIENT
Start: 2019-08-07 | End: 2020-03-23

## 2019-08-09 ENCOUNTER — OFFICE VISIT (OUTPATIENT)
Dept: URGENT CARE | Facility: CLINIC | Age: 84
End: 2019-08-09
Payer: MEDICARE

## 2019-08-09 ENCOUNTER — APPOINTMENT (OUTPATIENT)
Dept: RADIOLOGY | Facility: IMAGING CENTER | Age: 84
End: 2019-08-09
Attending: EMERGENCY MEDICINE
Payer: MEDICARE

## 2019-08-09 VITALS
SYSTOLIC BLOOD PRESSURE: 124 MMHG | OXYGEN SATURATION: 94 % | HEART RATE: 64 BPM | RESPIRATION RATE: 14 BRPM | WEIGHT: 207 LBS | DIASTOLIC BLOOD PRESSURE: 70 MMHG | TEMPERATURE: 98.7 F | BODY MASS INDEX: 32.49 KG/M2 | HEIGHT: 67 IN

## 2019-08-09 DIAGNOSIS — M25.561 ACUTE PAIN OF RIGHT KNEE: ICD-10-CM

## 2019-08-09 DIAGNOSIS — M25.461 EFFUSION OF RIGHT KNEE: ICD-10-CM

## 2019-08-09 PROCEDURE — 73564 X-RAY EXAM KNEE 4 OR MORE: CPT | Mod: TC,RT | Performed by: EMERGENCY MEDICINE

## 2019-08-09 PROCEDURE — 99213 OFFICE O/P EST LOW 20 MIN: CPT | Performed by: EMERGENCY MEDICINE

## 2019-08-09 RX ORDER — TAMSULOSIN HYDROCHLORIDE 0.4 MG/1
CAPSULE ORAL
Status: ON HOLD | COMMUNITY
Start: 2019-07-12 | End: 2021-01-18

## 2019-08-09 ASSESSMENT — ENCOUNTER SYMPTOMS
NUMBNESS: 0
FEVER: 0
SENSORY CHANGE: 0
JOINT SWELLING: 1
TINGLING: 0
FOCAL WEAKNESS: 0

## 2019-08-09 NOTE — PROGRESS NOTES
Subjective:      Philip Richardson is a 83 y.o. male who presents with Knee Pain ((R) knee pain woke up with it this morning)            Knee Pain   This is a new problem. The current episode started in the past 7 days. The problem occurs daily. The problem has been rapidly worsening. Associated symptoms include joint swelling. Pertinent negatives include no fever, numbness or rash. The symptoms are aggravated by walking. He has tried rest for the symptoms. The treatment provided no relief.   No trauma.    Review of Systems   Constitutional: Negative for fever.   Musculoskeletal: Positive for joint swelling.        No pain proximal or distal to the site.   Skin: Negative for rash.   Neurological: Negative for tingling, sensory change, focal weakness and numbness.     PMH:  has a past medical history of A-fib ([facility]) (6/28/2011), Arteriosclerosis, CAD (coronary artery disease), CAD (coronary artery disease) (6/28/2011), Cardiac pacemaker in situ (11/19/2013), Colonic polyps, Diverticulosis, Dizzy spells (11/20/2012), HERZOG (dyspnea on exertion) (9/7/2014), Dyslipidemia (6/28/2011), Hematoma of abdominal wall, Hematoma of rectus sheath (6/13/2012), Myocardial infarct ([facility]) (1989), Pacer at end of battery life (6/28/2011), PAF (paroxysmal atrial fibrillation) ([facility]) (9/7/2014), Paroxysmal atrial fibrillation ([facility]), Peripheral vascular disease ([facility]) (10/31/2011), and PVD (peripheral vascular disease) ([facility]).  MEDS:   Current Outpatient Medications:   •  tamsulosin (FLOMAX) 0.4 MG capsule, , Disp: , Rfl:   •  clobetasol (TEMOVATE) 0.05 % Cream, apply topically to affected area(s) twice a day, Disp: 60 g, Rfl: 0  •  HYDROcodone/acetaminophen (NORCO)  MG Tab, Take 1 Tab by mouth every 6 hours as needed for up to 30 days., Disp: 120 Tab, Rfl: 0  •  atorvastatin (LIPITOR) 40 MG Tab, Take 1 tab daily, Disp: 100 Tab, Rfl: 3  •  warfarin (COUMADIN) 5 MG Tab, Take 1/2 to 1 tablet by mouth one time daily or as directed by  "clinic, Disp: 90 Tab, Rfl: 1  •  isosorbide mononitrate SR (IMDUR) 30 MG TABLET SR 24 HR, Take 1 Tab by mouth every morning., Disp: 30 Tab, Rfl: 11  •  finasteride (PROSCAR) 5 MG TABS, TAKE ONE TABLET BY MOUTH ONE TIME DAILY, Disp: 90 Tab, Rfl: 2  •  ciprofloxacin (CIPRO) 500 MG Tab, take one tablet by mouth at 8 am and one tablet at 6 pm daily for 10 days (Patient not taking: Reported on 8/9/2019), Disp: 20 Tab, Rfl: 0  ALLERGIES:   Allergies   Allergen Reactions   • No Known Drug Allergy      SURGHX:   Past Surgical History:   Procedure Laterality Date   • EXPLORATORY LAPAROTOMY  6/14/2012    Performed by CALLY MERCER at SURGERY [facility] ORS   • COLONOSCOPY WITH POLYP  7/24/08    Performed by RUDOLPH BRENNER at SURGERY [facility] ORS   • ABDOMINAL AORTIC ANEURYSM     • PACEMAKER INSERTION       SOCHX:  reports that he quit smoking about 31 years ago. He quit after 35.00 years of use. He has never used smokeless tobacco. He reports that he does not drink alcohol or use drugs.  FH: family history includes Cancer in his mother.     Objective:     /70 (BP Location: Left arm, Patient Position: Sitting, BP Cuff Size: Large adult)   Pulse 64   Temp 37.1 °C (98.7 °F) (Temporal)   Resp 14   Ht 1.702 m (5' 7\")   Wt 93.9 kg (207 lb)   SpO2 94%   BMI 32.42 kg/m²      Physical Exam   Constitutional: He is oriented to person, place, and time.  Non-toxic appearance. No distress.   Cardiovascular:   Pulses:       Dorsalis pedis pulses are 1+ on the right side.        Posterior tibial pulses are 1+ on the right side.   No calf tenderness, Homans sign negative.   Musculoskeletal:        Right knee: He exhibits effusion. He exhibits normal range of motion, no ecchymosis, no deformity, no erythema, normal alignment, no LCL laxity, normal patellar mobility and no MCL laxity. Tenderness found. Medial joint line tenderness noted. No lateral joint line, no MCL, no LCL and no patellar tendon tenderness noted. "   Neurological: He is alert and oriented to person, place, and time.   Distal motor function intact.   Skin: Skin is warm, dry and intact.   Psychiatric: He has a normal mood and affect.               Assessment/Plan:     1. Effusion of right knee  Knee support when walking, use cane as needed.  Elevation, ice/heat PRN, own analgesia when needed.  Referral to sports medicine  2. Acute pain of right knee  - DX-KNEE COMPLETE 4+ RIGHT; per radiologist:  There is atherosclerosis.  There is a small joint effusion.  There is no evidence of displaced  fracture or dislocation.  There is moderate joint space narrowing of all 3 compartments with marginal spurring. There is a trace of lateral subluxation of the proximal tibia relative to the distal femur.

## 2019-08-14 ENCOUNTER — HOSPITAL ENCOUNTER (OUTPATIENT)
Dept: LAB | Facility: MEDICAL CENTER | Age: 84
End: 2019-08-14
Attending: NURSE PRACTITIONER
Payer: MEDICARE

## 2019-08-14 DIAGNOSIS — Z79.01 CHRONIC ANTICOAGULATION: ICD-10-CM

## 2019-08-14 LAB
INR PPP: 2.43 (ref 0.87–1.13)
PROTHROMBIN TIME: 27.3 SEC (ref 12–14.6)

## 2019-08-14 PROCEDURE — 36415 COLL VENOUS BLD VENIPUNCTURE: CPT

## 2019-08-14 PROCEDURE — 85610 PROTHROMBIN TIME: CPT

## 2019-08-15 ENCOUNTER — ANTICOAGULATION MONITORING (OUTPATIENT)
Dept: VASCULAR LAB | Facility: MEDICAL CENTER | Age: 84
End: 2019-08-15

## 2019-08-20 ENCOUNTER — TELEPHONE (OUTPATIENT)
Dept: MEDICAL GROUP | Facility: MEDICAL CENTER | Age: 84
End: 2019-08-20

## 2019-08-20 NOTE — TELEPHONE ENCOUNTER
ESTABLISHED PATIENT PRE-VISIT PLANNING     Patient was NOT contacted to complete PVP.     Note: Patient will not be contacted if there is no indication to call.     1.  Reviewed notes from the last few office visits within the medical group: Yes    2.  If any orders were placed at last visit or intended to be done for this visit (i.e. 6 mos follow-up), do we have Results/Consult Notes?        •  Labs - Labs were not ordered at last office visit.   Note: If patient appointment is for lab review and patient did not complete labs, check with provider if OK to reschedule patient until labs completed.       •  Imaging - Imaging was not ordered at last office visit.       •  Referrals - No referrals were ordered at last office visit.    3. Is this appointment scheduled as a Hospital Follow-Up? No    4.  Immunizations were updated in Epic using WebIZ?: Epic matches WebIZ       •  Web Iz Recommendations: PNEUMOVAX (PPSV23), VARICELLA (Chicken Pox)  and SHINGRIX (Shingles)    5.  Patient is due for the following Health Maintenance Topics:   Health Maintenance Due   Topic Date Due   • IMM HEP B VACCINE (1 of 3 - Risk 3-dose series) 08/31/1954   • IMM ZOSTER VACCINES (1 of 2) 08/31/1985   • IMM PNEUMOCOCCAL VACCINE: 65+ Years (2 of 2 - PPSV23) 09/06/2018   • Annual Wellness Visit  09/07/2018   • URINE DRUG SCREEN  08/22/2019       6. Orders for overdue Health Maintenance topics pended in Pre-Charting? NO    7.  AHA (MDX) form printed for Provider? No, already completed    8.  Patient was NOT informed to arrive 15 min prior to their scheduled appointment and bring in their medication bottles.

## 2019-08-30 ENCOUNTER — OFFICE VISIT (OUTPATIENT)
Dept: MEDICAL GROUP | Facility: MEDICAL CENTER | Age: 84
End: 2019-08-30
Payer: MEDICARE

## 2019-08-30 ENCOUNTER — HOSPITAL ENCOUNTER (OUTPATIENT)
Facility: MEDICAL CENTER | Age: 84
End: 2019-08-30
Attending: PHYSICIAN ASSISTANT
Payer: MEDICARE

## 2019-08-30 VITALS
DIASTOLIC BLOOD PRESSURE: 50 MMHG | SYSTOLIC BLOOD PRESSURE: 96 MMHG | BODY MASS INDEX: 30.98 KG/M2 | WEIGHT: 197.4 LBS | HEART RATE: 70 BPM | RESPIRATION RATE: 16 BRPM | HEIGHT: 67 IN | TEMPERATURE: 98 F | OXYGEN SATURATION: 92 %

## 2019-08-30 DIAGNOSIS — E66.9 OBESITY (BMI 30-39.9): ICD-10-CM

## 2019-08-30 DIAGNOSIS — Z79.891 CHRONIC USE OF OPIATE DRUG FOR THERAPEUTIC PURPOSE: ICD-10-CM

## 2019-08-30 DIAGNOSIS — F11.20 OPIOID TYPE DEPENDENCE, CONTINUOUS (HCC): ICD-10-CM

## 2019-08-30 DIAGNOSIS — M47.26 OSTEOARTHRITIS OF SPINE WITH RADICULOPATHY, LUMBAR REGION: ICD-10-CM

## 2019-08-30 DIAGNOSIS — M17.11 PRIMARY OSTEOARTHRITIS OF RIGHT KNEE: ICD-10-CM

## 2019-08-30 DIAGNOSIS — N41.0 ACUTE PROSTATITIS: ICD-10-CM

## 2019-08-30 PROCEDURE — 87086 URINE CULTURE/COLONY COUNT: CPT

## 2019-08-30 PROCEDURE — 99214 OFFICE O/P EST MOD 30 MIN: CPT | Performed by: PHYSICIAN ASSISTANT

## 2019-08-30 RX ORDER — HYDROCODONE BITARTRATE AND ACETAMINOPHEN 10; 325 MG/1; MG/1
1 TABLET ORAL EVERY 6 HOURS PRN
Qty: 120 TAB | Refills: 0 | Status: SHIPPED | OUTPATIENT
Start: 2019-10-30 | End: 2019-12-02 | Stop reason: SDUPTHER

## 2019-08-30 RX ORDER — HYDROCODONE BITARTRATE AND ACETAMINOPHEN 10; 325 MG/1; MG/1
1 TABLET ORAL EVERY 6 HOURS PRN
Qty: 120 TAB | Refills: 0 | Status: SHIPPED | OUTPATIENT
Start: 2019-09-30 | End: 2019-12-02 | Stop reason: SDUPTHER

## 2019-08-30 RX ORDER — HYDROCODONE BITARTRATE AND ACETAMINOPHEN 10; 325 MG/1; MG/1
1 TABLET ORAL EVERY 6 HOURS PRN
Qty: 120 TAB | Refills: 0 | Status: SHIPPED | OUTPATIENT
Start: 2019-08-30 | End: 2019-12-02 | Stop reason: SDUPTHER

## 2019-09-01 LAB
BACTERIA UR CULT: NORMAL
SIGNIFICANT IND 70042: NORMAL
SITE SITE: NORMAL
SOURCE SOURCE: NORMAL

## 2019-09-03 ENCOUNTER — TELEPHONE (OUTPATIENT)
Dept: MEDICAL GROUP | Facility: MEDICAL CENTER | Age: 84
End: 2019-09-03

## 2019-09-03 NOTE — ASSESSMENT & PLAN NOTE
Chronic, stable, using right knee brace prn, taking meds as prescribed, no recent change,  verified  The patient describes right knee and low back pain.  Preliminary diagnosis: osteoarthritis  Treatment plan: pain control and maintain function  Diagnostic evaluations completed: xray    I have chosen to use a controlled substance for this patient instead of an alternative treatment because he has good pain control and is also already taking NSAID.    Current pain control: 3 when sitting, 5 with walking,   Adverse effects from medication: none.  States where the person has previously resided or had CS filled in: none.  Physical functioning: fair.   Overall functioning: fair.  Mood and Mental Health: good.  Family and social relationships: good, lives with sister.   Sleep pattern: good.  Nonnarcotic treatments that are being used: NSAIDs, knee brace. We discussed non-opiod treatment.  options.   Goals of treatment: pain control, maintain function.    Pain management agreement initiated and signed on: 2/2019.   Consent for opiate prescription signed on : 2/2019.  Last dose of narcotic medication: today.   Most recent urine drug screen done today.       Controlled Substance Assessment:     - Is the medication, if previously prescribed, working as intended and expected to treat   the patients symptoms: yes.     - Does the patient have a history of substance abuse: no.     - has the patient demonstrated aberrant behavior or intoxication: no.     - Is there reason to believe that the patient is not using medication as prescribed or diverting the medication: no.     - Is there reason to believe that the patient is currently misusing this medication or addicted to the controlled substance: no.     - Is it necessary to verify that controlled substances, other that those authorized under the treatment plan, are not present in the body of this patient: no.    - Are there any blood or urine test that indicate inappropriate use  of the medication or other controlled substances : no.     - I have reviewed the . Does the  report irregular/inconsistent medication use or indicate that the medication is being used inappropriately or that the patient is using another controlled substance not prescribed or known to me: no.     - Is there reason to believe that the patient is using other drugs, including alcohol, prescription or illicit drugs, that may interact negatively with controlled substance or have not been prescribed by a practitioner who is treating the patient: no.     - Are there any known early refill attempts or claims that medication has been lost or stolen: no.     - Are there are any major changes in the patient's mental or physical health that would affect the medical appropriateness of this medication: no.     - Has the patient increased the dose of medication without provider authorization: no.    - Has the patient been reluctant to cooperate with any exam, analysis or test recommended by me: no.     - Has the patient demonstrated reluctance to stop or reduce use of the medicine: no.     - Has the patient requested or demanded a controlled substance that is likely to be abused or cause dependency or addiction: no.     - Is there any other evidence that the patient is chronically using opioids, misusing, abusing, illegally using or addicted to any drug or failing to comply with the instructions of the practitioner concerning the use of the controlled substance: no    - Are there any other factors that the practitioner determines is necessary to make an informed professional judgment concerning the medical appropriateness of the prescription: no.       I have reviewed the medical record and medical history of this patient, examined the patient and reviewed the Prescription Monitoring Program and I have determined that norco is medically indicated. Review of the medial records demonstrates compliance. I discussed risks and  benefits of the pain medication. I discussed proper use, storage and disposal of the pain medication. I have advised patient to keep medication in a safe place and to not drive with medication.    Opioid Risk Tool:   Up to date

## 2019-09-03 NOTE — PROGRESS NOTES
Subjective:   Gennaro Richardson is a 84 y.o. male here today for follow up on chronic conditions    Opioid type dependence, continuous  Chronic, stable, using right knee brace prn, taking meds as prescribed, no recent change,  verified  The patient describes right knee and low back pain.  Preliminary diagnosis: osteoarthritis  Treatment plan: pain control and maintain function  Diagnostic evaluations completed: xray    I have chosen to use a controlled substance for this patient instead of an alternative treatment because he has good pain control and is also already taking NSAID.    Current pain control: 3 when sitting, 5 with walking,   Adverse effects from medication: none.  States where the person has previously resided or had CS filled in: none.  Physical functioning: fair.   Overall functioning: fair.  Mood and Mental Health: good.  Family and social relationships: good, lives with sister.   Sleep pattern: good.  Nonnarcotic treatments that are being used: NSAIDs, knee brace. We discussed non-opiod treatment.  options.   Goals of treatment: pain control, maintain function.    Pain management agreement initiated and signed on: 2/2019.   Consent for opiate prescription signed on : 2/2019.  Last dose of narcotic medication: today.   Most recent urine drug screen done today.       Controlled Substance Assessment:     - Is the medication, if previously prescribed, working as intended and expected to treat   the patients symptoms: yes.     - Does the patient have a history of substance abuse: no.     - has the patient demonstrated aberrant behavior or intoxication: no.     - Is there reason to believe that the patient is not using medication as prescribed or diverting the medication: no.     - Is there reason to believe that the patient is currently misusing this medication or addicted to the controlled substance: no.     - Is it necessary to verify that controlled substances, other that those authorized under  the treatment plan, are not present in the body of this patient: no.    - Are there any blood or urine test that indicate inappropriate use of the medication or other controlled substances : no.     - I have reviewed the . Does the  report irregular/inconsistent medication use or indicate that the medication is being used inappropriately or that the patient is using another controlled substance not prescribed or known to me: no.     - Is there reason to believe that the patient is using other drugs, including alcohol, prescription or illicit drugs, that may interact negatively with controlled substance or have not been prescribed by a practitioner who is treating the patient: no.     - Are there any known early refill attempts or claims that medication has been lost or stolen: no.     - Are there are any major changes in the patient's mental or physical health that would affect the medical appropriateness of this medication: no.     - Has the patient increased the dose of medication without provider authorization: no.    - Has the patient been reluctant to cooperate with any exam, analysis or test recommended by me: no.     - Has the patient demonstrated reluctance to stop or reduce use of the medicine: no.     - Has the patient requested or demanded a controlled substance that is likely to be abused or cause dependency or addiction: no.     - Is there any other evidence that the patient is chronically using opioids, misusing, abusing, illegally using or addicted to any drug or failing to comply with the instructions of the practitioner concerning the use of the controlled substance: no    - Are there any other factors that the practitioner determines is necessary to make an informed professional judgment concerning the medical appropriateness of the prescription: no.       I have reviewed the medical record and medical history of this patient, examined the patient and reviewed the Prescription Monitoring  Program and I have determined that norco is medically indicated. Review of the medial records demonstrates compliance. I discussed risks and benefits of the pain medication. I discussed proper use, storage and disposal of the pain medication. I have advised patient to keep medication in a safe place and to not drive with medication.    Opioid Risk Tool:   Up to date    Acute prostatitis  Would like to have urine tested to make sure infection is gone    Obesity (BMI 30-39.9)  States he has lost about 10 pounds in 3 months on purpose, eating less         Current medicines (including changes today)  Current Outpatient Medications   Medication Sig Dispense Refill   • [START ON 10/30/2019] HYDROcodone/acetaminophen (NORCO)  MG Tab Take 1 Tab by mouth every 6 hours as needed for up to 30 days. 120 Tab 0   • [START ON 9/30/2019] HYDROcodone/acetaminophen (NORCO)  MG Tab Take 1 Tab by mouth every 6 hours as needed for up to 30 days. 120 Tab 0   • HYDROcodone/acetaminophen (NORCO)  MG Tab Take 1 Tab by mouth every 6 hours as needed for up to 30 days. 120 Tab 0   • tamsulosin (FLOMAX) 0.4 MG capsule      • clobetasol (TEMOVATE) 0.05 % Cream apply topically to affected area(s) twice a day 60 g 0   • atorvastatin (LIPITOR) 40 MG Tab Take 1 tab daily 100 Tab 3   • warfarin (COUMADIN) 5 MG Tab Take 1/2 to 1 tablet by mouth one time daily or as directed by clinic 90 Tab 1   • isosorbide mononitrate SR (IMDUR) 30 MG TABLET SR 24 HR Take 1 Tab by mouth every morning. 30 Tab 11   • finasteride (PROSCAR) 5 MG TABS TAKE ONE TABLET BY MOUTH ONE TIME DAILY 90 Tab 2   • ciprofloxacin (CIPRO) 500 MG Tab take one tablet by mouth at 8 am and one tablet at 6 pm daily for 10 days (Patient not taking: Reported on 8/9/2019) 20 Tab 0     No current facility-administered medications for this visit.      He  has a past medical history of A-fib (HCC) (6/28/2011), Arteriosclerosis, CAD (coronary artery disease), CAD (coronary artery  "disease) (6/28/2011), Cardiac pacemaker in situ (11/19/2013), Colonic polyps, Diverticulosis, Dizzy spells (11/20/2012), HERZOG (dyspnea on exertion) (9/7/2014), Dyslipidemia (6/28/2011), Hematoma of abdominal wall, Hematoma of rectus sheath (6/13/2012), Myocardial infarct ([facility]) (1989), Pacer at end of battery life (6/28/2011), PAF (paroxysmal atrial fibrillation) ([facility]) (9/7/2014), Paroxysmal atrial fibrillation ([facility]), Peripheral vascular disease ([facility]) (10/31/2011), and PVD (peripheral vascular disease) ([facility]).    ROS   No fever/chills.  No headache/dizziness. No focal weakness. No sore throat, nasal congestion, ear pain. No chest pain, no shortness of breath, difficulty breathing. No n/v/d/c or abdominal pain.      Objective:     BP (!) 96/50 (BP Location: Right arm, Patient Position: Sitting, BP Cuff Size: Adult)   Pulse 70   Temp 36.7 °C (98 °F) (Temporal)   Resp 16   Ht 1.702 m (5' 7\")   Wt 89.5 kg (197 lb 6.4 oz)   SpO2 92%  Body mass index is 30.92 kg/m².   Physical Exam:  Constitutional: WDWN, NAD  Skin: Warm, dry, good turgor, no rashes in visible areas.  Respiratory: Unlabored respiratory effort, lungs clear to auscultation, no wheezes, no ronchi.  Cardiovascular: Normal S1, S2, no murmur, no leg edema.  Psych: Alert and oriented x3, normal affect and mood.    Assessment and Plan:   The following treatment plan was discussed    1. Acute prostatitis    - URINE CULTURE(NEW); Future    2. Opioid type dependence, continuous (HCC)    - HYDROcodone/acetaminophen (NORCO)  MG Tab; Take 1 Tab by mouth every 6 hours as needed for up to 30 days.  Dispense: 120 Tab; Refill: 0  - HYDROcodone/acetaminophen (NORCO)  MG Tab; Take 1 Tab by mouth every 6 hours as needed for up to 30 days.  Dispense: 120 Tab; Refill: 0  - HYDROcodone/acetaminophen (NORCO)  MG Tab; Take 1 Tab by mouth every 6 hours as needed for up to 30 days.  Dispense: 120 Tab; Refill: 0    3. Chronic use of opiate drug for therapeutic " purpose      4. Primary osteoarthritis of right knee    - HYDROcodone/acetaminophen (NORCO)  MG Tab; Take 1 Tab by mouth every 6 hours as needed for up to 30 days.  Dispense: 120 Tab; Refill: 0  - HYDROcodone/acetaminophen (NORCO)  MG Tab; Take 1 Tab by mouth every 6 hours as needed for up to 30 days.  Dispense: 120 Tab; Refill: 0  - HYDROcodone/acetaminophen (NORCO)  MG Tab; Take 1 Tab by mouth every 6 hours as needed for up to 30 days.  Dispense: 120 Tab; Refill: 0    5. Osteoarthritis of spine with radiculopathy, lumbar region    - HYDROcodone/acetaminophen (NORCO)  MG Tab; Take 1 Tab by mouth every 6 hours as needed for up to 30 days.  Dispense: 120 Tab; Refill: 0  - HYDROcodone/acetaminophen (NORCO)  MG Tab; Take 1 Tab by mouth every 6 hours as needed for up to 30 days.  Dispense: 120 Tab; Refill: 0  - HYDROcodone/acetaminophen (NORCO)  MG Tab; Take 1 Tab by mouth every 6 hours as needed for up to 30 days.  Dispense: 120 Tab; Refill: 0    6. Obesity (BMI 30-39.9)      Followup: 3 months

## 2019-09-03 NOTE — TELEPHONE ENCOUNTER
Phone Number Called: [phone] (home)     Call outcome: spoke to patient regarding message below    Message: Pt notified no questions at this time.

## 2019-09-03 NOTE — TELEPHONE ENCOUNTER
----- Message from Angelica Hauser P.A.-C. sent at 9/3/2019  3:07 PM PDT -----  Please call patient, urine culture is normal/negative, no sign of infection, thanks! Angelica Hauser PA-C

## 2019-09-12 ENCOUNTER — HOSPITAL ENCOUNTER (OUTPATIENT)
Dept: LAB | Facility: MEDICAL CENTER | Age: 84
End: 2019-09-12
Attending: NURSE PRACTITIONER
Payer: MEDICARE

## 2019-09-12 ENCOUNTER — HOSPITAL ENCOUNTER (OUTPATIENT)
Dept: LAB | Facility: MEDICAL CENTER | Age: 84
End: 2019-09-12
Attending: INTERNAL MEDICINE
Payer: MEDICARE

## 2019-09-12 DIAGNOSIS — I25.10 CORONARY ARTERY DISEASE DUE TO CALCIFIED CORONARY LESION: ICD-10-CM

## 2019-09-12 DIAGNOSIS — Z79.01 CHRONIC ANTICOAGULATION: ICD-10-CM

## 2019-09-12 DIAGNOSIS — E78.5 DYSLIPIDEMIA: Chronic | ICD-10-CM

## 2019-09-12 DIAGNOSIS — I25.84 CORONARY ARTERY DISEASE DUE TO CALCIFIED CORONARY LESION: ICD-10-CM

## 2019-09-12 DIAGNOSIS — I48.0 PAF (PAROXYSMAL ATRIAL FIBRILLATION) (HCC): ICD-10-CM

## 2019-09-12 LAB
ALBUMIN SERPL BCP-MCNC: 3.7 G/DL (ref 3.2–4.9)
ALBUMIN/GLOB SERPL: 1.3 G/DL
ALP SERPL-CCNC: 69 U/L (ref 30–99)
ALT SERPL-CCNC: 14 U/L (ref 2–50)
ANION GAP SERPL CALC-SCNC: 7 MMOL/L (ref 0–11.9)
AST SERPL-CCNC: 16 U/L (ref 12–45)
BILIRUB SERPL-MCNC: 0.8 MG/DL (ref 0.1–1.5)
BUN SERPL-MCNC: 23 MG/DL (ref 8–22)
CALCIUM SERPL-MCNC: 8.8 MG/DL (ref 8.5–10.5)
CHLORIDE SERPL-SCNC: 109 MMOL/L (ref 96–112)
CHOLEST SERPL-MCNC: 97 MG/DL (ref 100–199)
CO2 SERPL-SCNC: 23 MMOL/L (ref 20–33)
CREAT SERPL-MCNC: 1.04 MG/DL (ref 0.5–1.4)
GLOBULIN SER CALC-MCNC: 2.9 G/DL (ref 1.9–3.5)
GLUCOSE SERPL-MCNC: 91 MG/DL (ref 65–99)
HDLC SERPL-MCNC: 35 MG/DL
INR PPP: 2.3 (ref 0.87–1.13)
LDLC SERPL CALC-MCNC: 37 MG/DL
POTASSIUM SERPL-SCNC: 3.8 MMOL/L (ref 3.6–5.5)
PROT SERPL-MCNC: 6.6 G/DL (ref 6–8.2)
PROTHROMBIN TIME: 26.1 SEC (ref 12–14.6)
SODIUM SERPL-SCNC: 139 MMOL/L (ref 135–145)
TRIGL SERPL-MCNC: 125 MG/DL (ref 0–149)

## 2019-09-12 PROCEDURE — 80053 COMPREHEN METABOLIC PANEL: CPT

## 2019-09-12 PROCEDURE — 36415 COLL VENOUS BLD VENIPUNCTURE: CPT

## 2019-09-12 PROCEDURE — 85610 PROTHROMBIN TIME: CPT

## 2019-09-12 PROCEDURE — 80061 LIPID PANEL: CPT

## 2019-09-13 ENCOUNTER — ANTICOAGULATION MONITORING (OUTPATIENT)
Dept: VASCULAR LAB | Facility: MEDICAL CENTER | Age: 84
End: 2019-09-13

## 2019-09-13 NOTE — PROGRESS NOTES
Anticoagulation Summary  As of 2019    INR goal:   2.0-3.0   TTR:   78.6 % (4.2 y)   INR used for dosin.30 (2019)   Warfarin maintenance plan:   2.5 mg (5 mg x 0.5) every Mon, Wed, Fri; 5 mg (5 mg x 1) all other days   Weekly warfarin total:   27.5 mg   No change documented:   Noreen Rhodes, Med Ass't   Plan last modified:   Verito Zafar, PharmD (2018)   Next INR check:   10/10/2019   Target end date:   Indefinite    Indications    A-fib (HCC) (Resolved) [I48.91]             Anticoagulation Episode Summary     INR check location:       Preferred lab:   [facility]    Send INR reminders to:       Comments:   [phone]        Anticoagulation Care Providers     Provider Role Specialty Phone number    Anastacio Sunshine M.D. Referring Cardiology [phone]    Mahin Valdez Responsible          Anticoagulation Patient Findings  Patient Findings     Negatives:   Signs/symptoms of thrombosis, Signs/symptoms of bleeding, Laboratory test error suspected, Change in health, Change in alcohol use, Change in activity, Upcoming invasive procedure, Emergency department visit, Upcoming dental procedure, Missed doses, Extra doses, Change in medications, Change in diet/appetite, Hospital admission, Bruising, Other complaints        Spoke with patient to report a therapeutic INR.  Pt instructed to continue with current warfarin dosing regimen. Pt denies any s/s of bleeding, bruising, clotting or any changes to diet or medication.  Will follow up in 4 weeks.    Noreen Rhodes, Med Ass't     I have reviewed and concur with the above plan on 2019.  Verito Zafar, Clinical Pharmacist, CDE, CACP

## 2019-10-03 ENCOUNTER — OFFICE VISIT (OUTPATIENT)
Dept: CARDIOLOGY | Facility: MEDICAL CENTER | Age: 84
End: 2019-10-03
Payer: MEDICARE

## 2019-10-03 ENCOUNTER — NON-PROVIDER VISIT (OUTPATIENT)
Dept: CARDIOLOGY | Facility: MEDICAL CENTER | Age: 84
End: 2019-10-03
Payer: MEDICARE

## 2019-10-03 VITALS
DIASTOLIC BLOOD PRESSURE: 60 MMHG | SYSTOLIC BLOOD PRESSURE: 94 MMHG | BODY MASS INDEX: 29.25 KG/M2 | WEIGHT: 193 LBS | HEIGHT: 68 IN | OXYGEN SATURATION: 97 % | HEART RATE: 60 BPM

## 2019-10-03 DIAGNOSIS — I48.0 PAF (PAROXYSMAL ATRIAL FIBRILLATION) (HCC): ICD-10-CM

## 2019-10-03 DIAGNOSIS — I25.84 CORONARY ARTERY DISEASE DUE TO CALCIFIED CORONARY LESION: ICD-10-CM

## 2019-10-03 DIAGNOSIS — Z95.0 CARDIAC PACEMAKER IN SITU: ICD-10-CM

## 2019-10-03 DIAGNOSIS — I73.9 PVD (PERIPHERAL VASCULAR DISEASE) (HCC): ICD-10-CM

## 2019-10-03 DIAGNOSIS — E78.5 DYSLIPIDEMIA: ICD-10-CM

## 2019-10-03 DIAGNOSIS — I25.10 CORONARY ARTERY DISEASE DUE TO CALCIFIED CORONARY LESION: ICD-10-CM

## 2019-10-03 PROCEDURE — 93280 PM DEVICE PROGR EVAL DUAL: CPT | Performed by: INTERNAL MEDICINE

## 2019-10-03 PROCEDURE — 99214 OFFICE O/P EST MOD 30 MIN: CPT | Mod: 25 | Performed by: INTERNAL MEDICINE

## 2019-10-03 NOTE — PROGRESS NOTES
Chief Complaint   Patient presents with   • Coronary Artery Disease       Subjective:   Gennaro Richardson is a 84 y.o. male who presents today for followup of his coronary disease, hyperlipidemia and paroxysmal atrial fibrillation.  He had a pacemaker generator change in September 2016.    He denies any chest discomfort, PND or orthopnea.  He does have some difficulty with edema.  He is noted no palpitations or lightheadedness.    Past Medical History:   Diagnosis Date   • A-fib ([facility]) 6/28/2011   • Arteriosclerosis    • CAD (coronary artery disease)    • CAD (coronary artery disease) 6/28/2011   • Cardiac pacemaker in situ 11/19/2013   • Colonic polyps    • Diverticulosis    • Dizzy spells 11/20/2012   • HERZOG (dyspnea on exertion) 9/7/2014 9/16/16- pt has no c/o   • Dyslipidemia 6/28/2011   • Hematoma of abdominal wall    • Hematoma of rectus sheath 6/13/2012    Delayed onset bleed, intraabdominal extension, left flank , ct abd active bleed , binder, vit k  6/14 Ex-lap     • Myocardial infarct ([facility]) 1989   • Pacer at end of battery life 6/28/2011   • PAF (paroxysmal atrial fibrillation) ([facility]) 9/7/2014   • Paroxysmal atrial fibrillation ([facility])    • Peripheral vascular disease ([facility]) 10/31/2011   • PVD (peripheral vascular disease) ([facility])      Past Surgical History:   Procedure Laterality Date   • EXPLORATORY LAPAROTOMY  6/14/2012    Performed by CALLY MERCER at SURGERY [facility] ORS   • COLONOSCOPY WITH POLYP  7/24/08    Performed by RUDOLPH BRENNER at SURGERY [facility] ORS   • ABDOMINAL AORTIC ANEURYSM     • PACEMAKER INSERTION       Family History   Problem Relation Age of Onset   • Cancer Mother      Social History     Socioeconomic History   • Marital status: Single     Spouse name: Not on file   • Number of children: Not on file   • Years of education: Not on file   • Highest education level: Not on file   Occupational History   • Not on file   Social Needs   • Financial resource strain: Not on  file   • Food insecurity:     Worry: Not on file     Inability: Not on file   • Transportation needs:     Medical: Not on file     Non-medical: Not on file   Tobacco Use   • Smoking status: Former Smoker     Years: 35.00     Last attempt to quit: 1988     Years since quittin.7   • Smokeless tobacco: Never Used   Substance and Sexual Activity   • Alcohol use: No     Alcohol/week: 0.0 oz   • Drug use: No   • Sexual activity: Never     Comment: , has children, retired.   Lifestyle   • Physical activity:     Days per week: Not on file     Minutes per session: Not on file   • Stress: Not on file   Relationships   • Social connections:     Talks on phone: Not on file     Gets together: Not on file     Attends Voodoo service: Not on file     Active member of club or organization: Not on file     Attends meetings of clubs or organizations: Not on file     Relationship status: Not on file   • Intimate partner violence:     Fear of current or ex partner: Not on file     Emotionally abused: Not on file     Physically abused: Not on file     Forced sexual activity: Not on file   Other Topics Concern   • Not on file   Social History Narrative   • Not on file     Allergies   Allergen Reactions   • No Known Drug Allergy      Outpatient Encounter Medications as of 10/3/2019   Medication Sig Dispense Refill   • [START ON 10/30/2019] HYDROcodone/acetaminophen (NORCO)  MG Tab Take 1 Tab by mouth every 6 hours as needed for up to 30 days. 120 Tab 0   • HYDROcodone/acetaminophen (NORCO)  MG Tab Take 1 Tab by mouth every 6 hours as needed for up to 30 days. 120 Tab 0   • tamsulosin (FLOMAX) 0.4 MG capsule      • clobetasol (TEMOVATE) 0.05 % Cream apply topically to affected area(s) twice a day 60 g 0   • atorvastatin (LIPITOR) 40 MG Tab Take 1 tab daily 100 Tab 3   • warfarin (COUMADIN) 5 MG Tab Take 1/2 to 1 tablet by mouth one time daily or as directed by clinic 90 Tab 1   • isosorbide mononitrate SR  "(IMDUR) 30 MG TABLET SR 24 HR Take 1 Tab by mouth every morning. 30 Tab 11   • finasteride (PROSCAR) 5 MG TABS TAKE ONE TABLET BY MOUTH ONE TIME DAILY 90 Tab 2   • ciprofloxacin (CIPRO) 500 MG Tab take one tablet by mouth at 8 am and one tablet at 6 pm daily for 10 days (Patient not taking: Reported on 8/9/2019) 20 Tab 0     No facility-administered encounter medications on file as of 10/3/2019.      ROS       Objective:   BP (!) 94/60 (BP Location: Left arm, Patient Position: Sitting, BP Cuff Size: Adult)   Pulse 60   Ht 1.715 m (5' 7.5\")   Wt 87.5 kg (193 lb)   SpO2 97%   BMI 29.78 kg/m²     Physical Exam   Constitutional: He appears well-developed and well-nourished.   Neck: No JVD present.   Cardiovascular: Normal rate and regular rhythm.   No murmur heard.  Pulmonary/Chest: Effort normal and breath sounds normal. He has no rales.   Abdominal: Soft. There is no tenderness.   Musculoskeletal: He exhibits no edema.     Lab Results   Component Value Date/Time    CHOLSTRLTOT 97 (L) 09/12/2019 12:14 PM    LDL 37 09/12/2019 12:14 PM    HDL 35 (A) 09/12/2019 12:14 PM    TRIGLYCERIDE 125 09/12/2019 12:14 PM       Lab Results   Component Value Date/Time    SODIUM 139 09/12/2019 12:14 PM    POTASSIUM 3.8 09/12/2019 12:14 PM    CHLORIDE 109 09/12/2019 12:14 PM    CO2 23 09/12/2019 12:14 PM    GLUCOSE 91 09/12/2019 12:14 PM    BUN 23 (H) 09/12/2019 12:14 PM    CREATININE 1.04 09/12/2019 12:14 PM    CREATININE 1.3 12/01/2008 10:45 AM     Lab Results   Component Value Date/Time    ALKPHOSPHAT 69 09/12/2019 12:14 PM    ASTSGOT 16 09/12/2019 12:14 PM    ALTSGPT 14 09/12/2019 12:14 PM    TBILIRUBIN 0.8 09/12/2019 12:14 PM      Lab Results   Component Value Date/Time    BNPBTYPENAT 93 09/06/2014 01:46 PM          Assessment:     1. Coronary artery disease due to calcified coronary lesion:Occluded left circumflex     2. Dyslipidemia     3. PVD (peripheral vascular disease) (HCC)     4. Cardiac pacemaker in situ     5. PAF " (paroxysmal atrial fibrillation) ([facility])         Medical Decision Making:  Today's Assessment / Status / Plan:     Mr. Richardson is clinically stable.  He is asymptomatic from a cardiovascular standpoint and his lipid status is under excellent control.  His blood pressure has been somewhat low and we will try discontinuing isosorbide mononitrate. He continues to be in atrial fibrillation about 5% of the time on his pacemaker check today.  His pacemaker is functioning normally and he has about 3.5 years of battery life remaining.  He will follow-up in about 6 months.

## 2019-10-03 NOTE — LETTER
[facility]   [address]  [place] [address]  Phone: [phone]  Fax: [phone]              Gennaro Richardson  1935    Encounter Date: 10/3/2019    Josef Retana M.D.          PROGRESS NOTE:  Chief Complaint   Patient presents with   • Coronary Artery Disease       Subjective:   Gennaro Richardson is a 84 y.o. male who presents today for followup of his coronary disease, hyperlipidemia and paroxysmal atrial fibrillation.  He had a pacemaker generator change in September 2016.    He denies any chest discomfort, PND or orthopnea.  He does have some difficulty with edema.  He is noted no palpitations or lightheadedness.    Past Medical History:   Diagnosis Date   • A-fib (HCC) 6/28/2011   • Arteriosclerosis    • CAD (coronary artery disease)    • CAD (coronary artery disease) 6/28/2011   • Cardiac pacemaker in situ 11/19/2013   • Colonic polyps    • Diverticulosis    • Dizzy spells 11/20/2012   • HERZOG (dyspnea on exertion) 9/7/2014 9/16/16- pt has no c/o   • Dyslipidemia 6/28/2011   • Hematoma of abdominal wall    • Hematoma of rectus sheath 6/13/2012    Delayed onset bleed, intraabdominal extension, left flank , ct abd active bleed , binder, vit k  6/14 Ex-lap     • Myocardial infarct ([facility]) 1989   • Pacer at end of battery life 6/28/2011   • PAF (paroxysmal atrial fibrillation) ([facility]) 9/7/2014   • Paroxysmal atrial fibrillation ([facility])    • Peripheral vascular disease ([facility]) 10/31/2011   • PVD (peripheral vascular disease) ([facility])      Past Surgical History:   Procedure Laterality Date   • EXPLORATORY LAPAROTOMY  6/14/2012    Performed by CALLY MERCER at SURGERY [facility] ORS   • COLONOSCOPY WITH POLYP  7/24/08    Performed by RUDOLPH BRENNER at SURGERY [facility] ORS   • ABDOMINAL AORTIC ANEURYSM     • PACEMAKER INSERTION       Family History   Problem Relation Age of Onset   • Cancer Mother      Social History     Socioeconomic  History   • Marital status: Single     Spouse name: Not on file   • Number of children: Not on file   • Years of education: Not on file   • Highest education level: Not on file   Occupational History   • Not on file   Social Needs   • Financial resource strain: Not on file   • Food insecurity:     Worry: Not on file     Inability: Not on file   • Transportation needs:     Medical: Not on file     Non-medical: Not on file   Tobacco Use   • Smoking status: Former Smoker     Years: 35.00     Last attempt to quit: 1988     Years since quittin.7   • Smokeless tobacco: Never Used   Substance and Sexual Activity   • Alcohol use: No     Alcohol/week: 0.0 oz   • Drug use: No   • Sexual activity: Never     Comment: , has children, retired.   Lifestyle   • Physical activity:     Days per week: Not on file     Minutes per session: Not on file   • Stress: Not on file   Relationships   • Social connections:     Talks on phone: Not on file     Gets together: Not on file     Attends Spiritism service: Not on file     Active member of club or organization: Not on file     Attends meetings of clubs or organizations: Not on file     Relationship status: Not on file   • Intimate partner violence:     Fear of current or ex partner: Not on file     Emotionally abused: Not on file     Physically abused: Not on file     Forced sexual activity: Not on file   Other Topics Concern   • Not on file   Social History Narrative   • Not on file     Allergies   Allergen Reactions   • No Known Drug Allergy      Outpatient Encounter Medications as of 10/3/2019   Medication Sig Dispense Refill   • [START ON 10/30/2019] HYDROcodone/acetaminophen (NORCO)  MG Tab Take 1 Tab by mouth every 6 hours as needed for up to 30 days. 120 Tab 0   • HYDROcodone/acetaminophen (NORCO)  MG Tab Take 1 Tab by mouth every 6 hours as needed for up to 30 days. 120 Tab 0   • tamsulosin (FLOMAX) 0.4 MG capsule      • clobetasol (TEMOVATE) 0.05 %  "Cream apply topically to affected area(s) twice a day 60 g 0   • atorvastatin (LIPITOR) 40 MG Tab Take 1 tab daily 100 Tab 3   • warfarin (COUMADIN) 5 MG Tab Take 1/2 to 1 tablet by mouth one time daily or as directed by clinic 90 Tab 1   • isosorbide mononitrate SR (IMDUR) 30 MG TABLET SR 24 HR Take 1 Tab by mouth every morning. 30 Tab 11   • finasteride (PROSCAR) 5 MG TABS TAKE ONE TABLET BY MOUTH ONE TIME DAILY 90 Tab 2   • ciprofloxacin (CIPRO) 500 MG Tab take one tablet by mouth at 8 am and one tablet at 6 pm daily for 10 days (Patient not taking: Reported on 8/9/2019) 20 Tab 0     No facility-administered encounter medications on file as of 10/3/2019.      ROS       Objective:   BP (!) 94/60 (BP Location: Left arm, Patient Position: Sitting, BP Cuff Size: Adult)   Pulse 60   Ht 1.715 m (5' 7.5\")   Wt 87.5 kg (193 lb)   SpO2 97%   BMI 29.78 kg/m²      Physical Exam   Constitutional: He appears well-developed and well-nourished.   Neck: No JVD present.   Cardiovascular: Normal rate and regular rhythm.   No murmur heard.  Pulmonary/Chest: Effort normal and breath sounds normal. He has no rales.   Abdominal: Soft. There is no tenderness.   Musculoskeletal: He exhibits no edema.     Lab Results   Component Value Date/Time    CHOLSTRLTOT 97 (L) 09/12/2019 12:14 PM    LDL 37 09/12/2019 12:14 PM    HDL 35 (A) 09/12/2019 12:14 PM    TRIGLYCERIDE 125 09/12/2019 12:14 PM       Lab Results   Component Value Date/Time    SODIUM 139 09/12/2019 12:14 PM    POTASSIUM 3.8 09/12/2019 12:14 PM    CHLORIDE 109 09/12/2019 12:14 PM    CO2 23 09/12/2019 12:14 PM    GLUCOSE 91 09/12/2019 12:14 PM    BUN 23 (H) 09/12/2019 12:14 PM    CREATININE 1.04 09/12/2019 12:14 PM    CREATININE 1.3 12/01/2008 10:45 AM     Lab Results   Component Value Date/Time    ALKPHOSPHAT 69 09/12/2019 12:14 PM    ASTSGOT 16 09/12/2019 12:14 PM    ALTSGPT 14 09/12/2019 12:14 PM    TBILIRUBIN 0.8 09/12/2019 12:14 PM      Lab Results   Component Value " Date/Time    BNPBTYPENAT 93 09/06/2014 01:46 PM          Assessment:     1. Coronary artery disease due to calcified coronary lesion:Occluded left circumflex     2. Dyslipidemia     3. PVD (peripheral vascular disease) ([facility])     4. Cardiac pacemaker in situ     5. PAF (paroxysmal atrial fibrillation) ([facility])         Medical Decision Making:  Today's Assessment / Status / Plan:     Mr. Richardson is clinically stable.  He is asymptomatic from a cardiovascular standpoint and his lipid status is under excellent control.  His blood pressure has been somewhat low and we will try discontinuing isosorbide mononitrate. He continues to be in atrial fibrillation about 5% of the time on his pacemaker check today.  His pacemaker is functioning normally and he has about 3.5 years of battery life remaining.  He will follow-up in about 6 months.        ANN [email or web address].  [address]  [address]  [facility] [address]  VIA In Basket     Martita Retana

## 2019-10-10 ENCOUNTER — HOSPITAL ENCOUNTER (OUTPATIENT)
Dept: LAB | Facility: MEDICAL CENTER | Age: 84
End: 2019-10-10
Attending: NURSE PRACTITIONER
Payer: MEDICARE

## 2019-10-10 DIAGNOSIS — Z79.01 CHRONIC ANTICOAGULATION: ICD-10-CM

## 2019-10-10 LAB
INR PPP: 3.37 (ref 0.87–1.13)
INR PPP: 3.37 (ref 2–3.5)
PROTHROMBIN TIME: 35.5 SEC (ref 12–14.6)

## 2019-10-10 PROCEDURE — 36415 COLL VENOUS BLD VENIPUNCTURE: CPT

## 2019-10-10 PROCEDURE — 85610 PROTHROMBIN TIME: CPT

## 2019-10-11 ENCOUNTER — ANTICOAGULATION MONITORING (OUTPATIENT)
Dept: VASCULAR LAB | Facility: MEDICAL CENTER | Age: 84
End: 2019-10-11

## 2019-10-11 NOTE — PROGRESS NOTES
Anticoagulation Summary  As of 10/11/2019    INR goal:   2.0-3.0   TTR:   78.4 % (4.3 y)   INR used for dosing:   3.37! (10/10/2019)   Warfarin maintenance plan:   2.5 mg (5 mg x 0.5) every Mon, Wed, Fri; 5 mg (5 mg x 1) all other days   Weekly warfarin total:   27.5 mg   Plan last modified:   Verito Zafar, PharmD (6/7/2018)   Next INR check:   11/7/2019   Target end date:   Indefinite    Indications    A-fib (HCC) (Resolved) [I48.91]             Anticoagulation Episode Summary     INR check location:       Preferred lab:   [facility]    Send INR reminders to:       Comments:   [phone]        Anticoagulation Care Providers     Provider Role Specialty Phone number    Anastacio Sunshine M.D. Referring Cardiology [phone]    Mahin Valdez Responsible          Anticoagulation Patient Findings  Patient Findings     Negatives:   Signs/symptoms of thrombosis, Signs/symptoms of bleeding, Laboratory test error suspected, Change in health, Change in alcohol use, Change in activity, Upcoming invasive procedure, Emergency department visit, Upcoming dental procedure, Missed doses, Extra doses, Change in medications, Change in diet/appetite, Hospital admission, Bruising, Other complaints        Spoke with the patient on the phone today, reporting a SUPRA-therapeutic INR of 3.37.  Confirmed the current warfarin dosing regimen and patient compliance. Patient denies any EtOH, cranberries or illness. Patient denies any interval changes to diet and/or medications. Patient denies any signs/symptoms of bleeding or clotting.    Patient instructed to HOLD dose TONIGHT ONLY, then to resume his current dosing regimen. Patient asked to follow up again in 2 weeks, but prefers to retest again in 4 weeks.     Roxane FuchsD

## 2019-11-06 ENCOUNTER — HOSPITAL ENCOUNTER (OUTPATIENT)
Dept: LAB | Facility: MEDICAL CENTER | Age: 84
End: 2019-11-06
Attending: NURSE PRACTITIONER
Payer: MEDICARE

## 2019-11-06 ENCOUNTER — ANTICOAGULATION MONITORING (OUTPATIENT)
Dept: VASCULAR LAB | Facility: MEDICAL CENTER | Age: 84
End: 2019-11-06

## 2019-11-06 DIAGNOSIS — Z79.01 CHRONIC ANTICOAGULATION: ICD-10-CM

## 2019-11-06 LAB
INR PPP: 3.28 (ref 0.87–1.13)
PROTHROMBIN TIME: 34.6 SEC (ref 12–14.6)

## 2019-11-06 PROCEDURE — 36415 COLL VENOUS BLD VENIPUNCTURE: CPT

## 2019-11-06 PROCEDURE — 85610 PROTHROMBIN TIME: CPT

## 2019-11-07 NOTE — PROGRESS NOTES
Anticoagulation Summary  As of 11/6/2019    INR goal:   2.0-3.0   TTR:   77.1 % (4.4 y)   INR used for dosing:   3.28! (11/6/2019)   Warfarin maintenance plan:   5 mg (5 mg x 1) every Sun, Tue, Thu; 2.5 mg (5 mg x 0.5) all other days   Weekly warfarin total:   25 mg   Plan last modified:   Kelsy Bloom, PharmD (11/6/2019)   Next INR check:   12/4/2019   Target end date:   Indefinite    Indications    A-fib (HCC) (Resolved) [I48.91]             Anticoagulation Episode Summary     INR check location:       Preferred lab:   [facility]    Send INR reminders to:       Comments:   [phone]        Anticoagulation Care Providers     Provider Role Specialty Phone number    Anastacio Sunshine M.D. Referring Cardiology [phone]    Mahin Valdez Responsible          Anticoagulation Patient Findings      Spoke with patient.  INR is SUPRA therapeutic.   Pt denies any unusual s/s of bleeding, bruising, clotting or any changes to diet or medications. Denies any etoh, cranberries, supplements, or illness.   Pt verifies warfarin weekly dosing.     Will have pt start a 9% reduced weekly warfarin dosing.    Repeat INR in 4 week(s) per pt.     Kelsy Bloom, PharmD

## 2019-11-19 ENCOUNTER — TELEPHONE (OUTPATIENT)
Dept: MEDICAL GROUP | Facility: MEDICAL CENTER | Age: 84
End: 2019-11-19

## 2019-11-19 NOTE — TELEPHONE ENCOUNTER
ESTABLISHED PATIENT PRE-VISIT PLANNING     Patient was NOT contacted to complete PVP.     Note: Patient will not be contacted if there is no indication to call.     1.  Reviewed notes from the last few office visits within the medical group: Yes    2.  If any orders were placed at last visit or intended to be done for this visit (i.e. 6 mos follow-up), do we have Results/Consult Notes?        •  Labs - Labs were not ordered at last office visit.   Note: If patient appointment is for lab review and patient did not complete labs, check with provider if OK to reschedule patient until labs completed.       •  Imaging - Imaging was not ordered at last office visit.       •  Referrals - No referrals were ordered at last office visit.    3. Is this appointment scheduled as a Hospital Follow-Up? No    4.  Immunizations were updated in Epic using WebIZ?: Epic matches WebIZ       •  Web Iz Recommendations: FLU, PNEUMOVAX (PPSV23), VARICELLA (Chicken Pox)  and SHINGRIX (Shingles)    5.  Patient is due for the following Health Maintenance Topics:   Health Maintenance Due   Topic Date Due   • IMM HEP B VACCINE (1 of 3 - Risk 3-dose series) 08/31/1954   • IMM ZOSTER VACCINES (1 of 2) 08/31/1985   • IMM PNEUMOCOCCAL VACCINE: 65+ Years (2 of 2 - PPSV23) 09/06/2018   • Annual Wellness Visit  09/07/2018   • IMM INFLUENZA (1) 09/01/2019       6. Orders for overdue Health Maintenance topics pended in Pre-Charting? NO    7.  AHA (MDX) form printed for Provider? No, already completed    8.  Patient was NOT informed to arrive 15 min prior to their scheduled appointment and bring in their medication bottles.

## 2019-11-27 ENCOUNTER — APPOINTMENT (OUTPATIENT)
Dept: MEDICAL GROUP | Facility: MEDICAL CENTER | Age: 84
End: 2019-11-27
Payer: MEDICARE

## 2019-12-02 ENCOUNTER — OFFICE VISIT (OUTPATIENT)
Dept: MEDICAL GROUP | Facility: MEDICAL CENTER | Age: 84
End: 2019-12-02
Payer: MEDICARE

## 2019-12-02 VITALS
HEIGHT: 68 IN | BODY MASS INDEX: 30.65 KG/M2 | TEMPERATURE: 98.7 F | DIASTOLIC BLOOD PRESSURE: 60 MMHG | OXYGEN SATURATION: 94 % | RESPIRATION RATE: 16 BRPM | SYSTOLIC BLOOD PRESSURE: 124 MMHG | HEART RATE: 49 BPM | WEIGHT: 202.2 LBS

## 2019-12-02 DIAGNOSIS — M47.26 OSTEOARTHRITIS OF SPINE WITH RADICULOPATHY, LUMBAR REGION: ICD-10-CM

## 2019-12-02 DIAGNOSIS — Z23 NEED FOR VACCINATION: ICD-10-CM

## 2019-12-02 DIAGNOSIS — F11.20 OPIOID TYPE DEPENDENCE, CONTINUOUS (HCC): ICD-10-CM

## 2019-12-02 DIAGNOSIS — I48.0 PAF (PAROXYSMAL ATRIAL FIBRILLATION) (HCC): ICD-10-CM

## 2019-12-02 DIAGNOSIS — N40.1 BENIGN PROSTATIC HYPERPLASIA WITH LOWER URINARY TRACT SYMPTOMS, SYMPTOM DETAILS UNSPECIFIED: Chronic | ICD-10-CM

## 2019-12-02 DIAGNOSIS — M17.11 PRIMARY OSTEOARTHRITIS OF RIGHT KNEE: ICD-10-CM

## 2019-12-02 PROBLEM — N41.0 ACUTE PROSTATITIS: Status: RESOLVED | Noted: 2019-05-24 | Resolved: 2019-12-02

## 2019-12-02 PROCEDURE — 90732 PPSV23 VACC 2 YRS+ SUBQ/IM: CPT | Performed by: PHYSICIAN ASSISTANT

## 2019-12-02 PROCEDURE — 99214 OFFICE O/P EST MOD 30 MIN: CPT | Mod: 25 | Performed by: PHYSICIAN ASSISTANT

## 2019-12-02 PROCEDURE — 90662 IIV NO PRSV INCREASED AG IM: CPT | Performed by: PHYSICIAN ASSISTANT

## 2019-12-02 PROCEDURE — G0009 ADMIN PNEUMOCOCCAL VACCINE: HCPCS | Performed by: PHYSICIAN ASSISTANT

## 2019-12-02 PROCEDURE — G0008 ADMIN INFLUENZA VIRUS VAC: HCPCS | Performed by: PHYSICIAN ASSISTANT

## 2019-12-02 RX ORDER — HYDROCODONE BITARTRATE AND ACETAMINOPHEN 10; 325 MG/1; MG/1
1 TABLET ORAL EVERY 6 HOURS PRN
Qty: 120 TAB | Refills: 0 | Status: SHIPPED | OUTPATIENT
Start: 2020-01-02 | End: 2020-03-02 | Stop reason: SDUPTHER

## 2019-12-02 RX ORDER — HYDROCODONE BITARTRATE AND ACETAMINOPHEN 10; 325 MG/1; MG/1
1 TABLET ORAL EVERY 6 HOURS PRN
Qty: 120 TAB | Refills: 0 | Status: SHIPPED | OUTPATIENT
Start: 2020-02-02 | End: 2020-03-02 | Stop reason: SDUPTHER

## 2019-12-02 RX ORDER — HYDROCODONE BITARTRATE AND ACETAMINOPHEN 10; 325 MG/1; MG/1
1 TABLET ORAL EVERY 6 HOURS PRN
Qty: 120 TAB | Refills: 0 | Status: SHIPPED | OUTPATIENT
Start: 2019-12-02 | End: 2020-03-02 | Stop reason: SDUPTHER

## 2019-12-02 NOTE — PROGRESS NOTES
Subjective:   Gennaro Richardson is a 84 y.o. male here today for follow up on chronic conditions    Benign prostatic hypertrophy with lower urinary tract symptoms (LUTS)  Chronic, stable, no current symptoms, seeing Maureen Wong at [facility] yearly.    PAF (paroxysmal atrial fibrillation)  Chronic, stable on current, followed by cardiology    Opioid type dependence, continuous  The patient describes knee pain.  Preliminary diagnosis: arthritis  Treatment plan: pain control  Diagnostic evaluations completed: xray and MRI    I have chosen to use a controlled substance for this patient instead of an alternative treatment because he can't take NSAIDs.    Current pain control: good  Adverse effects from medication: none.  States where the person has previously resided or had CS filled in: none.  Physical functioning: fair.   Overall functioning: good.  Mood and Mental Health: good.  Family and social relationships: good.   Sleep pattern: good.  Nonnarcotic treatments that are being used: none. We discussed non-opiod treatment.  options.   Goals of treatment: pain control.    Pain management agreement initiated and signed on: 2/2019.   Consent for opiate prescription signed on : 2/2019  Last dose of narcotic medication: yesterday.      Controlled Substance Assessment:     - Is the medication, if previously prescribed, working as intended and expected to treat   the patients symptoms: yes.     - Does the patient have a history of substance abuse: no.     - has the patient demonstrated aberrant behavior or intoxication: no.     - Is there reason to believe that the patient is not using medication as prescribed or diverting the medication: no.     - Is there reason to believe that the patient is currently misusing this medication or addicted to the controlled substance: no.     - Is it necessary to verify that controlled substances, other that those authorized under the treatment plan, are not present in the body  of this patient: no.    - Are there any blood or urine test that indicate inappropriate use of the medication or other controlled substances : no.     - I have reviewed the . Does the  report irregular/inconsistent medication use or indicate that the medication is being used inappropriately or that the patient is using another controlled substance not prescribed or known to me: no.     - Is there reason to believe that the patient is using other drugs, including alcohol, prescription or illicit drugs, that may interact negatively with controlled substance or have not been prescribed by a practitioner who is treating the patient: no.     - Are there any known early refill attempts or claims that medication has been lost or stolen: no.     - Are there are any major changes in the patient's mental or physical health that would affect the medical appropriateness of this medication: no.     - Has the patient increased the dose of medication without provider authorization: no.    - Has the patient been reluctant to cooperate with any exam, analysis or test recommended by me: no.     - Has the patient demonstrated reluctance to stop or reduce use of the medicine: no.     - Has the patient requested or demanded a controlled substance that is likely to be abused or cause dependency or addiction: no.     - Is there any other evidence that the patient is chronically using opioids, misusing, abusing, illegally using or addicted to any drug or failing to comply with the instructions of the practitioner concerning the use of the controlled substance: no    - Are there any other factors that the practitioner determines is necessary to make an informed professional judgment concerning the medical appropriateness of the prescription: no.       I have reviewed the medical record and medical history of this patient, examined the patient and reviewed the Prescription Monitoring Program and I have determined that norco is  medically indicated. Review of the medial records demonstrates compliance. I discussed risks and benefits of the pain medication. I discussed proper use, storage and disposal of the pain medication. I have advised patient to keep medication in a safe place and to not drive with medication.         Current medicines (including changes today)  Current Outpatient Medications   Medication Sig Dispense Refill   • [START ON 2/2/2020] HYDROcodone/acetaminophen (NORCO)  MG Tab Take 1 Tab by mouth every 6 hours as needed for up to 30 days. 120 Tab 0   • [START ON 1/2/2020] HYDROcodone/acetaminophen (NORCO)  MG Tab Take 1 Tab by mouth every 6 hours as needed for up to 30 days. 120 Tab 0   • HYDROcodone/acetaminophen (NORCO)  MG Tab Take 1 Tab by mouth every 6 hours as needed for up to 30 days. 120 Tab 0   • warfarin (COUMADIN) 5 MG Tab Take 1/2 to 1 tablet by mouth one time daily or as directed by clinic 90 Tab 1   • tamsulosin (FLOMAX) 0.4 MG capsule      • clobetasol (TEMOVATE) 0.05 % Cream apply topically to affected area(s) twice a day 60 g 0   • atorvastatin (LIPITOR) 40 MG Tab Take 1 tab daily 100 Tab 3   • finasteride (PROSCAR) 5 MG TABS TAKE ONE TABLET BY MOUTH ONE TIME DAILY 90 Tab 2     No current facility-administered medications for this visit.      He  has a past medical history of A-fib ([facility]) (6/28/2011), Arteriosclerosis, CAD (coronary artery disease), CAD (coronary artery disease) (6/28/2011), Cardiac pacemaker in situ (11/19/2013), Colonic polyps, Diverticulosis, Dizzy spells (11/20/2012), HERZOG (dyspnea on exertion) (9/7/2014), Dyslipidemia (6/28/2011), Hematoma of abdominal wall, Hematoma of rectus sheath (6/13/2012), Myocardial infarct ([facility]) (1989), Pacer at end of battery life (6/28/2011), PAF (paroxysmal atrial fibrillation) ([facility]) (9/7/2014), Paroxysmal atrial fibrillation ([facility]), Peripheral vascular disease ([facility]) (10/31/2011), and PVD (peripheral vascular disease) ([facility]).    ROS   No  "fever/chills. No weight change. No headache/dizziness. No focal weakness. No sore throat, nasal congestion, ear pain. No chest pain, no shortness of breath, difficulty breathing. No n/v/d/c or abdominal pain. No urinary complaint. No rash or skin lesion.      Objective:     /60 (BP Location: Left arm, Patient Position: Sitting, BP Cuff Size: Adult long)   Pulse (!) 49   Temp 37.1 °C (98.7 °F) (Temporal)   Resp 16   Ht 1.715 m (5' 7.5\")   Wt 91.7 kg (202 lb 3.2 oz)   SpO2 94%  Body mass index is 31.2 kg/m².   Physical Exam:  Constitutional: WDWN, NAD  Skin: Warm, dry, good turgor, no rashes in visible areas.  Respiratory: Unlabored respiratory effort, lungs clear to auscultation, no wheezes, no ronchi.  Cardiovascular: Normal S1, S2, no murmur  Psych: Alert and oriented x3, normal affect and mood.      Assessment and Plan:   The following treatment plan was discussed    1. Opioid type dependence, continuous (HCC)  [facility] Aware web site evaluation: I have obtained and reviewed patient utilization report from [facility] pharmacy database prior to writing prescription for controlled substance II, III or IV. Based on the report and my clinical assessment the prescription is medically necessary.   Patient is cautioned on sedation potential of narcotic medication; no drinking, driving or operating heavy machinery while on this medication.  - HYDROcodone/acetaminophen (NORCO)  MG Tab; Take 1 Tab by mouth every 6 hours as needed for up to 30 days.  Dispense: 120 Tab; Refill: 0  - HYDROcodone/acetaminophen (NORCO)  MG Tab; Take 1 Tab by mouth every 6 hours as needed for up to 30 days.  Dispense: 120 Tab; Refill: 0  - HYDROcodone/acetaminophen (NORCO)  MG Tab; Take 1 Tab by mouth every 6 hours as needed for up to 30 days.  Dispense: 120 Tab; Refill: 0    2. Need for vaccination    - Influenza Vaccine, High Dose (65+ Only)  - Pneumovax Vaccine (PPSV23)    3. Benign prostatic hyperplasia with " lower urinary tract symptoms, symptom details unspecified      4. PAF (paroxysmal atrial fibrillation) ([facility])      5. Primary osteoarthritis of right knee    - HYDROcodone/acetaminophen (NORCO)  MG Tab; Take 1 Tab by mouth every 6 hours as needed for up to 30 days.  Dispense: 120 Tab; Refill: 0  - HYDROcodone/acetaminophen (NORCO)  MG Tab; Take 1 Tab by mouth every 6 hours as needed for up to 30 days.  Dispense: 120 Tab; Refill: 0  - HYDROcodone/acetaminophen (NORCO)  MG Tab; Take 1 Tab by mouth every 6 hours as needed for up to 30 days.  Dispense: 120 Tab; Refill: 0    6. Osteoarthritis of spine with radiculopathy, lumbar region  - HYDROcodone/acetaminophen (NORCO)  MG Tab; Take 1 Tab by mouth every 6 hours as needed for up to 30 days.  Dispense: 120 Tab; Refill: 0  - HYDROcodone/acetaminophen (NORCO)  MG Tab; Take 1 Tab by mouth every 6 hours as needed for up to 30 days.  Dispense: 120 Tab; Refill: 0  - HYDROcodone/acetaminophen (NORCO)  MG Tab; Take 1 Tab by mouth every 6 hours as needed for up to 30 days.  Dispense: 120 Tab; Refill: 0      Followup: Return in about 3 months (around 3/2/2020).

## 2019-12-02 NOTE — ASSESSMENT & PLAN NOTE
The patient describes knee pain.  Preliminary diagnosis: arthritis  Treatment plan: pain control  Diagnostic evaluations completed: xray and MRI    I have chosen to use a controlled substance for this patient instead of an alternative treatment because he can't take NSAIDs.    Current pain control: good  Adverse effects from medication: none.  States where the person has previously resided or had CS filled in: none.  Physical functioning: fair.   Overall functioning: good.  Mood and Mental Health: good.  Family and social relationships: good.   Sleep pattern: good.  Nonnarcotic treatments that are being used: none. We discussed non-opiod treatment.  options.   Goals of treatment: pain control.    Pain management agreement initiated and signed on: 2/2019.   Consent for opiate prescription signed on : 2/2019  Last dose of narcotic medication: yesterday.      Controlled Substance Assessment:     - Is the medication, if previously prescribed, working as intended and expected to treat   the patients symptoms: yes.     - Does the patient have a history of substance abuse: no.     - has the patient demonstrated aberrant behavior or intoxication: no.     - Is there reason to believe that the patient is not using medication as prescribed or diverting the medication: no.     - Is there reason to believe that the patient is currently misusing this medication or addicted to the controlled substance: no.     - Is it necessary to verify that controlled substances, other that those authorized under the treatment plan, are not present in the body of this patient: no.    - Are there any blood or urine test that indicate inappropriate use of the medication or other controlled substances : no.     - I have reviewed the . Does the  report irregular/inconsistent medication use or indicate that the medication is being used inappropriately or that the patient is using another controlled substance not prescribed or known to me: no.      - Is there reason to believe that the patient is using other drugs, including alcohol, prescription or illicit drugs, that may interact negatively with controlled substance or have not been prescribed by a practitioner who is treating the patient: no.     - Are there any known early refill attempts or claims that medication has been lost or stolen: no.     - Are there are any major changes in the patient's mental or physical health that would affect the medical appropriateness of this medication: no.     - Has the patient increased the dose of medication without provider authorization: no.    - Has the patient been reluctant to cooperate with any exam, analysis or test recommended by me: no.     - Has the patient demonstrated reluctance to stop or reduce use of the medicine: no.     - Has the patient requested or demanded a controlled substance that is likely to be abused or cause dependency or addiction: no.     - Is there any other evidence that the patient is chronically using opioids, misusing, abusing, illegally using or addicted to any drug or failing to comply with the instructions of the practitioner concerning the use of the controlled substance: no    - Are there any other factors that the practitioner determines is necessary to make an informed professional judgment concerning the medical appropriateness of the prescription: no.       I have reviewed the medical record and medical history of this patient, examined the patient and reviewed the Prescription Monitoring Program and I have determined that norco is medically indicated. Review of the medial records demonstrates compliance. I discussed risks and benefits of the pain medication. I discussed proper use, storage and disposal of the pain medication. I have advised patient to keep medication in a safe place and to not drive with medication.

## 2019-12-02 NOTE — ASSESSMENT & PLAN NOTE
Chronic, stable, no current symptoms, [facility].

## 2019-12-04 ENCOUNTER — HOSPITAL ENCOUNTER (OUTPATIENT)
Dept: LAB | Facility: MEDICAL CENTER | Age: 84
End: 2019-12-04
Attending: NURSE PRACTITIONER
Payer: MEDICARE

## 2019-12-04 DIAGNOSIS — Z79.01 CHRONIC ANTICOAGULATION: ICD-10-CM

## 2019-12-04 LAB
INR PPP: 4.46 (ref 0.87–1.13)
PROTHROMBIN TIME: 44.3 SEC (ref 12–14.6)

## 2019-12-04 PROCEDURE — 36415 COLL VENOUS BLD VENIPUNCTURE: CPT

## 2019-12-04 PROCEDURE — 85610 PROTHROMBIN TIME: CPT

## 2019-12-05 ENCOUNTER — ANTICOAGULATION MONITORING (OUTPATIENT)
Dept: VASCULAR LAB | Facility: MEDICAL CENTER | Age: 84
End: 2019-12-05

## 2019-12-05 NOTE — PROGRESS NOTES
Anticoagulation Summary  As of 2019    INR goal:   2.0-3.0   TTR:   75.7 % (4.5 y)   INR used for dosin.46! (2019)   Warfarin maintenance plan:   5 mg (5 mg x 1) every Tue, Thu; 2.5 mg (5 mg x 0.5) all other days   Weekly warfarin total:   22.5 mg   Plan last modified:   Mahin Valdez, PharmD (2019)   Next INR check:      Target end date:   Indefinite    Indications    A-fib (HCC) (Resolved) [I48.91]             Anticoagulation Episode Summary     INR check location:       Preferred lab:   [facility]    Send INR reminders to:       Comments:   [phone]        Anticoagulation Care Providers     Provider Role Specialty Phone number    Anastacio Sunshine M.D. Referring Cardiology [phone]    Mahin Valdez Responsible          Anticoagulation Patient Findings    Spoke to patient on the phone.   INR  supra-therapeutic.   Denies signs/symptoms of bleeding and/or thrombosis.   Denies changes to diet or medications.   Follow up appointment in 1 week(s).    Hold x 2 days then resume.       Mahin Valdez, PharmD

## 2019-12-11 ENCOUNTER — HOSPITAL ENCOUNTER (OUTPATIENT)
Dept: LAB | Facility: MEDICAL CENTER | Age: 84
End: 2019-12-11
Attending: NURSE PRACTITIONER
Payer: MEDICARE

## 2019-12-11 DIAGNOSIS — Z79.01 CHRONIC ANTICOAGULATION: ICD-10-CM

## 2019-12-11 LAB
INR PPP: 2.08 (ref 0.87–1.13)
PROTHROMBIN TIME: 24 SEC (ref 12–14.6)

## 2019-12-11 PROCEDURE — 85610 PROTHROMBIN TIME: CPT

## 2019-12-11 PROCEDURE — 36415 COLL VENOUS BLD VENIPUNCTURE: CPT

## 2019-12-12 ENCOUNTER — ANTICOAGULATION MONITORING (OUTPATIENT)
Dept: MEDICAL GROUP | Facility: PHYSICIAN GROUP | Age: 84
End: 2019-12-12

## 2019-12-12 ENCOUNTER — ANTICOAGULATION MONITORING (OUTPATIENT)
Dept: VASCULAR LAB | Facility: MEDICAL CENTER | Age: 84
End: 2019-12-12

## 2019-12-12 NOTE — PROGRESS NOTES
OP Anticoagulation Telephone Note    Date: 2019       Plan:  Spoke with pt on the phone. Pt is therapeutic today after holding x2 days. Denies any changes in medications or diet. Denies any s/sx of bleeding or clotting. Will decrease warfarin dosing as outlined below and follow-up with pt in 2wks.      Anticoagulation Summary  As of 2019    INR goal:   2.0-3.0   TTR:   75.6 % (4.5 y)   INR used for dosin.08 (2019)   Warfarin maintenance plan:   5 mg (5 mg x 1) every e, Thu; 2.5 mg (5 mg x 0.5) all other days   Weekly warfarin total:   22.5 mg   Plan last modified:   MEGAN Lagunas (2019)   Next INR check:   2019   Target end date:   Indefinite    Indications    A-fib (HCC) (Resolved) [I48.91]             Anticoagulation Episode Summary     INR check location:       Preferred lab:   [facility]    Send INR reminders to:       Comments:   [phone]        Anticoagulation Care Providers     Provider Role Specialty Phone number    Anastacio Sunshine M.D. Referring Cardiology [phone]    Mahin Valdez Responsible              MUSTAPHA Huddleston  [facility]

## 2019-12-24 ENCOUNTER — HOSPITAL ENCOUNTER (OUTPATIENT)
Dept: LAB | Facility: MEDICAL CENTER | Age: 84
End: 2019-12-24
Attending: NURSE PRACTITIONER
Payer: MEDICARE

## 2019-12-24 DIAGNOSIS — Z79.01 CHRONIC ANTICOAGULATION: ICD-10-CM

## 2019-12-24 LAB
INR PPP: 2.2 (ref 0.87–1.13)
PROTHROMBIN TIME: 25.1 SEC (ref 12–14.6)

## 2019-12-24 PROCEDURE — 85610 PROTHROMBIN TIME: CPT

## 2019-12-24 PROCEDURE — 36415 COLL VENOUS BLD VENIPUNCTURE: CPT

## 2019-12-26 ENCOUNTER — ANTICOAGULATION MONITORING (OUTPATIENT)
Dept: MEDICAL GROUP | Facility: PHYSICIAN GROUP | Age: 84
End: 2019-12-26

## 2019-12-26 NOTE — PROGRESS NOTES
OP Telephone Anticoagulation Service Note    Date: 2019      Anticoagulation Summary  As of 2019    INR goal:   2.0-3.0   TTR:   75.8 % (4.5 y)   INR used for dosin.20 (2019)   Warfarin maintenance plan:   5 mg (5 mg x 1) every Tue, Thu; 2.5 mg (5 mg x 0.5) all other days   Weekly warfarin total:   22.5 mg   Plan last modified:   MEGAN Lagunas (2019)   Next INR check:   2020   Target end date:   Indefinite    Indications    A-fib (HCC) (Resolved) [I48.91]             Anticoagulation Episode Summary     INR check location:       Preferred lab:   [facility]    Send INR reminders to:       Comments:   [phone]        Anticoagulation Care Providers     Provider Role Specialty Phone number    Anastacio Sunshine M.D. Referring Cardiology [phone]    Mahin Valdez Responsible          Anticoagulation Patient Findings        Plan: Spoke with patient on the phone. Patient is  therapeutic today. Confirmed dosing. No missed tablets in the last week. Patient denies any changes in medications or diet. Patient denies any signs or symptoms of bleeding or clotting. Instructed patient to call clinic if any unusual bleeding or bruising occurs. Will continue dosing as outlined. Will follow-up with patient in 3 week(s).        Christen Rosales, PharmD

## 2020-01-17 ENCOUNTER — HOSPITAL ENCOUNTER (OUTPATIENT)
Dept: LAB | Facility: MEDICAL CENTER | Age: 85
End: 2020-01-17
Attending: NURSE PRACTITIONER
Payer: MEDICARE

## 2020-01-17 ENCOUNTER — ANTICOAGULATION MONITORING (OUTPATIENT)
Dept: VASCULAR LAB | Facility: MEDICAL CENTER | Age: 85
End: 2020-01-17

## 2020-01-17 DIAGNOSIS — Z79.01 CHRONIC ANTICOAGULATION: ICD-10-CM

## 2020-01-17 LAB
INR PPP: 2.14 (ref 0.87–1.13)
PROTHROMBIN TIME: 24.6 SEC (ref 12–14.6)

## 2020-01-17 PROCEDURE — 36415 COLL VENOUS BLD VENIPUNCTURE: CPT

## 2020-01-17 PROCEDURE — 85610 PROTHROMBIN TIME: CPT

## 2020-01-18 NOTE — PROGRESS NOTES
Anticoagulation Summary  As of 2020    INR goal:   2.0-3.0   TTR:   76.1 % (4.6 y)   INR used for dosin.14 (2020)   Warfarin maintenance plan:   5 mg (5 mg x 1) every Tue, Thu; 2.5 mg (5 mg x 0.5) all other days   Weekly warfarin total:   22.5 mg   Plan last modified:   Verito Zafar (2020)   Next INR check:   2020   Target end date:   Indefinite    Indications    A-fib (HCC) (Resolved) [I48.91]             Anticoagulation Episode Summary     INR check location:       Preferred lab:   [facility]    Send INR reminders to:       Comments:   [phone]        Anticoagulation Care Providers     Provider Role Specialty Phone number    Anastacio Sunshine M.D. Referring Cardiology [phone]    Mahin Valdez Responsible          Anticoagulation Patient Findings          Spoke with Gennaro to report a therapeutic INR of 2.14. Continue current dosing regimen.  Follow up in 4 weeks, to reduce the risk of adverse events related to this high risk medication, warfarin.    Verito Zafar, Clinical Pharmacist  ]

## 2020-02-14 ENCOUNTER — HOSPITAL ENCOUNTER (OUTPATIENT)
Dept: LAB | Facility: MEDICAL CENTER | Age: 85
End: 2020-02-14
Attending: NURSE PRACTITIONER
Payer: MEDICARE

## 2020-02-14 DIAGNOSIS — Z79.01 CHRONIC ANTICOAGULATION: ICD-10-CM

## 2020-02-14 LAB
INR PPP: 2.96 (ref 0.87–1.13)
PROTHROMBIN TIME: 31.9 SEC (ref 12–14.6)

## 2020-02-14 PROCEDURE — 85610 PROTHROMBIN TIME: CPT

## 2020-02-14 PROCEDURE — 36415 COLL VENOUS BLD VENIPUNCTURE: CPT

## 2020-02-17 ENCOUNTER — ANTICOAGULATION MONITORING (OUTPATIENT)
Dept: VASCULAR LAB | Facility: MEDICAL CENTER | Age: 85
End: 2020-02-17

## 2020-02-17 NOTE — PROGRESS NOTES
Anticoagulation Summary  As of 2020    INR goal:   2.0-3.0   TTR:   76.5 % (4.7 y)   INR used for dosin.96 (2020)   Warfarin maintenance plan:   5 mg (5 mg x 1) every e, Thu; 2.5 mg (5 mg x 0.5) all other days   Weekly warfarin total:   22.5 mg   Plan last modified:   Verito M Filter (2020)   Next INR check:   3/16/2020   Target end date:   Indefinite    Indications    A-fib (HCC) (Resolved) [I48.91]             Anticoagulation Episode Summary     INR check location:   Clinic Lab    Preferred lab:   [facility]    Send INR reminders to:       Comments:   [phone]        Anticoagulation Care Providers     Provider Role Specialty Phone number    Anastacio Sunshine M.D. Referring Cardiology [phone]    Mahin Valdez Responsible          Anticoagulation Patient Findings    HPI:  Gennaro Richardson, on anticoagulation therapy with warfarin for AFib.   Changes to current medical/health status since last appt: none  Denies signs/symptoms of bleeding and/or thrombosis since the last appt.    Denies any interval changes to diet  Denies any interval changes to medications since last appt.   Denies any complications or cost restrictions with current therapy.     A/P   INR  therapeutic.   Pt is to continue with current warfarin dosing regimen.     Next INR in 4 week(s).    Jus Young, PharmD

## 2020-02-18 ENCOUNTER — ANTICOAGULATION MONITORING (OUTPATIENT)
Dept: VASCULAR LAB | Facility: MEDICAL CENTER | Age: 85
End: 2020-02-18

## 2020-03-02 ENCOUNTER — OFFICE VISIT (OUTPATIENT)
Dept: MEDICAL GROUP | Facility: MEDICAL CENTER | Age: 85
End: 2020-03-02
Payer: MEDICARE

## 2020-03-02 VITALS
BODY MASS INDEX: 31.27 KG/M2 | WEIGHT: 199.2 LBS | HEIGHT: 67 IN | OXYGEN SATURATION: 91 % | HEART RATE: 63 BPM | TEMPERATURE: 98.9 F | SYSTOLIC BLOOD PRESSURE: 110 MMHG | DIASTOLIC BLOOD PRESSURE: 52 MMHG

## 2020-03-02 DIAGNOSIS — I48.0 PAF (PAROXYSMAL ATRIAL FIBRILLATION) (HCC): ICD-10-CM

## 2020-03-02 DIAGNOSIS — I73.9 PVD (PERIPHERAL VASCULAR DISEASE) (HCC): Chronic | ICD-10-CM

## 2020-03-02 DIAGNOSIS — M17.11 PRIMARY OSTEOARTHRITIS OF RIGHT KNEE: ICD-10-CM

## 2020-03-02 DIAGNOSIS — M47.26 OSTEOARTHRITIS OF SPINE WITH RADICULOPATHY, LUMBAR REGION: ICD-10-CM

## 2020-03-02 DIAGNOSIS — N40.1 BENIGN PROSTATIC HYPERPLASIA WITH LOWER URINARY TRACT SYMPTOMS, SYMPTOM DETAILS UNSPECIFIED: Chronic | ICD-10-CM

## 2020-03-02 DIAGNOSIS — F11.20 OPIOID TYPE DEPENDENCE, CONTINUOUS (HCC): ICD-10-CM

## 2020-03-02 PROCEDURE — 99214 OFFICE O/P EST MOD 30 MIN: CPT | Performed by: PHYSICIAN ASSISTANT

## 2020-03-02 RX ORDER — HYDROCODONE BITARTRATE AND ACETAMINOPHEN 10; 325 MG/1; MG/1
1 TABLET ORAL EVERY 6 HOURS PRN
Qty: 120 TAB | Refills: 0 | Status: SHIPPED | OUTPATIENT
Start: 2020-05-02 | End: 2020-05-27 | Stop reason: SDUPTHER

## 2020-03-02 RX ORDER — HYDROCODONE BITARTRATE AND ACETAMINOPHEN 10; 325 MG/1; MG/1
1 TABLET ORAL EVERY 6 HOURS PRN
Qty: 120 TAB | Refills: 0 | Status: SHIPPED | OUTPATIENT
Start: 2020-04-02 | End: 2020-05-27 | Stop reason: SDUPTHER

## 2020-03-02 RX ORDER — HYDROCODONE BITARTRATE AND ACETAMINOPHEN 10; 325 MG/1; MG/1
1 TABLET ORAL EVERY 6 HOURS PRN
Qty: 120 TAB | Refills: 0 | Status: SHIPPED | OUTPATIENT
Start: 2020-03-02 | End: 2020-05-27 | Stop reason: SDUPTHER

## 2020-03-02 ASSESSMENT — PATIENT HEALTH QUESTIONNAIRE - PHQ9: CLINICAL INTERPRETATION OF PHQ2 SCORE: 0

## 2020-03-02 NOTE — PROGRESS NOTES
Subjective:   Gennaro Richardson is a 84 y.o. male here today for f/u on chronic conditions    PVD (peripheral vascular disease)  Chronic, stable on current, no new extremity pain, taking statin, blood thinner, BP well controlled    Benign prostatic hypertrophy with lower urinary tract symptoms (LUTS)  Chronic, stable on current, sees urology    PAF (paroxysmal atrial fibrillation)  Chronic, stable on current, monitored at coumadin clinic, no SOB or leg edema    Opioid type dependence, continuous  Chronic, stable on current, no new concerns     The patient describes right knee and right hip pain.  Preliminary diagnosis: osteoarthritis  Treatment plan: pain control  Diagnostic evaluations completed: xray, MRI    I have chosen to use a controlled substance for this patient instead of an alternative treatment because can't take NSAIDs.    Current pain control: fair  Adverse effects from medication: none  States where the person has previously resided or had CS filled in: n/a.  Physical functioning: fair.   Overall functioning: fair.  Mood and Mental Health: good.  Family and social relationships: good.   Sleep pattern: good.  Nonnarcotic treatments that are being used: none. We discussed non-opiod treatment.  options.   Goals of treatment: pain control    Pain management agreement initiated and signed on: today  Consent for opiate prescription signed on : today  Last dose of narcotic medication: this morning.       Controlled Substance Assessment:     - Is the medication, if previously prescribed, working as intended and expected to treat   the patients symptoms: yes.     - Does the patient have a history of substance abuse: no.     - has the patient demonstrated aberrant behavior or intoxication: no.     - Is there reason to believe that the patient is not using medication as prescribed or diverting the medication: no.     - Is there reason to believe that the patient is currently misusing this medication or  addicted to the controlled substance: no.     - Is it necessary to verify that controlled substances, other that those authorized under the treatment plan, are not present in the body of this patient: no.    - Are there any blood or urine test that indicate inappropriate use of the medication or other controlled substances : no.     - I have reviewed the . Does the  report irregular/inconsistent medication use or indicate that the medication is being used inappropriately or that the patient is using another controlled substance not prescribed or known to me: no.     - Is there reason to believe that the patient is using other drugs, including alcohol, prescription or illicit drugs, that may interact negatively with controlled substance or have not been prescribed by a practitioner who is treating the patient: no.     - Are there any known early refill attempts or claims that medication has been lost or stolen: no.     - Are there are any major changes in the patient's mental or physical health that would affect the medical appropriateness of this medication: no.     - Has the patient increased the dose of medication without provider authorization: no.    - Has the patient been reluctant to cooperate with any exam, analysis or test recommended by me: no.     - Has the patient demonstrated reluctance to stop or reduce use of the medicine: no.     - Has the patient requested or demanded a controlled substance that is likely to be abused or cause dependency or addiction: no.     - Is there any other evidence that the patient is chronically using opioids, misusing, abusing, illegally using or addicted to any drug or failing to comply with the instructions of the practitioner concerning the use of the controlled substance: no    - Are there any other factors that the practitioner determines is necessary to make an informed professional judgment concerning the medical appropriateness of the prescription: no.       I  have reviewed the medical record and medical history of this patient, examined the patient and reviewed the Prescription Monitoring Program and I have determined that norco is medically indicated. Review of the medial records demonstrates compliance. I discussed risks and benefits of the pain medication. I discussed proper use, storage and disposal of the pain medication. I have advised patient to keep medication in a safe place and to not drive with medication.      Current medicines (including changes today)  Current Outpatient Medications   Medication Sig Dispense Refill   • [START ON 5/2/2020] HYDROcodone/acetaminophen (NORCO)  MG Tab Take 1 Tab by mouth every 6 hours as needed for up to 30 days. 120 Tab 0   • [START ON 4/2/2020] HYDROcodone/acetaminophen (NORCO)  MG Tab Take 1 Tab by mouth every 6 hours as needed for up to 30 days. 120 Tab 0   • HYDROcodone/acetaminophen (NORCO)  MG Tab Take 1 Tab by mouth every 6 hours as needed for up to 30 days. 120 Tab 0   • warfarin (COUMADIN) 5 MG Tab Take 1/2 to 1 tablet by mouth one time daily or as directed by clinic 90 Tab 1   • tamsulosin (FLOMAX) 0.4 MG capsule      • clobetasol (TEMOVATE) 0.05 % Cream apply topically to affected area(s) twice a day 60 g 0   • atorvastatin (LIPITOR) 40 MG Tab Take 1 tab daily 100 Tab 3   • finasteride (PROSCAR) 5 MG TABS TAKE ONE TABLET BY MOUTH ONE TIME DAILY 90 Tab 2     No current facility-administered medications for this visit.      He  has a past medical history of A-fib (HCC) (6/28/2011), Arteriosclerosis, CAD (coronary artery disease), CAD (coronary artery disease) (6/28/2011), Cardiac pacemaker in situ (11/19/2013), Colonic polyps, Diverticulosis, Dizzy spells (11/20/2012), HERZOG (dyspnea on exertion) (9/7/2014), Dyslipidemia (6/28/2011), Hematoma of abdominal wall, Hematoma of rectus sheath (6/13/2012), Myocardial infarct (HCC) (1989), Pacer at end of battery life (6/28/2011), PAF (paroxysmal atrial  "fibrillation) (HCC) (9/7/2014), Paroxysmal atrial fibrillation (HCC), Peripheral vascular disease (HCC) (10/31/2011), and PVD (peripheral vascular disease) ([facility]).    ROS   No fever/chills. No weight change. No headache/dizziness. No focal weakness. No sore throat, nasal congestion, ear pain. No chest pain, no shortness of breath, difficulty breathing. No n/v/d/c or abdominal pain. No urinary complaint. No rash or skin lesion.      Objective:     /52 (BP Location: Right arm, Patient Position: Sitting, BP Cuff Size: Adult)   Pulse 63   Temp 37.2 °C (98.9 °F) (Temporal)   Ht 1.702 m (5' 7\")   Wt 90.4 kg (199 lb 3.2 oz)   SpO2 91%  Body mass index is 31.2 kg/m².   Physical Exam:  Constitutional: WDWN, NAD  Skin: Warm, dry, good turgor, no rashes in visible areas.  Neck: Trachea midline, no masses, no thyromegaly. No cervical or supraclavicular lymphadenopathy  Respiratory: Unlabored respiratory effort, lungs clear to auscultation, no wheezes, no ronchi.  Cardiovascular: Normal S1, S2, no murmur, no leg edema.  Psych: Alert and oriented x3, normal affect and mood.    Assessment and Plan:   The following treatment plan was discussed    1. Primary osteoarthritis of right knee  [facility] Aware web site evaluation: I have obtained and reviewed patient utilization report from [facility] pharmacy database prior to writing prescription for controlled substance II, III or IV. Based on the report and my clinical assessment the prescription is medically necessary.   Patient is cautioned on sedation potential of narcotic medication; no drinking, driving or operating heavy machinery while on this medication.  - HYDROcodone/acetaminophen (NORCO)  MG Tab; Take 1 Tab by mouth every 6 hours as needed for up to 30 days.  Dispense: 120 Tab; Refill: 0  - HYDROcodone/acetaminophen (NORCO)  MG Tab; Take 1 Tab by mouth every 6 hours as needed for up to 30 days.  Dispense: 120 Tab; Refill: 0  - " HYDROcodone/acetaminophen (NORCO)  MG Tab; Take 1 Tab by mouth every 6 hours as needed for up to 30 days.  Dispense: 120 Tab; Refill: 0  - Controlled Substance Treatment Agreement    2. Osteoarthritis of spine with radiculopathy, lumbar region    - HYDROcodone/acetaminophen (NORCO)  MG Tab; Take 1 Tab by mouth every 6 hours as needed for up to 30 days.  Dispense: 120 Tab; Refill: 0  - HYDROcodone/acetaminophen (NORCO)  MG Tab; Take 1 Tab by mouth every 6 hours as needed for up to 30 days.  Dispense: 120 Tab; Refill: 0  - HYDROcodone/acetaminophen (NORCO)  MG Tab; Take 1 Tab by mouth every 6 hours as needed for up to 30 days.  Dispense: 120 Tab; Refill: 0  - Controlled Substance Treatment Agreement    3. Opioid type dependence, continuous (HCC)    - HYDROcodone/acetaminophen (NORCO)  MG Tab; Take 1 Tab by mouth every 6 hours as needed for up to 30 days.  Dispense: 120 Tab; Refill: 0  - HYDROcodone/acetaminophen (NORCO)  MG Tab; Take 1 Tab by mouth every 6 hours as needed for up to 30 days.  Dispense: 120 Tab; Refill: 0  - HYDROcodone/acetaminophen (NORCO)  MG Tab; Take 1 Tab by mouth every 6 hours as needed for up to 30 days.  Dispense: 120 Tab; Refill: 0  - Controlled Substance Treatment Agreement    4. PVD (peripheral vascular disease) ([facility])      5. Benign prostatic hyperplasia with lower urinary tract symptoms, symptom details unspecified      6. PAF (paroxysmal atrial fibrillation) ([facility])        Followup: 3 months

## 2020-03-02 NOTE — PROGRESS NOTES
Annual Health Assessment Questions:    1.  Are you currently engaging in any exercise or physical activity? No    2.  How would you describe your mood or emotional well-being today? good    3.  Have you had any falls in the last year? No    4.  Have you noticed any problems with your balance or had difficulty walking? Yes    5.  In the last six months have you experienced any leakage of urine? No    6. DPA/Advanced Directive: Patient has Advanced Directive, but it is not on file. Instructed to bring in a copy to scan into their chart.

## 2020-03-02 NOTE — ASSESSMENT & PLAN NOTE
Chronic, stable on current, no new extremity pain, taking statin, blood thinner, BP well controlled

## 2020-03-16 ENCOUNTER — ANTICOAGULATION MONITORING (OUTPATIENT)
Dept: VASCULAR LAB | Facility: MEDICAL CENTER | Age: 85
End: 2020-03-16

## 2020-03-16 ENCOUNTER — HOSPITAL ENCOUNTER (OUTPATIENT)
Dept: LAB | Facility: MEDICAL CENTER | Age: 85
End: 2020-03-16
Attending: NURSE PRACTITIONER
Payer: MEDICARE

## 2020-03-16 DIAGNOSIS — Z79.01 CHRONIC ANTICOAGULATION: ICD-10-CM

## 2020-03-16 LAB
INR PPP: 2.86 (ref 0.87–1.13)
PROTHROMBIN TIME: 31.1 SEC (ref 12–14.6)

## 2020-03-16 PROCEDURE — 36415 COLL VENOUS BLD VENIPUNCTURE: CPT

## 2020-03-16 PROCEDURE — 85610 PROTHROMBIN TIME: CPT

## 2020-03-17 NOTE — PROGRESS NOTES
Anticoagulation Summary  As of 3/16/2020    INR goal:   2.0-3.0   TTR:   76.9 % (4.7 y)   INR used for dosin.86 (3/16/2020)   Warfarin maintenance plan:   5 mg (5 mg x 1) every Tue, Thu; 2.5 mg (5 mg x 0.5) all other days   Weekly warfarin total:   22.5 mg   Plan last modified:   Verito M Filter (2020)   Next INR check:   2020   Target end date:   Indefinite    Indications    A-fib (HCC) (Resolved) [I48.91]             Anticoagulation Episode Summary     INR check location:   Clinic Lab    Preferred lab:   [facility]    Send INR reminders to:       Comments:   [phone]        Anticoagulation Care Providers     Provider Role Specialty Phone number    Anastacio Sunshine M.D. Referring Cardiology [phone]    Mahin Valdez Responsible          Anticoagulation Patient Findings      Spoke with patient to report a therapeutic INR.    Pt instructed to continue with current warfarin dosing regimen, confirms dosing.   Pt denies any s/s of bleeding, bruising, clotting or any changes to diet or medication.    Will follow up in 6 week(s).     Kelsy Bloom, PharmD

## 2020-03-23 RX ORDER — CLOBETASOL PROPIONATE 0.5 MG/G
CREAM TOPICAL
Qty: 60 G | Refills: 3 | Status: ON HOLD | OUTPATIENT
Start: 2020-03-23 | End: 2021-01-26

## 2020-04-22 ENCOUNTER — TELEPHONE (OUTPATIENT)
Dept: CARDIOLOGY | Facility: MEDICAL CENTER | Age: 85
End: 2020-04-22

## 2020-04-22 NOTE — TELEPHONE ENCOUNTER
Message Received: Yesterday Message Contents   Lizzette Yap M.D.; Dariela Peterson R.N.     Below patients are scheduled to see Dr. MABRY on 05/01 and would like to keep their in-office appointments. Patients have not been cancelled. Thank you!!     ·        REINIER DAUGHERTY MRN [id number]      Called pt and reviewed findings.  Pt states he has no concerns or new symptoms at this time and is ok with rescheduling [facility].  Informed pt that face to face visit may start after June.  Reassurance given if he has concerns or questions before his appt to return this call.  He states no other concerns or questions at this time and is appreciative of information given.    Task deferred to schedulers to re-schedule appt.

## 2020-04-24 ENCOUNTER — TELEPHONE (OUTPATIENT)
Dept: HEALTH INFORMATION MANAGEMENT | Facility: OTHER | Age: 85
End: 2020-04-24

## 2020-04-24 DIAGNOSIS — R39.89 BLADDER PAIN: ICD-10-CM

## 2020-04-24 NOTE — TELEPHONE ENCOUNTER
1. Caller Name: Gennaro Richardson                          Call Back Number: [phone] (home)       How would the patient prefer to be contacted with a response: Phone call do NOT leave a detailed message    2. What are the patient's symptoms (location & severity)? UTI    3. Is this a new symptom No    4. When did it start? Recurrent    5. Action taken per Active Symptom Guide:   Reached out to PCP office for assistance of getting pt seen. He is refusing UC and does not have access to [facility].   PAR stated she would send message to PCP and have MA reach out to patient.

## 2020-04-24 NOTE — TELEPHONE ENCOUNTER
Please have him come here or go to lab for urine sample that we can send for urinalysis and culture. Thanks! Angelica Hauser PA-C

## 2020-04-27 ENCOUNTER — HOSPITAL ENCOUNTER (OUTPATIENT)
Dept: LAB | Facility: MEDICAL CENTER | Age: 85
End: 2020-04-27
Attending: PHYSICIAN ASSISTANT
Payer: MEDICARE

## 2020-04-27 ENCOUNTER — ANTICOAGULATION MONITORING (OUTPATIENT)
Dept: VASCULAR LAB | Facility: MEDICAL CENTER | Age: 85
End: 2020-04-27

## 2020-04-27 ENCOUNTER — HOSPITAL ENCOUNTER (OUTPATIENT)
Dept: LAB | Facility: MEDICAL CENTER | Age: 85
End: 2020-04-27
Attending: NURSE PRACTITIONER
Payer: MEDICARE

## 2020-04-27 DIAGNOSIS — Z79.01 CHRONIC ANTICOAGULATION: ICD-10-CM

## 2020-04-27 DIAGNOSIS — R39.89 BLADDER PAIN: ICD-10-CM

## 2020-04-27 LAB
APPEARANCE UR: CLEAR
BACTERIA #/AREA URNS HPF: NEGATIVE /HPF
BILIRUB UR QL STRIP.AUTO: NEGATIVE
COLOR UR: YELLOW
EPI CELLS #/AREA URNS HPF: ABNORMAL /HPF
GLUCOSE UR STRIP.AUTO-MCNC: NEGATIVE MG/DL
HYALINE CASTS #/AREA URNS LPF: ABNORMAL /LPF
INR PPP: 1.7 (ref 0.87–1.13)
KETONES UR STRIP.AUTO-MCNC: NEGATIVE MG/DL
LEUKOCYTE ESTERASE UR QL STRIP.AUTO: ABNORMAL
MICRO URNS: ABNORMAL
NITRITE UR QL STRIP.AUTO: NEGATIVE
PH UR STRIP.AUTO: 5.5 [PH] (ref 5–8)
PROT UR QL STRIP: NEGATIVE MG/DL
PROTHROMBIN TIME: 20.5 SEC (ref 12–14.6)
RBC # URNS HPF: ABNORMAL /HPF
RBC UR QL AUTO: ABNORMAL
SP GR UR STRIP.AUTO: 1.02
UROBILINOGEN UR STRIP.AUTO-MCNC: 0.2 MG/DL
WBC #/AREA URNS HPF: ABNORMAL /HPF

## 2020-04-27 PROCEDURE — 36415 COLL VENOUS BLD VENIPUNCTURE: CPT

## 2020-04-27 PROCEDURE — 87086 URINE CULTURE/COLONY COUNT: CPT

## 2020-04-27 PROCEDURE — 81001 URINALYSIS AUTO W/SCOPE: CPT

## 2020-04-27 PROCEDURE — 85610 PROTHROMBIN TIME: CPT

## 2020-04-27 NOTE — PROGRESS NOTES
Anticoagulation Summary  As of 2020    INR goal:   2.0-3.0   TTR:   76.9 % (4.9 y)   INR used for dosin.70! (2020)   Warfarin maintenance plan:   5 mg (5 mg x 1) every Tue, Thu; 2.5 mg (5 mg x 0.5) all other days   Weekly warfarin total:   22.5 mg   Plan last modified:   Verito M Filter (2020)   Next INR check:   2020   Target end date:   Indefinite    Indications    A-fib (HCC) (Resolved) [I48.91]             Anticoagulation Episode Summary     INR check location:   Clinic Lab    Preferred lab:   [facility]    Send INR reminders to:       Comments:   [phone]        Anticoagulation Care Providers     Provider Role Specialty Phone number    Anastacio Sunshine M.D. Referring Cardiology [phone]    Mahin Valdez Responsible          Anticoagulation Patient Findings    Spoke to patient on the phone.   INR  sub-therapeutic.   Denies signs/symptoms of bleeding and/or thrombosis.   Denies changes to diet or medications.   Follow up appointment in 2 week(s).    5mg tonight then continue weekly warfarin dose as noted      Mahin Valdez, PharmD, MS, BCACP, LCC    This note was created using voice recognition software (Dragon). The accuracy of the dictation is limited by the abilities of the software. I have reviewed the note prior to signing, however some errors in grammar and context are still possible. If you have any questions related to this note please do not hesitate to contact our office.

## 2020-04-29 ENCOUNTER — TELEPHONE (OUTPATIENT)
Dept: MEDICAL GROUP | Facility: MEDICAL CENTER | Age: 85
End: 2020-04-29

## 2020-04-29 LAB
BACTERIA UR CULT: NORMAL
SIGNIFICANT IND 70042: NORMAL
SITE SITE: NORMAL
SOURCE SOURCE: NORMAL

## 2020-04-29 NOTE — TELEPHONE ENCOUNTER
----- Message from Angelica Hauser P.A.-C. sent at 4/29/2020  9:11 AM PDT -----  Please call patient, his urine culture is normal/negative for infection, how is he feeling? Angelica

## 2020-05-12 ENCOUNTER — HOSPITAL ENCOUNTER (OUTPATIENT)
Dept: LAB | Facility: MEDICAL CENTER | Age: 85
End: 2020-05-12
Attending: NURSE PRACTITIONER
Payer: MEDICARE

## 2020-05-12 DIAGNOSIS — Z79.01 CHRONIC ANTICOAGULATION: ICD-10-CM

## 2020-05-18 ENCOUNTER — HOSPITAL ENCOUNTER (OUTPATIENT)
Dept: LAB | Facility: MEDICAL CENTER | Age: 85
End: 2020-05-18
Attending: NURSE PRACTITIONER
Payer: MEDICARE

## 2020-05-18 ENCOUNTER — ANTICOAGULATION MONITORING (OUTPATIENT)
Dept: VASCULAR LAB | Facility: MEDICAL CENTER | Age: 85
End: 2020-05-18

## 2020-05-18 DIAGNOSIS — Z79.01 CHRONIC ANTICOAGULATION: ICD-10-CM

## 2020-05-18 LAB
INR PPP: 1.32 (ref 0.87–1.13)
PROTHROMBIN TIME: 16.7 SEC (ref 12–14.6)

## 2020-05-18 PROCEDURE — 36415 COLL VENOUS BLD VENIPUNCTURE: CPT

## 2020-05-18 PROCEDURE — 85610 PROTHROMBIN TIME: CPT

## 2020-05-18 NOTE — PROGRESS NOTES
Anticoagulation Summary  As of 2020    INR goal:   2.0-3.0   TTR:   76.0 % (4.9 y)   INR used for dosin.32! (2020)   Warfarin maintenance plan:   5 mg (5 mg x 1) every Mon, Wed, Fri; 2.5 mg (5 mg x 0.5) all other days   Weekly warfarin total:   25 mg   Plan last modified:   Mahin Valdez PharmD (2020)   Next INR check:   2020   Target end date:   Indefinite    Indications    A-fib (HCC) (Resolved) [I48.91]             Anticoagulation Episode Summary     INR check location:   Clinic Lab    Preferred lab:   [facility]    Send INR reminders to:       Comments:   [phone]        Anticoagulation Care Providers     Provider Role Specialty Phone number    Anastacio Sunshine M.D. Referring Cardiology [phone]    Mahin Valdez Responsible          Anticoagulation Patient Findings        Spoke to patient on the phone.   INR  sub-therapeutic.   Denies signs/symptoms of bleeding and/or thrombosis.   Denies changes to diet or medications.   Follow up appointment in 1 week(s).    Increase weekly warfarin dose as noted        Mahin Valdez, PharmD, MS, BCACP, LCC    This note was created using voice recognition software (Dragon). The accuracy of the dictation is limited by the abilities of the software. I have reviewed the note prior to signing, however some errors in grammar and context are still possible. If you have any questions related to this note please do not hesitate to contact our office.

## 2020-05-26 ENCOUNTER — TELEPHONE (OUTPATIENT)
Dept: MEDICAL GROUP | Facility: PHYSICIAN GROUP | Age: 85
End: 2020-05-26

## 2020-05-26 NOTE — TELEPHONE ENCOUNTER
ESTABLISHED PATIENT PRE-VISIT PLANNING     Patient was NOT contacted to complete PVP.     Note: Patient will not be contacted if there is no indication to call.     1.  Reviewed notes from the last few office visits within the medical group: Yes    2.  If any orders were placed at last visit or intended to be done for this visit (i.e. 6 mos follow-up), do we have Results/Consult Notes?        •  Labs - Labs ordered, completed on 04/27/2020 and results are in chart.   Note: If patient appointment is for lab review and patient did not complete labs, check with provider if OK to reschedule patient until labs completed.       •  Imaging - Imaging was not ordered at last office visit.       •  Referrals - No referrals were ordered at last office visit.    3. Is this appointment scheduled as a Hospital Follow-Up? No    4.  Immunizations were updated in Epic using WebIZ?: Epic matches WebIZ       •  Web Iz Recommendations: SHINGRIX (Shingles)    5.  Patient is due for the following Health Maintenance Topics:   Health Maintenance Due   Topic Date Due   • IMM ZOSTER VACCINES (1 of 2) 08/31/1985   • Annual Wellness Visit  09/07/2018   • URINE DRUG SCREEN  08/22/2019       - Patient plans to schedule appointment for Annual Wellness Visit (AWV).    6. Orders for overdue Health Maintenance topics pended in Pre-Charting? N\A    7.  AHA (MDX) form printed for Provider? YES    8.  Patient was NOT informed to arrive 15 min prior to their scheduled appointment and bring in their medication bottles.

## 2020-05-27 ENCOUNTER — HOSPITAL ENCOUNTER (OUTPATIENT)
Facility: MEDICAL CENTER | Age: 85
End: 2020-05-27
Attending: PHYSICIAN ASSISTANT
Payer: MEDICARE

## 2020-05-27 ENCOUNTER — OFFICE VISIT (OUTPATIENT)
Dept: MEDICAL GROUP | Facility: MEDICAL CENTER | Age: 85
End: 2020-05-27
Payer: MEDICARE

## 2020-05-27 VITALS
SYSTOLIC BLOOD PRESSURE: 132 MMHG | DIASTOLIC BLOOD PRESSURE: 62 MMHG | OXYGEN SATURATION: 93 % | WEIGHT: 198.19 LBS | BODY MASS INDEX: 30.04 KG/M2 | HEIGHT: 68 IN | HEART RATE: 64 BPM | TEMPERATURE: 99.1 F

## 2020-05-27 DIAGNOSIS — I25.84 CORONARY ATHEROSCLEROSIS DUE TO CALCIFIED CORONARY LESION: ICD-10-CM

## 2020-05-27 DIAGNOSIS — F11.20 OPIOID TYPE DEPENDENCE, CONTINUOUS (HCC): ICD-10-CM

## 2020-05-27 DIAGNOSIS — Z79.891 CHRONIC USE OF OPIATE DRUG FOR THERAPEUTIC PURPOSE: ICD-10-CM

## 2020-05-27 DIAGNOSIS — I73.9 PVD (PERIPHERAL VASCULAR DISEASE) (HCC): Chronic | ICD-10-CM

## 2020-05-27 DIAGNOSIS — I25.10 CORONARY ATHEROSCLEROSIS DUE TO CALCIFIED CORONARY LESION: ICD-10-CM

## 2020-05-27 DIAGNOSIS — I48.0 PAF (PAROXYSMAL ATRIAL FIBRILLATION) (HCC): ICD-10-CM

## 2020-05-27 DIAGNOSIS — M17.11 PRIMARY OSTEOARTHRITIS OF RIGHT KNEE: ICD-10-CM

## 2020-05-27 DIAGNOSIS — M47.26 OSTEOARTHRITIS OF SPINE WITH RADICULOPATHY, LUMBAR REGION: ICD-10-CM

## 2020-05-27 PROCEDURE — 80307 DRUG TEST PRSMV CHEM ANLYZR: CPT

## 2020-05-27 PROCEDURE — 99214 OFFICE O/P EST MOD 30 MIN: CPT | Performed by: PHYSICIAN ASSISTANT

## 2020-05-27 PROCEDURE — 99000 SPECIMEN HANDLING OFFICE-LAB: CPT | Performed by: PHYSICIAN ASSISTANT

## 2020-05-27 RX ORDER — HYDROCODONE BITARTRATE AND ACETAMINOPHEN 10; 325 MG/1; MG/1
1 TABLET ORAL EVERY 6 HOURS PRN
Qty: 120 TAB | Refills: 0 | Status: SHIPPED | OUTPATIENT
Start: 2020-06-29 | End: 2020-09-02 | Stop reason: SDUPTHER

## 2020-05-27 RX ORDER — HYDROCODONE BITARTRATE AND ACETAMINOPHEN 10; 325 MG/1; MG/1
1 TABLET ORAL EVERY 6 HOURS PRN
Qty: 120 TAB | Refills: 0 | Status: SHIPPED | OUTPATIENT
Start: 2020-07-29 | End: 2020-09-02 | Stop reason: SDUPTHER

## 2020-05-27 RX ORDER — HYDROCODONE BITARTRATE AND ACETAMINOPHEN 10; 325 MG/1; MG/1
1 TABLET ORAL EVERY 6 HOURS PRN
Qty: 120 TAB | Refills: 0 | Status: SHIPPED | OUTPATIENT
Start: 2020-05-29 | End: 2020-09-02 | Stop reason: SDUPTHER

## 2020-05-27 NOTE — ASSESSMENT & PLAN NOTE
The patient describes right knee pain.  Preliminary diagnosis: primary osteoarthritis  Treatment plan: pain control, maintain function  Diagnostic evaluations completed: xray    I have chosen to use a controlled substance for this patient instead of an alternative treatment because he is not able to take NSAIDS.    Current pain control: fair,   Adverse effects from medication: none.  States where the person has previously resided or had CS filled in: none.  Physical functioning: fair.   Overall functioning: fair.  Mood and Mental Health: fair.  Family and social relationships: fair.   Sleep pattern: good.  Nonnarcotic treatments that are being used: knee brace. We discussed non-opiod treatment.  options.   Goals of treatment: pain control, maintain function.    Pain management agreement initiated and signed on: 3/2020.   Consent for opiate prescription signed on : 3/2020.  Last dose of narcotic medication: this morning.   Most recent urine drug screen done today.       Controlled Substance Assessment:     - Is the medication, if previously prescribed, working as intended and expected to treat   the patients symptoms: yes.     - Does the patient have a history of substance abuse: no.     - has the patient demonstrated aberrant behavior or intoxication: no.     - Is there reason to believe that the patient is not using medication as prescribed or diverting the medication: no.     - Is there reason to believe that the patient is currently misusing this medication or addicted to the controlled substance: no.     - Is it necessary to verify that controlled substances, other that those authorized under the treatment plan, are not present in the body of this patient: no.    - Are there any blood or urine test that indicate inappropriate use of the medication or other controlled substances : no.     - I have reviewed the . Does the  report irregular/inconsistent medication use or indicate that the medication is being  used inappropriately or that the patient is using another controlled substance not prescribed or known to me: no.     - Is there reason to believe that the patient is using other drugs, including alcohol, prescription or illicit drugs, that may interact negatively with controlled substance or have not been prescribed by a practitioner who is treating the patient: no.     - Are there any known early refill attempts or claims that medication has been lost or stolen: no.     - Are there are any major changes in the patient's mental or physical health that would affect the medical appropriateness of this medication: no.     - Has the patient increased the dose of medication without provider authorization: no.    - Has the patient been reluctant to cooperate with any exam, analysis or test recommended by me: no.     - Has the patient demonstrated reluctance to stop or reduce use of the medicine: no.     - Has the patient requested or demanded a controlled substance that is likely to be abused or cause dependency or addiction: no.     - Is there any other evidence that the patient is chronically using opioids, misusing, abusing, illegally using or addicted to any drug or failing to comply with the instructions of the practitioner concerning the use of the controlled substance: no    - Are there any other factors that the practitioner determines is necessary to make an informed professional judgment concerning the medical appropriateness of the prescription: no.       I have reviewed the medical record and medical history of this patient, examined the patient and reviewed the Prescription Monitoring Program and I have determined that norco is medically indicated. Review of the medial records demonstrates compliance. I discussed risks and benefits of the pain medication. I discussed proper use, storage and disposal of the pain medication. I have advised patient to keep medication in a safe place and to not drive with  medication.

## 2020-05-27 NOTE — PROGRESS NOTES
Subjective:   Gennaro Richardson is a 84 y.o. male here today for f/u on chronic conditions, pain med refill    Coronary atherosclerosis due to calcified coronary lesion  Has appt to see new cardiologist in august    PVD (peripheral vascular disease)  Chronic, stable on current, taking meds as prescribed. No new extremity pain, numbness, swelling, skin lesion    PAF (paroxysmal atrial fibrillation)  Chronic, stable on current coumadin, managed by coumadin clinic, rate controlled, no dizziness/SOB    Chronic use of opiate drug for therapeutic purpose  The patient describes right knee pain.  Preliminary diagnosis: primary osteoarthritis  Treatment plan: pain control, maintain function  Diagnostic evaluations completed: xray    I have chosen to use a controlled substance for this patient instead of an alternative treatment because he is not able to take NSAIDS.    Current pain control: fair,   Adverse effects from medication: none.  States where the person has previously resided or had CS filled in: none.  Physical functioning: fair.   Overall functioning: fair.  Mood and Mental Health: fair.  Family and social relationships: fair.   Sleep pattern: good.  Nonnarcotic treatments that are being used: knee brace. We discussed non-opiod treatment.  options.   Goals of treatment: pain control, maintain function.    Pain management agreement initiated and signed on: 3/2020.   Consent for opiate prescription signed on : 3/2020.  Last dose of narcotic medication: this morning.   Most recent urine drug screen done today.       Controlled Substance Assessment:     - Is the medication, if previously prescribed, working as intended and expected to treat   the patients symptoms: yes.     - Does the patient have a history of substance abuse: no.     - has the patient demonstrated aberrant behavior or intoxication: no.     - Is there reason to believe that the patient is not using medication as prescribed or diverting the  medication: no.     - Is there reason to believe that the patient is currently misusing this medication or addicted to the controlled substance: no.     - Is it necessary to verify that controlled substances, other that those authorized under the treatment plan, are not present in the body of this patient: no.    - Are there any blood or urine test that indicate inappropriate use of the medication or other controlled substances : no.     - I have reviewed the . Does the  report irregular/inconsistent medication use or indicate that the medication is being used inappropriately or that the patient is using another controlled substance not prescribed or known to me: no.     - Is there reason to believe that the patient is using other drugs, including alcohol, prescription or illicit drugs, that may interact negatively with controlled substance or have not been prescribed by a practitioner who is treating the patient: no.     - Are there any known early refill attempts or claims that medication has been lost or stolen: no.     - Are there are any major changes in the patient's mental or physical health that would affect the medical appropriateness of this medication: no.     - Has the patient increased the dose of medication without provider authorization: no.    - Has the patient been reluctant to cooperate with any exam, analysis or test recommended by me: no.     - Has the patient demonstrated reluctance to stop or reduce use of the medicine: no.     - Has the patient requested or demanded a controlled substance that is likely to be abused or cause dependency or addiction: no.     - Is there any other evidence that the patient is chronically using opioids, misusing, abusing, illegally using or addicted to any drug or failing to comply with the instructions of the practitioner concerning the use of the controlled substance: no    - Are there any other factors that the practitioner determines is necessary to make  an informed professional judgment concerning the medical appropriateness of the prescription: no.       I have reviewed the medical record and medical history of this patient, examined the patient and reviewed the Prescription Monitoring Program and I have determined that norco is medically indicated. Review of the medial records demonstrates compliance. I discussed risks and benefits of the pain medication. I discussed proper use, storage and disposal of the pain medication. I have advised patient to keep medication in a safe place and to not drive with medication.         Current medicines (including changes today)  Current Outpatient Medications   Medication Sig Dispense Refill   • [START ON 7/29/2020] HYDROcodone/acetaminophen (NORCO)  MG Tab Take 1 Tab by mouth every 6 hours as needed for up to 30 days. 120 Tab 0   • [START ON 6/29/2020] HYDROcodone/acetaminophen (NORCO)  MG Tab Take 1 Tab by mouth every 6 hours as needed for up to 30 days. 120 Tab 0   • [START ON 5/29/2020] HYDROcodone/acetaminophen (NORCO)  MG Tab Take 1 Tab by mouth every 6 hours as needed for up to 30 days. 120 Tab 0   • clobetasol (TEMOVATE) 0.05 % Cream Apply topically to the affected area (S) twice daily  60 g 3   • warfarin (COUMADIN) 5 MG Tab Take 1/2 to 1 tablet by mouth one time daily or as directed by clinic 90 Tab 1   • tamsulosin (FLOMAX) 0.4 MG capsule      • atorvastatin (LIPITOR) 40 MG Tab Take 1 tab daily 100 Tab 3   • finasteride (PROSCAR) 5 MG TABS TAKE ONE TABLET BY MOUTH ONE TIME DAILY 90 Tab 2     No current facility-administered medications for this visit.      He  has a past medical history of A-fib (HCC) (6/28/2011), Arteriosclerosis, CAD (coronary artery disease), CAD (coronary artery disease) (6/28/2011), Cardiac pacemaker in situ (11/19/2013), Colonic polyps, Diverticulosis, Dizzy spells (11/20/2012), HERZOG (dyspnea on exertion) (9/7/2014), Dyslipidemia (6/28/2011), Hematoma of abdominal wall,  "Hematoma of rectus sheath (6/13/2012), Myocardial infarct ([facility]) (1989), Pacer at end of battery life (6/28/2011), PAF (paroxysmal atrial fibrillation) ([facility]) (9/7/2014), Paroxysmal atrial fibrillation ([facility]), Peripheral vascular disease ([facility]) (10/31/2011), and PVD (peripheral vascular disease) ([facility]).    ROS   No fever/chills. No weight change. No headache/dizziness. No focal weakness. No sore throat, nasal congestion, ear pain. No chest pain, no shortness of breath, difficulty breathing. No n/v/d/c or abdominal pain.  No rash or skin lesion.    Objective:     /62 (BP Location: Left arm, Patient Position: Sitting, BP Cuff Size: Adult long)   Pulse 64   Temp 37.3 °C (99.1 °F) (Temporal)   Ht 1.715 m (5' 7.5\")   Wt 89.9 kg (198 lb 3.1 oz)   SpO2 93%  Body mass index is 30.58 kg/m².   Physical Exam:  Constitutional: WDWN, NAD  Skin: Warm, dry, good turgor, no rashes in visible areas.  Neck: Trachea midline, no masses, no thyromegaly. No cervical or supraclavicular lymphadenopathy  Respiratory: Unlabored respiratory effort, lungs clear to auscultation, no wheezes, no ronchi.  Cardiovascular: Normal S1, S2, no murmur  Psych: Alert and oriented x3, normal affect and mood.      Assessment and Plan:   The following treatment plan was discussed    1. Chronic use of opiate drug for therapeutic purpose    - URINE DRUG SCREEN; Future    2. Opioid type dependence, continuous (HCC)    - HYDROcodone/acetaminophen (NORCO)  MG Tab; Take 1 Tab by mouth every 6 hours as needed for up to 30 days.  Dispense: 120 Tab; Refill: 0  - HYDROcodone/acetaminophen (NORCO)  MG Tab; Take 1 Tab by mouth every 6 hours as needed for up to 30 days.  Dispense: 120 Tab; Refill: 0  - HYDROcodone/acetaminophen (NORCO)  MG Tab; Take 1 Tab by mouth every 6 hours as needed for up to 30 days.  Dispense: 120 Tab; Refill: 0  - URINE DRUG SCREEN; Future    3. Primary osteoarthritis of right knee  [place]  Aware web site evaluation: I " have obtained and reviewed patient utilization report from [facility] pharmacy database prior to writing prescription for controlled substance II, III or IV. Based on the report and my clinical assessment the prescription is medically necessary.   Patient is cautioned on sedation potential of narcotic medication; no drinking, driving or operating heavy machinery while on this medication.  - HYDROcodone/acetaminophen (NORCO)  MG Tab; Take 1 Tab by mouth every 6 hours as needed for up to 30 days.  Dispense: 120 Tab; Refill: 0  - HYDROcodone/acetaminophen (NORCO)  MG Tab; Take 1 Tab by mouth every 6 hours as needed for up to 30 days.  Dispense: 120 Tab; Refill: 0  - HYDROcodone/acetaminophen (NORCO)  MG Tab; Take 1 Tab by mouth every 6 hours as needed for up to 30 days.  Dispense: 120 Tab; Refill: 0    4. Osteoarthritis of spine with radiculopathy, lumbar region    - HYDROcodone/acetaminophen (NORCO)  MG Tab; Take 1 Tab by mouth every 6 hours as needed for up to 30 days.  Dispense: 120 Tab; Refill: 0  - HYDROcodone/acetaminophen (NORCO)  MG Tab; Take 1 Tab by mouth every 6 hours as needed for up to 30 days.  Dispense: 120 Tab; Refill: 0  - HYDROcodone/acetaminophen (NORCO)  MG Tab; Take 1 Tab by mouth every 6 hours as needed for up to 30 days.  Dispense: 120 Tab; Refill: 0    5. Coronary atherosclerosis due to calcified coronary lesion      6. PVD (peripheral vascular disease) ([facility])      7. PAF (paroxysmal atrial fibrillation) ([facility])        Followup: 3 month

## 2020-05-27 NOTE — ASSESSMENT & PLAN NOTE
Chronic, stable on current, taking meds as prescribed. No new extremity pain, numbness, swelling, skin lesion

## 2020-05-27 NOTE — PROGRESS NOTES
Annual Health Assessment Questions:    1.  Are you currently engaging in any exercise or physical activity? No, due to knee    2.  How would you describe your mood or emotional well-being today? fair    3.  Have you had any falls in the last year? No    4.  Have you noticed any problems with your balance or had difficulty walking? No    5.  In the last six months have you experienced any leakage of urine? No    6. DPA/Advanced Directive: Patient has Living Will, but it is not on file. Instructed to bring in a copy to scan into their chart.

## 2020-05-27 NOTE — ASSESSMENT & PLAN NOTE
Chronic, stable on current coumadin, managed by coumadin clinic, rate controlled, no dizziness/SOB

## 2020-05-29 LAB
AMPHETAMINES UR QL: NEGATIVE NG/ML
BARBITURATES UR QL: NEGATIVE NG/ML
BENZODIAZ UR QL: NEGATIVE NG/ML
CANNABINOIDS UR QL SCN: NEGATIVE NG/ML
COCAINE UR QL: NEGATIVE NG/ML
DRUG SCREEN COMMENT UR-IMP: NORMAL
MDMA CTO UR SCN-MCNC: NEGATIVE NG/ML
METHADONE UR QL: NEGATIVE NG/ML
OPIATES UR QL: POSITIVE NG/ML
OXYCODONE CTO UR SCN-MCNC: NEGATIVE NG/ML
PCP UR QL SCN: NEGATIVE NG/ML
PROPOXYPH UR QL: NEGATIVE NG/ML

## 2020-06-15 ENCOUNTER — HOSPITAL ENCOUNTER (OUTPATIENT)
Dept: LAB | Facility: MEDICAL CENTER | Age: 85
End: 2020-06-15
Attending: NURSE PRACTITIONER
Payer: MEDICARE

## 2020-06-15 DIAGNOSIS — Z79.01 CHRONIC ANTICOAGULATION: ICD-10-CM

## 2020-06-15 LAB
INR PPP: 1.7 (ref 0.87–1.13)
PROTHROMBIN TIME: 20.4 SEC (ref 12–14.6)

## 2020-06-15 PROCEDURE — 36415 COLL VENOUS BLD VENIPUNCTURE: CPT

## 2020-06-15 PROCEDURE — 85610 PROTHROMBIN TIME: CPT

## 2020-06-16 ENCOUNTER — ANTICOAGULATION MONITORING (OUTPATIENT)
Dept: VASCULAR LAB | Facility: MEDICAL CENTER | Age: 85
End: 2020-06-16

## 2020-06-16 NOTE — PROGRESS NOTES
Anticoagulation Summary  As of 2020    INR goal:   2.0-3.0   TTR:   74.8 % (5 y)   INR used for dosin.70! (6/15/2020)   Warfarin maintenance plan:   2.5 mg (5 mg x 0.5) every Mon, Wed, Fri; 5 mg (5 mg x 1) all other days   Weekly warfarin total:   27.5 mg   Plan last modified:   Marylou Leung PharmD (2020)   Next INR check:   2020   Target end date:   Indefinite    Indications    A-fib (HCC) (Resolved) [I48.91]             Anticoagulation Episode Summary     INR check location:   Clinic Lab    Preferred lab:   [facility]    Send INR reminders to:       Comments:   [phone]        Anticoagulation Care Providers     Provider Role Specialty Phone number    Anastacio Sunshine M.D. Referring Cardiology [phone]    Mahin Valdez Responsible          Anticoagulation Patient Findings      Spoke with pt and pt's sister.  INR is sub=therapeutic.   Pt denies any unusual s/s of bleeding, bruising, clotting or any changes to diet or medications. Denies any etoh, cranberries, supplements, or illness.   Pt verifies warfarin weekly dosing.     Will have pt increase regimen by 10%    Repeat INR in 2 week(s).     Marylou Leung, PharmD

## 2020-06-30 ENCOUNTER — HOSPITAL ENCOUNTER (OUTPATIENT)
Dept: LAB | Facility: MEDICAL CENTER | Age: 85
End: 2020-06-30
Attending: NURSE PRACTITIONER
Payer: MEDICARE

## 2020-06-30 DIAGNOSIS — Z79.01 CHRONIC ANTICOAGULATION: ICD-10-CM

## 2020-06-30 LAB
INR PPP: 1.96 (ref 0.87–1.13)
PROTHROMBIN TIME: 23 SEC (ref 12–14.6)

## 2020-06-30 PROCEDURE — 85610 PROTHROMBIN TIME: CPT

## 2020-06-30 PROCEDURE — 36415 COLL VENOUS BLD VENIPUNCTURE: CPT

## 2020-07-01 ENCOUNTER — ANTICOAGULATION MONITORING (OUTPATIENT)
Dept: VASCULAR LAB | Facility: MEDICAL CENTER | Age: 85
End: 2020-07-01

## 2020-07-01 NOTE — PROGRESS NOTES
Anticoagulation Summary  As of 2020    INR goal:   2.0-3.0   TTR:   74.2 % (5 y)   INR used for dosin.96! (2020)   Warfarin maintenance plan:   2.5 mg (5 mg x 0.5) every Mon, Fri; 5 mg (5 mg x 1) all other days   Weekly warfarin total:   30 mg   Plan last modified:   Verito Zafar (2020)   Next INR check:      Target end date:   Indefinite    Indications    A-fib (HCC) (Resolved) [I48.91]             Anticoagulation Episode Summary     INR check location:   Clinic Lab    Preferred lab:   [facility]    Send INR reminders to:       Comments:   [phone]        Anticoagulation Care Providers     Provider Role Specialty Phone number    Anastacio Sunshine M.D. Referring Cardiology [phone]    Mahin Valdez Responsible          Anticoagulation Patient Findings          Spoke with Philip to report a sub therapeutic INR of 1.9 INRs have been sub therapeutic today.  Will increase weekly dose by 10%. Follow up in 2 weeks, to reduce the risk of adverse events related to this high risk medication, warfarin.    Verito Zafar, Clinical Pharmacist

## 2020-07-13 ENCOUNTER — HOSPITAL ENCOUNTER (OUTPATIENT)
Dept: LAB | Facility: MEDICAL CENTER | Age: 85
End: 2020-07-13
Attending: NURSE PRACTITIONER
Payer: MEDICARE

## 2020-07-13 DIAGNOSIS — Z79.01 CHRONIC ANTICOAGULATION: ICD-10-CM

## 2020-07-13 LAB
INR PPP: 1.3 (ref 0.87–1.13)
PROTHROMBIN TIME: 16.6 SEC (ref 12–14.6)

## 2020-07-13 PROCEDURE — 85610 PROTHROMBIN TIME: CPT

## 2020-07-13 PROCEDURE — 36415 COLL VENOUS BLD VENIPUNCTURE: CPT

## 2020-07-14 ENCOUNTER — ANTICOAGULATION MONITORING (OUTPATIENT)
Dept: VASCULAR LAB | Facility: MEDICAL CENTER | Age: 85
End: 2020-07-14

## 2020-07-14 NOTE — PROGRESS NOTES
Anticoagulation Summary  As of 2020    INR goal:   2.0-3.0   TTR:   73.7 % (5.1 y)   INR used for dosin.30! (2020)   Warfarin maintenance plan:   2.5 mg (5 mg x 0.5) every Mon, Fri; 5 mg (5 mg x 1) all other days   Weekly warfarin total:   30 mg   Plan last modified:   Verito M Filter (2020)   Next INR check:   2020   Target end date:   Indefinite    Indications    A-fib (HCC) (Resolved) [I48.91]             Anticoagulation Episode Summary     INR check location:   Clinic Lab    Preferred lab:   [facility]    Send INR reminders to:       Comments:   [phone]        Anticoagulation Care Providers     Provider Role Specialty Phone number    Anastacio Sunshine M.D. Referring Cardiology [phone]    Mahin Valdez Responsible          Anticoagulation Patient Findings  Patient Findings     Positives:   Change in health (feeling ill for the past few days)            Spoke with patient today regarding subtherapeutic INR of 1.3.  Patient denies any signs/symptoms of bruising or bleeding or any changes in diet and medications.  Instructed patient to call clinic with any questions or concerns.  Patient states he is not feeling well for the past few days but has still been eating well.  Instructed patient to bolus with 10 mg x1 then continue current warfarin regimen as detailed above.   Follow up in 1 week to reduce risk of adverse events related to this high risk medication,  Warfarin.    Bianca Monk PharmD Candidate   In collaboration with Marylou Leung PharmD

## 2020-07-28 ENCOUNTER — ANTICOAGULATION MONITORING (OUTPATIENT)
Dept: VASCULAR LAB | Facility: MEDICAL CENTER | Age: 85
End: 2020-07-28

## 2020-07-28 DIAGNOSIS — Z79.01 CHRONIC ANTICOAGULATION: ICD-10-CM

## 2020-07-29 DIAGNOSIS — Z79.01 CHRONIC ANTICOAGULATION: ICD-10-CM

## 2020-08-03 ENCOUNTER — HOSPITAL ENCOUNTER (OUTPATIENT)
Dept: LAB | Facility: MEDICAL CENTER | Age: 85
End: 2020-08-03
Attending: NURSE PRACTITIONER
Payer: MEDICARE

## 2020-08-03 DIAGNOSIS — Z79.01 CHRONIC ANTICOAGULATION: ICD-10-CM

## 2020-08-03 LAB
INR PPP: 1.32 (ref 0.87–1.13)
PROTHROMBIN TIME: 16.8 SEC (ref 12–14.6)

## 2020-08-03 PROCEDURE — 36415 COLL VENOUS BLD VENIPUNCTURE: CPT

## 2020-08-03 PROCEDURE — 85610 PROTHROMBIN TIME: CPT

## 2020-08-04 ENCOUNTER — ANTICOAGULATION MONITORING (OUTPATIENT)
Dept: VASCULAR LAB | Facility: MEDICAL CENTER | Age: 85
End: 2020-08-04

## 2020-08-04 ENCOUNTER — OFFICE VISIT (OUTPATIENT)
Dept: URGENT CARE | Facility: CLINIC | Age: 85
End: 2020-08-04
Payer: MEDICARE

## 2020-08-04 ENCOUNTER — APPOINTMENT (OUTPATIENT)
Dept: RADIOLOGY | Facility: IMAGING CENTER | Age: 85
End: 2020-08-04
Attending: PHYSICIAN ASSISTANT
Payer: MEDICARE

## 2020-08-04 VITALS
TEMPERATURE: 99.1 F | OXYGEN SATURATION: 92 % | HEART RATE: 80 BPM | WEIGHT: 187 LBS | BODY MASS INDEX: 28.86 KG/M2 | SYSTOLIC BLOOD PRESSURE: 128 MMHG | DIASTOLIC BLOOD PRESSURE: 84 MMHG | RESPIRATION RATE: 18 BRPM

## 2020-08-04 DIAGNOSIS — S69.91XA INJURY OF RIGHT LITTLE FINGER, INITIAL ENCOUNTER: ICD-10-CM
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
## 2020-08-04 DIAGNOSIS — S62.616A CLOSED DISPLACED FRACTURE OF PROXIMAL PHALANX OF RIGHT LITTLE FINGER, INITIAL ENCOUNTER: Primary | ICD-10-CM

## 2020-08-04 PROCEDURE — 73130 X-RAY EXAM OF HAND: CPT | Mod: TC,RT | Performed by: PHYSICIAN ASSISTANT

## 2020-08-04 PROCEDURE — 26725 TREAT FINGER FRACTURE EACH: CPT | Mod: 54,F9 | Performed by: PHYSICIAN ASSISTANT

## 2020-08-04 NOTE — PROGRESS NOTES
Anticoagulation Summary  As of 2020    INR goal:   2.0-3.0   TTR:   72.8 % (5.1 y)   INR used for dosin.32! (8/3/2020)   Warfarin maintenance plan:   5 mg (5 mg x 1) every Mon, Fri; 2.5 mg (5 mg x 0.5) all other days   Weekly warfarin total:   22.5 mg   Plan last modified:   Drake Yip, Pharmacy Intern (2020)   Next INR check:   8/10/2020   Target end date:   Indefinite    Indications    A-fib (HCC) (Resolved) [I48.91]             Anticoagulation Episode Summary     INR check location:   Clinic Lab    Preferred lab:   [facility]    Send INR reminders to:       Comments:   [phone]        Anticoagulation Care Providers     Provider Role Specialty Phone number    Anastacio Sunshine M.D. Referring Cardiology [phone]    Mahin Valdez Responsible          Anticoagulation Patient Findings  Patient Findings     Positives:   Change in medications (Pt has been taking dose different from our records. Ecouraged pt to get a med box or write dosing on a calendar.)    Negatives:   Signs/symptoms of thrombosis, Signs/symptoms of bleeding, Laboratory test error suspected, Change in health, Change in alcohol use, Change in activity, Upcoming invasive procedure, Emergency department visit, Upcoming dental procedure, Missed doses, Extra doses, Change in diet/appetite, Hospital admission, Bruising, Other complaints          Spoke with Gennaro.  INR is sub-therapeutic.   Pt denies any unusual s/s of bleeding, bruising, clotting or any changes to diet or medications. Denies any etoh, cranberries, supplements, or illness.     Will have pt increase dose tonight and tomorrow to 5 mg once, then resume regular dosing schedule. Pt may benefit from a DOAC due to dosing adherence.    Repeat INR in 1 week(s) per pt preference.     Drake Yip, Pharmacy Intern

## 2020-08-04 NOTE — PATIENT INSTRUCTIONS
Finger Fracture, Adult  A finger fracture is a break in any of the bones in your fingers. Your doctor may put a splint on your finger so it will not move while it heals (immobilization).  Follow these instructions at home:  If you have a splint:    · Wear the splint as told by your doctor. Remove it only as told by your doctor.  · Do not put pressure on any part of the splint until it is fully hardened. This may take several hours.  · Loosen the splint if your fingers tingle, lose feeling (get numb), or turn cold and blue.  · Keep the splint clean.  · If the splint is not waterproof:  ? Do not let it get wet.  ? Cover it with a watertight covering when you take a bath or a shower.  · Ask your doctor when it is safe for you to drive.  Managing pain, stiffness, and swelling  · If directed, put ice on the injured area:  ? If you have a removable splint, remove it as told by your doctor.  ? Put ice in a plastic bag.  ? Place a towel between your skin and the bag.  ? Leave the ice on for 20 minutes, 2-3 times a day.  · Move your fingers often to avoid stiffness and to lessen swelling.  · Raise (elevate) the injured area above the level of your heart while you are sitting or lying down.  Activity  · Do not drive or use heavy machinery while taking prescription pain medicine.  · Do exercises (physical therapy) as told by your doctor.  · Return to your normal activities as told by your doctor. Ask your doctor what activities are safe for you.  General instructions  · Do not use any products that have nicotine or tobacco in them, such as cigarettes and e-cigarettes. These can delay bone healing. If you need help quitting, ask your doctor.  · Take over-the-counter and prescription medicines only as told by your doctor.  · Keep all follow-up visits as told by your doctor. This is important.  Contact a doctor if:  · Your pain or swelling gets worse, even with treatment.  · You have trouble moving your finger.  Get help right  away if:  · Your finger gets numb or turns blue.  Summary  · A finger fracture is a break in any of the bones in your fingers.  · You may need to wear a splint on your finger so it will not move while it heals (immobilization).  · If directed, put ice on the injured area for 20 minutes, 2-3 times a day.  This information is not intended to replace advice given to you by your health care provider. Make sure you discuss any questions you have with your health care provider.  Document Released: 06/05/2009 Document Revised: 04/14/2020 Document Reviewed: 08/01/2018  Elsevier Patient Education © 2020 [employer]

## 2020-08-05 NOTE — PROGRESS NOTES
Subjective:      PT is a 84 y.o. male who presents with Fall ((R) small finger -fell one hour ago)            HPI  This is a new problem. PT notes mechanical fall 1 hour ago and abraided both knees and injured right little finger with bruising, swelling and loss of ROM. Pt has not taken any Rx medications for this condition. Pt states the pain is a 7/10 in right little finger, aching in nature and worse at the time of injury. Pt denies CP, SOB, NVD,  headaches, dizziness, change in vision, hives, or other joint pain. The pt's medication list, problem list, and allergies have been evaluated and reviewed during today's visit.    PMH:  Past Medical History:   Diagnosis Date   • A-fib ([facility]) 2011   • Arteriosclerosis    • CAD (coronary artery disease)    • CAD (coronary artery disease) 2011   • Cardiac pacemaker in situ 2013   • Colonic polyps    • Diverticulosis    • Dizzy spells 2012   • HERZOG (dyspnea on exertion) 2014- pt has no c/o   • Dyslipidemia 2011   • Hematoma of abdominal wall    • Hematoma of rectus sheath 2012    Delayed onset bleed, intraabdominal extension, left flank , ct abd active bleed , binder, vit k   Ex-lap     • Myocardial infarct ([facility])    • Pacer at end of battery life 2011   • PAF (paroxysmal atrial fibrillation) ([facility]) 2014   • Paroxysmal atrial fibrillation ([facility])    • Peripheral vascular disease ([facility]) 10/31/2011   • PVD (peripheral vascular disease) ([facility])        PSH:  Past Surgical History:   Procedure Laterality Date   • EXPLORATORY LAPAROTOMY  2012    Performed by CALLY MERCER at SURGERY [facility] ORS   • COLONOSCOPY WITH POLYP  08    Performed by RUDOLPH BRENNER at SURGERY [facility] ORS   • ABDOMINAL AORTIC ANEURYSM     • PACEMAKER INSERTION         Fam Hx:    family history includes Cancer in his mother.  Family Status   Relation Name Status   • Mo     • Fa     • Sis  Alive       Soc  HX:  Social History     Socioeconomic History   • Marital status: Single     Spouse name: Not on file   • Number of children: Not on file   • Years of education: Not on file   • Highest education level: Not on file   Occupational History   • Not on file   Social Needs   • Financial resource strain: Not on file   • Food insecurity     Worry: Not on file     Inability: Not on file   • Transportation needs     Medical: Not on file     Non-medical: Not on file   Tobacco Use   • Smoking status: Former Smoker     Years: 35.00     Last attempt to quit: 1988     Years since quittin.6   • Smokeless tobacco: Never Used   Substance and Sexual Activity   • Alcohol use: No     Alcohol/week: 0.0 oz   • Drug use: No   • Sexual activity: Never     Comment: , has children, retired.   Lifestyle   • Physical activity     Days per week: Not on file     Minutes per session: Not on file   • Stress: Not on file   Relationships   • Social connections     Talks on phone: Not on file     Gets together: Not on file     Attends Rastafarian service: Not on file     Active member of club or organization: Not on file     Attends meetings of clubs or organizations: Not on file     Relationship status: Not on file   • Intimate partner violence     Fear of current or ex partner: Not on file     Emotionally abused: Not on file     Physically abused: Not on file     Forced sexual activity: Not on file   Other Topics Concern   • Not on file   Social History Narrative   • Not on file         Medications:    Current Outpatient Medications:   •  HYDROcodone/acetaminophen (NORCO)  MG Tab, Take 1 Tab by mouth every 6 hours as needed for up to 30 days., Disp: 120 Tab, Rfl: 0  •  clobetasol (TEMOVATE) 0.05 % Cream, Apply topically to the affected area (S) twice daily , Disp: 60 g, Rfl: 3  •  warfarin (COUMADIN) 5 MG Tab, Take 1/2 to 1 tablet by mouth one time daily or as directed by clinic, Disp: 90 Tab, Rfl: 1  •  tamsulosin (FLOMAX) 0.4  MG capsule, , Disp: , Rfl:   •  atorvastatin (LIPITOR) 40 MG Tab, Take 1 tab daily, Disp: 100 Tab, Rfl: 3  •  finasteride (PROSCAR) 5 MG TABS, TAKE ONE TABLET BY MOUTH ONE TIME DAILY, Disp: 90 Tab, Rfl: 2      Allergies:  No known drug allergy    ROS  Constitutional: Negative for fever, chills and malaise/fatigue.   HENT: Negative for congestion and sore throat.    Eyes: Negative for blurred vision, double vision and photophobia.   Respiratory: Negative for cough and shortness of breath.  Cardiovascular: Negative for chest pain and palpitations.   Gastrointestinal: Negative for heartburn, nausea, vomiting, abdominal pain, diarrhea and constipation.   Genitourinary: Negative for dysuria and flank pain.   Musculoskeletal: +right little finger injury  Skin: Negative for itching and rash. +abrasions to B/L knees  Neurological: Negative for dizziness, tingling and headaches.   Endo/Heme/Allergies: Does not bruise/bleed easily.   Psychiatric/Behavioral: Negative for depression. The patient is not nervous/anxious.           Objective:     /84 (BP Location: Left arm)   Pulse 80   Temp 37.3 °C (99.1 °F) (Temporal)   Resp 18   Wt 84.8 kg (187 lb)   SpO2 92%   BMI 28.86 kg/m²      Physical Exam  Musculoskeletal:      Right hand: He exhibits decreased range of motion, tenderness, bony tenderness, disruption of two-point discrimination, deformity and swelling. He exhibits normal capillary refill and no laceration. Normal sensation noted. Decreased strength noted. He exhibits finger abduction and thumb/finger opposition.        Hands:    Skin:     General: Skin is warm.      Capillary Refill: Capillary refill takes less than 2 seconds.      Coloration: Skin is not ashen, cyanotic, jaundiced, mottled, pale or sallow.      Findings: Abrasion present. No abscess, acne, bruising, burn, ecchymosis, erythema, signs of injury, laceration, lesion, petechiae, rash or wound. Rash is not crusting, macular, nodular, papular,  purpuric, pustular, scaling, urticarial or vesicular.      Nails: There is no clubbing.                    Constitutional: PT is oriented to person, place, and time. PT appears well-developed and well-nourished. No distress.   HENT:   Head: Normocephalic and atraumatic.   Mouth/Throat: Oropharynx is clear and moist. No oropharyngeal exudate.   Eyes: Conjunctivae normal and EOM are normal. Pupils are equal, round, and reactive to light.   Neck: Normal range of motion. Neck supple. No thyromegaly present.   Cardiovascular: Normal rate, regular rhythm, normal heart sounds and intact distal pulses.  Exam reveals no gallop and no friction rub.    No murmur heard.  Pulmonary/Chest: Effort normal and breath sounds normal. No respiratory distress. PT has no wheezes. PT has no rales. Pt exhibits no tenderness.   Abdominal: Soft. Bowel sounds are normal. PT exhibits no distension and no mass. There is no tenderness. There is no rebound and no guarding.   Neurological: PT is alert and oriented to person, place, and time. PT has normal reflexes. No cranial nerve deficit.       Psychiatric: PT has a normal mood and affect. PT behavior is normal. Judgment and thought content normal.     RADS:    DX-HAND 3+ RIGHT   Order: [id number]   Status:  Final result   Visible to patient:  No (not released)   Next appt:  08/14/2020 at 01:15 PM in Cardiology (PACER CHECK-CAM B 2)   Dx:  Injury of right little finger, initia...   Details     Reading Physician  Reading Date  Result Priority    Lazarus Bowser M.D.  [phone]  8/4/2020  Urgent Care       Narrative & Impression         8/4/2020 2:30 PM     HISTORY/REASON FOR EXAM:  Pain/Deformity Following Trauma; Concern for 5th finger fx/dislocation  ] Degenerative pain.     TECHNIQUE/EXAM DESCRIPTION AND NUMBER OF VIEWS:  3 views of the RIGHT hand.     COMPARISON: None     FINDINGS:     Diffuse osseous demineralization.     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 Severe osteoarthritis of the first IP joint.     Old fracture deformity of the third metacarpal.     Vascular calcification.     IMPRESSION:        Acute displaced fracture of the fifth proximal phalanx.            Last Resulted: 08/04/20  2:44 PM                 Assessment/Plan:       1.  fifth proximal phalanx fracture    2. Injury of right little finger, initial encounter    - DX-HAND 3+ RIGHT; Future    3. Fall with injury, initial encounter    - DX-HAND 3+ RIGHT; Future      PROCEDURE:  Modifier 25 as pt unaware fracture reduction could be done in UC and not ER or ortho  Finger reduction after displaced fracture to right little finger  -Risks, benefits, and alternatives discussed. Risks include infection, bleeding, nerve damage, and poor cosmetic outcome  -Clean technique   -Local anesthesia with 1% lidocaine withOUT epinephrine  -Forward and lateral tension placed on finger after digital block and reduced into correct anatomic position  -splint placed and buddy taped  -The patient tolerated the procedure well      REF sports med: Dr Hernandez consulted and viewed xray and agreed with plan.  PROCEDURE: Finger reduction  RICE therapy discussed  Gentle ROM exercises discussed  WBAT right hand with caution  Ice/heat therapy discussed  OTC Tylenol for pain control  Rest, fluids encouraged.  AVS with medical info given.  Pt was in full understanding and agreement with the plan.  Follow-up as needed if symptoms worsen or fail to improve.

## 2020-08-10 ENCOUNTER — HOSPITAL ENCOUNTER (OUTPATIENT)
Dept: LAB | Facility: MEDICAL CENTER | Age: 85
End: 2020-08-10
Attending: NURSE PRACTITIONER
Payer: MEDICARE

## 2020-08-10 ENCOUNTER — APPOINTMENT (OUTPATIENT)
Dept: RADIOLOGY | Facility: IMAGING CENTER | Age: 85
End: 2020-08-10
Attending: FAMILY MEDICINE
Payer: MEDICARE

## 2020-08-10 ENCOUNTER — OFFICE VISIT (OUTPATIENT)
Dept: MEDICAL GROUP | Facility: CLINIC | Age: 85
End: 2020-08-10
Payer: MEDICARE

## 2020-08-10 VITALS
WEIGHT: 187 LBS | SYSTOLIC BLOOD PRESSURE: 118 MMHG | TEMPERATURE: 98.5 F | RESPIRATION RATE: 18 BRPM | DIASTOLIC BLOOD PRESSURE: 66 MMHG | HEART RATE: 66 BPM | OXYGEN SATURATION: 95 % | HEIGHT: 68 IN | BODY MASS INDEX: 28.34 KG/M2

## 2020-08-10 DIAGNOSIS — S62.619A: ICD-10-CM

## 2020-08-10 DIAGNOSIS — Z79.01 CHRONIC ANTICOAGULATION: ICD-10-CM

## 2020-08-10 LAB
INR PPP: 1.97 (ref 0.87–1.13)
PROTHROMBIN TIME: 23 SEC (ref 12–14.6)

## 2020-08-10 PROCEDURE — 85610 PROTHROMBIN TIME: CPT

## 2020-08-10 PROCEDURE — 36415 COLL VENOUS BLD VENIPUNCTURE: CPT

## 2020-08-10 PROCEDURE — 73140 X-RAY EXAM OF FINGER(S): CPT | Mod: TC,RT | Performed by: FAMILY MEDICINE

## 2020-08-10 PROCEDURE — 26725 TREAT FINGER FRACTURE EACH: CPT | Mod: F9 | Performed by: FAMILY MEDICINE

## 2020-08-10 ASSESSMENT — ENCOUNTER SYMPTOMS
DIZZINESS: 0
VOMITING: 0
NAUSEA: 0
CHILLS: 0
FEVER: 0
SHORTNESS OF BREATH: 0

## 2020-08-10 NOTE — PROGRESS NOTES
Subjective:      Gennaro Richardson is a 84 y.o. male who presents with Finger Injury (Referral from UC/ R little finger injury )     Referred by Supa Leigh PA-C  for evaluation of RIGHT fifth finger pain    HPI   RIGHT fifth finger pain  Due to injury, August 4, 2020  Mechanism of injury, walking at his mailbox and inadvertently tripped causing him to fall onto his RIGHT hand  Pain was predominantly at the fifth finger of the RIGHT hand  No radiation  Swelling has reduced  POSITIVE ecchymosis which has distributed to the wrist and hand  Seen at urgent care and fracture was aligned and he was immobilized and referred for further evaluation and management  Takes hydrocortisone for his knee which has helped a friend  No night symptoms    Review of Systems   Constitutional: Negative for chills and fever.   Respiratory: Negative for shortness of breath.    Cardiovascular: Negative for chest pain.   Gastrointestinal: Negative for nausea and vomiting.   Neurological: Negative for dizziness.     PMH:  has a past medical history of A-fib ([facility]) (6/28/2011), Arteriosclerosis, CAD (coronary artery disease), CAD (coronary artery disease) (6/28/2011), Cardiac pacemaker in situ (11/19/2013), Colonic polyps, Diverticulosis, Dizzy spells (11/20/2012), HERZOG (dyspnea on exertion) (9/7/2014), Dyslipidemia (6/28/2011), Hematoma of abdominal wall, Hematoma of rectus sheath (6/13/2012), Myocardial infarct ([facility]) (1989), Pacer at end of battery life (6/28/2011), PAF (paroxysmal atrial fibrillation) ([facility]) (9/7/2014), Paroxysmal atrial fibrillation ([facility]), Peripheral vascular disease ([facility]) (10/31/2011), and PVD (peripheral vascular disease) ([facility]).  MEDS:   Current Outpatient Medications:   •  HYDROcodone/acetaminophen (NORCO)  MG Tab, Take 1 Tab by mouth every 6 hours as needed for up to 30 days., Disp: 120 Tab, Rfl: 0  •  clobetasol (TEMOVATE) 0.05 % Cream, Apply topically to the affected area (S) twice daily , Disp: 60 g, Rfl:  3  •  warfarin (COUMADIN) 5 MG Tab, Take 1/2 to 1 tablet by mouth one time daily or as directed by clinic, Disp: 90 Tab, Rfl: 1  •  tamsulosin (FLOMAX) 0.4 MG capsule, , Disp: , Rfl:   •  atorvastatin (LIPITOR) 40 MG Tab, Take 1 tab daily, Disp: 100 Tab, Rfl: 3  •  finasteride (PROSCAR) 5 MG TABS, TAKE ONE TABLET BY MOUTH ONE TIME DAILY, Disp: 90 Tab, Rfl: 2  ALLERGIES:   Allergies   Allergen Reactions   • No Known Drug Allergy      SURGHX:   Past Surgical History:   Procedure Laterality Date   • EXPLORATORY LAPAROTOMY  6/14/2012    Performed by CALLY MERCER at SURGERY [facility] ORS   • COLONOSCOPY WITH POLYP  7/24/08    Performed by RUDOLPH BRENNER at SURGERY [facility] ORS   • ABDOMINAL AORTIC ANEURYSM     • PACEMAKER INSERTION       SOCHX:  reports that he quit smoking about 32 years ago. He quit after 35.00 years of use. He has never used smokeless tobacco. He reports that he does not drink alcohol or use drugs.  FH: Family history was reviewed, no pertinent findings to report     Objective:     There were no vitals taken for this visit.     Physical Exam     Hand exam    NAD  Alert and oriented    BILATERAL hand exam  MINIMAL swelling along the ulnar aspect of the RIGHT hand and fifth finger  NO rotational deformity  Range of motion of all MCP, DIP and PIP joints decreased along the RIGHT fifth finger at all joints  Pinky opposition difficult to perform on the RIGHT compared to left  Grind test is NEGATIVE  Collateral ligament testing is NORMAL     Assessment/Plan:         1. Closed fracture of proximal phalanx of right hand, initial encounter (LITTLE finger)  DX-FINGER(S) 2+ RIGHT     Due to injury, August 4, 2020  Mechanism of injury, walking at his mailbox and inadvertently tripped causing him to fall onto his RIGHT hand  Pain was predominantly at the fifth finger of the RIGHT hand    Finger was reduced by Supa Leigh PA-C at his initial urgent care visit  Alignment is IMPROVED compared to  the initial x-rays    Stabilized with a finger splint at urgent care  NEW finger splint was fitted in the office TODAY (August 10, 2020) together with some rosemarie taping of the fourth finger  He was splinted in slight flexion for comfort     Return in about 2 weeks (around 8/24/2020).  To see how he is doing, will likely get rid of the splint at that point and start rosemarie taping alone        8/10/2020 3:52 PM     HISTORY/REASON FOR EXAM:  Pain/Deformity Following Trauma; Verify fracture stability  Follow up fracture of fifth proximal phalanx  ?     TECHNIQUE/EXAM DESCRIPTION AND NUMBER OF VIEWS:  3 views of the RIGHT fingers.     COMPARISON: 8/4/20     FINDINGS:     Severe osseous demineralization.     Redemonstration of displaced angulated fracture of fifth proximal phalanx with no significant healing change. The alignment is slightly improved since prior.     Severe osteoarthritis of the PIP and DIP joints. Severe osteoarthritis of the first IP joint.     IMPRESSION:        Redemonstration of displaced angulated fracture of fifth proximal phalanx with no significant healing change. The alignment is slightly improved since prior.     Severe osseous demineralization.        There are no diagnoses linked to this encounter.      8/4/2020 2:30 PM     HISTORY/REASON FOR EXAM:  Pain/Deformity Following Trauma; Concern for 5th finger fx/dislocation  ] Degenerative pain.     TECHNIQUE/EXAM DESCRIPTION AND NUMBER OF VIEWS:  3 views of the RIGHT hand.     COMPARISON: None     FINDINGS:     Diffuse osseous demineralization.     Acute displaced fracture of the fifth proximal phalanx     Severe osteoarthritis of the PIP and DIP joints. Severe osteoarthritis of the first IP joint.     Old fracture deformity of the third metacarpal.     Vascular calcification.     IMPRESSION:      Acute displaced fracture of the fifth proximal phalanx.    Thank you Supa Leigh PA-C for allowing me to participate in caring for the patient.

## 2020-08-11 ENCOUNTER — ANTICOAGULATION MONITORING (OUTPATIENT)
Dept: VASCULAR LAB | Facility: MEDICAL CENTER | Age: 85
End: 2020-08-11

## 2020-08-12 NOTE — PROGRESS NOTES
Anticoagulation Summary  As of 2020    INR goal:  2.0-3.0   TTR:  72.6 % (5.1 y)   INR used for dosin.97 (8/10/2020)   Warfarin maintenance plan:  2.5 mg (5 mg x 0.5) every Mon, Wed, Fri; 5 mg (5 mg x 1) all other days   Weekly warfarin total:  27.5 mg   Plan last modified:  Shila PérezD (2020)   Next INR check:  2020   Target end date:  Indefinite    Indications    A-fib (HCC) (Resolved) [I48.91]             Anticoagulation Episode Summary     INR check location:  Clinic Lab    Preferred lab:  [facility]    Send INR reminders to:      Comments:  [phone]        Anticoagulation Care Providers     Provider Role Specialty Phone number    Anastacio Sunshine M.D. Referring Cardiology [phone]    Mahin Valdez Responsible          Anticoagulation Patient Findings      Spoke with patient.  INR is slightly subtherapeutic.   Pt denies any unusual s/s of bleeding, bruising, clotting or any changes to diet or medications. Denies any etoh, cranberries, supplements, or illness.     Will have pt start a new increased weekly warfarin dose.     Repeat INR in 2 week(s) per pt preference    Kelsy Bloom, PharmD

## 2020-08-14 ENCOUNTER — NON-PROVIDER VISIT (OUTPATIENT)
Dept: CARDIOLOGY | Facility: MEDICAL CENTER | Age: 85
End: 2020-08-14
Payer: MEDICARE

## 2020-08-14 ENCOUNTER — OFFICE VISIT (OUTPATIENT)
Dept: CARDIOLOGY | Facility: MEDICAL CENTER | Age: 85
End: 2020-08-14
Payer: MEDICARE

## 2020-08-14 VITALS
DIASTOLIC BLOOD PRESSURE: 52 MMHG | OXYGEN SATURATION: 94 % | BODY MASS INDEX: 28.95 KG/M2 | HEART RATE: 62 BPM | SYSTOLIC BLOOD PRESSURE: 144 MMHG | HEIGHT: 68 IN | WEIGHT: 191 LBS

## 2020-08-14 DIAGNOSIS — I49.5 SICK SINUS SYNDROME (HCC): ICD-10-CM

## 2020-08-14 DIAGNOSIS — I25.84 CORONARY ARTERY DISEASE DUE TO CALCIFIED CORONARY LESION: ICD-10-CM

## 2020-08-14 DIAGNOSIS — I25.10 CORONARY ARTERY DISEASE DUE TO CALCIFIED CORONARY LESION: ICD-10-CM

## 2020-08-14 DIAGNOSIS — Z95.0 CARDIAC PACEMAKER IN SITU: ICD-10-CM

## 2020-08-14 DIAGNOSIS — I48.0 PAF (PAROXYSMAL ATRIAL FIBRILLATION) (HCC): ICD-10-CM

## 2020-08-14 DIAGNOSIS — Z79.01 CHRONIC ANTICOAGULATION: ICD-10-CM

## 2020-08-14 PROCEDURE — 93280 PM DEVICE PROGR EVAL DUAL: CPT | Performed by: INTERNAL MEDICINE

## 2020-08-14 PROCEDURE — 99214 OFFICE O/P EST MOD 30 MIN: CPT | Mod: 25 | Performed by: INTERNAL MEDICINE

## 2020-08-14 ASSESSMENT — ENCOUNTER SYMPTOMS
PALPITATIONS: 0
BRUISES/BLEEDS EASILY: 1
SHORTNESS OF BREATH: 0
BLOOD IN STOOL: 0
DIZZINESS: 0

## 2020-08-18 DIAGNOSIS — Z79.01 CHRONIC ANTICOAGULATION: ICD-10-CM

## 2020-08-18 RX ORDER — WARFARIN SODIUM 5 MG/1
TABLET ORAL
Qty: 90 TAB | Refills: 1 | Status: SHIPPED | OUTPATIENT
Start: 2020-08-18 | End: 2021-03-26

## 2020-08-24 ENCOUNTER — HOSPITAL ENCOUNTER (OUTPATIENT)
Dept: LAB | Facility: MEDICAL CENTER | Age: 85
End: 2020-08-24
Attending: NURSE PRACTITIONER
Payer: MEDICARE

## 2020-08-24 DIAGNOSIS — Z79.01 CHRONIC ANTICOAGULATION: ICD-10-CM

## 2020-08-24 LAB
INR PPP: 1.8 (ref 0.87–1.13)
PROTHROMBIN TIME: 21.5 SEC (ref 12–14.6)

## 2020-08-24 PROCEDURE — 36415 COLL VENOUS BLD VENIPUNCTURE: CPT

## 2020-08-24 PROCEDURE — 85610 PROTHROMBIN TIME: CPT

## 2020-08-25 ENCOUNTER — OFFICE VISIT (OUTPATIENT)
Dept: MEDICAL GROUP | Facility: CLINIC | Age: 85
End: 2020-08-25
Payer: MEDICARE

## 2020-08-25 ENCOUNTER — ANTICOAGULATION MONITORING (OUTPATIENT)
Dept: VASCULAR LAB | Facility: MEDICAL CENTER | Age: 85
End: 2020-08-25

## 2020-08-25 ENCOUNTER — APPOINTMENT (OUTPATIENT)
Dept: RADIOLOGY | Facility: IMAGING CENTER | Age: 85
End: 2020-08-25
Attending: FAMILY MEDICINE
Payer: MEDICARE

## 2020-08-25 VITALS
HEIGHT: 68 IN | BODY MASS INDEX: 28.95 KG/M2 | RESPIRATION RATE: 14 BRPM | WEIGHT: 191 LBS | HEART RATE: 62 BPM | OXYGEN SATURATION: 92 % | DIASTOLIC BLOOD PRESSURE: 60 MMHG | TEMPERATURE: 98.4 F | SYSTOLIC BLOOD PRESSURE: 120 MMHG

## 2020-08-25 DIAGNOSIS — S62.619D CLOSED FRACTURE OF PROXIMAL PHALANX OF RIGHT HAND WITH ROUTINE HEALING, SUBSEQUENT ENCOUNTER: ICD-10-CM

## 2020-08-25 DIAGNOSIS — M25.641 HAND JOINT STIFF, RIGHT: ICD-10-CM

## 2020-08-25 PROCEDURE — 99212 OFFICE O/P EST SF 10 MIN: CPT | Mod: 24 | Performed by: FAMILY MEDICINE

## 2020-08-25 PROCEDURE — 73140 X-RAY EXAM OF FINGER(S): CPT | Mod: TC,RT | Performed by: FAMILY MEDICINE

## 2020-08-25 NOTE — PROGRESS NOTES
"Subjective:      Gennaro Richardson is a 84 y.o. male who presents with Finger Injury (Referral from UC/ R little finger injury )     Referred by Supa Leigh PA-C  for evaluation of RIGHT fifth finger pain    HPI   RIGHT fifth finger pain  Due to injury, August 4, 2020  Mechanism of injury, walking at his mailbox and inadvertently tripped causing him to fall onto his RIGHT hand  Pain was predominantly at the fifth finger of the RIGHT hand  Overall pain IMPROVED  He has been wearing his finger splint     Objective:     /60 (BP Location: Left arm, Patient Position: Sitting, BP Cuff Size: Adult)   Pulse 62   Temp 36.9 °C (98.4 °F) (Temporal)   Resp 14   Ht 1.727 m (5' 8\")   Wt 86.6 kg (191 lb)   SpO2 92%   BMI 29.04 kg/m²     Physical Exam     Hand exam    NAD  Alert and oriented    BILATERAL hand exam  MINIMAL swelling along the ulnar aspect of the RIGHT hand and fifth finger  NO rotational deformity  Range of motion of all MCP, DIP and PIP joints decreased along the RIGHT fifth finger at all joints  Pinky opposition difficult to perform on the RIGHT compared to left  Minimal to no tenderness at the fracture site, he does have POSITIVE pain with passive range of motion of the DIP and PIP joints  Grind test is NEGATIVE  Collateral ligament testing is NORMAL     Assessment/Plan:       1. Closed fracture of proximal phalanx of right hand with routine healing, subsequent encounter (little finger)  DX-FINGER(S) 2+ RIGHT   2. Hand joint stiff, right       Due to injury, August 4, 2020  Mechanism of injury, walking at his mailbox and inadvertently tripped causing him to fall onto his RIGHT hand  Pain was predominantly at the fifth finger of the RIGHT hand    Finger splint REMOVED, demonstrated rosemarie taping procedure  Continue rosemarie taping to the fourth finger and reevaluate in 3 weeks    He is having some significant pain with range of motion of the finger likely related to osteoarthritis/stiffness  We " did offer occupational therapy, patient politely declined    Return in about 3 weeks (around 9/15/2020).  We compromised and he agreed to consider formal occupational therapy of follow-up if the stiffness/pain continues      To see how he is doing, will likely get rid of the splint at that point and start rosemarie taping alone    8/25/2020 11:08 AM     HISTORY/REASON FOR EXAM:  Pain/Deformity Following Trauma; Verify fracture stability.        TECHNIQUE/EXAM DESCRIPTION AND NUMBER OF VIEWS:  3 views of the RIGHT fingers.     COMPARISON: 8/10/2020     FINDINGS:  Again noted is a mildly angulated fracture of the proximal phalanx of the fifth digit. There is interphalangeal joint space narrowing and spurring. There is joint space narrowing at the MCP joints. Bones are demineralized. There is a remote posttraumatic   deformity of the third metacarpal. Atherosclerotic calcification is seen.        IMPRESSION:     Mildly angulated fracture of the proximal phalanx of the fifth digit is not significantly changed in appearance compared to prior.     Degenerative changes.     Old posttraumatic deformity of the third metacarpal.     Demineralization.        8/10/2020 3:52 PM     HISTORY/REASON FOR EXAM:  Pain/Deformity Following Trauma; Verify fracture stability  Follow up fracture of fifth proximal phalanx  ?     TECHNIQUE/EXAM DESCRIPTION AND NUMBER OF VIEWS:  3 views of the RIGHT fingers.     COMPARISON: 8/4/20     FINDINGS:     Severe osseous demineralization.     Redemonstration of displaced angulated fracture of fifth proximal phalanx with no significant healing change. The alignment is slightly improved since prior.     Severe osteoarthritis of the PIP and DIP joints. Severe osteoarthritis of the first IP joint.     IMPRESSION:     Redemonstration of displaced angulated fracture of fifth proximal phalanx with no significant healing change. The alignment is slightly improved since prior.     Severe osseous  demineralization.        There are no diagnoses linked to this encounter.      8/4/2020 2:30 PM     HISTORY/REASON FOR EXAM:  Pain/Deformity Following Trauma; Concern for 5th finger fx/dislocation  ] Degenerative pain.     TECHNIQUE/EXAM DESCRIPTION AND NUMBER OF VIEWS:  3 views of the RIGHT hand.     COMPARISON: None     FINDINGS:     Diffuse osseous demineralization.     Acute displaced fracture of the fifth proximal phalanx     Severe osteoarthritis of the PIP and DIP joints. Severe osteoarthritis of the first IP joint.     Old fracture deformity of the third metacarpal.     Vascular calcification.     IMPRESSION:      Acute displaced fracture of the fifth proximal phalanx.    Thank you Supa Leigh PA-C for allowing me to participate in caring for the patient.

## 2020-08-25 NOTE — PROGRESS NOTES
Attempted to reach patient, but phone just rings and rings. No VM available.  Will try again later    8/25/20 10:45am  Roxane Navarro PharmD

## 2020-08-26 NOTE — PROGRESS NOTES
Anticoagulation Summary  As of 2020    INR goal:  2.0-3.0   TTR:  72.0 % (5.2 y)   INR used for dosin.80 (2020)   Warfarin maintenance plan:  2.5 mg (5 mg x 0.5) every Mon, Fri; 5 mg (5 mg x 1) all other days   Weekly warfarin total:  30 mg   Plan last modified:  Bert Gurrola, Pharmacy Intern (2020)   Next INR check:  2020   Target end date:  Indefinite    Indications    A-fib (HCC) (Resolved) [I48.91]             Anticoagulation Episode Summary     INR check location:  Clinic Lab    Preferred lab:  [facility]    Send INR reminders to:      Comments:  [phone]        Anticoagulation Care Providers     Provider Role Specialty Phone number    Anastacio Sunshine M.D. Referring Cardiology [phone]    Mahin Valdez Responsible          Spoke with patient today regarding subtherapeutic INR of 1.8.  Patient denies any signs/symptoms of bruising or bleeding or any changes in diet and medications.  Instructed patient to call clinic with any questions or concerns.    Patient is to Increase weekly regimen as detailed above.    Follow up in 2 weeks, to reduce risk of adverse events related to this high risk medication,  Warfarin.    Bert Gurrola, Pharmacy Intern

## 2020-08-27 ENCOUNTER — PATIENT OUTREACH (OUTPATIENT)
Dept: HEALTH INFORMATION MANAGEMENT | Facility: OTHER | Age: 85
End: 2020-08-27

## 2020-09-02 ENCOUNTER — HOSPITAL ENCOUNTER (OUTPATIENT)
Facility: MEDICAL CENTER | Age: 85
End: 2020-09-02
Attending: PHYSICIAN ASSISTANT
Payer: MEDICARE

## 2020-09-02 ENCOUNTER — OFFICE VISIT (OUTPATIENT)
Dept: MEDICAL GROUP | Facility: MEDICAL CENTER | Age: 85
End: 2020-09-02
Payer: MEDICARE

## 2020-09-02 VITALS
SYSTOLIC BLOOD PRESSURE: 130 MMHG | DIASTOLIC BLOOD PRESSURE: 60 MMHG | OXYGEN SATURATION: 91 % | HEART RATE: 65 BPM | TEMPERATURE: 98.3 F | HEIGHT: 68 IN | BODY MASS INDEX: 28.89 KG/M2 | WEIGHT: 190.6 LBS

## 2020-09-02 DIAGNOSIS — R35.0 URINARY FREQUENCY: ICD-10-CM

## 2020-09-02 DIAGNOSIS — Z79.891 CHRONIC USE OF OPIATE DRUG FOR THERAPEUTIC PURPOSE: ICD-10-CM

## 2020-09-02 DIAGNOSIS — F11.20 OPIOID TYPE DEPENDENCE, CONTINUOUS (HCC): ICD-10-CM

## 2020-09-02 DIAGNOSIS — M47.26 OSTEOARTHRITIS OF SPINE WITH RADICULOPATHY, LUMBAR REGION: ICD-10-CM

## 2020-09-02 DIAGNOSIS — M17.11 PRIMARY OSTEOARTHRITIS OF RIGHT KNEE: ICD-10-CM

## 2020-09-02 LAB
APPEARANCE UR: NORMAL
BILIRUB UR STRIP-MCNC: NORMAL MG/DL
COLOR UR AUTO: NORMAL
GLUCOSE UR STRIP.AUTO-MCNC: NEGATIVE MG/DL
KETONES UR STRIP.AUTO-MCNC: NEGATIVE MG/DL
LEUKOCYTE ESTERASE UR QL STRIP.AUTO: NORMAL
NITRITE UR QL STRIP.AUTO: NEGATIVE
PH UR STRIP.AUTO: 5 [PH] (ref 5–8)
PROT UR QL STRIP: NORMAL MG/DL
RBC UR QL AUTO: NORMAL
SP GR UR STRIP.AUTO: 1.02
UROBILINOGEN UR STRIP-MCNC: 0.2 MG/DL

## 2020-09-02 PROCEDURE — 87086 URINE CULTURE/COLONY COUNT: CPT

## 2020-09-02 PROCEDURE — 99000 SPECIMEN HANDLING OFFICE-LAB: CPT | Performed by: PHYSICIAN ASSISTANT

## 2020-09-02 PROCEDURE — 80307 DRUG TEST PRSMV CHEM ANLYZR: CPT

## 2020-09-02 PROCEDURE — 81002 URINALYSIS NONAUTO W/O SCOPE: CPT | Performed by: PHYSICIAN ASSISTANT

## 2020-09-02 PROCEDURE — 99214 OFFICE O/P EST MOD 30 MIN: CPT | Mod: 24 | Performed by: PHYSICIAN ASSISTANT

## 2020-09-02 RX ORDER — HYDROCODONE BITARTRATE AND ACETAMINOPHEN 10; 325 MG/1; MG/1
1 TABLET ORAL EVERY 6 HOURS PRN
Qty: 120 TAB | Refills: 0 | Status: SHIPPED | OUTPATIENT
Start: 2020-09-02 | End: 2020-12-02 | Stop reason: SDUPTHER

## 2020-09-02 RX ORDER — HYDROCODONE BITARTRATE AND ACETAMINOPHEN 10; 325 MG/1; MG/1
1 TABLET ORAL EVERY 6 HOURS PRN
Qty: 120 TAB | Refills: 0 | Status: SHIPPED | OUTPATIENT
Start: 2020-11-02 | End: 2020-12-02 | Stop reason: SDUPTHER

## 2020-09-02 RX ORDER — HYDROCODONE BITARTRATE AND ACETAMINOPHEN 10; 325 MG/1; MG/1
1 TABLET ORAL EVERY 6 HOURS PRN
Qty: 120 TAB | Refills: 0 | Status: SHIPPED | OUTPATIENT
Start: 2020-10-02 | End: 2020-12-02 | Stop reason: SDUPTHER

## 2020-09-04 PROBLEM — R35.0 URINARY FREQUENCY: Status: ACTIVE | Noted: 2020-09-04

## 2020-09-04 NOTE — PROGRESS NOTES
Subjective:   Gennaro Richardson is a 85 y.o. male here today for pain medication refill. No recent illness. No falls. He and sister are doing well. Did accidentally hit his finger and break it, seeing Dr. Hernandez, thinks it is healing well.     Chronic use of opiate drug for therapeutic purpose  Chronic, stable,  verified, chronic knee pain, chronic low back pain, taking as prescribed, urine drug screen today. No h/o substance abuse. No abnormality with the . Doesn't drink alcohol. Will continue prescription.     Urinary frequency  Chronic, would like check for infection       Current medicines (including changes today)  Current Outpatient Medications   Medication Sig Dispense Refill   • [START ON 11/2/2020] HYDROcodone/acetaminophen (NORCO)  MG Tab Take 1 Tab by mouth every 6 hours as needed for up to 30 days. 120 Tab 0   • [START ON 10/2/2020] HYDROcodone/acetaminophen (NORCO)  MG Tab Take 1 Tab by mouth every 6 hours as needed for up to 30 days. 120 Tab 0   • HYDROcodone/acetaminophen (NORCO)  MG Tab Take 1 Tab by mouth every 6 hours as needed for up to 30 days. 120 Tab 0   • warfarin (COUMADIN) 5 MG Tab TAKE one-HALF TO ONE TABLET BY MOUTH DAILY or as instructed by clinic  90 Tab 1   • clobetasol (TEMOVATE) 0.05 % Cream Apply topically to the affected area (S) twice daily  60 g 3   • tamsulosin (FLOMAX) 0.4 MG capsule      • finasteride (PROSCAR) 5 MG TABS TAKE ONE TABLET BY MOUTH ONE TIME DAILY 90 Tab 2     No current facility-administered medications for this visit.      He  has a past medical history of A-fib (HCC) (6/28/2011), Arteriosclerosis, CAD (coronary artery disease), CAD (coronary artery disease) (6/28/2011), Cardiac pacemaker in situ (11/19/2013), Colonic polyps, Diverticulosis, Dizzy spells (11/20/2012), HERZOG (dyspnea on exertion) (9/7/2014), Dyslipidemia (6/28/2011), Hematoma of abdominal wall, Hematoma of rectus sheath (6/13/2012), Myocardial infarct ([facility]) (1989), Pacer  "at end of battery life (6/28/2011), PAF (paroxysmal atrial fibrillation) (HCC) (9/7/2014), Paroxysmal atrial fibrillation (HCC), Peripheral vascular disease (HCC) (10/31/2011), and PVD (peripheral vascular disease) ([facility]).    ROS   No fever/chills. No weight change. No headache/dizziness. No focal weakness. No sore throat, nasal congestion, ear pain. No chest pain, no shortness of breath, difficulty breathing. No n/v/d/c or abdominal pain. No urinary complaint. No rash or skin lesion.        Objective:     /60 (BP Location: Right arm, Patient Position: Sitting, BP Cuff Size: Adult long)   Pulse 65   Temp 36.8 °C (98.3 °F) (Temporal)   Ht 1.727 m (5' 8\")   Wt 86.5 kg (190 lb 9.6 oz)   SpO2 91%  Body mass index is 28.98 kg/m².   Physical Exam:  Constitutional: WDWN, NAD  Skin: Warm, dry, good turgor, no rashes in visible areas.  Respiratory: Unlabored respiratory effort, lungs clear to auscultation, no wheezes, no ronchi.  Cardiovascular: Normal S1, S2, no murmur, no leg edema.  Psych: Alert and oriented x3, normal affect and mood.    Lab Results   Component Value Date/Time    POCCOLOR Kelsy 09/02/2020 11:00 AM    POCAPPEAR Cloudy 09/02/2020 11:00 AM    POCLEUKEST 1+ 09/02/2020 11:00 AM    POCNITRITE Negative 09/02/2020 11:00 AM    POCUROBILIGE 0.2 09/02/2020 11:00 AM    POCPROTEIN Trace 09/02/2020 11:00 AM    POCURPH 5.0 09/02/2020 11:00 AM    POCBLOOD 2+ 09/02/2020 11:00 AM    POCSPGRV 1.020 09/02/2020 11:00 AM    POCKETONES Negative 09/02/2020 11:00 AM    POCBILIRUBIN 1+ 09/02/2020 11:00 AM    POCGLUCUA Negative 09/02/2020 11:00 AM      Assessment and Plan:   The following treatment plan was discussed    1. Primary osteoarthritis of right knee  [facility] Aware web site evaluation: I have obtained and reviewed patient utilization report from [facility] pharmacy database prior to writing prescription for controlled substance II, III or IV. Based on the report and my clinical assessment the prescription " is medically necessary.   Patient is cautioned on sedation potential of narcotic medication; no drinking, driving or operating heavy machinery while on this medication.  - HYDROcodone/acetaminophen (NORCO)  MG Tab; Take 1 Tab by mouth every 6 hours as needed for up to 30 days.  Dispense: 120 Tab; Refill: 0  - HYDROcodone/acetaminophen (NORCO)  MG Tab; Take 1 Tab by mouth every 6 hours as needed for up to 30 days.  Dispense: 120 Tab; Refill: 0  - HYDROcodone/acetaminophen (NORCO)  MG Tab; Take 1 Tab by mouth every 6 hours as needed for up to 30 days.  Dispense: 120 Tab; Refill: 0    2. Osteoarthritis of spine with radiculopathy, lumbar region    - HYDROcodone/acetaminophen (NORCO)  MG Tab; Take 1 Tab by mouth every 6 hours as needed for up to 30 days.  Dispense: 120 Tab; Refill: 0  - HYDROcodone/acetaminophen (NORCO)  MG Tab; Take 1 Tab by mouth every 6 hours as needed for up to 30 days.  Dispense: 120 Tab; Refill: 0  - HYDROcodone/acetaminophen (NORCO)  MG Tab; Take 1 Tab by mouth every 6 hours as needed for up to 30 days.  Dispense: 120 Tab; Refill: 0    3. Opioid type dependence, continuous (HCC)    - HYDROcodone/acetaminophen (NORCO)  MG Tab; Take 1 Tab by mouth every 6 hours as needed for up to 30 days.  Dispense: 120 Tab; Refill: 0  - HYDROcodone/acetaminophen (NORCO)  MG Tab; Take 1 Tab by mouth every 6 hours as needed for up to 30 days.  Dispense: 120 Tab; Refill: 0  - HYDROcodone/acetaminophen (NORCO)  MG Tab; Take 1 Tab by mouth every 6 hours as needed for up to 30 days.  Dispense: 120 Tab; Refill: 0    4. Urinary frequency    - POCT Urinalysis    5. Chronic use of opiate drug for therapeutic purpose        Followup: 3 months

## 2020-09-04 NOTE — ASSESSMENT & PLAN NOTE
Chronic, stable,  verified, chronic knee pain, chronic low back pain, taking as prescribed, urine drug screen today. No h/o substance abuse. No abnormality with the . Doesn't drink alcohol. Will continue prescription.

## 2020-09-05 LAB
BACTERIA UR CULT: NORMAL
SIGNIFICANT IND 70042: NORMAL
SITE SITE: NORMAL
SOURCE SOURCE: NORMAL

## 2020-09-08 ENCOUNTER — HOSPITAL ENCOUNTER (OUTPATIENT)
Dept: LAB | Facility: MEDICAL CENTER | Age: 85
End: 2020-09-08
Attending: NURSE PRACTITIONER
Payer: MEDICARE

## 2020-09-08 DIAGNOSIS — Z79.01 CHRONIC ANTICOAGULATION: ICD-10-CM

## 2020-09-08 LAB
INR PPP: 3.22 (ref 0.87–1.13)
PROTHROMBIN TIME: 33.9 SEC (ref 12–14.6)

## 2020-09-08 PROCEDURE — 36415 COLL VENOUS BLD VENIPUNCTURE: CPT

## 2020-09-08 PROCEDURE — 85610 PROTHROMBIN TIME: CPT

## 2020-09-09 ENCOUNTER — ANTICOAGULATION MONITORING (OUTPATIENT)
Dept: VASCULAR LAB | Facility: MEDICAL CENTER | Age: 85
End: 2020-09-09

## 2020-09-09 NOTE — PROGRESS NOTES
Anticoagulation Summary  As of 9/9/2020    INR goal:  2.0-3.0   TTR:  72.0 % (5.2 y)   INR used for dosing:  3.22 (9/8/2020)   Warfarin maintenance plan:  2.5 mg (5 mg x 0.5) every Mon, Wed, Fri; 5 mg (5 mg x 1) all other days   Weekly warfarin total:  27.5 mg   Plan last modified:  Reny Leon, Pharmacy Intern (9/9/2020)   Next INR check:     Target end date:  Indefinite    Indications    A-fib (HCC) (Resolved) [I48.91]             Anticoagulation Episode Summary     INR check location:  Clinic Lab    Preferred lab:  [facility]    Send INR reminders to:      Comments:  [phone]        Anticoagulation Care Providers     Provider Role Specialty Phone number    Anastacio Sunshine M.D. Referring Cardiology [phone]    Mahin Valdez Responsible          Anticoagulation Patient Findings      Spoke with patient.  INR is supratherapeutic.   Pt denies any unusual s/s of bleeding, bruising, clotting or any changes to diet or medications. Denies any etoh, cranberries, supplements, or illness.   Pt verifies warfarin weekly dosing.   CHADSVASC 3 Secondary to age and Hx of MI  Clot hx MI     Will have pt take 2.5 mg mon, wed, fri, and 5 mg all other days    Repeat INR in 3 weeks per pt request. We discussed the risks of both bleeding and clotting.   We discussed Apixaban but patient says it is too expensive.    Reny Leon, Pharmacy Intern

## 2020-09-15 ENCOUNTER — OFFICE VISIT (OUTPATIENT)
Dept: MEDICAL GROUP | Facility: CLINIC | Age: 85
End: 2020-09-15
Payer: MEDICARE

## 2020-09-15 VITALS
SYSTOLIC BLOOD PRESSURE: 128 MMHG | OXYGEN SATURATION: 94 % | WEIGHT: 190.6 LBS | TEMPERATURE: 97.5 F | DIASTOLIC BLOOD PRESSURE: 70 MMHG | BODY MASS INDEX: 28.89 KG/M2 | HEART RATE: 68 BPM | HEIGHT: 68 IN | RESPIRATION RATE: 18 BRPM

## 2020-09-15 DIAGNOSIS — S62.619D CLOSED FRACTURE OF PROXIMAL PHALANX OF RIGHT HAND WITH ROUTINE HEALING, SUBSEQUENT ENCOUNTER: ICD-10-CM

## 2020-09-15 PROCEDURE — 99024 POSTOP FOLLOW-UP VISIT: CPT | Performed by: FAMILY MEDICINE

## 2020-09-15 NOTE — PROGRESS NOTES
"Subjective:      Gennaro Richardson is a 84 y.o. male who presents with Finger Injury (Referral from UC/ R little finger injury )     Referred by Supa Leigh PA-C  for evaluation of RIGHT fifth finger pain    HPI   RIGHT fifth finger pain  Due to injury, August 4, 2020  Mechanism of injury, walking at his mailbox and inadvertently tripped causing him to fall onto his RIGHT hand  Pain was predominantly at the fifth finger of the RIGHT hand  Overall pain IMPROVED  He has had intermittent pain if he bumps the hand, but otherwise functionally he is doing WELL  Still doing some rosemarie taping  He has been wearing his finger splint     Objective:     /70 (BP Location: Right arm, Patient Position: Sitting, BP Cuff Size: Adult)   Pulse 68   Temp 36.4 °C (97.5 °F) (Temporal)   Resp 18   Ht 1.727 m (5' 8\")   Wt 86.5 kg (190 lb 9.6 oz)   SpO2 94%   BMI 28.98 kg/m²     Physical Exam     Hand exam    NAD  Alert and oriented    BILATERAL hand exam  NO swelling along the ulnar aspect of the RIGHT hand and fifth finger  NO rotational deformity  Range of motion of all MCP, DIP and PIP joints MILDLY decreased along the RIGHT fifth finger at all joints     Assessment/Plan:       1. Closed fracture of proximal phalanx of right hand with routine healing, subsequent encounter       Due to injury, August 4, 2020  Mechanism of injury, walking at his mailbox and inadvertently tripped causing him to fall onto his RIGHT hand  Pain was predominantly at the fifth finger of the RIGHT hand    No longer having pain with range of motion  No rotational deformity and clinically fracture is HEALED  Patient denies any limited hand function    We again offered occupational therapy, patient politely declined since he is pleased with his progress at this point    We compromised and he agreed to consider formal occupational therapy of follow-up if the stiffness/pain continues      To see how he is doing, will likely get rid of the splint " at that point and start rosemarie taping alone    8/25/2020 11:08 AM     HISTORY/REASON FOR EXAM:  Pain/Deformity Following Trauma; Verify fracture stability.        TECHNIQUE/EXAM DESCRIPTION AND NUMBER OF VIEWS:  3 views of the RIGHT fingers.     COMPARISON: 8/10/2020     FINDINGS:  Again noted is a mildly angulated fracture of the proximal phalanx of the fifth digit. There is interphalangeal joint space narrowing and spurring. There is joint space narrowing at the MCP joints. Bones are demineralized. There is a remote posttraumatic   deformity of the third metacarpal. Atherosclerotic calcification is seen.        IMPRESSION:     Mildly angulated fracture of the proximal phalanx of the fifth digit is not significantly changed in appearance compared to prior.     Degenerative changes.     Old posttraumatic deformity of the third metacarpal.     Demineralization.        8/10/2020 3:52 PM     HISTORY/REASON FOR EXAM:  Pain/Deformity Following Trauma; Verify fracture stability  Follow up fracture of fifth proximal phalanx  ?     TECHNIQUE/EXAM DESCRIPTION AND NUMBER OF VIEWS:  3 views of the RIGHT fingers.     COMPARISON: 8/4/20     FINDINGS:     Severe osseous demineralization.     Redemonstration of displaced angulated fracture of fifth proximal phalanx with no significant healing change. The alignment is slightly improved since prior.     Severe osteoarthritis of the PIP and DIP joints. Severe osteoarthritis of the first IP joint.     IMPRESSION:     Redemonstration of displaced angulated fracture of fifth proximal phalanx with no significant healing change. The alignment is slightly improved since prior.     Severe osseous demineralization.        There are no diagnoses linked to this encounter.      8/4/2020 2:30 PM     HISTORY/REASON FOR EXAM:  Pain/Deformity Following Trauma; Concern for 5th finger fx/dislocation  ] Degenerative pain.     TECHNIQUE/EXAM DESCRIPTION AND NUMBER OF VIEWS:  3 views of the RIGHT  hand.     COMPARISON: None     FINDINGS:     Diffuse osseous demineralization.     Acute displaced fracture of the fifth proximal phalanx     Severe osteoarthritis of the PIP and DIP joints. Severe osteoarthritis of the first IP joint.     Old fracture deformity of the third metacarpal.     Vascular calcification.     IMPRESSION:      Acute displaced fracture of the fifth proximal phalanx.    Thank you Supa Leigh PA-C for allowing me to participate in caring for the patient.

## 2020-09-29 ENCOUNTER — HOSPITAL ENCOUNTER (OUTPATIENT)
Dept: LAB | Facility: MEDICAL CENTER | Age: 85
End: 2020-09-29
Attending: NURSE PRACTITIONER
Payer: MEDICARE

## 2020-09-29 DIAGNOSIS — Z79.01 CHRONIC ANTICOAGULATION: ICD-10-CM

## 2020-09-29 LAB
INR PPP: 2.04 (ref 0.87–1.13)
PROTHROMBIN TIME: 23.6 SEC (ref 12–14.6)

## 2020-09-29 PROCEDURE — 85610 PROTHROMBIN TIME: CPT

## 2020-09-29 PROCEDURE — 36415 COLL VENOUS BLD VENIPUNCTURE: CPT

## 2020-09-30 ENCOUNTER — ANTICOAGULATION MONITORING (OUTPATIENT)
Dept: VASCULAR LAB | Facility: MEDICAL CENTER | Age: 85
End: 2020-09-30

## 2020-09-30 NOTE — PROGRESS NOTES
Anticoagulation Summary  As of 2020    INR goal:  2.0-3.0   TTR:  72.1 % (5.3 y)   INR used for dosin.04 (2020)   Warfarin maintenance plan:  5 mg (5 mg x 1) every Mon, Wed, Fri; 2.5 mg (5 mg x 0.5) all other days   Weekly warfarin total:  25 mg   Plan last modified:  Yanira Deng, Pharmacy Intern (2020)   Next INR check:  10/21/2020   Target end date:  Indefinite    Indications    A-fib (HCC) (Resolved) [I48.91]             Anticoagulation Episode Summary     INR check location:  Clinic Lab    Preferred lab:  [facility]    Send INR reminders to:      Comments:  [phone]        Anticoagulation Care Providers     Provider Role Specialty Phone number    Anastacio Sunshine M.D. Referring Cardiology [phone]    Mahin Valdez Responsible          Anticoagulation Patient Findings     Spoke with patient today regarding therapeutic INR of 2.04.  Patient denies any signs/symptoms of bruising or bleeding or any changes in diet and medications. Per pt, he has actually been taking his warfarin 5 mg Mon/Wed/Fri and 2.5 mg all other days (backwards to what we had) x 3 weeks and his INR is now in range. Instructed patient to call clinic with any questions or concerns.    Pt is to continue with current warfarin dosing regimen of 5 mg Mon/Wed/Fri and 2.5 mg all other days .    Follow up in 3 weeks - per pt request, to reduce risk of adverse events related to this high risk medication,  Warfarin.    Yanira Deng, Pharmacy Intern

## 2020-10-20 ENCOUNTER — ANTICOAGULATION MONITORING (OUTPATIENT)
Dept: VASCULAR LAB | Facility: MEDICAL CENTER | Age: 85
End: 2020-10-20

## 2020-10-20 ENCOUNTER — HOSPITAL ENCOUNTER (OUTPATIENT)
Dept: LAB | Facility: MEDICAL CENTER | Age: 85
End: 2020-10-20
Attending: NURSE PRACTITIONER
Payer: MEDICARE

## 2020-10-20 DIAGNOSIS — Z79.01 CHRONIC ANTICOAGULATION: ICD-10-CM

## 2020-10-20 LAB
INR PPP: 1.6 (ref 0.87–1.13)
PROTHROMBIN TIME: 19.6 SEC (ref 12–14.6)

## 2020-10-20 PROCEDURE — 85610 PROTHROMBIN TIME: CPT

## 2020-10-20 PROCEDURE — 36415 COLL VENOUS BLD VENIPUNCTURE: CPT

## 2020-10-20 NOTE — PROGRESS NOTES
OP   Telephone Anticoagulation Service Note      Anticoagulation Summary  As of 10/20/2020    INR goal:  2.0-3.0   TTR:  71.4 % (5.3 y)   INR used for dosin.60 (10/20/2020)   Warfarin maintenance plan:  2.5 mg (5 mg x 0.5) every Sun, Tue, Thu; 5 mg (5 mg x 1) all other days   Weekly warfarin total:  27.5 mg   Plan last modified:  Leah Navarro (10/20/2020)   Next INR check:  11/10/2020   Target end date:  Indefinite    Indications    A-fib (HCC) (Resolved) [I48.91]             Anticoagulation Episode Summary     INR check location:  Clinic Lab    Preferred lab:  [facility]    Send INR reminders to:      Comments:  [phone]        Anticoagulation Care Providers     Provider Role Specialty Phone number    Anastcaio Sunshine M.D. Referring Cardiology [phone]    Mahin Valdez Responsible          Anticoagulation Patient Findings  Patient Findings     Negatives:  Signs/symptoms of thrombosis, Signs/symptoms of bleeding, Laboratory test error suspected, Change in health, Change in alcohol use, Change in activity, Upcoming invasive procedure, Emergency department visit, Upcoming dental procedure, Missed doses, Extra doses, Change in medications, Change in diet/appetite, Hospital admission, Bruising, Other complaints         Spoke with the patient on the phone today, reporting a SUB-therapeutic INR of 1.6. Confirmed the current warfarin dosing regimen and patient compliance. Patient denies any missed doses.  Patient denies any interval changes to diet and/or medications. Patient denies any signs/symptoms of bleeding or clotting.  Patient instructed to bolus with 5mg TONIGHT, then begin an increased regimen of 2.5mg on Sun, Tues, Thurs and 5mg ROW. Patient will retest again in 3 weeks ( per pt preference).     Roxane Navarro  PharmD

## 2020-11-10 ENCOUNTER — HOSPITAL ENCOUNTER (OUTPATIENT)
Dept: LAB | Facility: MEDICAL CENTER | Age: 85
End: 2020-11-10
Attending: NURSE PRACTITIONER
Payer: MEDICARE

## 2020-11-10 DIAGNOSIS — Z79.01 CHRONIC ANTICOAGULATION: ICD-10-CM

## 2020-11-10 LAB
INR PPP: 2.22 (ref 0.87–1.13)
PROTHROMBIN TIME: 25.3 SEC (ref 12–14.6)

## 2020-11-10 PROCEDURE — 85610 PROTHROMBIN TIME: CPT

## 2020-11-10 PROCEDURE — 36415 COLL VENOUS BLD VENIPUNCTURE: CPT

## 2020-11-11 ENCOUNTER — ANTICOAGULATION MONITORING (OUTPATIENT)
Dept: VASCULAR LAB | Facility: MEDICAL CENTER | Age: 85
End: 2020-11-11

## 2020-11-11 NOTE — PROGRESS NOTES
Anticoagulation Summary  As of 2020    INR goal:  2.0-3.0   TTR:  71.0 % (5.4 y)   INR used for dosin.22 (11/10/2020)   Warfarin maintenance plan:  2.5 mg (5 mg x 0.5) every Sun, e, Thu; 5 mg (5 mg x 1) all other days   Weekly warfarin total:  27.5 mg   Plan last modified:  Leah Navarro (10/20/2020)   Next INR check:     Target end date:  Indefinite    Indications    A-fib (HCC) (Resolved) [I48.91]             Anticoagulation Episode Summary     INR check location:  Clinic Lab    Preferred lab:  [facility]    Send INR reminders to:      Comments:  [phone]        Anticoagulation Care Providers     Provider Role Specialty Phone number    Anastacio Sunshine M.D. Referring Cardiology [phone]    Mahin Valdez Responsible          Anticoagulation Patient Findings      Spoke with patient to report a therapeutic INR. Spoke with patient to report a therapeutic INR.  Pt confirms dosing and denies bleeding, or any changes to medications or diet. Pt instructed to continue with current regimen. Will f/u 4 weeks       Buffy Bazan, Pharmacy Intern

## 2020-12-02 ENCOUNTER — OFFICE VISIT (OUTPATIENT)
Dept: MEDICAL GROUP | Facility: MEDICAL CENTER | Age: 85
End: 2020-12-02
Payer: MEDICARE

## 2020-12-02 VITALS
HEART RATE: 65 BPM | SYSTOLIC BLOOD PRESSURE: 122 MMHG | TEMPERATURE: 98.3 F | HEIGHT: 68 IN | DIASTOLIC BLOOD PRESSURE: 78 MMHG | OXYGEN SATURATION: 94 % | WEIGHT: 189.15 LBS | BODY MASS INDEX: 28.67 KG/M2

## 2020-12-02 DIAGNOSIS — F11.20 OPIOID TYPE DEPENDENCE, CONTINUOUS (HCC): ICD-10-CM

## 2020-12-02 DIAGNOSIS — M47.26 OSTEOARTHRITIS OF SPINE WITH RADICULOPATHY, LUMBAR REGION: ICD-10-CM

## 2020-12-02 DIAGNOSIS — N40.1 BENIGN PROSTATIC HYPERPLASIA WITH LOWER URINARY TRACT SYMPTOMS, SYMPTOM DETAILS UNSPECIFIED: Chronic | ICD-10-CM

## 2020-12-02 DIAGNOSIS — M17.11 PRIMARY OSTEOARTHRITIS OF RIGHT KNEE: ICD-10-CM

## 2020-12-02 DIAGNOSIS — Z79.891 CHRONIC USE OF OPIATE DRUG FOR THERAPEUTIC PURPOSE: ICD-10-CM

## 2020-12-02 DIAGNOSIS — I48.0 PAF (PAROXYSMAL ATRIAL FIBRILLATION) (HCC): ICD-10-CM

## 2020-12-02 PROCEDURE — 99214 OFFICE O/P EST MOD 30 MIN: CPT | Performed by: PHYSICIAN ASSISTANT

## 2020-12-02 RX ORDER — BROMFENAC 0.76 MG/ML
1 SOLUTION/ DROPS OPHTHALMIC
COMMUNITY
End: 2021-02-06

## 2020-12-02 RX ORDER — HYDROCODONE BITARTRATE AND ACETAMINOPHEN 10; 325 MG/1; MG/1
1 TABLET ORAL EVERY 6 HOURS PRN
Qty: 120 TAB | Refills: 0 | Status: SHIPPED | OUTPATIENT
Start: 2020-12-02 | End: 2021-01-01

## 2020-12-02 RX ORDER — OFLOXACIN 3 MG/ML
SOLUTION/ DROPS OPHTHALMIC
COMMUNITY
End: 2021-01-17

## 2020-12-02 RX ORDER — HYDROCODONE BITARTRATE AND ACETAMINOPHEN 10; 325 MG/1; MG/1
1 TABLET ORAL EVERY 6 HOURS PRN
Qty: 120 TAB | Refills: 0 | Status: ON HOLD | OUTPATIENT
Start: 2021-02-02 | End: 2021-01-26

## 2020-12-02 RX ORDER — OFLOXACIN 3 MG/ML
SOLUTION/ DROPS OPHTHALMIC
COMMUNITY
Start: 2020-11-17 | End: 2021-01-17

## 2020-12-02 RX ORDER — HYDROCODONE BITARTRATE AND ACETAMINOPHEN 10; 325 MG/1; MG/1
1 TABLET ORAL EVERY 6 HOURS PRN
Qty: 120 TAB | Refills: 0 | Status: ON HOLD | OUTPATIENT
Start: 2021-01-02 | End: 2021-01-26

## 2020-12-02 RX ORDER — BROMFENAC 0.76 MG/ML
SOLUTION/ DROPS OPHTHALMIC
COMMUNITY
Start: 2020-11-17 | End: 2021-01-17

## 2020-12-02 RX ORDER — LOTEPREDNOL ETABONATE 3.8 MG/G
GEL OPHTHALMIC
Status: ON HOLD | COMMUNITY
Start: 2020-11-17 | End: 2021-01-18

## 2020-12-02 NOTE — ASSESSMENT & PLAN NOTE
Chronic, stable on current,  verified, no other substance use, no other prescribers, no lost or stolen meds, no request for early refill, no pharmacy change.

## 2020-12-07 ENCOUNTER — HOSPITAL ENCOUNTER (OUTPATIENT)
Dept: LAB | Facility: MEDICAL CENTER | Age: 85
End: 2020-12-07
Attending: NURSE PRACTITIONER
Payer: MEDICARE

## 2020-12-07 DIAGNOSIS — Z79.01 CHRONIC ANTICOAGULATION: ICD-10-CM

## 2020-12-07 LAB
INR PPP: 2.97 (ref 0.87–1.13)
PROTHROMBIN TIME: 31.8 SEC (ref 12–14.6)

## 2020-12-07 PROCEDURE — 36415 COLL VENOUS BLD VENIPUNCTURE: CPT

## 2020-12-07 PROCEDURE — 85610 PROTHROMBIN TIME: CPT

## 2020-12-08 ENCOUNTER — ANTICOAGULATION MONITORING (OUTPATIENT)
Dept: VASCULAR LAB | Facility: MEDICAL CENTER | Age: 85
End: 2020-12-08

## 2020-12-08 NOTE — PROGRESS NOTES
Attempted to reach patient regarding INR below.  INR   Date Value Ref Range Status   12/07/2020 2.97 (H) 0.87 - 1.13 Final     Comment:     INR - Non-therapeutic Reference Range: 0.87-1.13  INR - Therapeutic Reference Range: 2.0-4.0         No answer to call, no VM.  Will attempt to reach at a later time.  Antwon Dejesus, PharmD, BCACP

## 2020-12-09 NOTE — PROGRESS NOTES
Anticoagulation Summary  As of 2020    INR goal:  2.0-3.0   TTR:  71.4 % (5.5 y)   INR used for dosin.97 (2020)   Warfarin maintenance plan:  2.5 mg (5 mg x 0.5) every Sun, Tue, Thu; 5 mg (5 mg x 1) all other days   Weekly warfarin total:  27.5 mg   Plan last modified:  Leah Navarro (10/20/2020)   Next INR check:  2021   Target end date:  Indefinite    Indications    A-fib (HCC) (Resolved) [I48.91]             Anticoagulation Episode Summary     INR check location:  Clinic Lab    Preferred lab:  [facility]    Send INR reminders to:      Comments:  [phone]        Anticoagulation Care Providers     Provider Role Specialty Phone number    Anastacio Sunshine M.D. Referring Cardiology [phone]    Mahin Valdez Responsible          Anticoagulation Patient Findings          Spoke with Gennaro to report a therapeutic INR of 2.97. Continue current dosing regimen.  Follow up in 6 weeks, to reduce the risk of adverse events related to this high risk medication, warfarin.    Verito Zafar, Clinical Pharmacist

## 2021-01-08 DIAGNOSIS — Z23 NEED FOR VACCINATION: ICD-10-CM

## 2021-01-17 ENCOUNTER — APPOINTMENT (OUTPATIENT)
Dept: RADIOLOGY | Facility: MEDICAL CENTER | Age: 86
DRG: 184 | End: 2021-01-17
Attending: EMERGENCY MEDICINE
Payer: MEDICARE

## 2021-01-17 ENCOUNTER — HOSPITAL ENCOUNTER (INPATIENT)
Facility: MEDICAL CENTER | Age: 86
LOS: 9 days | DRG: 184 | End: 2021-01-26
Attending: EMERGENCY MEDICINE | Admitting: SURGERY
Payer: MEDICARE

## 2021-01-17 DIAGNOSIS — R09.02 HYPOXIA: ICD-10-CM

## 2021-01-17 DIAGNOSIS — I48.0 PAF (PAROXYSMAL ATRIAL FIBRILLATION) (HCC): ICD-10-CM

## 2021-01-17 DIAGNOSIS — S51.812A LACERATION OF LEFT FOREARM, INITIAL ENCOUNTER: ICD-10-CM

## 2021-01-17 DIAGNOSIS — S22.42XA CLOSED FRACTURE OF FOUR RIBS OF LEFT SIDE, INITIAL ENCOUNTER: ICD-10-CM

## 2021-01-17 DIAGNOSIS — Z79.01 ANTICOAGULATED ON COUMADIN: ICD-10-CM

## 2021-01-17 DIAGNOSIS — I25.2 HISTORY OF MI (MYOCARDIAL INFARCTION): ICD-10-CM

## 2021-01-17 PROBLEM — T14.90XA TRAUMA: Status: ACTIVE | Noted: 2021-01-17

## 2021-01-17 PROBLEM — I71.40 AAA (ABDOMINAL AORTIC ANEURYSM) (HCC): Status: ACTIVE | Noted: 2021-01-17

## 2021-01-17 PROBLEM — S27.322A BILATERAL PULMONARY CONTUSION: Status: ACTIVE | Noted: 2021-01-17

## 2021-01-17 PROBLEM — E04.2 MULTIPLE THYROID NODULES: Status: ACTIVE | Noted: 2021-01-17

## 2021-01-17 PROBLEM — I71.20 THORACIC AORTIC ANEURYSM (TAA) (HCC): Status: ACTIVE | Noted: 2021-01-17

## 2021-01-17 PROBLEM — J98.11 ATELECTASIS, BILATERAL: Status: ACTIVE | Noted: 2021-01-17

## 2021-01-17 PROBLEM — K80.20 CHOLELITHIASIS: Status: ACTIVE | Noted: 2021-01-17

## 2021-01-17 PROBLEM — Z53.09: Status: ACTIVE | Noted: 2021-01-17

## 2021-01-17 PROBLEM — M79.632 LEFT FOREARM PAIN: Status: ACTIVE | Noted: 2021-01-17

## 2021-01-17 PROBLEM — J43.8 OTHER EMPHYSEMA (HCC): Status: ACTIVE | Noted: 2021-01-17

## 2021-01-17 PROBLEM — T50.905A PLATELET DYSFUNCTION DUE TO DRUGS: Status: ACTIVE | Noted: 2021-01-17

## 2021-01-17 PROBLEM — N28.1 BILATERAL RENAL CYSTS: Status: ACTIVE | Noted: 2021-01-17

## 2021-01-17 PROBLEM — I71.9 AORTIC ANEURYSM, DESCENDING (HCC): Status: ACTIVE | Noted: 2021-01-17

## 2021-01-17 PROBLEM — D69.59 PLATELET DYSFUNCTION DUE TO DRUGS: Status: ACTIVE | Noted: 2021-01-17

## 2021-01-17 PROBLEM — I35.0 AORTIC STENOSIS: Status: ACTIVE | Noted: 2021-01-17

## 2021-01-17 LAB
ABO GROUP BLD: NORMAL
ALBUMIN SERPL BCP-MCNC: 3.8 G/DL (ref 3.2–4.9)
ALBUMIN/GLOB SERPL: 1.2 G/DL
ALP SERPL-CCNC: 78 U/L (ref 30–99)
ALT SERPL-CCNC: 19 U/L (ref 2–50)
ANION GAP SERPL CALC-SCNC: 6 MMOL/L (ref 7–16)
APPEARANCE UR: CLEAR
APTT PPP: 38.3 SEC (ref 24.7–36)
AST SERPL-CCNC: 22 U/L (ref 12–45)
BASOPHILS # BLD AUTO: 0.2 % (ref 0–1.8)
BASOPHILS # BLD: 0.03 K/UL (ref 0–0.12)
BILIRUB SERPL-MCNC: 0.4 MG/DL (ref 0.1–1.5)
BILIRUB UR QL STRIP.AUTO: NEGATIVE
BLD GP AB SCN SERPL QL: NORMAL
BUN SERPL-MCNC: 24 MG/DL (ref 8–22)
CALCIUM SERPL-MCNC: 8.7 MG/DL (ref 8.5–10.5)
CHLORIDE SERPL-SCNC: 107 MMOL/L (ref 96–112)
CO2 SERPL-SCNC: 25 MMOL/L (ref 20–33)
COLOR UR: YELLOW
CREAT SERPL-MCNC: 0.82 MG/DL (ref 0.5–1.4)
EKG IMPRESSION: NORMAL
EOSINOPHIL # BLD AUTO: 0.09 K/UL (ref 0–0.51)
EOSINOPHIL NFR BLD: 0.7 % (ref 0–6.9)
ERYTHROCYTE [DISTWIDTH] IN BLOOD BY AUTOMATED COUNT: 47.8 FL (ref 35.9–50)
GLOBULIN SER CALC-MCNC: 3.2 G/DL (ref 1.9–3.5)
GLUCOSE SERPL-MCNC: 97 MG/DL (ref 65–99)
GLUCOSE UR STRIP.AUTO-MCNC: NEGATIVE MG/DL
HCT VFR BLD AUTO: 44.8 % (ref 42–52)
HGB BLD-MCNC: 14.2 G/DL (ref 14–18)
IMM GRANULOCYTES # BLD AUTO: 0.12 K/UL (ref 0–0.11)
IMM GRANULOCYTES NFR BLD AUTO: 1 % (ref 0–0.9)
INR PPP: 2.63 (ref 0.87–1.13)
KETONES UR STRIP.AUTO-MCNC: NEGATIVE MG/DL
LEUKOCYTE ESTERASE UR QL STRIP.AUTO: NEGATIVE
LYMPHOCYTES # BLD AUTO: 1.4 K/UL (ref 1–4.8)
LYMPHOCYTES NFR BLD: 11.2 % (ref 22–41)
MCH RBC QN AUTO: 30.6 PG (ref 27–33)
MCHC RBC AUTO-ENTMCNC: 31.7 G/DL (ref 33.7–35.3)
MCV RBC AUTO: 96.6 FL (ref 81.4–97.8)
MICRO URNS: ABNORMAL
MONOCYTES # BLD AUTO: 0.92 K/UL (ref 0–0.85)
MONOCYTES NFR BLD AUTO: 7.4 % (ref 0–13.4)
NEUTROPHILS # BLD AUTO: 9.93 K/UL (ref 1.82–7.42)
NEUTROPHILS NFR BLD: 79.5 % (ref 44–72)
NITRITE UR QL STRIP.AUTO: NEGATIVE
NRBC # BLD AUTO: 0 K/UL
NRBC BLD-RTO: 0 /100 WBC
PH UR STRIP.AUTO: 5 [PH] (ref 5–8)
PLATELET # BLD AUTO: 182 K/UL (ref 164–446)
PMV BLD AUTO: 9.9 FL (ref 9–12.9)
POTASSIUM SERPL-SCNC: 4.3 MMOL/L (ref 3.6–5.5)
PROT SERPL-MCNC: 7 G/DL (ref 6–8.2)
PROT UR QL STRIP: NEGATIVE MG/DL
PROTHROMBIN TIME: 28.9 SEC (ref 12–14.6)
RBC # BLD AUTO: 4.64 M/UL (ref 4.7–6.1)
RBC UR QL AUTO: ABNORMAL
RH BLD: NORMAL
SARS-COV+SARS-COV-2 AG RESP QL IA.RAPID: NOTDETECTED
SODIUM SERPL-SCNC: 138 MMOL/L (ref 135–145)
SP GR UR REFRACTOMETRY: 1.04
SPECIMEN SOURCE: NORMAL
UROBILINOGEN UR STRIP.AUTO-MCNC: 0.2 MG/DL
WBC # BLD AUTO: 12.5 K/UL (ref 4.8–10.8)

## 2021-01-17 PROCEDURE — 90715 TDAP VACCINE 7 YRS/> IM: CPT | Performed by: EMERGENCY MEDICINE

## 2021-01-17 PROCEDURE — 87426 SARSCOV CORONAVIRUS AG IA: CPT

## 2021-01-17 PROCEDURE — 81003 URINALYSIS AUTO W/O SCOPE: CPT

## 2021-01-17 PROCEDURE — 304999 HCHG REPAIR-SIMPLE/INTERMED LEVEL 1

## 2021-01-17 PROCEDURE — 71260 CT THORAX DX C+: CPT

## 2021-01-17 PROCEDURE — 0HQEXZZ REPAIR LEFT LOWER ARM SKIN, EXTERNAL APPROACH: ICD-10-PCS | Performed by: EMERGENCY MEDICINE

## 2021-01-17 PROCEDURE — 36415 COLL VENOUS BLD VENIPUNCTURE: CPT

## 2021-01-17 PROCEDURE — 72125 CT NECK SPINE W/O DYE: CPT

## 2021-01-17 PROCEDURE — 93005 ELECTROCARDIOGRAM TRACING: CPT | Performed by: SURGERY

## 2021-01-17 PROCEDURE — 96375 TX/PRO/DX INJ NEW DRUG ADDON: CPT

## 2021-01-17 PROCEDURE — 303747 HCHG EXTRA SUTURE

## 2021-01-17 PROCEDURE — 85025 COMPLETE CBC W/AUTO DIFF WBC: CPT

## 2021-01-17 PROCEDURE — 96365 THER/PROPH/DIAG IV INF INIT: CPT

## 2021-01-17 PROCEDURE — 700111 HCHG RX REV CODE 636 W/ 250 OVERRIDE (IP)

## 2021-01-17 PROCEDURE — 90471 IMMUNIZATION ADMIN: CPT

## 2021-01-17 PROCEDURE — 85730 THROMBOPLASTIN TIME PARTIAL: CPT

## 2021-01-17 PROCEDURE — 85610 PROTHROMBIN TIME: CPT

## 2021-01-17 PROCEDURE — 99291 CRITICAL CARE FIRST HOUR: CPT

## 2021-01-17 PROCEDURE — 3E0234Z INTRODUCTION OF SERUM, TOXOID AND VACCINE INTO MUSCLE, PERCUTANEOUS APPROACH: ICD-10-PCS | Performed by: EMERGENCY MEDICINE

## 2021-01-17 PROCEDURE — 700102 HCHG RX REV CODE 250 W/ 637 OVERRIDE(OP): Performed by: SURGERY

## 2021-01-17 PROCEDURE — 304217 HCHG IRRIGATION SYSTEM

## 2021-01-17 PROCEDURE — 70450 CT HEAD/BRAIN W/O DYE: CPT

## 2021-01-17 PROCEDURE — U0005 INFEC AGEN DETEC AMPLI PROBE: HCPCS

## 2021-01-17 PROCEDURE — 700101 HCHG RX REV CODE 250: Performed by: EMERGENCY MEDICINE

## 2021-01-17 PROCEDURE — 700117 HCHG RX CONTRAST REV CODE 255: Performed by: EMERGENCY MEDICINE

## 2021-01-17 PROCEDURE — 700101 HCHG RX REV CODE 250: Performed by: SURGERY

## 2021-01-17 PROCEDURE — 86850 RBC ANTIBODY SCREEN: CPT

## 2021-01-17 PROCEDURE — 770022 HCHG ROOM/CARE - ICU (200)

## 2021-01-17 PROCEDURE — 700105 HCHG RX REV CODE 258: Performed by: SURGERY

## 2021-01-17 PROCEDURE — A9270 NON-COVERED ITEM OR SERVICE: HCPCS | Performed by: SURGERY

## 2021-01-17 PROCEDURE — 80053 COMPREHEN METABOLIC PANEL: CPT

## 2021-01-17 PROCEDURE — 73090 X-RAY EXAM OF FOREARM: CPT | Mod: LT

## 2021-01-17 PROCEDURE — 700111 HCHG RX REV CODE 636 W/ 250 OVERRIDE (IP): Performed by: EMERGENCY MEDICINE

## 2021-01-17 PROCEDURE — 86900 BLOOD TYPING SEROLOGIC ABO: CPT

## 2021-01-17 PROCEDURE — 86901 BLOOD TYPING SEROLOGIC RH(D): CPT

## 2021-01-17 PROCEDURE — U0003 INFECTIOUS AGENT DETECTION BY NUCLEIC ACID (DNA OR RNA); SEVERE ACUTE RESPIRATORY SYNDROME CORONAVIRUS 2 (SARS-COV-2) (CORONAVIRUS DISEASE [COVID-19]), AMPLIFIED PROBE TECHNIQUE, MAKING USE OF HIGH THROUGHPUT TECHNOLOGIES AS DESCRIBED BY CMS-2020-01-R: HCPCS

## 2021-01-17 RX ORDER — ONDANSETRON 2 MG/ML
4 INJECTION INTRAMUSCULAR; INTRAVENOUS ONCE
Status: COMPLETED | OUTPATIENT
Start: 2021-01-17 | End: 2021-01-17

## 2021-01-17 RX ORDER — MORPHINE SULFATE 4 MG/ML
INJECTION, SOLUTION INTRAMUSCULAR; INTRAVENOUS
Status: COMPLETED
Start: 2021-01-17 | End: 2021-01-17

## 2021-01-17 RX ORDER — AMOXICILLIN 250 MG
1 CAPSULE ORAL NIGHTLY
Status: DISCONTINUED | OUTPATIENT
Start: 2021-01-17 | End: 2021-01-26 | Stop reason: HOSPADM

## 2021-01-17 RX ORDER — GABAPENTIN 100 MG/1
100 CAPSULE ORAL 3 TIMES DAILY
Status: DISCONTINUED | OUTPATIENT
Start: 2021-01-17 | End: 2021-01-26 | Stop reason: HOSPADM

## 2021-01-17 RX ORDER — ONDANSETRON 2 MG/ML
4 INJECTION INTRAMUSCULAR; INTRAVENOUS EVERY 4 HOURS PRN
Status: DISCONTINUED | OUTPATIENT
Start: 2021-01-17 | End: 2021-01-26 | Stop reason: HOSPADM

## 2021-01-17 RX ORDER — ONDANSETRON 2 MG/ML
INJECTION INTRAMUSCULAR; INTRAVENOUS
Status: COMPLETED
Start: 2021-01-17 | End: 2021-01-17

## 2021-01-17 RX ORDER — MORPHINE SULFATE 4 MG/ML
4 INJECTION, SOLUTION INTRAMUSCULAR; INTRAVENOUS ONCE
Status: COMPLETED | OUTPATIENT
Start: 2021-01-17 | End: 2021-01-17

## 2021-01-17 RX ORDER — FAMOTIDINE 20 MG/1
20 TABLET, FILM COATED ORAL 2 TIMES DAILY
Status: DISCONTINUED | OUTPATIENT
Start: 2021-01-17 | End: 2021-01-18

## 2021-01-17 RX ORDER — SODIUM CHLORIDE 9 MG/ML
INJECTION, SOLUTION INTRAVENOUS CONTINUOUS
Status: DISCONTINUED | OUTPATIENT
Start: 2021-01-17 | End: 2021-01-18

## 2021-01-17 RX ORDER — FINASTERIDE 5 MG/1
5 TABLET, FILM COATED ORAL DAILY
Status: DISCONTINUED | OUTPATIENT
Start: 2021-01-18 | End: 2021-01-26 | Stop reason: HOSPADM

## 2021-01-17 RX ORDER — BISACODYL 10 MG
10 SUPPOSITORY, RECTAL RECTAL
Status: DISCONTINUED | OUTPATIENT
Start: 2021-01-17 | End: 2021-01-26 | Stop reason: HOSPADM

## 2021-01-17 RX ORDER — CELECOXIB 200 MG/1
200 CAPSULE ORAL DAILY
Status: COMPLETED | OUTPATIENT
Start: 2021-01-18 | End: 2021-01-22

## 2021-01-17 RX ORDER — ACETAMINOPHEN 325 MG/1
650 TABLET ORAL EVERY 6 HOURS
Status: DISPENSED | OUTPATIENT
Start: 2021-01-17 | End: 2021-01-22

## 2021-01-17 RX ORDER — POLYETHYLENE GLYCOL 3350 17 G/17G
1 POWDER, FOR SOLUTION ORAL 2 TIMES DAILY
Status: DISCONTINUED | OUTPATIENT
Start: 2021-01-17 | End: 2021-01-26 | Stop reason: HOSPADM

## 2021-01-17 RX ORDER — ENEMA 19; 7 G/133ML; G/133ML
1 ENEMA RECTAL
Status: DISCONTINUED | OUTPATIENT
Start: 2021-01-17 | End: 2021-01-26 | Stop reason: HOSPADM

## 2021-01-17 RX ORDER — DOCUSATE SODIUM 100 MG/1
100 CAPSULE, LIQUID FILLED ORAL 2 TIMES DAILY
Status: DISCONTINUED | OUTPATIENT
Start: 2021-01-17 | End: 2021-01-26 | Stop reason: HOSPADM

## 2021-01-17 RX ORDER — HYDROMORPHONE HYDROCHLORIDE 1 MG/ML
0.5 INJECTION, SOLUTION INTRAMUSCULAR; INTRAVENOUS; SUBCUTANEOUS
Status: DISCONTINUED | OUTPATIENT
Start: 2021-01-17 | End: 2021-01-18

## 2021-01-17 RX ORDER — OXYCODONE HYDROCHLORIDE 5 MG/1
5 TABLET ORAL
Status: DISCONTINUED | OUTPATIENT
Start: 2021-01-17 | End: 2021-01-26 | Stop reason: HOSPADM

## 2021-01-17 RX ORDER — LIDOCAINE 50 MG/G
2 PATCH TOPICAL EVERY 24 HOURS
Status: DISCONTINUED | OUTPATIENT
Start: 2021-01-17 | End: 2021-01-26 | Stop reason: HOSPADM

## 2021-01-17 RX ORDER — LIDOCAINE HYDROCHLORIDE AND EPINEPHRINE BITARTRATE 20; .01 MG/ML; MG/ML
10 INJECTION, SOLUTION SUBCUTANEOUS ONCE
Status: COMPLETED | OUTPATIENT
Start: 2021-01-17 | End: 2021-01-17

## 2021-01-17 RX ORDER — CEFAZOLIN SODIUM 1 G/50ML
1 INJECTION, SOLUTION INTRAVENOUS ONCE
Status: COMPLETED | OUTPATIENT
Start: 2021-01-17 | End: 2021-01-17

## 2021-01-17 RX ORDER — BACITRACIN ZINC AND POLYMYXIN B SULFATE 500; 1000 [USP'U]/G; [USP'U]/G
OINTMENT TOPICAL 2 TIMES DAILY
Status: DISPENSED | OUTPATIENT
Start: 2021-01-17 | End: 2021-01-24

## 2021-01-17 RX ORDER — AMOXICILLIN 250 MG
1 CAPSULE ORAL
Status: DISCONTINUED | OUTPATIENT
Start: 2021-01-17 | End: 2021-01-26 | Stop reason: HOSPADM

## 2021-01-17 RX ADMIN — LIDOCAINE HYDROCHLORIDE AND EPINEPHRINE 10 ML: 20; 10 INJECTION, SOLUTION INFILTRATION; PERINEURAL at 17:45

## 2021-01-17 RX ADMIN — SODIUM CHLORIDE: 9 INJECTION, SOLUTION INTRAVENOUS at 21:07

## 2021-01-17 RX ADMIN — IOHEXOL 100 ML: 350 INJECTION, SOLUTION INTRAVENOUS at 16:46

## 2021-01-17 RX ADMIN — LIDOCAINE 2 PATCH: 50 PATCH TOPICAL at 21:05

## 2021-01-17 RX ADMIN — ONDANSETRON 4 MG: 2 INJECTION INTRAMUSCULAR; INTRAVENOUS at 16:45

## 2021-01-17 RX ADMIN — MORPHINE SULFATE 2 MG: 4 INJECTION INTRAVENOUS at 16:45

## 2021-01-17 RX ADMIN — CEFAZOLIN SODIUM 1 G: 1 INJECTION, SOLUTION INTRAVENOUS at 19:07

## 2021-01-17 RX ADMIN — MORPHINE SULFATE 2 MG: 4 INJECTION, SOLUTION INTRAMUSCULAR; INTRAVENOUS at 16:45

## 2021-01-17 RX ADMIN — CLOSTRIDIUM TETANI TOXOID ANTIGEN (FORMALDEHYDE INACTIVATED), CORYNEBACTERIUM DIPHTHERIAE TOXOID ANTIGEN (FORMALDEHYDE INACTIVATED), BORDETELLA PERTUSSIS TOXOID ANTIGEN (GLUTARALDEHYDE INACTIVATED), BORDETELLA PERTUSSIS FILAMENTOUS HEMAGGLUTININ ANTIGEN (FORMALDEHYDE INACTIVATED), BORDETELLA PERTUSSIS PERTACTIN ANTIGEN, AND BORDETELLA PERTUSSIS FIMBRIAE 2/3 ANTIGEN 0.5 ML: 5; 2; 2.5; 5; 3; 5 INJECTION, SUSPENSION INTRAMUSCULAR at 19:08

## 2021-01-17 RX ADMIN — GABAPENTIN 100 MG: 100 CAPSULE ORAL at 21:02

## 2021-01-17 ASSESSMENT — FIBROSIS 4 INDEX
FIB4 SCORE: 2.36
FIB4 SCORE: 2.36

## 2021-01-17 ASSESSMENT — COPD QUESTIONNAIRES
DURING THE PAST 4 WEEKS HOW MUCH DID YOU FEEL SHORT OF BREATH: NONE/LITTLE OF THE TIME
DO YOU EVER COUGH UP ANY MUCUS OR PHLEGM?: NO/ONLY WITH OCCASIONAL COLDS OR INFECTIONS
HAVE YOU SMOKED AT LEAST 100 CIGARETTES IN YOUR ENTIRE LIFE: NO/DON'T KNOW
COPD SCREENING SCORE: 2

## 2021-01-17 NOTE — ED TRIAGE NOTES
Gennaro Richardson  Pt bib EMS. Pt changed into gown and placed on monitor.     Chief Complaint   Patient presents with   • T-5000 GLF   • Arm Pain     LEFT   • Flank Pain     LEFT   • Laceration     skin tear to LEFT forearm     Per EMS, pt had mechanical GLF today. Pt now c/o LEFT arm and flank pain. Pt also noted to have skin tear to LEFT forearm. Pt taking warfarin daily, EMS reports active bleeding upon arrival to scene. Bleeding now controlled with gauze wrap. No blood noted on bandage at this time. Pt denies LOC or hitting head. AOx4. No deformity, discoloration, swelling noted to LEFT flank or arm. NAD noted, VSS.  Pt and staff wearing appropriate PPE during triage. Pt denies any recent travel or close contact with anyone suspected or confirmed of having COVID-19.    Chart up and ready for ERP now.

## 2021-01-18 ENCOUNTER — APPOINTMENT (OUTPATIENT)
Dept: RADIOLOGY | Facility: MEDICAL CENTER | Age: 86
DRG: 184 | End: 2021-01-18
Attending: SURGERY
Payer: MEDICARE

## 2021-01-18 PROBLEM — Z02.9 DISCHARGE PLANNING ISSUES: Status: ACTIVE | Noted: 2021-01-18

## 2021-01-18 PROBLEM — Z75.8 DISCHARGE PLANNING ISSUES: Status: ACTIVE | Noted: 2021-01-18

## 2021-01-18 LAB
ALBUMIN SERPL BCP-MCNC: 3.4 G/DL (ref 3.2–4.9)
ALBUMIN/GLOB SERPL: 1.1 G/DL
ALP SERPL-CCNC: 66 U/L (ref 30–99)
ALT SERPL-CCNC: 16 U/L (ref 2–50)
ANION GAP SERPL CALC-SCNC: 7 MMOL/L (ref 7–16)
AST SERPL-CCNC: 21 U/L (ref 12–45)
BASOPHILS # BLD AUTO: 0.2 % (ref 0–1.8)
BASOPHILS # BLD: 0.03 K/UL (ref 0–0.12)
BILIRUB SERPL-MCNC: 1 MG/DL (ref 0.1–1.5)
BUN SERPL-MCNC: 17 MG/DL (ref 8–22)
CALCIUM SERPL-MCNC: 8.4 MG/DL (ref 8.5–10.5)
CHLORIDE SERPL-SCNC: 103 MMOL/L (ref 96–112)
CO2 SERPL-SCNC: 23 MMOL/L (ref 20–33)
CREAT SERPL-MCNC: 0.72 MG/DL (ref 0.5–1.4)
EKG IMPRESSION: NORMAL
EKG IMPRESSION: NORMAL
EOSINOPHIL # BLD AUTO: 0.07 K/UL (ref 0–0.51)
EOSINOPHIL NFR BLD: 0.6 % (ref 0–6.9)
ERYTHROCYTE [DISTWIDTH] IN BLOOD BY AUTOMATED COUNT: 47.7 FL (ref 35.9–50)
GLOBULIN SER CALC-MCNC: 3.1 G/DL (ref 1.9–3.5)
GLUCOSE SERPL-MCNC: 96 MG/DL (ref 65–99)
HCT VFR BLD AUTO: 43.6 % (ref 42–52)
HGB BLD-MCNC: 13.7 G/DL (ref 14–18)
IMM GRANULOCYTES # BLD AUTO: 0.08 K/UL (ref 0–0.11)
IMM GRANULOCYTES NFR BLD AUTO: 0.6 % (ref 0–0.9)
INR PPP: 2.68 (ref 0.87–1.13)
LYMPHOCYTES # BLD AUTO: 1.18 K/UL (ref 1–4.8)
LYMPHOCYTES NFR BLD: 9.3 % (ref 22–41)
MAGNESIUM SERPL-MCNC: 2.2 MG/DL (ref 1.5–2.5)
MCH RBC QN AUTO: 30.4 PG (ref 27–33)
MCHC RBC AUTO-ENTMCNC: 31.4 G/DL (ref 33.7–35.3)
MCV RBC AUTO: 96.7 FL (ref 81.4–97.8)
MONOCYTES # BLD AUTO: 1.18 K/UL (ref 0–0.85)
MONOCYTES NFR BLD AUTO: 9.3 % (ref 0–13.4)
NEUTROPHILS # BLD AUTO: 10.15 K/UL (ref 1.82–7.42)
NEUTROPHILS NFR BLD: 80 % (ref 44–72)
NRBC # BLD AUTO: 0 K/UL
NRBC BLD-RTO: 0 /100 WBC
PHOSPHATE SERPL-MCNC: 2.5 MG/DL (ref 2.5–4.5)
PLATELET # BLD AUTO: 165 K/UL (ref 164–446)
PMV BLD AUTO: 9.8 FL (ref 9–12.9)
POTASSIUM SERPL-SCNC: 4.3 MMOL/L (ref 3.6–5.5)
PROT SERPL-MCNC: 6.5 G/DL (ref 6–8.2)
PROTHROMBIN TIME: 29.3 SEC (ref 12–14.6)
RBC # BLD AUTO: 4.51 M/UL (ref 4.7–6.1)
SARS-COV-2 RNA RESP QL NAA+PROBE: NOTDETECTED
SODIUM SERPL-SCNC: 133 MMOL/L (ref 135–145)
SPECIMEN SOURCE: NORMAL
TROPONIN T SERPL-MCNC: 15 NG/L (ref 6–19)
WBC # BLD AUTO: 12.7 K/UL (ref 4.8–10.8)

## 2021-01-18 PROCEDURE — 93005 ELECTROCARDIOGRAM TRACING: CPT | Performed by: SURGERY

## 2021-01-18 PROCEDURE — 99233 SBSQ HOSP IP/OBS HIGH 50: CPT | Performed by: SURGERY

## 2021-01-18 PROCEDURE — 770001 HCHG ROOM/CARE - MED/SURG/GYN PRIV*

## 2021-01-18 PROCEDURE — 97165 OT EVAL LOW COMPLEX 30 MIN: CPT

## 2021-01-18 PROCEDURE — 80053 COMPREHEN METABOLIC PANEL: CPT

## 2021-01-18 PROCEDURE — 700102 HCHG RX REV CODE 250 W/ 637 OVERRIDE(OP): Performed by: SURGERY

## 2021-01-18 PROCEDURE — 71045 X-RAY EXAM CHEST 1 VIEW: CPT

## 2021-01-18 PROCEDURE — 700101 HCHG RX REV CODE 250: Performed by: SURGERY

## 2021-01-18 PROCEDURE — 83735 ASSAY OF MAGNESIUM: CPT

## 2021-01-18 PROCEDURE — 97162 PT EVAL MOD COMPLEX 30 MIN: CPT

## 2021-01-18 PROCEDURE — 93010 ELECTROCARDIOGRAM REPORT: CPT | Performed by: INTERNAL MEDICINE

## 2021-01-18 PROCEDURE — A9270 NON-COVERED ITEM OR SERVICE: HCPCS | Performed by: SURGERY

## 2021-01-18 PROCEDURE — 85025 COMPLETE CBC W/AUTO DIFF WBC: CPT

## 2021-01-18 PROCEDURE — 84484 ASSAY OF TROPONIN QUANT: CPT

## 2021-01-18 PROCEDURE — 85610 PROTHROMBIN TIME: CPT

## 2021-01-18 PROCEDURE — 84100 ASSAY OF PHOSPHORUS: CPT

## 2021-01-18 PROCEDURE — 94669 MECHANICAL CHEST WALL OSCILL: CPT

## 2021-01-18 RX ORDER — WARFARIN SODIUM 2.5 MG/1
2.5 TABLET ORAL
Status: DISCONTINUED | OUTPATIENT
Start: 2021-01-19 | End: 2021-01-20

## 2021-01-18 RX ORDER — OFLOXACIN 3 MG/ML
1 SOLUTION/ DROPS OPHTHALMIC 4 TIMES DAILY
COMMUNITY
End: 2021-06-02

## 2021-01-18 RX ORDER — WARFARIN SODIUM 5 MG/1
5 TABLET ORAL
Status: DISCONTINUED | OUTPATIENT
Start: 2021-01-18 | End: 2021-01-20

## 2021-01-18 RX ADMIN — WARFARIN SODIUM 5 MG: 5 TABLET ORAL at 17:09

## 2021-01-18 RX ADMIN — OXYCODONE 5 MG: 5 TABLET ORAL at 02:58

## 2021-01-18 RX ADMIN — CELECOXIB 200 MG: 200 CAPSULE ORAL at 05:41

## 2021-01-18 RX ADMIN — OXYCODONE 5 MG: 5 TABLET ORAL at 12:10

## 2021-01-18 RX ADMIN — FAMOTIDINE 20 MG: 20 TABLET ORAL at 05:41

## 2021-01-18 RX ADMIN — FINASTERIDE 5 MG: 5 TABLET, FILM COATED ORAL at 05:41

## 2021-01-18 RX ADMIN — GABAPENTIN 100 MG: 100 CAPSULE ORAL at 12:07

## 2021-01-18 RX ADMIN — Medication 1 EACH: at 17:09

## 2021-01-18 RX ADMIN — POLYETHYLENE GLYCOL 3350 1 PACKET: 17 POWDER, FOR SOLUTION ORAL at 17:09

## 2021-01-18 RX ADMIN — ACETAMINOPHEN 650 MG: 325 TABLET, FILM COATED ORAL at 12:07

## 2021-01-18 RX ADMIN — LIDOCAINE 2 PATCH: 50 PATCH TOPICAL at 17:17

## 2021-01-18 RX ADMIN — DOCUSATE SODIUM 100 MG: 100 CAPSULE, LIQUID FILLED ORAL at 05:41

## 2021-01-18 RX ADMIN — OXYCODONE 5 MG: 5 TABLET ORAL at 17:09

## 2021-01-18 RX ADMIN — ACETAMINOPHEN 650 MG: 325 TABLET, FILM COATED ORAL at 17:09

## 2021-01-18 RX ADMIN — DOCUSATE SODIUM 100 MG: 100 CAPSULE, LIQUID FILLED ORAL at 17:09

## 2021-01-18 RX ADMIN — GABAPENTIN 100 MG: 100 CAPSULE ORAL at 05:41

## 2021-01-18 RX ADMIN — GABAPENTIN 100 MG: 100 CAPSULE ORAL at 17:09

## 2021-01-18 RX ADMIN — POLYETHYLENE GLYCOL 3350 1 PACKET: 17 POWDER, FOR SOLUTION ORAL at 05:41

## 2021-01-18 ASSESSMENT — ENCOUNTER SYMPTOMS
SPUTUM PRODUCTION: 0
SPEECH CHANGE: 0
SHORTNESS OF BREATH: 0
COUGH: 0
DOUBLE VISION: 0
ABDOMINAL PAIN: 0
FEVER: 0
NECK PAIN: 0
BLURRED VISION: 0
CHILLS: 0
BACK PAIN: 0
VOMITING: 0
MYALGIAS: 1
NAUSEA: 0
FOCAL WEAKNESS: 0

## 2021-01-18 ASSESSMENT — CHA2DS2 SCORE
HYPERTENSION: NO
AGE 65 TO 74: NO
AGE 75 OR GREATER: YES
CHF OR LEFT VENTRICULAR DYSFUNCTION: NO
PRIOR STROKE OR TIA OR THROMBOEMBOLISM: NO
SEX: MALE
CHA2DS2 VASC SCORE: 3
DIABETES: NO
VASCULAR DISEASE: YES

## 2021-01-18 ASSESSMENT — ACTIVITIES OF DAILY LIVING (ADL): TOILETING: INDEPENDENT

## 2021-01-18 ASSESSMENT — COGNITIVE AND FUNCTIONAL STATUS - GENERAL
MOVING FROM LYING ON BACK TO SITTING ON SIDE OF FLAT BED: A LOT
MOBILITY SCORE: 12
DRESSING REGULAR UPPER BODY CLOTHING: A LITTLE
WALKING IN HOSPITAL ROOM: A LITTLE
MOVING TO AND FROM BED TO CHAIR: UNABLE
TURNING FROM BACK TO SIDE WHILE IN FLAT BAD: UNABLE
CLIMB 3 TO 5 STEPS WITH RAILING: A LOT
SUGGESTED CMS G CODE MODIFIER MOBILITY: CL
PERSONAL GROOMING: A LITTLE
HELP NEEDED FOR BATHING: A LOT
DRESSING REGULAR LOWER BODY CLOTHING: A LOT
DAILY ACTIVITIY SCORE: 16
SUGGESTED CMS G CODE MODIFIER DAILY ACTIVITY: CK
STANDING UP FROM CHAIR USING ARMS: A LITTLE
TOILETING: A LOT

## 2021-01-18 ASSESSMENT — GAIT ASSESSMENTS: GAIT LEVEL OF ASSIST: UNABLE TO PARTICIPATE

## 2021-01-18 ASSESSMENT — PAIN DESCRIPTION - PAIN TYPE
TYPE: ACUTE PAIN
TYPE: ACUTE PAIN

## 2021-01-18 NOTE — PROGRESS NOTES
Patient arrived to S115 on monitor with ACLS RN    HR 69  /67  RR 20  O2 97% on 2L NC  97.4F  84.3Kg      2 RN skin assessment completed with RNFranck  Sacral region is red but blanching and intact   Left forearm skin tear

## 2021-01-18 NOTE — DISCHARGE PLANNING
PC to sister Karen who lives with pt in a 2 bedroom apartment. They have about 2-3 steps to get in the apartment, they are on the first level. Pt uses a cane but sister said pt should use a walker instead. Sister would consider Home Health for pt.     Sister was concerned about transportation to take pt home as she is unable to drive far. Unit CM please [facility] CM.    Care Transition Team Assessment    Information Source  Information Given By: Other (Comments)(sister)  Informant's Name: sister Jones    Readmission Evaluation  Is this a readmission?: No    Elopement Risk  Legal Hold: No  Ambulatory or Self Mobile in Wheelchair: No-Not an Elopement Risk    Interdisciplinary Discharge Planning  Does Admitting Nurse Feel This Could be a Complex Discharge?: No  Primary Care Physician: Murtaza KEMP  Lives with - Patient's Self Care Capacity: Other (Comments)  Support Systems: Other (Comments)  Housing / Facility: 1 Story Apartment / Condo(2 steps-4 steps)  Do You Take your Prescribed Medications Regularly: Yes(safeway on [facility])  Able to Return to Previous ADL's: Other  Mobility Issues: Yes  Prior Services: Home-Independent  Patient Prefers to be Discharged to:: TBD  Assistance Needed: No  Durable Medical Equipment: Other - Specify(Cane)    Discharge Preparedness  What is your plan after discharge?: Uncertain - pending medical team collaboration  What are your discharge supports?: Sibling  Prior Functional Level: Ambulatory, Independent with Activities of Daily Living, Independent with Medication Management, Uses Cane  Difficulity with ADLs: None  Difficulity with IADLs: Driving    Functional Assesment  Prior Functional Level: Ambulatory, Independent with Activities of Daily Living, Independent with Medication Management, Uses Cane    Finances  Prescription Coverage: Yes    Vision / Hearing Impairment  Vision Impairment : Yes  Hearing Impairment : No    Domestic Abuse  Have you ever been the victim of  abuse or violence?: No    Psychological Assessment  History of Substance Abuse: None    Discharge Risks or Barriers  Discharge risks or barriers?: No    Anticipated Discharge Information  Discharge Disposition: Still a Patient (30)

## 2021-01-18 NOTE — THERAPY
Occupational Therapy   Initial Evaluation     Patient Name: Gennaro Richardson  Age:  85 y.o., Sex:  male  Medical Record #: [id number]  Today's Date: 1/18/2021     Precautions  Precautions: Fall Risk    Assessment  Patient is 85 y.o. male with a diagnosis of GLF.   Pt currently limited by decreased functional mobility, activity tolerance, cognition, strength, balance, and pain which are affecting pt's ability to complete ADLs/IADLs at baseline. Pt would benefit from OT services in the acute care setting to maximize functional recovery.       Plan    Recommend Occupational Therapy 3 times per week until therapy goals are met for the following treatments:  Self Care/Activities of Daily Living and Therapeutic Activities.       Discharge Recommendations: (P) Recommend post-acute placement for additional occupational therapy services prior to discharge home        01/18/21 5209   Prior Living Situation   Prior Services None   Housing / Facility 1 Story Apartment / Condo   Steps Into Home 3   Steps In Home 0   Equipment Owned Single Point Cane   Lives with - Patient's Self Care Capacity Other (Comments)  (sister)   Prior Level of ADL Function   Self Feeding Independent   Grooming / Hygiene Independent   Bathing Independent   Dressing Independent   Toileting Independent   ADL Assessment   Grooming Supervision   Upper Body Dressing Supervision   Lower Body Dressing Moderate Assist   Toileting Moderate Assist   Functional Mobility   Sit to Stand   (SBA)   Bed, Chair, Wheelchair Transfer Minimal Assist   Short Term Goals   Short Term Goal # 1 supervised with LB dressing   Short Term Goal # 2 supervised with ADL txfs

## 2021-01-18 NOTE — H&P
Trauma Surgery History and Physical  1/17/2021    Trauma Physician: Scott Hurley MD.     CC: Trauma The patient was triaged as a T-5000 in accordance with established pre hospital protols. An expeditious primary and secondary survey with required adjuncts was conducted. See Trauma Narrator for full details.    HPI: This is a 85 y.o male presents to [facility] for injuries sustained in a ground-level fall. The patient has a history of chronic Coumadin use for atrial fibrillation.  Today he states he fell to the ground.  He is not sure why he fell.  He denies chest pain, shortness of breath, or syncopal event.  He complains of left arm pain, left flank pain and left-sided chest wall pain.  He is unsure if he hit his head.  He denies being dizzy or lightheaded.  States he just fell down.  He sustained a large laceration left forearm that was repaired in the emergency department.  Denies any numbness or tingling to his arms or legs.  No pain in his arms or legs.  Denies any chest pain cough shortness of breath.  Denies any other aggravating or relieving factors or associated complaints.      Past Medical History:   Diagnosis Date   • A-fib ([facility]) 6/28/2011   • Arteriosclerosis    • CAD (coronary artery disease)    • CAD (coronary artery disease) 6/28/2011   • Cardiac pacemaker in situ 11/19/2013   • Colonic polyps    • Diverticulosis    • Dizzy spells 11/20/2012   • HERZOG (dyspnea on exertion) 9/7/2014 9/16/16- pt has no c/o   • Dyslipidemia 6/28/2011   • Hematoma of abdominal wall    • Hematoma of rectus sheath 6/13/2012    Delayed onset bleed, intraabdominal extension, left flank , ct abd active bleed , binder, vit k  6/14 Ex-lap     • Myocardial infarct ([facility]) 1989   • Pacer at end of battery life 6/28/2011   • PAF (paroxysmal atrial fibrillation) ([facility]) 9/7/2014   • Paroxysmal atrial fibrillation ([facility])    • Peripheral vascular disease ([facility]) 10/31/2011   • PVD (peripheral vascular disease)  (HCC)        Past Surgical History:   Procedure Laterality Date   • EXPLORATORY LAPAROTOMY  6/14/2012    Performed by CALLY MERCER at SURGERY [facility] ORS   • COLONOSCOPY WITH POLYP  7/24/08    Performed by RUDOLPH BRENNER at SURGERY [facility] ORS   • ABDOMINAL AORTIC ANEURYSM     • PACEMAKER INSERTION         Current Facility-Administered Medications   Medication Dose Route Frequency Provider Last Rate Last Admin   • Respiratory Therapy Consult   Nebulization Continuous RT Scott Hurley M.D.       • Pharmacy Consult Request ...Pain Management Review 1 Each  1 Each Other PHARMACY TO DOSE Scott Hurley M.D.       • docusate sodium (COLACE) capsule 100 mg  100 mg Oral BID Scott Hurley M.D.       • senna-docusate (PERICOLACE or SENOKOT S) 8.6-50 MG per tablet 1 Tab  1 Tab Oral Nightly Scott Hurley M.D.       • senna-docusate (PERICOLACE or SENOKOT S) 8.6-50 MG per tablet 1 Tab  1 Tab Oral Q24HRS PRN Scott Hurley M.D.       • polyethylene glycol/lytes (MIRALAX) PACKET 1 Packet  1 Packet Oral BID Scott Hurley M.D.       • [START ON 1/18/2021] magnesium hydroxide (MILK OF MAGNESIA) suspension 30 mL  30 mL Oral DAILY Scott Hurley M.D.       • bisacodyl (DULCOLAX) suppository 10 mg  10 mg Rectal Q24HRS PRN Scott Hurley M.D.       • fleet enema 133 mL  1 Each Rectal Once PRN Scott Hurley M.D.       • NS infusion   Intravenous Continuous Scott Hurley M.D.       • acetaminophen (Tylenol) tablet 650 mg  650 mg Oral Q6HRS Scott Hurley M.D.       • [START ON 1/18/2021] celecoxib (CELEBREX) capsule 200 mg  200 mg Oral DAILY Scott Hurley M.D.       • oxyCODONE immediate-release (ROXICODONE) tablet 5 mg  5 mg Oral Q3HRS PRN Scott Hurley M.D.       • HYDROmorphone pf (DILAUDID) injection 0.5 mg  0.5 mg Intravenous Q2HRS PRN Scott Hurley M.D.       • ondansetron (ZOFRAN) syringe/vial injection 4 mg  4 mg  Intravenous Q4HRS PRN Scott Hurley M.D.       • famotidine (PEPCID) tablet 20 mg  20 mg Oral BID Scott Hurley M.D.        Or   • famotidine (PEPCID) injection 20 mg  20 mg Intravenous BID Scott Hurley M.D.       • bacitracin-polymyxin b (POLYSPORIN) 500-71550 UNIT/GM ointment   Topical BID Scott Hurley M.D.       • [START ON 1/18/2021] finasteride (PROSCAR) tablet 5 mg  5 mg Oral DAILY Scott Hurley M.D.       • lidocaine (LIDODERM) 5 % 2 Patch  2 Patch Transdermal Q24HR Scott Hurley M.D.       • gabapentin (NEURONTIN) capsule 100 mg  100 mg Oral TID Scott Hurley M.D.         Current Outpatient Medications   Medication Sig Dispense Refill   • Bromfenac Sodium 0.075 % Solution BromSite 0.075 % eye drops     • Loteprednol Etabonate 0.38 % Gel Lotemax SM 0.38 % eye gel drops     • LOTEMAX SM 0.38 % Gel      • [START ON 2/2/2021] HYDROcodone/acetaminophen (NORCO)  MG Tab Take 1 Tab by mouth every 6 hours as needed for up to 30 days. 120 Tab 0   • HYDROcodone/acetaminophen (NORCO)  MG Tab Take 1 Tab by mouth every 6 hours as needed for up to 30 days. (Patient not taking: Reported on 1/17/2021) 120 Tab 0   • warfarin (COUMADIN) 5 MG Tab TAKE one-HALF TO ONE TABLET BY MOUTH DAILY or as instructed by clinic  (Patient taking differently: Take 2.5-5 mg by mouth every bedtime. 2.5 mg on Tuesday, Thursday, and Sunday  5 mg all other days) 90 Tab 1   • clobetasol (TEMOVATE) 0.05 % Cream Apply topically to the affected area (S) twice daily  60 g 3   • tamsulosin (FLOMAX) 0.4 MG capsule      • finasteride (PROSCAR) 5 MG TABS TAKE ONE TABLET BY MOUTH ONE TIME DAILY 90 Tab 2       Social History     Socioeconomic History   • Marital status: Single     Spouse name: Not on file   • Number of children: Not on file   • Years of education: Not on file   • Highest education level: Not on file   Occupational History   • Not on file   Social Needs   • Financial resource  strain: Not on file   • Food insecurity     Worry: Not on file     Inability: Not on file   • Transportation needs     Medical: Not on file     Non-medical: Not on file   Tobacco Use   • Smoking status: Former Smoker     Years: 35.00     Quit date: 1988     Years since quittin.0   • Smokeless tobacco: Never Used   Substance and Sexual Activity   • Alcohol use: No     Alcohol/week: 0.0 oz   • Drug use: No   • Sexual activity: Never     Comment: , has children, retired.   Lifestyle   • Physical activity     Days per week: Not on file     Minutes per session: Not on file   • Stress: Not on file   Relationships   • Social connections     Talks on phone: Not on file     Gets together: Not on file     Attends Nondenominational service: Not on file     Active member of club or organization: Not on file     Attends meetings of clubs or organizations: Not on file     Relationship status: Not on file   • Intimate partner violence     Fear of current or ex partner: Not on file     Emotionally abused: Not on file     Physically abused: Not on file     Forced sexual activity: Not on file   Other Topics Concern   • Not on file   Social History Narrative   • Not on file       Family History   Problem Relation Age of Onset   • Cancer Mother        Allergies:  No known drug allergy    Review of Systems:  Constitutional: Negative for fever, chills, weight loss, malaise/fatigue and diaphoresis.   HENT: Negative for hearing loss, ear pain, nosebleeds, congestion, sore throat, neck pain, and ear discharge.    Eyes: Negative for blurred vision, double vision, and redness.   Respiratory: Negative for cough, sputum production, shortness of breath, wheezing and stridor.  Positive for left chest wall pain.  Cardiovascular: Negative for chest pain, palpitations.   Gastrointestinal: Negative for heartburn, nausea, vomiting, abdominal pain, diarrhea, constipation.  Genitourinary: Negative for dysuria, urgency, frequency.  "  Musculoskeletal: Negative for myalgias, back pain, joint pain and falls.   Skin: Negative for itching and rash.  Neurological: Negative for dizziness, loss of consciousness, weakness and headaches.   Endo/Heme/Allergies: Negative for environmental allergies. Does not bruise/bleed easily.   Psychiatric/Behavioral: Negative for depression and substance abuse. The patient is not nervous/anxious.    Physical Exam:  /63   Pulse 66   Temp 36.8 °C (98.2 °F) (Temporal)   Resp 20   Ht 1.727 m (5' 8\")   Wt 86.2 kg (190 lb)   SpO2 95%     Constitutional: Awake, alert, oriented x3. No acute distress. GCS 15. E4 V5 M6.  Head: No cephalohematoma. Pupils are 2 mm,  reactive bilaterally. Midface stable. No malocclusion.  TMs clear bilaterally. No drainage from the mouth or nose.  Neck: No tracheal deviation. No midline cervical spine tenderness.  Cardiovascular: Normal rate, regular rhythm, normal heart sounds and intact distal pulses.  Exam reveals no gallop and no friction rub.  No murmur heard.  Pulmonary/Chest: Clavicles nontender to palpation. There is left lateral chest wall tenderness.  No crepitus. Positive breath sounds bilaterally.   Abdominal: Soft, nondistended. Nontender to palpation. Pelvis is stable to anterior-posterior compression.   Musculoskeletal: Right upper extremity grossly atraumatic, palpable radial pulse. 5/5  strength. Full ROM and strength at elbow.  Left upper extremity wound closed and dressed, palpable radial pulse. 5/5  strength. Full ROM and strength at elbow.  Right lower extremity grossly atraumatic. 5/5 strength in ankle plantar flexion and dorsiflexion. No pain and full ROM at right knee and hip.   Left  lower extremity grossly atraumatic. 5/5 strength in ankle plantar flexion and dorsiflexion. No pain and full ROM at left knee and hip.   Back: Midline thoracic and lumbar spines are nontender to palpation. No step-offs.   : Normal male external genitalia. Rectal exam not " done. No blood visible at urethral meatus.   Neurological: Sensation intact to light touch dorsum and plantar surfaces of both feet and the medial and lateral aspects of both lower legs.  Sensation intact to light touch dorsum and plantar surfaces of both hands.   Skin: Skin is warm and dry.  No diaphoresis. No erythema. No pallor.     Labs:  Recent Labs     01/17/21  1614   WBC 12.5*   RBC 4.64*   HEMOGLOBIN 14.2   HEMATOCRIT 44.8   MCV 96.6   MCH 30.6   MCHC 31.7*   RDW 47.8   PLATELETCT 182   MPV 9.9     Recent Labs     01/17/21  1614   SODIUM 138   POTASSIUM 4.3   CHLORIDE 107   CO2 25   GLUCOSE 97   BUN 24*   CREATININE 0.82   CALCIUM 8.7     Recent Labs     01/17/21  1614   APTT 38.3*   INR 2.63*     Recent Labs     01/17/21  1614   ASTSGOT 22   ALTSGPT 19   TBILIRUBIN 0.4   ALKPHOSPHAT 78   GLOBULIN 3.2   INR 2.63*       Radiology:  DX-FOREARM LEFT   Final Result      1.  No acute LEFT forearm fracture.   2.  Probable dorsal soft tissue injury.      CT-CHEST,ABDOMEN,PELVIS WITH   Final Result      Fractures of the left fifth, sixth, seventh and eighth ribs.      Bibasilar atelectasis, left greater than right.      No pneumothorax is identified.      No solid organ or vascular injury is seen.      Large amount of colonic stool. Colonic diverticulosis.      Left renal cyst and small hypodense bilateral renal lesions which are too small to characterize but likely represent small cysts.      Aortobiiliac stent graft. Abdominal aortic aneurysm measures 6.7 x 6.8 cm compared to 6.6 x 6.7 cm previously.      Multiple bladder calculi. Trabeculated bladder with a bladder diverticulum at the dome.      Enlarged prostate.      Cortical buckling of the anterior aspect of S4 is new compared to 2015.      Findings compatible with ankylosing spondylitis.      Thyroid nodules on the right. Evaluation with thyroid ultrasound is recommended.      Emphysematous changes.      Cholelithiasis.      CT-HEAD W/O   Final Result       No acute intracranial abnormality is identified.      Atrophy      There are periventricular and subcortical white matter changes present.  This finding is nonspecific and could be from previous small vessel ischemia, demyelination, or gliosis.         CT-CSPINE WITHOUT PLUS RECONS   Final Result      1.  Mild reversal of curvature.   2.  Moderate multilevel degenerative changes cervical spine with associated mild anterolisthesis of C4-5.   3.  No acute cervical spine fracture.      DX-CHEST-PORTABLE (1 VIEW)    (Results Pending)         Assessment: This is a 85 y.o male with multiple left-sided rib fractures after ground-level fall, anticoagulation with Coumadin for atrial fibrillation, hypoxia and left forearm laceration.    Plan:     Left forearm wound repaired emergency department  Covid testing ordered  Admit to ICU - hopefully only for overnight  Multimodal pain management  Serial CXR    Trauma  GLF  T-5000 Activation.  Scott Hurley MD. Trauma Surgery.      Screening examination for infectious disease  1/17 COVID-19 specimen sent. AIRBORNE & CONTACT/EYE ISOLATION implemented pending final SARS-CoV-2 testing.      Contraindication to deep vein thrombosis (DVT) prophylaxis  Contraindication for anticoagulation due to high risk of bleeding  Trauma duplex screening surveillance ordered at 48 hours post admission    Fracture of four ribs of left side  Fractures of the left fifth, sixth, seventh and eighth ribs.  Aggressive multimodal pain management, and pulmonary hygiene.   Serial chest radiographs.    Atelectasis, bilateral  Bibasilar atelectasis, left greater than right.  Aggressive pulmonary hygiene.   Serial CXRs  Pain control.   Mobilize.    Bilateral renal cysts  Left renal cyst and small hypodense bilateral renal lesions which are too small to characterize but likely represent small cysts.  initially seen on CT 2012.    AAA (abdominal aortic aneurysm) (HCC)  Aortobiiliac stent graft placed in  2004  Abdominal aortic aneurysm measures 6.7 x 6.8 cm compared to 6.6 x 6.7 cm previously.      Multiple thyroid nodules  Thyroid nodules on the right. Evaluation with thyroid ultrasound is recommended    Other emphysema (HCC)  Emphysematous changes on CT    Anticoagulated on Coumadin  Chronic Coumadin use  1/17 INR on admission  2.63  Anticipate restart on 1/18      Laceration of left forearm  No acute left forearm fracture.  Complex laceration closed in ED  Sutures out in 10 - 14 days    Hypoxia  Desaturates without supplemental oxygen to 86%  Supplemental oxygen to maintain O2 saturations greater than 92%    Time spent: Trauma / Critical Care Time 60 minutes excluding procedures.    Scott Hurley MD  [facility]  [phone]

## 2021-01-18 NOTE — PROGRESS NOTES
Inpatient Anticoagulation Service Note    Date: 1/18/2021    Reason for Anticoagulation: Atrial Fibrillation   Target INR: 2.0 to 3.0  [id number] VASc Score: 3  HAS-BLED Score: 2   Hemoglobin Value: (!) 13.7  Hematocrit Value: 43.6  Lab Platelet Value: 165    INR from last 7 days     Date/Time INR Value    01/18/21 1000  (!) 2.68    01/17/21 1614  (!) 2.63        Dose from last 7 days     Date/Time Dose (mg)    01/18/21 1103  5        Average Dose (mg): (Per Anticoagulation Clinic: 2.5mg every Su/Tu/Th and 5 mg AOD)  Significant Interactions: NSAID  Bridge Therapy: No   Reversal Agent Administered: Not Applicable    Assessment: Patient was admitted as a trauma after sustaining a fall at home with rib fractures and arm laceration, repaired in the ED.  He has been started on celecoxib for pain management, new drug-drug interaction.  His INR on admit was therapeutic.  He is managed by the [facility], his last visit was 12/8.  His INR was therapeutic at that time.  He was continued on the above listed regimen.  His TTR was 71.4%.  He has been started on a Regular Diet.  His H/H decreased slightly from yesterday, no overt s/sx of major bleeding.  Warfarin ordered to be resumed by Trauma Surgery today.      Plan:  Will resume his home warfarin regimen.  He will be scheduled to receive warfarin 5 mg tonight with a follow up INR in the AM.  Ordered INR x 5 days.  Education Material Provided?: No(chronic warfarin patient)  Pharmacist suggested discharge dosing: warfarin 2.5 mg every Sunday, Tuesday, and Thursday; 5 mg all other days.     Sonya Retana, Pharm.D., BCPS

## 2021-01-18 NOTE — PROGRESS NOTES
MD and and PharmD notified of increase in pacer spikes on monitor after pt up to chair.   3-4 sets of A/V pacer spikes per beat.   Pt in NAD, underlying rate remains in the 60s.   STAT EKG and trop ordered.  Pt back to bed and upon standing rhythm returned to normal V pacing with occasional A and V pacing.   MD PharmJM updated.

## 2021-01-18 NOTE — ED NOTES
Bedside report given to Franck GAMA, questions and comments addressed.  Pt updated on poc and admit plans. PIV remains CDI and patent.   Room checked and all pt belongings transported with pt. Pt to S115 now with Franck GAMA and CNA via [employer].

## 2021-01-18 NOTE — PROGRESS NOTES
Trauma Surgery SARS-CoV-2 Contact Precautions De-escalation Note    Admission SARS-CoV-2 PCR testing negative. LOW RISK patient. Repeat SARS-CoV-2 testing not indicated.   Isolation precautions de-escalated.     ____________________________________     MEGAN Rodríguez    DD: 1/18/2021  9:10 AM

## 2021-01-18 NOTE — ASSESSMENT & PLAN NOTE
Date of admission: 1/17/2021  Date: 1/18 Transfer orders from SICU pending  Date: 1/18  Rehab/SNF consult   Refusing rehab, home health arranged   Cleared for discharge: Yes - Date: 1/20/21  Discharge delayed: No    Discharge date: TBD

## 2021-01-18 NOTE — THERAPY
"Physical Therapy   Initial Evaluation     Patient Name: Gennaro Richardson  Age:  85 y.o., Sex:  male  Medical Record #: [id number]  Today's Date: 1/18/2021     Precautions: Fall Risk    Assessment  Patient is an 85 y.o. male admitted following GLF. Pt sustained L forearm laceration and L rib fxs. Pt seen for PT evaluation at this time. Pt presents below his functional baseline, required max A for supine > sit with incr rib pain, and was able to perform SPT with HHA and min A from bed to chair. Unable to attempt gait at this time d/t poor foot clearance with transfer and decr activity tolerance standing. At this time recommend postacute placement to maximize safety and functional independence prior to return home. Will continue to follow to progress mobility.     Plan  Recommend Physical Therapy 4 times per week until therapy goals are met for the following treatments:  Bed Mobility, Gait Training, Neuro Re-Education / Balance, Self Care/Home Evaluation, Stair Training, Therapeutic Activities and Therapeutic Exercises  DC Equipment Recommendations: Unable to determine at this time  Discharge Recommendations: Recommend post-acute placement for additional physical therapy services prior to discharge home          01/18/21 0912   Prior Living Situation   Prior Services None   Housing / Facility 1 Story Apartment / Condo   Steps Into Home 3   Steps In Home 0   Equipment Owned Single Point Cane   Lives with -  Sister   Comments pt lives with sister. pt reports uses SPC in the apartment. chart indicates multiple GLFs and sister thinks pt should use a walker.    Prior Level of Functional Mobility   Bed Mobility Independent   Transfer Status Independent   Ambulation Independent   Distance Ambulation (Feet) limited community   Assistive Devices Used Single Point Cane   Stairs Independent   Cognition    Level of Consciousness Alert   Safety Awareness Impaired   Comments slightly [place]. poor safety awareness. reports \"let me " "stand by myself, if I fall, I fall.\"    Balance Assessment   Sitting Balance (Static) Fair   Sitting Balance (Dynamic) Fair   Standing Balance (Static) Fair -   Standing Balance (Dynamic) Poor +   Weight Shift Sitting Fair   Weight Shift Standing Fair   Comments standing with HHA x2   Gait Analysis   Gait Level Of Assist Unable to Participate   Weight Bearing Status no restrictions   Comments shuffled steps for SPT with HHA from EOB to chair, fwd trunk flexion   Bed Mobility    Supine to Sit Maximal Assist   Comments assist at LEs and trunk, reports incr pain   Functional Mobility   Sit to Stand close SPV, pt refusing therapist assist   Bed, Chair, WC Transfer Minimal Assist   Transfer Method Stand Pivot   Mobility with HHA, poor foot clearance and sequencing   Comments able to complete STS on his own but with incr time and effort to perform and close SPV for safety   Short Term Goals    Short Term Goal # 1 pt will perform supine <> sit without bed features with SPV in 6 visits to be able to get in/out of bed at home   Short Term Goal # 2 pt will perform all functional xfrs with SPV in 6 visits for improved independence   Short Term Goal # 3 pt will ambulate 150ft with SPV and SPC or FWW in 6 visits to access home environment   Short Term Goal # 4 pt will negotiate 3 steps with SPV in 6 visits to access home       "

## 2021-01-18 NOTE — ASSESSMENT & PLAN NOTE
Fractures of the left fifth, sixth, seventh and eighth ribs.  Aggressive multimodal pain management, and pulmonary hygiene.   Serial chest radiographs.

## 2021-01-18 NOTE — ASSESSMENT & PLAN NOTE
Desaturates without supplemental oxygen to 86%  Supplemental oxygen to maintain O2 saturations greater than 92%

## 2021-01-18 NOTE — ASSESSMENT & PLAN NOTE
Aortobiiliac stent graft placed in 2004  Abdominal aortic aneurysm measures 6.7 x 6.8 cm compared to 6.6 x 6.7 cm previously.

## 2021-01-18 NOTE — ASSESSMENT & PLAN NOTE
Contraindication for anticoagulation due to high risk of bleeding  Trauma duplex screening surveillance ordered at 48 hours post admission

## 2021-01-18 NOTE — ED NOTES
Pt back from CT now. Pt given 2mg morphine and 4mg zofran in CT d/t increased pain. Skin tear to left forearm noted to have new serosanguinous

## 2021-01-18 NOTE — PROGRESS NOTES
Trauma Surgery SARS-CoV-2 Contact Precautions De-escalation Note    Admission SARS-CoV-2 PCR testing negative. LOW RISK patient. Repeat SARS-CoV-2 testing not indicated.   Isolation precautions de-escalated.     ____________________________________     MEGAN Rodríguez    DD: 1/18/2021  1:38 PM

## 2021-01-18 NOTE — ASSESSMENT & PLAN NOTE
Chronic Coumadin use  1/17 INR on admission  2.63   1/18 Coumadin per pharmacy protocol  Pharmacist suggested discharge dosing: warfarin 2.5 mg every Sunday, Tuesday, and Thursday; 5 mg all other days.

## 2021-01-18 NOTE — ASSESSMENT & PLAN NOTE
Bibasilar atelectasis, left greater than right.  Aggressive pulmonary hygiene. Serial chest radiographs.

## 2021-01-18 NOTE — PROGRESS NOTES
"TRAUMA TERTIARY SURVEY     Mental status adequate for full examination?: Yes    Spine cleared (radiologically and/or clinically): Yes    PHYSICAL EXAMINATION:  Vitals: /60   Pulse 60   Temp 36.3 °C (97.4 °F) (Temporal)   Resp (!) 22   Ht 1.727 m (5' 8\")   Wt 84.3 kg (185 lb 13.6 oz)   SpO2 95%   BMI 28.26 kg/m²   Constitutional:     General Appearance: appears stated age.  HEENT:     No significant external craniofacial trauma. The pupils are equal, round, and reactive to light bilaterally. The extraocular muscles are intact bilaterally.. The nares and oropharynx are clear. The midface and jaw are stable. No malocclusion is evident.  Neck:    Normal range of motion.  Respiratory:   Inspection: Unlabored respirations, no intercostal retractions, paradoxical motion, or accessory muscle use.   Palpation:  The chest is nontender. The clavicles are non deformed bilaterally..   Auscultation: normal.  Cardiovascular:   Auscultation: normal.   Peripheral Pulses: Normal.   Abdomen:   Abdomen is soft, nontender, without organomegaly or masses.  Genitourinary:   (MALE):   Musculoskeletal:   The pelvis is stable.   Back:   The thoracolumbar spine was examined. Examination is remarkable for no significant tenderness, swelling, or deformity in the thoracolumbar region.  Skin:   The skin is warm and dry.  Neurologic:    [place] Coma Scale (GCS) 15 [id number]. Neurologic examination revealed no focal deficits noted, mental status intact.  Psychiatric:   The patient does not appear depressed or anxious.    IMAGING:  DX-CHEST-PORTABLE (1 VIEW)   Final Result         1.  Pulmonary edema and/or infiltrates are identified, which are stable since the prior exam.   2.  Trace left pleural effusion   3.  Cardiomegaly      DX-FOREARM LEFT   Final Result      1.  No acute LEFT forearm fracture.   2.  Probable dorsal soft tissue injury.      CT-CHEST,ABDOMEN,PELVIS WITH   Final Result      Fractures of the left fifth, sixth, seventh " and eighth ribs.      Bibasilar atelectasis, left greater than right.      No pneumothorax is identified.      No solid organ or vascular injury is seen.      Large amount of colonic stool. Colonic diverticulosis.      Left renal cyst and small hypodense bilateral renal lesions which are too small to characterize but likely represent small cysts.      Aortobiiliac stent graft. Abdominal aortic aneurysm measures 6.7 x 6.8 cm compared to 6.6 x 6.7 cm previously.      Multiple bladder calculi. Trabeculated bladder with a bladder diverticulum at the dome.      Enlarged prostate.      Cortical buckling of the anterior aspect of S4 is new compared to 2015.      Findings compatible with ankylosing spondylitis.      Thyroid nodules on the right. Evaluation with thyroid ultrasound is recommended.      Emphysematous changes.      Cholelithiasis.      CT-HEAD W/O   Final Result      No acute intracranial abnormality is identified.      Atrophy      There are periventricular and subcortical white matter changes present.  This finding is nonspecific and could be from previous small vessel ischemia, demyelination, or gliosis.         CT-CSPINE WITHOUT PLUS RECONS   Final Result      1.  Mild reversal of curvature.   2.  Moderate multilevel degenerative changes cervical spine with associated mild anterolisthesis of C4-5.   3.  No acute cervical spine fracture.        All current laboratory studies/radiology exams reviewed: Yes    Completed Consultations:  No consults for this admission     Pending Consultations:  Cardiology    Newly Identified Diagnoses and Injuries:  pts pacer was firing when OOB.   EKG to Dr. Bowling  Cardiology consult  Troponins times 2  Converted to sinus when back in bed.  TOTAL RAP SCORE:  RAP Score Total: 5      ETOH Screening     Assessment complete date: 1/18/2021 (No CAGE )

## 2021-01-18 NOTE — PROGRESS NOTES
"Trauma Progress Note 1/18/2021 4:56 AM     This is a 85 y.o. male who fell at home. He sustained fracture of four ribs on the left side and laceration of his left forearm that was repaired in the ED. He was noted to be hypoxic requiring supplemental oxygen.   The patient is chronically anticoagulated with Coumadin for atrial fibrillation.    Plan:   - continue aggressive pulmonary hygiene     Assessment: awake, alert, pain controlled, oxygenating over 92% with 2L via nasal cannula. IS ~1000ml    /63   Pulse 65   Temp 36.3 °C (97.4 °F) (Temporal)   Resp (!) 25   Ht 1.727 m (5' 8\")   Wt 84.3 kg (185 lb 13.6 oz)   SpO2 95%   BMI 28.26 kg/m²     Hemoglobin: 13.7 g/dL  Hematocrit: 43.6 %    Urine Output: voiding / adequate output    Recent Labs     01/17/21  1614   APTT 38.3*   INR 2.63*      Hypoxia- (present on admission)  Assessment & Plan  Desaturates without supplemental oxygen to 86%  Supplemental oxygen to maintain O2 saturations greater than 92%    Fracture of four ribs of left side- (present on admission)  Assessment & Plan  Fractures of the left fifth, sixth, seventh and eighth ribs.  Aggressive multimodal pain management, and pulmonary hygiene.   Serial chest radiographs.    Laceration of left forearm- (present on admission)  Assessment & Plan  No acute left forearm fracture.  Complex laceration closed in ED  Sutures out in 10 - 14 days    Anticoagulated on Coumadin- (present on admission)  Assessment & Plan  Chronic Coumadin use  1/17 INR on admission  2.63  Anticipate restart on 1/18    Atelectasis, bilateral- (present on admission)  Assessment & Plan  Bibasilar atelectasis, left greater than right.  Supplemental oxygen to maintain O2 saturations greater than 95%  Aggressive pulmonary hygiene. Serial chest radiographs.    Screening examination for infectious disease- (present on admission)  Assessment & Plan  1/17 COVID-19 specimen sent. AIRBORNE & CONTACT/EYE ISOLATION implemented pending final " SARS-CoV-2 testing.    Other emphysema (HCC)- (present on admission)  Assessment & Plan  Emphysematous changes on CT    Multiple thyroid nodules- (present on admission)  Assessment & Plan  Thyroid nodules on the right. Evaluation with thyroid ultrasound is recommended    AAA (abdominal aortic aneurysm) (HCC)- (present on admission)  Assessment & Plan  Aortobiiliac stent graft placed in 2004  Abdominal aortic aneurysm measures 6.7 x 6.8 cm compared to 6.6 x 6.7 cm previously.    Bilateral renal cysts- (present on admission)  Assessment & Plan  Left renal cyst and small hypodense bilateral renal lesions which are too small to characterize but likely represent small cysts.  initially seen on CT 2012.    Trauma- (present on admission)  Assessment & Plan  GLF  T-5000 Activation.  Scott Hurlye MD. Trauma Surgery.

## 2021-01-18 NOTE — ED PROVIDER NOTES
ED Provider Note    CHIEF COMPLAINT  Chief Complaint   Patient presents with   • T-5000 GLF   • Arm Pain     LEFT   • Flank Pain     LEFT   • Laceration     skin tear to LEFT forearm       HPI  Gennaro Richardson is a 85 y.o. male who presents to the emergency department complaining of ground-level fall.  The patient has a history of chronic Coumadin use.  He is unable to tell me why.  Today he states he fell to the ground.  He is also not sure why he fell.  He does not remember the fall.  He complains of left arm pain, and left flank pain and left-sided chest wall pain.  He is unsure if he hit his head.  He denies being dizzy or lightheaded.  States he just fell down.  He sustained a large laceration left forearm.  Denies any numbness or tingling to his arms or legs.  No pain in his arms or legs.  Denies any chest pain cough shortness of breath.  Denies any other aggravating or relieving factors or associated complaints.    REVIEW OF SYSTEMS  See HPI for further details. All other systems are negative.    PAST MEDICAL HISTORY  Past Medical History:   Diagnosis Date   • A-fib ([facility]) 6/28/2011   • Arteriosclerosis    • CAD (coronary artery disease)    • CAD (coronary artery disease) 6/28/2011   • Cardiac pacemaker in situ 11/19/2013   • Colonic polyps    • Diverticulosis    • Dizzy spells 11/20/2012   • HERZOG (dyspnea on exertion) 9/7/2014 9/16/16- pt has no c/o   • Dyslipidemia 6/28/2011   • Hematoma of abdominal wall    • Hematoma of rectus sheath 6/13/2012    Delayed onset bleed, intraabdominal extension, left flank , ct abd active bleed , binder, vit k  6/14 Ex-lap     • Myocardial infarct ([facility]) 1989   • Pacer at end of battery life 6/28/2011   • PAF (paroxysmal atrial fibrillation) ([facility]) 9/7/2014   • Paroxysmal atrial fibrillation ([facility])    • Peripheral vascular disease ([facility]) 10/31/2011   • PVD (peripheral vascular disease) ([facility])        FAMILY HISTORY  Family History   Problem Relation Age of Onset   • Cancer  Mother        SOCIAL HISTORY  Social History     Socioeconomic History   • Marital status: Single     Spouse name: Not on file   • Number of children: Not on file   • Years of education: Not on file   • Highest education level: Not on file   Occupational History   • Not on file   Social Needs   • Financial resource strain: Not on file   • Food insecurity     Worry: Not on file     Inability: Not on file   • Transportation needs     Medical: Not on file     Non-medical: Not on file   Tobacco Use   • Smoking status: Former Smoker     Years: 35.00     Quit date: 1988     Years since quittin.0   • Smokeless tobacco: Never Used   Substance and Sexual Activity   • Alcohol use: No     Alcohol/week: 0.0 oz   • Drug use: No   • Sexual activity: Never     Comment: , has children, retired.   Lifestyle   • Physical activity     Days per week: Not on file     Minutes per session: Not on file   • Stress: Not on file   Relationships   • Social connections     Talks on phone: Not on file     Gets together: Not on file     Attends Alevism service: Not on file     Active member of club or organization: Not on file     Attends meetings of clubs or organizations: Not on file     Relationship status: Not on file   • Intimate partner violence     Fear of current or ex partner: Not on file     Emotionally abused: Not on file     Physically abused: Not on file     Forced sexual activity: Not on file   Other Topics Concern   • Not on file   Social History Narrative   • Not on file       SURGICAL HISTORY  Past Surgical History:   Procedure Laterality Date   • EXPLORATORY LAPAROTOMY  2012    Performed by CALLY MERCER at SURGERY [facility] ORS   • COLONOSCOPY WITH POLYP  08    Performed by RUDOLPH BRENNER at SURGERY [facility] ORS   • ABDOMINAL AORTIC ANEURYSM     • PACEMAKER INSERTION         CURRENT MEDICATIONS  Home Medications     Reviewed by Gisele Forte (Pharmacy Tech) on 21 at 1741   Med List Status: Partial   Medication Last Dose Status   Bromfenac Sodium 0.075 % Solution  Active   clobetasol (TEMOVATE) 0.05 % Cream  Active   finasteride (PROSCAR) 5 MG TABS  Active   HYDROcodone/acetaminophen (NORCO)  MG Tab 1/17/2021 Active   HYDROcodone/acetaminophen (NORCO)  MG Tab duplicate Active   LOTEMAX SM 0.38 % Gel  Active   Loteprednol Etabonate 0.38 % Gel  Active   tamsulosin (FLOMAX) 0.4 MG capsule  Active   warfarin (COUMADIN) 5 MG Tab 1/16/2021 Active                ALLERGIES  Allergies   Allergen Reactions   • No Known Drug Allergy        PHYSICAL EXAM  VITAL SIGNS: /71   Pulse 60   Temp 36.8 °C (98.2 °F) (Temporal)   SpO2 93%    Constitutional: Well developed, Well nourished, No acute distress, Non-toxic appearance.   HENT: Normocephalic, contusion left side of his forehead., Bilateral external ears normal, Oropharynx moist, No oral exudates, Nose normal.   Eyes: PERRL, EOMI, Conjunctiva normal, No discharge.   Neck: Normal range of motion, no midline tenderness  Cardiovascular: Normal heart rate, Normal rhythm, No murmurs, No rubs, No gallops.   Thorax & Lungs: Normal breath sounds, No respiratory distress, No wheezing, left-sided chest wall is tender to palpation  Abdomen: Bowel sounds normal, Soft, tender in left upper quadrant no peritonitis  Skin: Warm, Dry, No erythema, No rash.   Back: No tenderness, No CVA tenderness.  No signs of trauma    Musculoskeletal: Good range of motion in all major joints.  Large laceration of left upper extremity.  Normal Norvasc exam no bony tenderness.  Good pulses  Neurologic: Alert, No focal deficits noted.   Psychiatric: Affect anxious      Results for orders placed or performed during the hospital encounter of 01/17/21   CBC WITH DIFFERENTIAL   Result Value Ref Range    WBC 12.5 (H) 4.8 - 10.8 K/uL    RBC 4.64 (L) 4.70 - 6.10 M/uL    Hemoglobin 14.2 14.0 - 18.0 g/dL    Hematocrit 44.8 42.0 - 52.0 %    MCV 96.6 81.4 - 97.8 fL    MCH  30.6 27.0 - 33.0 pg    MCHC 31.7 (L) 33.7 - 35.3 g/dL    RDW 47.8 35.9 - 50.0 fL    Platelet Count 182 164 - 446 K/uL    MPV 9.9 9.0 - 12.9 fL    Neutrophils-Polys 79.50 (H) 44.00 - 72.00 %    Lymphocytes 11.20 (L) 22.00 - 41.00 %    Monocytes 7.40 0.00 - 13.40 %    Eosinophils 0.70 0.00 - 6.90 %    Basophils 0.20 0.00 - 1.80 %    Immature Granulocytes 1.00 (H) 0.00 - 0.90 %    Nucleated RBC 0.00 /100 WBC    Neutrophils (Absolute) 9.93 (H) 1.82 - 7.42 K/uL    Lymphs (Absolute) 1.40 1.00 - 4.80 K/uL    Monos (Absolute) 0.92 (H) 0.00 - 0.85 K/uL    Eos (Absolute) 0.09 0.00 - 0.51 K/uL    Baso (Absolute) 0.03 0.00 - 0.12 K/uL    Immature Granulocytes (abs) 0.12 (H) 0.00 - 0.11 K/uL    NRBC (Absolute) 0.00 K/uL   COMP METABOLIC PANEL   Result Value Ref Range    Sodium 138 135 - 145 mmol/L    Potassium 4.3 3.6 - 5.5 mmol/L    Chloride 107 96 - 112 mmol/L    Co2 25 20 - 33 mmol/L    Anion Gap 6.0 (L) 7.0 - 16.0    Glucose 97 65 - 99 mg/dL    Bun 24 (H) 8 - 22 mg/dL    Creatinine 0.82 0.50 - 1.40 mg/dL    Calcium 8.7 8.5 - 10.5 mg/dL    AST(SGOT) 22 12 - 45 U/L    ALT(SGPT) 19 2 - 50 U/L    Alkaline Phosphatase 78 30 - 99 U/L    Total Bilirubin 0.4 0.1 - 1.5 mg/dL    Albumin 3.8 3.2 - 4.9 g/dL    Total Protein 7.0 6.0 - 8.2 g/dL    Globulin 3.2 1.9 - 3.5 g/dL    A-G Ratio 1.2 g/dL   APTT   Result Value Ref Range    APTT 38.3 (H) 24.7 - 36.0 sec   PROTHROMBIN TIME (INR)   Result Value Ref Range    PT 28.9 (H) 12.0 - 14.6 sec    INR 2.63 (H) 0.87 - 1.13   COD (ADULT)   Result Value Ref Range    ABO Grouping Only O     Rh Grouping Only POS     Antibody Screen-Cod NEG    ESTIMATED GFR   Result Value Ref Range    GFR If African American >60 >60 mL/min/1.73 m 2    GFR If Non African American >60 >60 mL/min/1.73 m 2   SARS-COV Antigen ZE: Collect dry nasal swab AND NP swab in VTM   Result Value Ref Range    SARS-CoV-2 Source Nasal Swab     SARS-COV ANTIGEN ZE NotDetected Not-Detected   SARS-CoV-2 PCR (24 hour In-House): Collect  NP swab in VTM    Specimen: Nasopharyngeal; Respirate   Result Value Ref Range    SARS-CoV-2 Source NP Swab    URINALYSIS    Specimen: Urine, Clean Catch   Result Value Ref Range    Color Yellow     Character Clear     Ph 5.0 5.0 - 8.0    Glucose Negative Negative mg/dL    Ketones Negative Negative mg/dL    Protein Negative Negative mg/dL    Bilirubin Negative Negative    Urobilinogen, Urine 0.2 Negative    Nitrite Negative Negative    Leukocyte Esterase Negative Negative    Occult Blood Small (A) Negative    Micro Urine Req Microscopic    REFRACTOMETER SG   Result Value Ref Range    Specific Gravity 1.040    EKG   Result Value Ref Range    Report       [facility] Emergency Dept.    Test Date:  2021  Pt Name:    REINIER DAUGHERTY                 Department: ER  MRN:        [id number]                      Room:       [facility]  Gender:     Male                         Technician: [id number]  :        [id number]                   Requested By:KEON COBB  Order #:    [id number]                    Reading MD: TRUMAN PEACOCK. [facility]    Measurements  Intervals                                Axis  Rate:       68                           P:          -78  WA:         162                          QRS:        -86  QRSD:       184                          T:          76  QT:         472  QTc:        503    Interpretive Statements  ATRIAL-SENSED VENTRICULAR-PACED COMPLEXES  NO FURTHER RHYTHM ANALYSIS ATTEMPTED DUE TO PACED RHYTHM  NONSPECIFIC IVCD WITH LAD  LEFT VENTRICULAR HYPERTROPHY  Compared to ECG 2016 12:08:46  Left ventricular hypertrophy now present  Electronically Signed On 2021 22:12:22 PST by TRUMAN PEACOCK . [facility]          RADIOLOGY/PROCEDURES  DX-FOREARM LEFT   Final Result      1.  No acute LEFT forearm fracture.   2.  Probable dorsal soft tissue injury.      CT-CHEST,ABDOMEN,PELVIS WITH   Final Result      Fractures of the left fifth, sixth, seventh and eighth ribs.      Bibasilar  atelectasis, left greater than right.      No pneumothorax is identified.      No solid organ or vascular injury is seen.      Large amount of colonic stool. Colonic diverticulosis.      Left renal cyst and small hypodense bilateral renal lesions which are too small to characterize but likely represent small cysts.      Aortobiiliac stent graft. Abdominal aortic aneurysm measures 6.7 x 6.8 cm compared to 6.6 x 6.7 cm previously.      Multiple bladder calculi. Trabeculated bladder with a bladder diverticulum at the dome.      Enlarged prostate.      Cortical buckling of the anterior aspect of S4 is new compared to 2015.      Findings compatible with ankylosing spondylitis.      Thyroid nodules on the right. Evaluation with thyroid ultrasound is recommended.      Emphysematous changes.      Cholelithiasis.      CT-HEAD W/O   Final Result      No acute intracranial abnormality is identified.      Atrophy      There are periventricular and subcortical white matter changes present.  This finding is nonspecific and could be from previous small vessel ischemia, demyelination, or gliosis.         CT-CSPINE WITHOUT PLUS RECONS   Final Result      1.  Mild reversal of curvature.   2.  Moderate multilevel degenerative changes cervical spine with associated mild anterolisthesis of C4-5.   3.  No acute cervical spine fracture.      DX-CHEST-PORTABLE (1 VIEW)    (Results Pending)       Laceration Repair Procedure Note    Indication: Laceration    Procedure: The patient was placed in the appropriate position and anesthesia around the laceration was obtained by infiltration using 10.0 cc of 1% Lidocaine with epinephrine. The area was then cleansed with betadine and draped in a sterile fashion, debrided, irrigated with normal saline, explored with no foreign bodies discovered and draped in a sterile fashion.  These were debrided and cleaned up was quite macerated.  No obvious tendon injury identified inside.  No obvious  neurovascular structures were injured.  The laceration was closed with 3-0 Ethilon using interrupted sutures. There were no additional lacerations requiring repair. The wound area was then dressed with bacitracin.      Total repaired wound length: 15 cm.     Other Items: Suture count: 8    The patient tolerated the procedure well.    Complications: None            COURSE & MEDICAL DECISION MAKING  Pertinent Labs & Imaging studies reviewed. (See chart for details)    Patient was seen and examined.  He does have some signs of head trauma.  Initially he reported he did not hit his head however the patient really is amnestic to the events of the fall.  He has some signs of head injury therefore a head CT neck CT the chest and pelvis CT are ordered.  He is made a code TBI.  He is calling be taken over emergently for CT is for trauma while chronic anticoagulation.    The patient is emergently taken to CT with the nurse.  Head and neck CT did not show any acute abnormalities.  CT of the chest and pelvis showed multiple concerning chronic abnormalities.  Most concerningly it showed multiple rib fractures.    The patient reports hospitalized for further work-up and treatment.  Because of his injury, hypoxia, and chronic anticoagulation I think is best served on the trauma service.  I discussed the case with the trauma surgeon care transfer at that time.    Patient lacerations cleaned and closed I ordered antibiotics and tetanus shot.    Patient be hospitalized for further work-up and treatment for displaced oxygen for respiratory failure hypoxemia.  Care is transferred to the trauma surgery service.  Is hospitalized in critical condition. Hospitalized in the ICU        FINAL IMPRESSION  1. Closed fracture of four ribs of left side, initial encounter     2. Hypoxia     3. Anticoagulated on Coumadin     4. Laceration of left forearm, initial encounter     5.  Laceration left forearm as well 15 cm in length complex closed with  8 sutures  6. Critical care time of 45 minutes  2.   3.         Electronically signed by: Chente Lima M.D., 1/17/2021 4:19 PM

## 2021-01-18 NOTE — ASSESSMENT & PLAN NOTE
Left renal cyst and small hypodense bilateral renal lesions which are too small to characterize but likely represent small cysts.  Stable since initially seen on CT 2012.

## 2021-01-18 NOTE — ED NOTES
Med rec partially completed. Pt is unable to provide a full home med list of medications. Pt reports taking warfarin and per records, pt takes norco. Med rec tech will follow up with pt's home pharmacy since they are closed today.

## 2021-01-19 ENCOUNTER — APPOINTMENT (OUTPATIENT)
Dept: RADIOLOGY | Facility: MEDICAL CENTER | Age: 86
DRG: 184 | End: 2021-01-19
Attending: SURGERY
Payer: MEDICARE

## 2021-01-19 LAB
ALBUMIN SERPL BCP-MCNC: 3.6 G/DL (ref 3.2–4.9)
ALBUMIN/GLOB SERPL: 1.2 G/DL
ALP SERPL-CCNC: 67 U/L (ref 30–99)
ALT SERPL-CCNC: 15 U/L (ref 2–50)
ANION GAP SERPL CALC-SCNC: 8 MMOL/L (ref 7–16)
AST SERPL-CCNC: 10 U/L (ref 12–45)
BASOPHILS # BLD AUTO: 0.3 % (ref 0–1.8)
BASOPHILS # BLD: 0.03 K/UL (ref 0–0.12)
BILIRUB SERPL-MCNC: 1.1 MG/DL (ref 0.1–1.5)
BUN SERPL-MCNC: 22 MG/DL (ref 8–22)
CALCIUM SERPL-MCNC: 8.6 MG/DL (ref 8.5–10.5)
CHLORIDE SERPL-SCNC: 104 MMOL/L (ref 96–112)
CO2 SERPL-SCNC: 25 MMOL/L (ref 20–33)
CREAT SERPL-MCNC: 0.92 MG/DL (ref 0.5–1.4)
EOSINOPHIL # BLD AUTO: 0.13 K/UL (ref 0–0.51)
EOSINOPHIL NFR BLD: 1.2 % (ref 0–6.9)
ERYTHROCYTE [DISTWIDTH] IN BLOOD BY AUTOMATED COUNT: 48.7 FL (ref 35.9–50)
GLOBULIN SER CALC-MCNC: 3 G/DL (ref 1.9–3.5)
GLUCOSE SERPL-MCNC: 94 MG/DL (ref 65–99)
HCT VFR BLD AUTO: 45.9 % (ref 42–52)
HGB BLD-MCNC: 14.6 G/DL (ref 14–18)
IMM GRANULOCYTES # BLD AUTO: 0.07 K/UL (ref 0–0.11)
IMM GRANULOCYTES NFR BLD AUTO: 0.6 % (ref 0–0.9)
INR PPP: 3.02 (ref 0.87–1.13)
LYMPHOCYTES # BLD AUTO: 1.26 K/UL (ref 1–4.8)
LYMPHOCYTES NFR BLD: 11.4 % (ref 22–41)
MCH RBC QN AUTO: 30.9 PG (ref 27–33)
MCHC RBC AUTO-ENTMCNC: 31.8 G/DL (ref 33.7–35.3)
MCV RBC AUTO: 97 FL (ref 81.4–97.8)
MONOCYTES # BLD AUTO: 0.91 K/UL (ref 0–0.85)
MONOCYTES NFR BLD AUTO: 8.2 % (ref 0–13.4)
NEUTROPHILS # BLD AUTO: 8.67 K/UL (ref 1.82–7.42)
NEUTROPHILS NFR BLD: 78.3 % (ref 44–72)
NRBC # BLD AUTO: 0 K/UL
NRBC BLD-RTO: 0 /100 WBC
PLATELET # BLD AUTO: 167 K/UL (ref 164–446)
PMV BLD AUTO: 9.7 FL (ref 9–12.9)
POTASSIUM SERPL-SCNC: 4.3 MMOL/L (ref 3.6–5.5)
PROT SERPL-MCNC: 6.6 G/DL (ref 6–8.2)
PROTHROMBIN TIME: 32.3 SEC (ref 12–14.6)
RBC # BLD AUTO: 4.73 M/UL (ref 4.7–6.1)
SODIUM SERPL-SCNC: 137 MMOL/L (ref 135–145)
WBC # BLD AUTO: 11.1 K/UL (ref 4.8–10.8)

## 2021-01-19 PROCEDURE — A9270 NON-COVERED ITEM OR SERVICE: HCPCS | Performed by: SURGERY

## 2021-01-19 PROCEDURE — 700102 HCHG RX REV CODE 250 W/ 637 OVERRIDE(OP): Performed by: SURGERY

## 2021-01-19 PROCEDURE — 99233 SBSQ HOSP IP/OBS HIGH 50: CPT | Performed by: SURGERY

## 2021-01-19 PROCEDURE — 85025 COMPLETE CBC W/AUTO DIFF WBC: CPT

## 2021-01-19 PROCEDURE — A9270 NON-COVERED ITEM OR SERVICE: HCPCS | Performed by: NURSE PRACTITIONER

## 2021-01-19 PROCEDURE — 71045 X-RAY EXAM CHEST 1 VIEW: CPT

## 2021-01-19 PROCEDURE — 85610 PROTHROMBIN TIME: CPT

## 2021-01-19 PROCEDURE — 94669 MECHANICAL CHEST WALL OSCILL: CPT

## 2021-01-19 PROCEDURE — 770001 HCHG ROOM/CARE - MED/SURG/GYN PRIV*

## 2021-01-19 PROCEDURE — 700101 HCHG RX REV CODE 250: Performed by: SURGERY

## 2021-01-19 PROCEDURE — 700102 HCHG RX REV CODE 250 W/ 637 OVERRIDE(OP): Performed by: NURSE PRACTITIONER

## 2021-01-19 PROCEDURE — 80053 COMPREHEN METABOLIC PANEL: CPT

## 2021-01-19 RX ORDER — METAXALONE 400 MG/1
400 TABLET ORAL 3 TIMES DAILY PRN
Status: DISCONTINUED | OUTPATIENT
Start: 2021-01-19 | End: 2021-01-26 | Stop reason: HOSPADM

## 2021-01-19 RX ADMIN — OXYCODONE 5 MG: 5 TABLET ORAL at 16:39

## 2021-01-19 RX ADMIN — OXYCODONE 5 MG: 5 TABLET ORAL at 05:52

## 2021-01-19 RX ADMIN — ACETAMINOPHEN 650 MG: 325 TABLET, FILM COATED ORAL at 23:57

## 2021-01-19 RX ADMIN — Medication 1 EACH: at 18:01

## 2021-01-19 RX ADMIN — METAXALONE 400 MG: 800 TABLET ORAL at 13:10

## 2021-01-19 RX ADMIN — DOCUSATE SODIUM 100 MG: 100 CAPSULE, LIQUID FILLED ORAL at 05:28

## 2021-01-19 RX ADMIN — DOCUSATE SODIUM 50 MG AND SENNOSIDES 8.6 MG 1 TABLET: 8.6; 5 TABLET, FILM COATED ORAL at 20:32

## 2021-01-19 RX ADMIN — POLYETHYLENE GLYCOL 3350 1 PACKET: 17 POWDER, FOR SOLUTION ORAL at 18:01

## 2021-01-19 RX ADMIN — ACETAMINOPHEN 650 MG: 325 TABLET, FILM COATED ORAL at 18:01

## 2021-01-19 RX ADMIN — ACETAMINOPHEN 650 MG: 325 TABLET, FILM COATED ORAL at 05:28

## 2021-01-19 RX ADMIN — CELECOXIB 200 MG: 200 CAPSULE ORAL at 05:28

## 2021-01-19 RX ADMIN — DOCUSATE SODIUM 100 MG: 100 CAPSULE, LIQUID FILLED ORAL at 18:01

## 2021-01-19 RX ADMIN — POLYETHYLENE GLYCOL 3350 1 PACKET: 17 POWDER, FOR SOLUTION ORAL at 05:28

## 2021-01-19 RX ADMIN — GABAPENTIN 100 MG: 100 CAPSULE ORAL at 12:41

## 2021-01-19 RX ADMIN — GABAPENTIN 100 MG: 100 CAPSULE ORAL at 18:01

## 2021-01-19 RX ADMIN — LIDOCAINE 2 PATCH: 50 PATCH TOPICAL at 18:01

## 2021-01-19 RX ADMIN — FINASTERIDE 5 MG: 5 TABLET, FILM COATED ORAL at 05:28

## 2021-01-19 RX ADMIN — GABAPENTIN 100 MG: 100 CAPSULE ORAL at 05:28

## 2021-01-19 RX ADMIN — ACETAMINOPHEN 650 MG: 325 TABLET, FILM COATED ORAL at 12:41

## 2021-01-19 RX ADMIN — WARFARIN SODIUM 2.5 MG: 2.5 TABLET ORAL at 18:01

## 2021-01-19 RX ADMIN — OXYCODONE 5 MG: 5 TABLET ORAL at 12:41

## 2021-01-19 ASSESSMENT — ENCOUNTER SYMPTOMS
NECK PAIN: 0
VOMITING: 0
COUGH: 0
FOCAL WEAKNESS: 0
NAUSEA: 0
MYALGIAS: 1
FEVER: 0
CHILLS: 0
BACK PAIN: 0
DOUBLE VISION: 0
BLURRED VISION: 0
SPUTUM PRODUCTION: 0
ABDOMINAL PAIN: 0
SHORTNESS OF BREATH: 0
SPEECH CHANGE: 0

## 2021-01-19 ASSESSMENT — PAIN DESCRIPTION - PAIN TYPE
TYPE: ACUTE PAIN
TYPE: ACUTE PAIN

## 2021-01-19 ASSESSMENT — FIBROSIS 4 INDEX: FIB4 SCORE: 2.7

## 2021-01-19 NOTE — DISCHARGE PLANNING
Follow up for rehab met with Gennaro in his room to discuss post acute services. Gennaro would like to go home with  program. Gennaro says that he needs to get home to help his sister out. Gennaro has a complaint of pain nursing notified.

## 2021-01-19 NOTE — PROGRESS NOTES
Trauma / Surgical Daily Progress Note    Date of Service  1/19/2021    Chief Complaint  85 y.o. male admitted 1/17/2021 with     Interval Events    Inadequate pain control.   Stable oxygen requirements and CXR.  Pacemaker aberrances noted on monitor. Clinically asymptomatic.  Coumadin per pharmacy. INR 3.02    - Continue multimodal pain management.  Add PRN skelaxin.   - [employer] for pacemaker interogration   - Clinically stable at this time.  Awaiting paulson bed with telemetry monitoring.  - Disposition: 1/18 Physiatry consult placed.  - Counseled     Review of Systems  Review of Systems   Constitutional: Negative for chills and fever.   HENT: Negative for hearing loss.    Eyes: Negative for blurred vision and double vision.   Respiratory: Negative for cough, sputum production and shortness of breath.    Cardiovascular: Negative for chest pain.   Gastrointestinal: Negative for abdominal pain, nausea and vomiting.   Genitourinary: Negative for dysuria.   Musculoskeletal: Positive for myalgias. Negative for back pain, joint pain and neck pain.   Neurological: Negative for speech change and focal weakness.        Vital Signs  Temp:  [36.4 °C (97.5 °F)-36.7 °C (98.1 °F)] 36.4 °C (97.6 °F)  Pulse:  [59-73] (P) 71  Resp:  [11-48] (P) 24  BP: ()/(45-75) (P) 139/64  SpO2:  [78 %-95 %] (P) 78 %    Physical Exam  Physical Exam  Vitals signs and nursing note reviewed.   Constitutional:       General: He is not in acute distress.     Appearance: He is not ill-appearing, toxic-appearing or diaphoretic.      Comments: Up to chair   HENT:      Head: Normocephalic and atraumatic.      Right Ear: External ear normal.      Left Ear: External ear normal.      Nose: Nose normal. No congestion.      Mouth/Throat:      Mouth: Mucous membranes are moist.      Pharynx: Oropharynx is clear.   Eyes:      General: No scleral icterus.     Extraocular Movements: Extraocular movements intact.      Conjunctiva/sclera: Conjunctivae  normal.      Pupils: Pupils are equal, round, and reactive to light.   Neck:      Musculoskeletal: Normal range of motion and neck supple. No muscular tenderness.   Cardiovascular:      Rate and Rhythm: Normal rate.      Pulses: Normal pulses.      Comments: Intermittently paced  Pulmonary:      Effort: Pulmonary effort is normal. No tachypnea, accessory muscle usage or respiratory distress.      Breath sounds: Normal air entry. Examination of the right-middle field reveals decreased breath sounds. Examination of the left-middle field reveals decreased breath sounds. Examination of the right-lower field reveals decreased breath sounds. Examination of the left-lower field reveals decreased breath sounds. Decreased breath sounds present. No wheezing.      Comments: IS 1000 cc  Chest:      Chest wall: Tenderness present.   Abdominal:      General: Bowel sounds are normal. There is no distension.      Palpations: Abdomen is soft.      Tenderness: There is no abdominal tenderness. There is no guarding or rebound.   Musculoskeletal: Normal range of motion.         General: No tenderness or signs of injury.   Skin:     General: Skin is warm and dry.      Capillary Refill: Capillary refill takes less than 2 seconds.      Findings: Bruising present.      Comments: Left forearm wound approximated with sutures.    Neurological:      General: No focal deficit present.      Mental Status: He is alert. Mental status is at baseline.      GCS: GCS eye subscore is 4. GCS verbal subscore is 5. GCS motor subscore is 6.      Sensory: Sensation is intact.      Motor: Motor function is intact.         Laboratory  Recent Results (from the past 24 hour(s))   EKG    Collection Time: 01/18/21  1:09 PM   Result Value Ref Range    Report       [facility]    Test Date:  2021-01-18  Pt Name:    REINIER DAUGHERTY                 Department: 19  MRN:        [id number]                      Room:       S115  Gender:     Male                          Technician: LISA  :        [id number]                   Requested By:BRAD YE  Order #:    [id number]                    Reading MD: Tina Devi    Measurements  Intervals                                Axis  Rate:       70                           P:  NC:                                      QRS:        269  QRSD:       160                          T:          78  QT:         451  QTc:        487    Interpretive Statements  ATRIAL FLUTTER  Atypical LBBB, could be V pacing based on prior ECG  Electronically Signed On 2021 13:51:47 PST by Tina Devi     CBC with Differential: Tomorrow AM    Collection Time: 21  5:45 AM   Result Value Ref Range    WBC 11.1 (H) 4.8 - 10.8 K/uL    RBC 4.73 4.70 - 6.10 M/uL    Hemoglobin 14.6 14.0 - 18.0 g/dL    Hematocrit 45.9 42.0 - 52.0 %    MCV 97.0 81.4 - 97.8 fL    MCH 30.9 27.0 - 33.0 pg    MCHC 31.8 (L) 33.7 - 35.3 g/dL    RDW 48.7 35.9 - 50.0 fL    Platelet Count 167 164 - 446 K/uL    MPV 9.7 9.0 - 12.9 fL    Neutrophils-Polys 78.30 (H) 44.00 - 72.00 %    Lymphocytes 11.40 (L) 22.00 - 41.00 %    Monocytes 8.20 0.00 - 13.40 %    Eosinophils 1.20 0.00 - 6.90 %    Basophils 0.30 0.00 - 1.80 %    Immature Granulocytes 0.60 0.00 - 0.90 %    Nucleated RBC 0.00 /100 WBC    Neutrophils (Absolute) 8.67 (H) 1.82 - 7.42 K/uL    Lymphs (Absolute) 1.26 1.00 - 4.80 K/uL    Monos (Absolute) 0.91 (H) 0.00 - 0.85 K/uL    Eos (Absolute) 0.13 0.00 - 0.51 K/uL    Baso (Absolute) 0.03 0.00 - 0.12 K/uL    Immature Granulocytes (abs) 0.07 0.00 - 0.11 K/uL    NRBC (Absolute) 0.00 K/uL   Comp Metabolic Panel (CMP): Tomorrow AM    Collection Time: 21  5:45 AM   Result Value Ref Range    Sodium 137 135 - 145 mmol/L    Potassium 4.3 3.6 - 5.5 mmol/L    Chloride 104 96 - 112 mmol/L    Co2 25 20 - 33 mmol/L    Anion Gap 8.0 7.0 - 16.0    Glucose 94 65 - 99 mg/dL    Bun 22 8 - 22 mg/dL    Creatinine 0.92 0.50 - 1.40 mg/dL    Calcium 8.6 8.5 - 10.5 mg/dL    AST(SGOT) 10 (L) 12 -  45 U/L    ALT(SGPT) 15 2 - 50 U/L    Alkaline Phosphatase 67 30 - 99 U/L    Total Bilirubin 1.1 0.1 - 1.5 mg/dL    Albumin 3.6 3.2 - 4.9 g/dL    Total Protein 6.6 6.0 - 8.2 g/dL    Globulin 3.0 1.9 - 3.5 g/dL    A-G Ratio 1.2 g/dL   PROTHROMBIN TIME    Collection Time: 01/19/21  5:45 AM   Result Value Ref Range    PT 32.3 (H) 12.0 - 14.6 sec    INR 3.02 (H) 0.87 - 1.13   ESTIMATED GFR    Collection Time: 01/19/21  5:45 AM   Result Value Ref Range    GFR If African American >60 >60 mL/min/1.73 m 2    GFR If Non African American >60 >60 mL/min/1.73 m 2       Fluids    Intake/Output Summary (Last 24 hours) at 1/19/2021 1056  Last data filed at 1/19/2021 1030  Gross per 24 hour   Intake 600 ml   Output 1300 ml   Net -700 ml       Core Measures & Quality Metrics  Labs reviewed, Medications reviewed and Radiology images reviewed  Mancilla catheter: No Mancilla      DVT Prophylaxis: Warfarin (Coumadin)  DVT prophylaxis - mechanical: SCDs  Ulcer prophylaxis: Not indicated    Assessed for rehab: Patient was assess for and/or received rehabilitation services during this hospitalization    RAP Score Total: 5    ETOH Screening     Assessment complete date: 1/18/2021 (No CAGE )        Assessment/Plan  Hypoxia- (present on admission)  Assessment & Plan  Desaturates without supplemental oxygen to 86%  Supplemental oxygen to maintain O2 saturations greater than 92%     Fracture of four ribs of left side- (present on admission)  Assessment & Plan  Fractures of the left fifth, sixth, seventh and eighth ribs.  Aggressive multimodal pain management, and pulmonary hygiene.   Serial chest radiographs.      Pacemaker- (present on admission)  Assessment & Plan  [employer]   1/19 Manufacture contacted for interrogation.     Discharge planning issues- (present on admission)  Assessment & Plan  Date of admission: 1/17/2021  Date: 1/18 Transfer orders from SICU pending  Date: 1/18  Rehab/SNF consult   Cleared for discharge: No  Discharge  delayed: No    Discharge date: TBD      Anticoagulated on Coumadin- (present on admission)  Assessment & Plan  Chronic Coumadin use  1/17 INR on admission  2.63   1/18 Coumadin per pharmacy protocol  Pharmacist suggested discharge dosing: warfarin 2.5 mg every Sunday, Tuesday, and Thursday; 5 mg all other days.      Atelectasis, bilateral- (present on admission)  Assessment & Plan  Bibasilar atelectasis, left greater than right.  Supplemental oxygen to maintain O2 saturations greater than 92%  Aggressive pulmonary hygiene. Serial chest radiographs.     Atrial fibrillation (HCC)- (present on admission)  Assessment & Plan  Chronic condition treated with coumadin.  1/18 Coumadin per pharmacy protocol.     Laceration of left forearm- (present on admission)  Assessment & Plan  No acute left forearm fracture.  Complex laceration closed in ED.  Sutures out in 10 -14 days.     Other emphysema (HCC)- (present on admission)  Assessment & Plan  Emphysematous changes on CT  Aggressive pulmonary hygiene      Multiple thyroid nodules- (present on admission)  Assessment & Plan  Thyroid nodules on the right. Evaluation with thyroid ultrasound is recommended     AAA (abdominal aortic aneurysm) (HCC)- (present on admission)  Assessment & Plan  Aortobiiliac stent graft placed in 2004  Abdominal aortic aneurysm measures 6.7 x 6.8 cm compared to 6.6 x 6.7 cm previously.      Bilateral renal cysts- (present on admission)  Assessment & Plan  Left renal cyst and small hypodense bilateral renal lesions which are too small to characterize but likely represent small cysts.  Stable since initially seen on CT 2012.     Trauma- (present on admission)  Assessment & Plan  GLF  T-5000 Activation.  Scott Hurley MD. Trauma Surgery.     Screening examination for infectious disease- (present on admission)  Assessment & Plan  Two SARS-CoV-2 tests negative. Isolation precautions de-escalated.         Discussed patient condition with Patient and  trauma surgery, Dr. Praneeth Bowling.

## 2021-01-19 NOTE — DISCHARGE PLANNING
"[facility] Coordination     Call out to patient's sister Karen Richardson to discuss IRF referral/verify discharge support.  Explained specifics of inpatient rehab, answered questions.concerns.  Karen tells me she is comfortale caring for Gennaro at discharge \"I've been doing it for 20 years.\"  She is agreeable to rehab admission for Gennaro if he is agreeable.  Advised [facility] to visit patient to discuss referral.  Provided [facility] contact information for any additional rehab questions.  Will follow for choice.    "

## 2021-01-19 NOTE — PROGRESS NOTES
APN at bedside, updated that pacemaker continues to show extra pacer spikes on monitor.  NAD, pt asx with otherwise stable VS. Leads changed with no improvement.     Call placed to [employer] for pacemaker interrogation ([phone]).  Rep from this area to will be notified and will give this RN a call, direct extension left for call back purposes.

## 2021-01-19 NOTE — DISCHARGE PLANNING
[facility]itional [facility] Coordination     Referral from:  Momo HYATT  Facesheet indicates:  [employer]  Potential Rehab Diagnosis:  GLF     Chart review indicates patient has on going medical management and therapy needs to possibly meet inpatient rehab facility criteria with the goal of returning to community.    D/C support:  Sister     Physiatry to consult.          Last Covid test date:  1/ - COVID Negative      Thank you for the referral.

## 2021-01-19 NOTE — PROGRESS NOTES
Spoke with [employer] rep about issues with pacemaker.   Rep believes that based on description the tele monitoring is picking up extra noise that is artifact.  Per Rep, pacer will not pace pt out of a flutter (discussed that pt was in a flutter yesterday and pacer was not firing).  She will be at [facility] to check on pacemaker sometime today.

## 2021-01-19 NOTE — PROGRESS NOTES
Trauma / Surgical Daily Progress Note    Date of Service  1/18/2021    Chief Complaint  85 y.o. male admitted 1/17/2021 with rib fractures post ground level fall.     Interval Events    Ambulatory with assist. Adequate pain control. IS 1000 cc. Room air.   History of atrial fibrillation with outpatient coumadin use.    PT/OT notes reviewed.     - Coumadin per pharmacy.  - Physiatry consult placed.  - Clinically stable at this time, transfer to [facility] with telemetry monitoring.  - Disposition: Physiatry consult placed. Medically cleared for post acute services   - Counseled.     Review of Systems  Review of Systems   Constitutional: Negative for chills and fever.   HENT: Negative for hearing loss.    Eyes: Negative for blurred vision and double vision.   Respiratory: Negative for cough, sputum production and shortness of breath.    Cardiovascular: Negative for chest pain.   Gastrointestinal: Negative for abdominal pain, nausea and vomiting.   Genitourinary: Negative for dysuria.   Musculoskeletal: Positive for myalgias. Negative for back pain, joint pain and neck pain.   Neurological: Negative for speech change and focal weakness.        Vital Signs  Temp:  [36.3 °C (97.4 °F)-36.4 °C (97.5 °F)] 36.4 °C (97.5 °F)  Pulse:  [59-80] 60  Resp:  [17-43] 43  BP: (111-166)/(56-86) 139/64  SpO2:  [90 %-98 %] 94 %    Physical Exam  Physical Exam  Vitals signs and nursing note reviewed.   Constitutional:       General: He is not in acute distress.     Appearance: He is not ill-appearing, toxic-appearing or diaphoretic.      Comments: Up to chair   HENT:      Head: Normocephalic and atraumatic.      Right Ear: External ear normal.      Left Ear: External ear normal.      Nose: Nose normal. No congestion.      Mouth/Throat:      Mouth: Mucous membranes are moist.      Pharynx: Oropharynx is clear.   Eyes:      General: No scleral icterus.     Extraocular Movements: Extraocular movements intact.       Conjunctiva/sclera: Conjunctivae normal.      Pupils: Pupils are equal, round, and reactive to light.   Neck:      Musculoskeletal: Normal range of motion and neck supple. No muscular tenderness.   Cardiovascular:      Rate and Rhythm: Normal rate.      Pulses: Normal pulses.      Heart sounds: Normal heart sounds.      Comments: Intermittently paced  Pulmonary:      Effort: Pulmonary effort is normal. No tachypnea, accessory muscle usage or respiratory distress.      Breath sounds: Normal air entry. Examination of the right-middle field reveals decreased breath sounds. Examination of the left-middle field reveals decreased breath sounds. Examination of the right-lower field reveals decreased breath sounds. Examination of the left-lower field reveals decreased breath sounds. Decreased breath sounds present. No wheezing.      Comments: IS 1000 cc  Chest:      Chest wall: Tenderness present.   Abdominal:      General: Bowel sounds are normal. There is no distension.      Palpations: Abdomen is soft.      Tenderness: There is no abdominal tenderness. There is no guarding or rebound.   Musculoskeletal: Normal range of motion.         General: No tenderness or signs of injury.   Skin:     General: Skin is warm and dry.      Capillary Refill: Capillary refill takes less than 2 seconds.      Findings: Bruising present.      Comments: Left forearm wound approximated with sutures.    Neurological:      General: No focal deficit present.      Mental Status: He is alert. Mental status is at baseline.      GCS: GCS eye subscore is 4. GCS verbal subscore is 5. GCS motor subscore is 6.      Sensory: Sensation is intact.      Motor: Motor function is intact.         Laboratory  Recent Results (from the past 24 hour(s))   SARS-COV Antigen ZE: Collect dry nasal swab AND NP swab in [facility]    Collection Time: 01/17/21  7:23 PM   Result Value Ref Range    SARS-CoV-2 Source Nasal Swab     SARS-COV ANTIGEN ZE NotDetected Not-Detected    SARS-CoV-2 PCR (24 hour In-House): Collect NP swab in [facility]    Collection Time: 21  7:23 PM    Specimen: Nasopharyngeal; Respirate   Result Value Ref Range    SARS-CoV-2 Source NP Swab     SARS-CoV-2 by PCR NotDetected    URINALYSIS    Collection Time: 21  7:30 PM    Specimen: Urine, Clean Catch   Result Value Ref Range    Color Yellow     Character Clear     Ph 5.0 5.0 - 8.0    Glucose Negative Negative mg/dL    Ketones Negative Negative mg/dL    Protein Negative Negative mg/dL    Bilirubin Negative Negative    Urobilinogen, Urine 0.2 Negative    Nitrite Negative Negative    Leukocyte Esterase Negative Negative    Occult Blood Small (A) Negative    Micro Urine Req Microscopic    REFRACTOMETER SG    Collection Time: 21  7:30 PM   Result Value Ref Range    Specific Gravity 1.040    EKG    Collection Time: 21  7:51 PM   Result Value Ref Range    Report       [facility]    Test Date:  2021  Pt Name:    REINIER DAUGHERTY                 Department: ER  MRN:        [id number]                      Room:       [facility]  Gender:     Male                         Technician: [id number]  :        [id number]                   Requested By:KEON COBB  Order #:    [id number]                    Reading MD: TRUMAN PEACOCK. AMD    Measurements  Intervals                                Axis  Rate:       68                           P:          -78  UT:         162                          QRS:        -86  QRSD:       184                          T:          76  QT:         472  QTc:        503    Interpretive Statements  ATRIAL-SENSED VENTRICULAR-PACED COMPLEXES  NO FURTHER RHYTHM ANALYSIS ATTEMPTED DUE TO PACED RHYTHM  NONSPECIFIC IVCD WITH LAD  LEFT VENTRICULAR HYPERTROPHY  Compared to ECG 2016 12:08:46  Left ventricular hypertrophy now present  Electronically Signed On 2021 22:12:22 PST by TRUMAN PEACOCK . AMD     CBC with Differential: Tomorrow AM     Collection Time: 01/18/21  5:44 AM   Result Value Ref Range    WBC 12.7 (H) 4.8 - 10.8 K/uL    RBC 4.51 (L) 4.70 - 6.10 M/uL    Hemoglobin 13.7 (L) 14.0 - 18.0 g/dL    Hematocrit 43.6 42.0 - 52.0 %    MCV 96.7 81.4 - 97.8 fL    MCH 30.4 27.0 - 33.0 pg    MCHC 31.4 (L) 33.7 - 35.3 g/dL    RDW 47.7 35.9 - 50.0 fL    Platelet Count 165 164 - 446 K/uL    MPV 9.8 9.0 - 12.9 fL    Neutrophils-Polys 80.00 (H) 44.00 - 72.00 %    Lymphocytes 9.30 (L) 22.00 - 41.00 %    Monocytes 9.30 0.00 - 13.40 %    Eosinophils 0.60 0.00 - 6.90 %    Basophils 0.20 0.00 - 1.80 %    Immature Granulocytes 0.60 0.00 - 0.90 %    Nucleated RBC 0.00 /100 WBC    Neutrophils (Absolute) 10.15 (H) 1.82 - 7.42 K/uL    Lymphs (Absolute) 1.18 1.00 - 4.80 K/uL    Monos (Absolute) 1.18 (H) 0.00 - 0.85 K/uL    Eos (Absolute) 0.07 0.00 - 0.51 K/uL    Baso (Absolute) 0.03 0.00 - 0.12 K/uL    Immature Granulocytes (abs) 0.08 0.00 - 0.11 K/uL    NRBC (Absolute) 0.00 K/uL   Comp Metabolic Panel (CMP): Tomorrow AM    Collection Time: 01/18/21  5:44 AM   Result Value Ref Range    Sodium 133 (L) 135 - 145 mmol/L    Potassium 4.3 3.6 - 5.5 mmol/L    Chloride 103 96 - 112 mmol/L    Co2 23 20 - 33 mmol/L    Anion Gap 7.0 7.0 - 16.0    Glucose 96 65 - 99 mg/dL    Bun 17 8 - 22 mg/dL    Creatinine 0.72 0.50 - 1.40 mg/dL    Calcium 8.4 (L) 8.5 - 10.5 mg/dL    AST(SGOT) 21 12 - 45 U/L    ALT(SGPT) 16 2 - 50 U/L    Alkaline Phosphatase 66 30 - 99 U/L    Total Bilirubin 1.0 0.1 - 1.5 mg/dL    Albumin 3.4 3.2 - 4.9 g/dL    Total Protein 6.5 6.0 - 8.2 g/dL    Globulin 3.1 1.9 - 3.5 g/dL    A-G Ratio 1.1 g/dL   MAGNESIUM    Collection Time: 01/18/21  5:44 AM   Result Value Ref Range    Magnesium 2.2 1.5 - 2.5 mg/dL   PHOSPHORUS    Collection Time: 01/18/21  5:44 AM   Result Value Ref Range    Phosphorus 2.5 2.5 - 4.5 mg/dL   ESTIMATED GFR    Collection Time: 01/18/21  5:44 AM   Result Value Ref Range    GFR If African American >60 >60 mL/min/1.73 m 2    GFR If Non African  American >60 >60 mL/min/1.73 m 2   Prothrombin Time    Collection Time: 21 10:00 AM   Result Value Ref Range    PT 29.3 (H) 12.0 - 14.6 sec    INR 2.68 (H) 0.87 - 1.13   TROPONIN    Collection Time: 21 10:00 AM   Result Value Ref Range    Troponin T 15 6 - 19 ng/L   EKG    Collection Time: 21 10:16 AM   Result Value Ref Range    Report       [facility]    Test Date:  2021  Pt Name:    REINIER DAUGHERTY                 Department: 19  MRN:        [id number]                      Room:       [facility]  Gender:     Male                         Technician: PEP  :        1935                   Requested By:BRAD YE  Order #:    [id number]                    Reading MD: Tina Devi    Measurements  Intervals                                Axis  Rate:       60                           P:          36  NY:         156                          QRS:        -87  QRSD:       160                          T:          79  QT:         452  QTc:        452    Interpretive Statements  Probably underlying atrial fibrillation  ATRIAL-SENSED VENTRICULAR-PACED COMPLEXES  NO FURTHER ANALYSIS ATTEMPTED DUE TO PACED RHYTHM  Electronically Signed On 2021 10:34:12 PST by Tina Devi     EKG    Collection Time: 21  1:09 PM   Result Value Ref Range    Report       [facility]    Test Date:  2021  Pt Name:    REINIER DAUGHERTY                 Department: 19  MRN:        [id number]                      Room:       [facility]  Gender:     Male                         Technician: LISA  :        1935                   Requested By:BRAD YE  Order #:    [id number]                    Reading MD: Tina Devi    Measurements  Intervals                                Axis  Rate:       70                           P:  NY:                                      QRS:        269  QRSD:       160                          T:          78  QT:         451  QTc:        487    Interpretive  Statements  ATRIAL FLUTTER  Atypical LBBB, could be V pacing based on prior ECG  Electronically Signed On 1- 13:51:47 PST by Tina Devi         Fluids    Intake/Output Summary (Last 24 hours) at 1/18/2021 1625  Last data filed at 1/18/2021 1300  Gross per 24 hour   Intake 944.17 ml   Output 1350 ml   Net -405.83 ml       Core Measures & Quality Metrics  Labs reviewed, Medications reviewed and Radiology images reviewed  Mancilla catheter: No Mancilla      DVT Prophylaxis: Warfarin (Coumadin)  DVT prophylaxis - mechanical: SCDs  Ulcer prophylaxis: Not indicated    Assessed for rehab: Patient was assess for and/or received rehabilitation services during this hospitalization    RAP Score Total: 5    ETOH Screening     Assessment complete date: 1/18/2021 (No CAGE )        Assessment/Plan  Hypoxia- (present on admission)  Assessment & Plan  Desaturates without supplemental oxygen to 86%  Supplemental oxygen to maintain O2 saturations greater than 92%    Fracture of four ribs of left side- (present on admission)  Assessment & Plan  Fractures of the left fifth, sixth, seventh and eighth ribs.  Aggressive multimodal pain management, and pulmonary hygiene.   Serial chest radiographs.     Discharge planning issues- (present on admission)  Assessment & Plan  Date of admission: 1/17/2021  Date: 1/18 Transfer orders from SICU pending  Date: 1/18  Rehab/SNF consult   Cleared for discharge: No  Discharge delayed: No    Discharge date: TBD     Anticoagulated on Coumadin- (present on admission)  Assessment & Plan  Chronic Coumadin use  1/17 INR on admission  2.63   1/18 Coumadin per pharmacy protocol  Pharmacist suggested discharge dosing: warfarin 2.5 mg every Sunday, Tuesday, and Thursday; 5 mg all other days.     Atelectasis, bilateral- (present on admission)  Assessment & Plan  Bibasilar atelectasis, left greater than right.  Supplemental oxygen to maintain O2 saturations greater than 92%  Aggressive pulmonary hygiene.  Serial chest radiographs.    Atrial fibrillation (HCC)- (present on admission)  Assessment & Plan  Chronic condition treated with coumadin.  1/18 Coumadin per pharmacy protocol.        Laceration of left forearm- (present on admission)  Assessment & Plan  No acute left forearm fracture.  Complex laceration closed in ED.  Sutures out in 10 -14 days.    Other emphysema (HCC)- (present on admission)  Assessment & Plan  Emphysematous changes on CT  Aggressive pulmonary hygiene     Multiple thyroid nodules- (present on admission)  Assessment & Plan  Thyroid nodules on the right. Evaluation with thyroid ultrasound is recommended    AAA (abdominal aortic aneurysm) (HCC)- (present on admission)  Assessment & Plan  Aortobiiliac stent graft placed in 2004  Abdominal aortic aneurysm measures 6.7 x 6.8 cm compared to 6.6 x 6.7 cm previously.     Bilateral renal cysts- (present on admission)  Assessment & Plan  Left renal cyst and small hypodense bilateral renal lesions which are too small to characterize but likely represent small cysts.  Stable since initially seen on CT 2012.    Trauma- (present on admission)  Assessment & Plan  GLF  T-5000 Activation.  Scott Hurley MD. Trauma Surgery.    Screening examination for infectious disease- (present on admission)  Assessment & Plan  Two SARS-CoV-2 tests negative. Isolation precautions de-escalated.      Discussed patient condition with Patient and trauma surgery, Dr. Praneeth Bowling.

## 2021-01-19 NOTE — PROGRESS NOTES
Inpatient Anticoagulation Service Note    Date: 1/19/2021    Reason for Anticoagulation: Atrial Fibrillation   Target INR: 2.0 to 3.0  [id number] VASc Score: 3  HAS-BLED Score: 2   Hemoglobin Value: 14.6  Hematocrit Value: 45.9  Lab Platelet Value: 167    INR from last 7 days     Date/Time INR Value    01/19/21 0545  (!) 3.02    01/18/21 1000  (!) 2.68    01/17/21 1614  (!) 2.63        Dose from last 7 days     Date/Time Dose (mg)    01/19/21 1121  2.5    01/18/21 1103  5        Average Dose (mg): (Per Anticoagulation Clinic: 2.5mg every Su/Tu/Th and 5 mg AOD)  Significant Interactions: NSAID  Bridge Therapy: No  Reversal Agent Administered: Not Applicable    Assessment: Working on pain control.  No changes to diet or drug-drug interactions.  H/H WNL.  No overt s/sx of bleeding.  INR remains therapeutic.  It did increase today.      Plan:  Patient is scheduled to receive warfarin 2.5 mg tonight per his home warfarin regimen.  Patient may require lower total daily dosing.  Follow up INR in the AM.  Education Material Provided?: No(chronic warfarin patient)  Pharmacist suggested discharge dosing: warfarin 2.5 mg every Sunday, Tuesday, and Thursday; 5 mg all other days.     Sonya Retana, Pharm.D., BCPS

## 2021-01-19 NOTE — ASSESSMENT & PLAN NOTE
[employer]   1/19 Interrogation completed.  1/20 No longer noting extra beats with activity  Follow up with Cardiology at discharge

## 2021-01-19 NOTE — DISCHARGE PLANNING
Anticipated Discharge Disposition: TBD    Action: APN placed physiatry consult - pending evaluation. Documentation shows that pt is medically cleared for post acute placement/services. Anticipate transfer out of ICU    Barriers to Discharge: PMR consult & determination of post acute services for safe dc     Plan: Continue to follow and assist with social/dc needs

## 2021-01-19 NOTE — PROGRESS NOTES
Dr. Malik at bedside to assess pt, clarified off the shelf TLSO brace is allowed for mobility and able to don at edge of bed. APN to review chart and add muscle relaxer.

## 2021-01-20 ENCOUNTER — APPOINTMENT (OUTPATIENT)
Dept: RADIOLOGY | Facility: MEDICAL CENTER | Age: 86
DRG: 184 | End: 2021-01-20
Attending: NURSE PRACTITIONER
Payer: MEDICARE

## 2021-01-20 ENCOUNTER — APPOINTMENT (OUTPATIENT)
Dept: RADIOLOGY | Facility: MEDICAL CENTER | Age: 86
DRG: 184 | End: 2021-01-20
Attending: SURGERY
Payer: MEDICARE

## 2021-01-20 LAB
ALBUMIN SERPL BCP-MCNC: 3.3 G/DL (ref 3.2–4.9)
ALBUMIN/GLOB SERPL: 1 G/DL
ALP SERPL-CCNC: 67 U/L (ref 30–99)
ALT SERPL-CCNC: 16 U/L (ref 2–50)
ANION GAP SERPL CALC-SCNC: 8 MMOL/L (ref 7–16)
AST SERPL-CCNC: 17 U/L (ref 12–45)
BASOPHILS # BLD AUTO: 0.3 % (ref 0–1.8)
BASOPHILS # BLD: 0.04 K/UL (ref 0–0.12)
BILIRUB SERPL-MCNC: 0.9 MG/DL (ref 0.1–1.5)
BUN SERPL-MCNC: 27 MG/DL (ref 8–22)
CALCIUM SERPL-MCNC: 8.4 MG/DL (ref 8.5–10.5)
CHLORIDE SERPL-SCNC: 105 MMOL/L (ref 96–112)
CO2 SERPL-SCNC: 24 MMOL/L (ref 20–33)
CREAT SERPL-MCNC: 0.86 MG/DL (ref 0.5–1.4)
EOSINOPHIL # BLD AUTO: 0.14 K/UL (ref 0–0.51)
EOSINOPHIL NFR BLD: 1.2 % (ref 0–6.9)
ERYTHROCYTE [DISTWIDTH] IN BLOOD BY AUTOMATED COUNT: 48.6 FL (ref 35.9–50)
GLOBULIN SER CALC-MCNC: 3.2 G/DL (ref 1.9–3.5)
GLUCOSE SERPL-MCNC: 94 MG/DL (ref 65–99)
HCT VFR BLD AUTO: 44.5 % (ref 42–52)
HGB BLD-MCNC: 14.1 G/DL (ref 14–18)
IMM GRANULOCYTES # BLD AUTO: 0.08 K/UL (ref 0–0.11)
IMM GRANULOCYTES NFR BLD AUTO: 0.7 % (ref 0–0.9)
INR PPP: 3.01 (ref 0.87–1.13)
LYMPHOCYTES # BLD AUTO: 1.6 K/UL (ref 1–4.8)
LYMPHOCYTES NFR BLD: 13.9 % (ref 22–41)
MCH RBC QN AUTO: 30.7 PG (ref 27–33)
MCHC RBC AUTO-ENTMCNC: 31.7 G/DL (ref 33.7–35.3)
MCV RBC AUTO: 96.7 FL (ref 81.4–97.8)
MONOCYTES # BLD AUTO: 0.99 K/UL (ref 0–0.85)
MONOCYTES NFR BLD AUTO: 8.6 % (ref 0–13.4)
NEUTROPHILS # BLD AUTO: 8.68 K/UL (ref 1.82–7.42)
NEUTROPHILS NFR BLD: 75.3 % (ref 44–72)
NRBC # BLD AUTO: 0 K/UL
NRBC BLD-RTO: 0 /100 WBC
PLATELET # BLD AUTO: 170 K/UL (ref 164–446)
PMV BLD AUTO: 10.1 FL (ref 9–12.9)
POTASSIUM SERPL-SCNC: 4.3 MMOL/L (ref 3.6–5.5)
PROT SERPL-MCNC: 6.5 G/DL (ref 6–8.2)
PROTHROMBIN TIME: 32.1 SEC (ref 12–14.6)
RBC # BLD AUTO: 4.6 M/UL (ref 4.7–6.1)
SODIUM SERPL-SCNC: 137 MMOL/L (ref 135–145)
WBC # BLD AUTO: 11.5 K/UL (ref 4.8–10.8)

## 2021-01-20 PROCEDURE — A9270 NON-COVERED ITEM OR SERVICE: HCPCS | Performed by: SURGERY

## 2021-01-20 PROCEDURE — 80053 COMPREHEN METABOLIC PANEL: CPT

## 2021-01-20 PROCEDURE — A9270 NON-COVERED ITEM OR SERVICE: HCPCS | Performed by: NURSE PRACTITIONER

## 2021-01-20 PROCEDURE — 85025 COMPLETE CBC W/AUTO DIFF WBC: CPT

## 2021-01-20 PROCEDURE — 770001 HCHG ROOM/CARE - MED/SURG/GYN PRIV*

## 2021-01-20 PROCEDURE — 700101 HCHG RX REV CODE 250: Performed by: SURGERY

## 2021-01-20 PROCEDURE — 700102 HCHG RX REV CODE 250 W/ 637 OVERRIDE(OP): Performed by: NURSE PRACTITIONER

## 2021-01-20 PROCEDURE — 71045 X-RAY EXAM CHEST 1 VIEW: CPT

## 2021-01-20 PROCEDURE — 700102 HCHG RX REV CODE 250 W/ 637 OVERRIDE(OP): Performed by: SURGERY

## 2021-01-20 PROCEDURE — 85610 PROTHROMBIN TIME: CPT

## 2021-01-20 PROCEDURE — 99233 SBSQ HOSP IP/OBS HIGH 50: CPT | Performed by: SURGERY

## 2021-01-20 RX ORDER — WARFARIN SODIUM 5 MG/1
5 TABLET ORAL
Status: DISCONTINUED | OUTPATIENT
Start: 2021-01-21 | End: 2021-01-22

## 2021-01-20 RX ORDER — WARFARIN SODIUM 2.5 MG/1
2.5 TABLET ORAL ONCE
Status: COMPLETED | OUTPATIENT
Start: 2021-01-20 | End: 2021-01-20

## 2021-01-20 RX ORDER — WARFARIN SODIUM 2.5 MG/1
2.5 TABLET ORAL
Status: DISCONTINUED | OUTPATIENT
Start: 2021-01-21 | End: 2021-01-22

## 2021-01-20 RX ADMIN — CELECOXIB 200 MG: 200 CAPSULE ORAL at 05:45

## 2021-01-20 RX ADMIN — POLYETHYLENE GLYCOL 3350 1 PACKET: 17 POWDER, FOR SOLUTION ORAL at 18:08

## 2021-01-20 RX ADMIN — ACETAMINOPHEN 650 MG: 325 TABLET, FILM COATED ORAL at 11:43

## 2021-01-20 RX ADMIN — GABAPENTIN 100 MG: 100 CAPSULE ORAL at 18:08

## 2021-01-20 RX ADMIN — DOCUSATE SODIUM 100 MG: 100 CAPSULE, LIQUID FILLED ORAL at 18:08

## 2021-01-20 RX ADMIN — LIDOCAINE 2 PATCH: 50 PATCH TOPICAL at 18:07

## 2021-01-20 RX ADMIN — WARFARIN SODIUM 2.5 MG: 5 TABLET ORAL at 18:08

## 2021-01-20 RX ADMIN — METAXALONE 400 MG: 800 TABLET ORAL at 11:43

## 2021-01-20 RX ADMIN — Medication 1 EACH: at 05:46

## 2021-01-20 RX ADMIN — ACETAMINOPHEN 650 MG: 325 TABLET, FILM COATED ORAL at 05:45

## 2021-01-20 RX ADMIN — OXYCODONE 5 MG: 5 TABLET ORAL at 18:08

## 2021-01-20 RX ADMIN — GABAPENTIN 100 MG: 100 CAPSULE ORAL at 11:43

## 2021-01-20 RX ADMIN — DOCUSATE SODIUM 50 MG AND SENNOSIDES 8.6 MG 1 TABLET: 8.6; 5 TABLET, FILM COATED ORAL at 21:59

## 2021-01-20 RX ADMIN — ACETAMINOPHEN 650 MG: 325 TABLET, FILM COATED ORAL at 18:08

## 2021-01-20 RX ADMIN — Medication 1 EACH: at 18:08

## 2021-01-20 RX ADMIN — OXYCODONE 5 MG: 5 TABLET ORAL at 13:43

## 2021-01-20 RX ADMIN — FINASTERIDE 5 MG: 5 TABLET, FILM COATED ORAL at 05:45

## 2021-01-20 RX ADMIN — GABAPENTIN 100 MG: 100 CAPSULE ORAL at 05:45

## 2021-01-20 ASSESSMENT — PAIN DESCRIPTION - PAIN TYPE
TYPE: ACUTE PAIN;CHRONIC PAIN
TYPE: ACUTE PAIN

## 2021-01-20 ASSESSMENT — ENCOUNTER SYMPTOMS
ABDOMINAL PAIN: 0
BACK PAIN: 0
SPEECH CHANGE: 0
VOMITING: 0
CHILLS: 0
FEVER: 0
SHORTNESS OF BREATH: 0
SPUTUM PRODUCTION: 0
COUGH: 0
DOUBLE VISION: 0
FOCAL WEAKNESS: 0
BLURRED VISION: 0
NAUSEA: 0
MYALGIAS: 1
NECK PAIN: 0

## 2021-01-20 NOTE — CARE PLAN
Problem: Hyperinflation:  Goal: Prevent or improve atelectasis  Outcome: PROGRESSING AS EXPECTED     IS QID. 1250 on IS.

## 2021-01-20 NOTE — PROGRESS NOTES
Trauma / Surgical Daily Progress Note    Date of Service  1/20/2021    Chief Complaint  85 y.o. male admitted 1/17/2021 with rib fractures post fall    Interval Events  Patient is no longer showing signs of extra pacer spikes when getting out of bed  Rehab consult appreciated  Pain controlled  BM this am    -Medically cleared to transfer to post acute service when authorized  -Transfer to [facility]  -Therapies  -Counseled    Review of Systems  Review of Systems   Constitutional: Negative for chills and fever.   HENT: Negative for hearing loss.    Eyes: Negative for blurred vision and double vision.   Respiratory: Negative for cough, sputum production and shortness of breath.    Cardiovascular: Negative for chest pain.   Gastrointestinal: Negative for abdominal pain, nausea and vomiting.   Genitourinary: Negative for dysuria.   Musculoskeletal: Positive for myalgias. Negative for back pain, joint pain and neck pain.   Neurological: Negative for speech change and focal weakness.        Vital Signs  Temp:  [35.9 °C (96.6 °F)-36.9 °C (98.4 °F)] 36.9 °C (98.4 °F)  Pulse:  [60-74] 60  Resp:  [18-48] 22  BP: ()/(50-70) 142/67  SpO2:  [92 %-97 %] 97 %    Physical Exam  Physical Exam  Vitals signs and nursing note reviewed.   Constitutional:       General: He is not in acute distress.     Appearance: He is not toxic-appearing or diaphoretic.      Comments: Up to chair   HENT:      Head: Normocephalic and atraumatic.      Right Ear: External ear normal.      Left Ear: External ear normal.      Nose: Nose normal. No congestion.      Mouth/Throat:      Mouth: Mucous membranes are moist.      Pharynx: Oropharynx is clear.   Eyes:      General: No scleral icterus.     Conjunctiva/sclera: Conjunctivae normal.      Pupils: Pupils are equal, round, and reactive to light.   Neck:      Musculoskeletal: Normal range of motion and neck supple. No muscular tenderness.   Cardiovascular:      Rate and Rhythm: Normal rate.      Pulses:  Normal pulses.      Comments: Pacer  Pulmonary:      Effort: Pulmonary effort is normal. No tachypnea, accessory muscle usage or respiratory distress.      Breath sounds: Normal air entry. Examination of the right-middle field reveals decreased breath sounds. Examination of the left-middle field reveals decreased breath sounds. Examination of the right-lower field reveals decreased breath sounds. Examination of the left-lower field reveals decreased breath sounds. Decreased breath sounds present. No wheezing.      Comments: IS 1250 cc  Chest:      Chest wall: Tenderness present.   Abdominal:      General: Bowel sounds are normal. There is no distension.      Palpations: Abdomen is soft.      Tenderness: There is no abdominal tenderness. There is no guarding or rebound.   Musculoskeletal: Normal range of motion.         General: No tenderness or signs of injury.   Skin:     General: Skin is warm.      Capillary Refill: Capillary refill takes less than 2 seconds.      Findings: Bruising present.      Comments: Left forearm wound approximated with sutures.    Neurological:      General: No focal deficit present.      Mental Status: He is alert. Mental status is at baseline.      GCS: GCS eye subscore is 4. GCS verbal subscore is 5. GCS motor subscore is 6.      Sensory: Sensation is intact.      Motor: Motor function is intact.   Psychiatric:         Behavior: Behavior normal.         Laboratory  Recent Results (from the past 24 hour(s))   PROTHROMBIN TIME    Collection Time: 01/20/21  5:01 AM   Result Value Ref Range    PT 32.1 (H) 12.0 - 14.6 sec    INR 3.01 (H) 0.87 - 1.13   CBC with Differential: Tomorrow AM    Collection Time: 01/20/21  5:16 AM   Result Value Ref Range    WBC 11.5 (H) 4.8 - 10.8 K/uL    RBC 4.60 (L) 4.70 - 6.10 M/uL    Hemoglobin 14.1 14.0 - 18.0 g/dL    Hematocrit 44.5 42.0 - 52.0 %    MCV 96.7 81.4 - 97.8 fL    MCH 30.7 27.0 - 33.0 pg    MCHC 31.7 (L) 33.7 - 35.3 g/dL    RDW 48.6 35.9 - 50.0 fL     Platelet Count 170 164 - 446 K/uL    MPV 10.1 9.0 - 12.9 fL    Neutrophils-Polys 75.30 (H) 44.00 - 72.00 %    Lymphocytes 13.90 (L) 22.00 - 41.00 %    Monocytes 8.60 0.00 - 13.40 %    Eosinophils 1.20 0.00 - 6.90 %    Basophils 0.30 0.00 - 1.80 %    Immature Granulocytes 0.70 0.00 - 0.90 %    Nucleated RBC 0.00 /100 WBC    Neutrophils (Absolute) 8.68 (H) 1.82 - 7.42 K/uL    Lymphs (Absolute) 1.60 1.00 - 4.80 K/uL    Monos (Absolute) 0.99 (H) 0.00 - 0.85 K/uL    Eos (Absolute) 0.14 0.00 - 0.51 K/uL    Baso (Absolute) 0.04 0.00 - 0.12 K/uL    Immature Granulocytes (abs) 0.08 0.00 - 0.11 K/uL    NRBC (Absolute) 0.00 K/uL   Comp Metabolic Panel (CMP): Tomorrow AM    Collection Time: 01/20/21  5:16 AM   Result Value Ref Range    Sodium 137 135 - 145 mmol/L    Potassium 4.3 3.6 - 5.5 mmol/L    Chloride 105 96 - 112 mmol/L    Co2 24 20 - 33 mmol/L    Anion Gap 8.0 7.0 - 16.0    Glucose 94 65 - 99 mg/dL    Bun 27 (H) 8 - 22 mg/dL    Creatinine 0.86 0.50 - 1.40 mg/dL    Calcium 8.4 (L) 8.5 - 10.5 mg/dL    AST(SGOT) 17 12 - 45 U/L    ALT(SGPT) 16 2 - 50 U/L    Alkaline Phosphatase 67 30 - 99 U/L    Total Bilirubin 0.9 0.1 - 1.5 mg/dL    Albumin 3.3 3.2 - 4.9 g/dL    Total Protein 6.5 6.0 - 8.2 g/dL    Globulin 3.2 1.9 - 3.5 g/dL    A-G Ratio 1.0 g/dL   ESTIMATED GFR    Collection Time: 01/20/21  5:16 AM   Result Value Ref Range    GFR If African American >60 >60 mL/min/1.73 m 2    GFR If Non African American >60 >60 mL/min/1.73 m 2       Fluids    Intake/Output Summary (Last 24 hours) at 1/20/2021 1004  Last data filed at 1/20/2021 0800  Gross per 24 hour   Intake 240 ml   Output 1000 ml   Net -760 ml       Core Measures & Quality Metrics  Labs reviewed, Medications reviewed and Radiology images reviewed  Mancilla catheter: No Mancilla      DVT Prophylaxis: Warfarin (Coumadin)  DVT prophylaxis - mechanical: SCDs  Ulcer prophylaxis: Not indicated    Assessed for rehab: Patient was assess for and/or received rehabilitation  services during this hospitalization    RAP Score Total: 5    ETOH Screening     Assessment complete date: 1/18/2021 (No CAGE )        Assessment/Plan  Hypoxia- (present on admission)  Assessment & Plan  Desaturates without supplemental oxygen to 86%  Supplemental oxygen to maintain O2 saturations greater than 92%     Fracture of four ribs of left side- (present on admission)  Assessment & Plan  Fractures of the left fifth, sixth, seventh and eighth ribs.  Aggressive multimodal pain management, and pulmonary hygiene.   Serial chest radiographs.      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     Discharge planning issues- (present on admission)  Assessment & Plan  Date of admission: 1/17/2021  Date: 1/18 Transfer orders from SICU pending  Date: 1/18  Rehab/SNF consult   Cleared for discharge: Yes - Date: 1/20/21  Discharge delayed: No    Discharge date: TBD      Anticoagulated on Coumadin- (present on admission)  Assessment & Plan  Chronic Coumadin use  1/17 INR on admission  2.63   1/18 Coumadin per pharmacy protocol  Pharmacist suggested discharge dosing: warfarin 2.5 mg every Sunday, Tuesday, and Thursday; 5 mg all other days.      Atelectasis, bilateral- (present on admission)  Assessment & Plan  Bibasilar atelectasis, left greater than right.  Supplemental oxygen to maintain O2 saturations greater than 92%  Aggressive pulmonary hygiene. Serial chest radiographs.     Atrial fibrillation (HCC)- (present on admission)  Assessment & Plan  Chronic condition treated with coumadin.  1/18 Coumadin per pharmacy protocol.     Laceration of left forearm- (present on admission)  Assessment & Plan  No acute left forearm fracture.  Complex laceration closed in ED.  Sutures out in 7-10 days. (1/24-1/27)    Other emphysema (HCC)- (present on admission)  Assessment & Plan  Emphysematous changes on CT  Aggressive pulmonary hygiene      Multiple thyroid nodules-  (present on admission)  Assessment & Plan  Thyroid nodules on the right. Evaluation with thyroid ultrasound is recommended     AAA (abdominal aortic aneurysm) (HCC)- (present on admission)  Assessment & Plan  Aortobiiliac stent graft placed in 2004  Abdominal aortic aneurysm measures 6.7 x 6.8 cm compared to 6.6 x 6.7 cm previously.      Bilateral renal cysts- (present on admission)  Assessment & Plan  Left renal cyst and small hypodense bilateral renal lesions which are too small to characterize but likely represent small cysts.  Stable since initially seen on CT 2012.     Trauma- (present on admission)  Assessment & Plan  GLF  T-5000 Activation.  Scott Hurley MD. Trauma Surgery.     Screening examination for infectious disease- (present on admission)  Assessment & Plan  Two SARS-CoV-2 tests negative. Isolation precautions de-escalated.         Discussed patient condition with RN, Patient and trauma surgery Dr. Bowling.

## 2021-01-20 NOTE — DISCHARGE PLANNING
Per Mark HOPSON, [facility] is still refusing [facility].  [facility] will no longer follow.  Please reach out to myself @ [phone] with any questions.

## 2021-01-20 NOTE — DISCHARGE PLANNING
Dr. Victor is recommending [facility].  EDMAR Torres has medically cleared.  Submitted to insurance, Tracie with [employer], in the event [facility] is agreeable.

## 2021-01-20 NOTE — PROGRESS NOTES
Inpatient Anticoagulation Service Note    Date: 1/20/2021    Reason for Anticoagulation: Atrial Fibrillation   Target INR: 2.0 to 3.0  [id number] VASc Score: 3  HAS-BLED Score: 2   Hemoglobin Value: 14.1  Hematocrit Value: 44.5  Lab Platelet Value: 170    INR from last 7 days     Date/Time INR Value    01/20/21 0501  (!) 3.01    01/19/21 0545  (!) 3.02    01/18/21 1000  (!) 2.68    01/17/21 1614  (!) 2.63        Dose from last 7 days     Date/Time Dose (mg)    01/20/21 1333  2.5    01/19/21 1121  2.5    01/18/21 1103  5        Average Dose (mg): (Per Anticoagulation Clinic: 2.5mg every Su/Tu/Th and 5 mg AOD)  Significant Interactions: NSAID  Bridge Therapy: No  Reversal Agent Administered: Not Applicable    Comments: Warfarin for afib. No new DDIs. H/H and platelets remain stable WNL with no overt s/sx of bleeding noted. INR remains at high end of therapeutic goal, will dose reduce from 5 mg to 2.5 mg ONCE then resume outpatient dosing regimen at this time with follow-up INR in the morning.    Plan:  One time dose reduction from 5 mg to 2.5 mg today, then resume outpatient warfarin dosing based off of follow-up INR tomorrow morning.  Education Material Provided?: No  Pharmacist suggested discharge dosing: warfarin 2.5 mg every Sunday, Tuesday, and Thursday; 5 mg all other days.     Jim Cortes, PharmD

## 2021-01-20 NOTE — PROGRESS NOTES
Pacemaker interrogated by rep from [employer].  Per rep, pacer is functioning properly.  Pacer is A sensing, V pacing and paces to a rate of at least 60.  Pt has been going in and out of afib/aflutter frequently during December and January, sometimes for hours at a time.   The episodes where extra pacer spikes have shown on hospital's monitor appears to be episodes of afib/aflutter per event data on device.  These episodes seem to be related to activity based on when pt is getting up and down from the chair.   Rep is uncertain if going in and out of afib/aflutter could have related at all to patient's recent fall and recommends cards consult if team thinks this may be related. Pt does not report symptoms during these periods and remains otherwise asx.     Printouts of device data were printed, labeled, and placed in pt's chart under cardiac monitoring.

## 2021-01-20 NOTE — CONSULTS
Medical chart review completed.     Patient is a 85 y.o. male with a past medical history significant for A Fib, CAD, MI, and PVD admitted to [facility] on 1/17/2021  3:13 PM with GLF. Per report patient had a GLF with multiple rib fractures. Patients hospital stay was complicated by hypoxia.  Patient has now been medically stabilized.     Patient was evaluated by PT on 1/18/21 and was maxA for bed mobility and Fiona for mobility. Patient was evaluated by OT on 1/18/21 and was modA for ADLs.       PMH:  Past Medical History:   Diagnosis Date   • A-fib (HCC) 6/28/2011   • Arteriosclerosis    • CAD (coronary artery disease)    • CAD (coronary artery disease) 6/28/2011   • Cardiac pacemaker in situ 11/19/2013   • Colonic polyps    • Diverticulosis    • Dizzy spells 11/20/2012   • HERZOG (dyspnea on exertion) 9/7/2014 9/16/16- pt has no c/o   • Dyslipidemia 6/28/2011   • Hematoma of abdominal wall    • Hematoma of rectus sheath 6/13/2012    Delayed onset bleed, intraabdominal extension, left flank , ct abd active bleed , binder, vit k  6/14 Ex-lap     • Myocardial infarct (HCC) 1989   • Pacer at end of battery life 6/28/2011   • PAF (paroxysmal atrial fibrillation) (HCC) 9/7/2014   • Paroxysmal atrial fibrillation (HCC)    • Peripheral vascular disease (HCC) 10/31/2011   • PVD (peripheral vascular disease) ([facility])        PSH:  Past Surgical History:   Procedure Laterality Date   • EXPLORATORY LAPAROTOMY  6/14/2012    Performed by CALLY MERCER at SURGERY [facility] ORS   • COLONOSCOPY WITH POLYP  7/24/08    Performed by RUDOLPH BRENNER at SURGERY [facility] ORS   • ABDOMINAL AORTIC ANEURYSM     • PACEMAKER INSERTION         FAMILY HISTORY:  Family History   Problem Relation Age of Onset   • Cancer Mother        MEDICATIONS:  Current Facility-Administered Medications   Medication Dose   • metaxalone (Skelaxin) tablet 400 mg  400 mg   • MD Alert...Warfarin per Pharmacy     • warfarin (COUMADIN) tablet  2.5 mg  2.5 mg    And   • warfarin (COUMADIN) tablet 5 mg  5 mg   • Respiratory Therapy Consult     • Pharmacy Consult Request ...Pain Management Review 1 Each  1 Each   • docusate sodium (COLACE) capsule 100 mg  100 mg   • senna-docusate (PERICOLACE or SENOKOT S) 8.6-50 MG per tablet 1 Tab  1 Tab   • senna-docusate (PERICOLACE or SENOKOT S) 8.6-50 MG per tablet 1 Tab  1 Tab   • polyethylene glycol/lytes (MIRALAX) PACKET 1 Packet  1 Packet   • magnesium hydroxide (MILK OF MAGNESIA) suspension 30 mL  30 mL   • bisacodyl (DULCOLAX) suppository 10 mg  10 mg   • fleet enema 133 mL  1 Each   • acetaminophen (Tylenol) tablet 650 mg  650 mg   • celecoxib (CELEBREX) capsule 200 mg  200 mg   • oxyCODONE immediate-release (ROXICODONE) tablet 5 mg  5 mg   • ondansetron (ZOFRAN) syringe/vial injection 4 mg  4 mg   • bacitracin-polymyxin b (POLYSPORIN) 500-28636 UNIT/GM ointment     • finasteride (PROSCAR) tablet 5 mg  5 mg   • lidocaine (LIDODERM) 5 % 2 Patch  2 Patch   • gabapentin (NEURONTIN) capsule 100 mg  100 mg       ALLERGIES:  No known drug allergy    PSYCHOSOCIAL HISTORY:  Living Site:  Home  Living With:  family  Caregiver's availability:  Not Applicable  Number of stairs:  3  Substance use history:  Unknown      The patient presents functional deficits in mobility and self-care, and Minimal  de-conditioning. Pre-morbidly, this patient lived in a single level home with Three steps to enter,with family  The patient was evaluated by acute care Physical Therapy and Occupational Therapy; currently requiring minimal assistance for mobility and moderate assistance for ADLs. The patient's current diet is Regular with Thin liquids.     The patient is   a Good candidate for an acute inpatient rehabilitation program with a coordinated program of care at an intensity and frequency not available at a lower level of care.     Note: This recommendation requires that patient has at least CGA/Minimal Assistance needs in at least two  therapy disciplines.  If patient progresses to no longer need CGA/Joe with at least two therapy disciplines they may be more appropriate for Skilled nursing facility versus home with home health.      This recommendation is substantiated by the patient's current medical condition with intervention and assessment of medical issues requiring an acute level of care for patient's safety and maximum outcome. A coordinated program of care will be provided by an interdisciplinary team including physical therapy, occupational therapy, physiatry, rehab nursing and rehab psychology. Rehab goals include improved mobility, self-care management, strength and conditioning/endurance, pain management, bowel and bladder management, mood and affect, and safety with independent home management including caregiver training. Estimated length of stay is approximately 10-14 days. Rehab potential: Good. Disposition: to pre-morbid independent living setting with supportive care of patient's family. We will continue to follow with you in anticipation of discharge to acute inpatient rehabilitation when medically stable to do so at the discretion of the attending physician. Thank you for allowing us to participate in this patient's care. Please call with any questions regarding this recommendation.    Clemente Victor M.D.

## 2021-01-20 NOTE — DISCHARGE PLANNING
Anticipated Discharge Disposition: Acute rehab vs HHC    Action: Reviewed documentation from admission coordinators for [facility] who met with pt and also discussed disposition support with his sister. Outcome of conversations show that pt is not agreeable with [facility] and is wanting to dc home with HH services. Per [place] evaluation, pt is needing post acute placement. LSW attempted to meet with pt to further discuss however he politely asked for LSW to come at a later time. No post acute referrals have been placed for HH.     Barriers to Discharge: coordination of safe dc    Plan: Follow up with pt

## 2021-01-21 LAB
INR PPP: 3.13 (ref 0.87–1.13)
PROTHROMBIN TIME: 33.1 SEC (ref 12–14.6)

## 2021-01-21 PROCEDURE — 97110 THERAPEUTIC EXERCISES: CPT

## 2021-01-21 PROCEDURE — 94669 MECHANICAL CHEST WALL OSCILL: CPT

## 2021-01-21 PROCEDURE — 700102 HCHG RX REV CODE 250 W/ 637 OVERRIDE(OP): Performed by: SURGERY

## 2021-01-21 PROCEDURE — 99233 SBSQ HOSP IP/OBS HIGH 50: CPT | Performed by: SURGERY

## 2021-01-21 PROCEDURE — 700101 HCHG RX REV CODE 250: Performed by: SURGERY

## 2021-01-21 PROCEDURE — 770006 HCHG ROOM/CARE - MED/SURG/GYN SEMI*

## 2021-01-21 PROCEDURE — 97530 THERAPEUTIC ACTIVITIES: CPT

## 2021-01-21 PROCEDURE — 85610 PROTHROMBIN TIME: CPT

## 2021-01-21 PROCEDURE — A9270 NON-COVERED ITEM OR SERVICE: HCPCS | Performed by: SURGERY

## 2021-01-21 RX ADMIN — DOCUSATE SODIUM 100 MG: 100 CAPSULE, LIQUID FILLED ORAL at 05:33

## 2021-01-21 RX ADMIN — LIDOCAINE 2 PATCH: 50 PATCH TOPICAL at 18:09

## 2021-01-21 RX ADMIN — GABAPENTIN 100 MG: 100 CAPSULE ORAL at 18:09

## 2021-01-21 RX ADMIN — OXYCODONE 5 MG: 5 TABLET ORAL at 18:09

## 2021-01-21 RX ADMIN — FINASTERIDE 5 MG: 5 TABLET, FILM COATED ORAL at 05:33

## 2021-01-21 RX ADMIN — WARFARIN SODIUM 2.5 MG: 2.5 TABLET ORAL at 18:17

## 2021-01-21 RX ADMIN — Medication 1 EACH: at 05:33

## 2021-01-21 RX ADMIN — GABAPENTIN 100 MG: 100 CAPSULE ORAL at 05:33

## 2021-01-21 RX ADMIN — ACETAMINOPHEN 650 MG: 325 TABLET, FILM COATED ORAL at 05:33

## 2021-01-21 RX ADMIN — GABAPENTIN 100 MG: 100 CAPSULE ORAL at 11:23

## 2021-01-21 RX ADMIN — CELECOXIB 200 MG: 200 CAPSULE ORAL at 05:33

## 2021-01-21 RX ADMIN — OXYCODONE 5 MG: 5 TABLET ORAL at 11:23

## 2021-01-21 RX ADMIN — ACETAMINOPHEN 650 MG: 325 TABLET, FILM COATED ORAL at 18:08

## 2021-01-21 RX ADMIN — ACETAMINOPHEN 650 MG: 325 TABLET, FILM COATED ORAL at 11:23

## 2021-01-21 ASSESSMENT — ENCOUNTER SYMPTOMS
DOUBLE VISION: 0
MYALGIAS: 1
CHILLS: 0
ABDOMINAL PAIN: 0
FEVER: 0
SPEECH CHANGE: 0
SPUTUM PRODUCTION: 0
FOCAL WEAKNESS: 0
BACK PAIN: 0
SHORTNESS OF BREATH: 0
NAUSEA: 0
COUGH: 0
NECK PAIN: 0
BLURRED VISION: 0
VOMITING: 0

## 2021-01-21 ASSESSMENT — LIFESTYLE VARIABLES
TOTAL SCORE: 0
ON A TYPICAL DAY WHEN YOU DRINK ALCOHOL HOW MANY DRINKS DO YOU HAVE: 0
HOW MANY TIMES IN THE PAST YEAR HAVE YOU HAD 5 OR MORE DRINKS IN A DAY: 0
ALCOHOL_USE: NO
TOTAL SCORE: 0
HAVE PEOPLE ANNOYED YOU BY CRITICIZING YOUR DRINKING: NO
HAVE YOU EVER FELT YOU SHOULD CUT DOWN ON YOUR DRINKING: NO
CONSUMPTION TOTAL: NEGATIVE
TOTAL SCORE: 0
EVER HAD A DRINK FIRST THING IN THE MORNING TO STEADY YOUR NERVES TO GET RID OF A HANGOVER: NO
ALCOHOL_USE: NO
EVER FELT BAD OR GUILTY ABOUT YOUR DRINKING: NO
AVERAGE NUMBER OF DAYS PER WEEK YOU HAVE A DRINK CONTAINING ALCOHOL: 0

## 2021-01-21 ASSESSMENT — COGNITIVE AND FUNCTIONAL STATUS - GENERAL
SUGGESTED CMS G CODE MODIFIER DAILY ACTIVITY: CJ
STANDING UP FROM CHAIR USING ARMS: A LITTLE
DAILY ACTIVITIY SCORE: 21
MOVING FROM LYING ON BACK TO SITTING ON SIDE OF FLAT BED: UNABLE
CLIMB 3 TO 5 STEPS WITH RAILING: A LOT
HELP NEEDED FOR BATHING: A LITTLE
SUGGESTED CMS G CODE MODIFIER MOBILITY: CL
MOVING TO AND FROM BED TO CHAIR: UNABLE
TOILETING: A LITTLE
TURNING FROM BACK TO SIDE WHILE IN FLAT BAD: UNABLE
MOBILITY SCORE: 11
WALKING IN HOSPITAL ROOM: A LITTLE
DRESSING REGULAR LOWER BODY CLOTHING: A LITTLE

## 2021-01-21 ASSESSMENT — GAIT ASSESSMENTS
ASSISTIVE DEVICE: FRONT WHEEL WALKER
DISTANCE (FEET): 100
GAIT LEVEL OF ASSIST: MINIMAL ASSIST
DEVIATION: BRADYKINETIC;SHUFFLED GAIT;DECREASED BASE OF SUPPORT

## 2021-01-21 ASSESSMENT — PATIENT HEALTH QUESTIONNAIRE - PHQ9
2. FEELING DOWN, DEPRESSED, IRRITABLE, OR HOPELESS: NOT AT ALL
SUM OF ALL RESPONSES TO PHQ9 QUESTIONS 1 AND 2: 0
1. LITTLE INTEREST OR PLEASURE IN DOING THINGS: NOT AT ALL

## 2021-01-21 ASSESSMENT — PAIN DESCRIPTION - PAIN TYPE
TYPE: ACUTE PAIN

## 2021-01-21 ASSESSMENT — FIBROSIS 4 INDEX
FIB4 SCORE: 2.125
FIB4 SCORE: 2.125

## 2021-01-21 NOTE — PROGRESS NOTES
Trauma / Surgical Daily Progress Note    Date of Service  1/21/2021    Chief Complaint  85 y.o. male admitted 1/17/2021 with hypoxia, rib fractures, after a fall    Interval Events  Patient refused transfer to rehab and wants to go home on home health  Patient remains requiring help out of bed and to chair, feel to high risk to discharge home with home health    - to follow up on attempting to transfer to rehab  -Transfer to [facility]  -Counseled  -Therapies  -Encourage independently ADL's       Review of Systems  Review of Systems   Constitutional: Negative for chills and fever.   HENT: Negative for hearing loss.    Eyes: Negative for blurred vision and double vision.   Respiratory: Negative for cough, sputum production and shortness of breath.    Cardiovascular: Negative for chest pain.   Gastrointestinal: Negative for abdominal pain, nausea and vomiting.        1/21 last bowel movement    Genitourinary: Negative for dysuria.   Musculoskeletal: Positive for joint pain and myalgias. Negative for back pain and neck pain.   Neurological: Negative for speech change and focal weakness.        Vital Signs  Temp:  [36.7 °C (98.1 °F)-37.1 °C (98.7 °F)] 36.7 °C (98.1 °F)  Pulse:  [42-73] 73  Resp:  [17-35] 35  BP: ()/(53-90) 101/54  SpO2:  [93 %-98 %] 97 %    Physical Exam  Physical Exam  Vitals signs and nursing note reviewed.   Constitutional:       General: He is not in acute distress.     Appearance: He is not toxic-appearing or diaphoretic.      Comments: Up to chair   HENT:      Head: Normocephalic and atraumatic.      Nose: Nose normal.   Eyes:      General: No scleral icterus.     Conjunctiva/sclera: Conjunctivae normal.      Pupils: Pupils are equal, round, and reactive to light.   Neck:      Musculoskeletal: Normal range of motion and neck supple. No muscular tenderness.   Cardiovascular:      Rate and Rhythm: Normal rate.      Pulses: Normal pulses.      Comments: Pacer  Pulmonary:      Effort:  Pulmonary effort is normal. No tachypnea, accessory muscle usage or respiratory distress.      Breath sounds: Normal air entry. Examination of the right-middle field reveals decreased breath sounds. Examination of the left-middle field reveals decreased breath sounds. Examination of the right-lower field reveals decreased breath sounds. Examination of the left-lower field reveals decreased breath sounds. Decreased breath sounds present. No wheezing.      Comments: IS 1250 cc  Chest:      Chest wall: Tenderness present.   Abdominal:      General: Bowel sounds are normal. There is no distension.      Palpations: Abdomen is soft.      Tenderness: There is no abdominal tenderness. There is no guarding or rebound.   Musculoskeletal: Normal range of motion.         General: No tenderness or signs of injury.   Skin:     General: Skin is warm.      Capillary Refill: Capillary refill takes less than 2 seconds.      Findings: Bruising present.      Comments: Left forearm wound approximated with sutures.    Neurological:      General: No focal deficit present.      Mental Status: He is alert and oriented to person, place, and time. Mental status is at baseline.      GCS: GCS eye subscore is 4. GCS verbal subscore is 5. GCS motor subscore is 6.      Sensory: Sensation is intact.      Motor: Motor function is intact.   Psychiatric:         Behavior: Behavior normal.         Laboratory  Recent Results (from the past 24 hour(s))   PROTHROMBIN TIME    Collection Time: 01/21/21  4:10 AM   Result Value Ref Range    PT 33.1 (H) 12.0 - 14.6 sec    INR 3.13 (H) 0.87 - 1.13       Fluids    Intake/Output Summary (Last 24 hours) at 1/21/2021 1039  Last data filed at 1/21/2021 0800  Gross per 24 hour   Intake 320 ml   Output 750 ml   Net -430 ml       Core Measures & Quality Metrics  Labs reviewed, Medications reviewed and Radiology images reviewed  Mancilla catheter: No Mancilla      DVT Prophylaxis: Warfarin (Coumadin)  DVT prophylaxis -  mechanical: SCDs  Ulcer prophylaxis: Not indicated    Assessed for rehab: Patient was assess for and/or received rehabilitation services during this hospitalization    RAP Score Total: 5    Assesment ETOH: No CAGE         Assessment/Plan  Hypoxia- (present on admission)  Assessment & Plan  Desaturates without supplemental oxygen to 86%  Supplemental oxygen to maintain O2 saturations greater than 92%     Fracture of four ribs of left side- (present on admission)  Assessment & Plan  Fractures of the left fifth, sixth, seventh and eighth ribs.  Aggressive multimodal pain management, and pulmonary hygiene.   Serial chest radiographs.      Pacemaker- (present on admission)  Assessment & Plan  [employer]   1/19 Interrogation completed.  1/20 No longer noting extra beats with activity  Follow up with Cardiology at discharge     Discharge planning issues- (present on admission)  Assessment & Plan  Date of admission: 1/17/2021  Date: 1/18 Transfer orders from SICU pending  Date: 1/18  Rehab/SNF consult   Cleared for discharge: Yes - Date: 1/20/21  Discharge delayed: No    Discharge date: TBD      Anticoagulated on Coumadin- (present on admission)  Assessment & Plan  Chronic Coumadin use  1/17 INR on admission  2.63   1/18 Coumadin per pharmacy protocol  Pharmacist suggested discharge dosing: warfarin 2.5 mg every Sunday, Tuesday, and Thursday; 5 mg all other days.      Atelectasis, bilateral- (present on admission)  Assessment & Plan  Bibasilar atelectasis, left greater than right.  Supplemental oxygen to maintain O2 saturations greater than 92%  Aggressive pulmonary hygiene. Serial chest radiographs.     Atrial fibrillation (HCC)- (present on admission)  Assessment & Plan  Chronic condition treated with coumadin.  1/18 Coumadin per pharmacy protocol.     Laceration of left forearm- (present on admission)  Assessment & Plan  No acute left forearm fracture.  Complex laceration closed in ED.  Sutures out in 7-10 days.  (1/24-1/27)    Other emphysema (HCC)- (present on admission)  Assessment & Plan  Emphysematous changes on CT  Aggressive pulmonary hygiene      Multiple thyroid nodules- (present on admission)  Assessment & Plan  Thyroid nodules on the right. Evaluation with thyroid ultrasound is recommended     AAA (abdominal aortic aneurysm) (HCC)- (present on admission)  Assessment & Plan  Aortobiiliac stent graft placed in 2004  Abdominal aortic aneurysm measures 6.7 x 6.8 cm compared to 6.6 x 6.7 cm previously.      Bilateral renal cysts- (present on admission)  Assessment & Plan  Left renal cyst and small hypodense bilateral renal lesions which are too small to characterize but likely represent small cysts.  Stable since initially seen on CT 2012.     Trauma- (present on admission)  Assessment & Plan  GLF  T-5000 Activation.  Scott Hurley MD. Trauma Surgery.     Screening examination for infectious disease- (present on admission)  Assessment & Plan  Two SARS-CoV-2 tests negative. Isolation precautions de-escalated.         Discussed patient condition with RN, Therapies, Patient and trauma surgery Dr. Pagan  .

## 2021-01-21 NOTE — PROGRESS NOTES
Skin Prevalence assessment completed by 3 RNs     - All devices and garments removed    - Diffuse bruising on limbs and trunk   - Small bruise to the left temple   - No signs of skin injury/pressure etc    All devices replaced, patient placed in position of comfort with call light in place and bed alarm on.

## 2021-01-21 NOTE — THERAPY
Physical Therapy   Daily Treatment     Patient Name: Gennaro Richardson  Age:  85 y.o., Sex:  male  Medical Record #: [id number]  Today's Date: 1/21/2021     Precautions: Fall Risk    Assessment    Pt initally refusing participation, thinking this therapist was from rehab, states he needs to get home to help his sister (who is 84); discussed concern for assist needs, as he is requiring bed and sit<>Stand stability; once he is upright his gait is steady but slow; would continue to suggest placement at this time though was able to progress gait distances today; will follow to optimize home safe discharge if pt continues to refuse placement and is deemed capacitated to do so.     Plan    Continue current treatment plan.    DC Equipment Recommendations: Unable to determine at this time  Discharge Recommendations:  Recommend post-acute placement for additional physical therapy services prior to discharge home      Abridged Subjective/Objective       01/21/21 0935   Cognition    Cognition / Consciousness X   Level of Consciousness Alert   Safety Awareness Impaired   New Learning Impaired   Comments initially stating 'i'm not going' when pt thought therapists were coming from rehab; states he needs to get home to help his sister who is 84; denies concern for home at this time;    Passive ROM Lower Body   Passive ROM Lower Body WDL   Sensation Lower Body   Lower Extremity Sensation   WDL   Balance   Sitting Balance (Static) Fair +   Sitting Balance (Dynamic) Fair   Standing Balance (Static) Fair -   Standing Balance (Dynamic) Poor +   Weight Shift Sitting Good   Weight Shift Standing Fair   Skilled Intervention Sequencing;Tactile Cuing;Verbal Cuing;Postural Facilitation   Comments B UE support in sitting/standing; one loss of balance with initial stand; a few toe trips with left LE forward progression but no LOB    Gait Analysis   Gait Level Of Assist Minimal Assist   Assistive Device Front Wheel Walker   Distance (Feet) 100    # of Times Distance was Traveled 1   Deviation Bradykinetic;Shuffled Gait;Decreased Base Of Support   Weight Bearing Status no restrictions   Vision Deficits Impacting Mobility denies   Skilled Intervention Sequencing;Postural Facilitation;Tactile Cuing;Verbal Cuing   Comments distance limited by therapist, very slow gait;    Bed Mobility    Supine to Sit   (Nt, in chair pre/post; does have a recliner available )   Functional Mobility   Sit to Stand Moderate Assist  (of 2 with FWW from recliner; then min of 1 from BSC with FWW)   Bed, Chair, Wheelchair Transfer Minimal Assist   Short Term Goals    Short Term Goal # 1 pt will perform supine <> sit without bed features with SPV in 6 visits to be able to get in/out of bed at home   Goal Outcome # 1 goal not met   Short Term Goal # 2 pt will perform all functional xfrs with SPV in 6 visits for improved independence   Goal Outcome # 2 Goal not met   Short Term Goal # 3 pt will ambulate 150ft with SPV and SPC or FWW in 6 visits to access home environment   Goal Outcome # 3 Goal not met   Short Term Goal # 4 pt will negotiate 3 steps with SPV in 6 visits to access home   Goal Outcome # 4 Goal not met

## 2021-01-21 NOTE — PROGRESS NOTES
Patient here from ICU.   No complaints of pain at this time.   Denies N/V.    2 RN skin check    Discoloration to back of left calf, blanching.   Bruising to Left flank and back of left arm.  Lac to left FA, dressed with biotain. Old shadowing noted.  Generalized/overall bruising and fragile skin. Skin appropriate to chronic anticoag use.    All other skin intact.

## 2021-01-21 NOTE — THERAPY
Occupational Therapy  Daily Treatment     Patient Name: Gennaro Richardson  Age:  85 y.o., Sex:  male  Medical Record #: [id number]  Today's Date: 1/21/2021     Precautions  Precautions: Fall Risk  Comments: rib fxs     Assessment     Pt currently limited by decreased functional mobility, activity tolerance, strength, balance, which are affecting pt's ability to complete ADLs/IADLs at baseline. Pt would benefit from OT services in the acute care setting to maximize functional recovery.     Plan    Continue current treatment plan.       Discharge Recommendations: (P) Recommend home health for continued occupational therapy services       01/21/21 0918   Activities of Daily Living   Grooming Supervision   Upper Body Dressing Supervision   Lower Body Dressing Minimal Assist   Toileting Minimal Assist   Functional Mobility   Sit to Stand Moderate Assist  (initially mod A, was able to stand from BSC supervised)   Bed, Chair, Wheelchair Transfer Supervised   Short Term Goals   Short Term Goal # 1 supervised with LB dressing   Goal Outcome # 1 Progressing as expected   Short Term Goal # 2 supervised with ADL txfs   Goal Outcome # 2 Progressing as expected

## 2021-01-21 NOTE — CARE PLAN
Problem: Communication  Goal: The ability to communicate needs accurately and effectively will improve  Outcome: PROGRESSING AS EXPECTED     Problem: Safety  Goal: Will remain free from injury  Outcome: PROGRESSING AS EXPECTED     Problem: Skin Integrity  Goal: Risk for impaired skin integrity will decrease  Outcome: PROGRESSING AS EXPECTED     Problem: Pain Management  Goal: Pain level will decrease to patient's comfort goal  Outcome: PROGRESSING AS EXPECTED

## 2021-01-22 ENCOUNTER — APPOINTMENT (OUTPATIENT)
Dept: RADIOLOGY | Facility: MEDICAL CENTER | Age: 86
DRG: 184 | End: 2021-01-22
Attending: NURSE PRACTITIONER
Payer: MEDICARE

## 2021-01-22 LAB
INR PPP: 3.15 (ref 0.87–1.13)
PROTHROMBIN TIME: 33.3 SEC (ref 12–14.6)

## 2021-01-22 PROCEDURE — 94760 N-INVAS EAR/PLS OXIMETRY 1: CPT

## 2021-01-22 PROCEDURE — 770006 HCHG ROOM/CARE - MED/SURG/GYN SEMI*

## 2021-01-22 PROCEDURE — 700102 HCHG RX REV CODE 250 W/ 637 OVERRIDE(OP): Performed by: SURGERY

## 2021-01-22 PROCEDURE — 71045 X-RAY EXAM CHEST 1 VIEW: CPT

## 2021-01-22 PROCEDURE — A9270 NON-COVERED ITEM OR SERVICE: HCPCS | Performed by: NURSE PRACTITIONER

## 2021-01-22 PROCEDURE — A9270 NON-COVERED ITEM OR SERVICE: HCPCS | Performed by: SURGERY

## 2021-01-22 PROCEDURE — 94669 MECHANICAL CHEST WALL OSCILL: CPT

## 2021-01-22 PROCEDURE — 700102 HCHG RX REV CODE 250 W/ 637 OVERRIDE(OP): Performed by: NURSE PRACTITIONER

## 2021-01-22 PROCEDURE — 700101 HCHG RX REV CODE 250: Performed by: SURGERY

## 2021-01-22 PROCEDURE — 36415 COLL VENOUS BLD VENIPUNCTURE: CPT

## 2021-01-22 PROCEDURE — 85610 PROTHROMBIN TIME: CPT

## 2021-01-22 RX ORDER — WARFARIN SODIUM 2.5 MG/1
2.5 TABLET ORAL
Status: COMPLETED | OUTPATIENT
Start: 2021-01-22 | End: 2021-01-22

## 2021-01-22 RX ORDER — ACETAMINOPHEN 325 MG/1
650 TABLET ORAL EVERY 6 HOURS
Status: DISCONTINUED | OUTPATIENT
Start: 2021-01-22 | End: 2021-01-26 | Stop reason: HOSPADM

## 2021-01-22 RX ADMIN — WARFARIN SODIUM 2.5 MG: 5 TABLET ORAL at 17:34

## 2021-01-22 RX ADMIN — GABAPENTIN 100 MG: 100 CAPSULE ORAL at 04:48

## 2021-01-22 RX ADMIN — CELECOXIB 200 MG: 200 CAPSULE ORAL at 04:47

## 2021-01-22 RX ADMIN — OXYCODONE 5 MG: 5 TABLET ORAL at 04:47

## 2021-01-22 RX ADMIN — ACETAMINOPHEN 650 MG: 325 TABLET, FILM COATED ORAL at 04:47

## 2021-01-22 RX ADMIN — ACETAMINOPHEN 650 MG: 325 TABLET, FILM COATED ORAL at 11:45

## 2021-01-22 RX ADMIN — OXYCODONE 5 MG: 5 TABLET ORAL at 12:42

## 2021-01-22 RX ADMIN — ACETAMINOPHEN 650 MG: 325 TABLET, FILM COATED ORAL at 00:12

## 2021-01-22 RX ADMIN — FINASTERIDE 5 MG: 5 TABLET, FILM COATED ORAL at 04:47

## 2021-01-22 RX ADMIN — Medication 1 EACH: at 04:47

## 2021-01-22 RX ADMIN — OXYCODONE 5 MG: 5 TABLET ORAL at 17:26

## 2021-01-22 RX ADMIN — OXYCODONE 5 MG: 5 TABLET ORAL at 00:11

## 2021-01-22 RX ADMIN — ACETAMINOPHEN 650 MG: 325 TABLET ORAL at 17:33

## 2021-01-22 RX ADMIN — GABAPENTIN 100 MG: 100 CAPSULE ORAL at 11:45

## 2021-01-22 RX ADMIN — LIDOCAINE 2 PATCH: 50 PATCH TOPICAL at 17:34

## 2021-01-22 RX ADMIN — GABAPENTIN 100 MG: 100 CAPSULE ORAL at 17:33

## 2021-01-22 RX ADMIN — OXYCODONE 5 MG: 5 TABLET ORAL at 09:19

## 2021-01-22 ASSESSMENT — ENCOUNTER SYMPTOMS
MYALGIAS: 1
COUGH: 0
ABDOMINAL PAIN: 0
VOMITING: 0
SHORTNESS OF BREATH: 0
DOUBLE VISION: 0
TINGLING: 0
CHILLS: 0
FOCAL WEAKNESS: 0
NECK PAIN: 0
FEVER: 0
SPEECH CHANGE: 0
BACK PAIN: 0
NAUSEA: 0
BLURRED VISION: 0

## 2021-01-22 ASSESSMENT — PAIN DESCRIPTION - PAIN TYPE
TYPE: ACUTE PAIN

## 2021-01-22 ASSESSMENT — FIBROSIS 4 INDEX: FIB4 SCORE: 2.125

## 2021-01-22 NOTE — PROGRESS NOTES
Received report from day shift RN.   Assessment completed.   A&Ox4.   Tolerating regular diet; denies N/V.   Patient on room air.   Left flank bruising.   Left forearm laceration; sutures in place, dressed with biotain.  Patient is x1 assist w/ FWW.   Patient rates 5/10 pain, rest.   +void    Reviewed plan of care with patient. Call light and personal belongings are within reach. All needs met at this time.

## 2021-01-22 NOTE — DIETARY
"Nutrition services: Day 5 of admit.  Gennaro Richardson is a 85 y.o. male with admitting DX of trauma     Consult received for MST 2 (unsure weight loss)    Pt reported UBW is 82 kg (180 lbs) which is reflective of current measured weight of 83.8 kg (184 lbs) per bed scale.     Assessment:  Height: 172.7 cm (5' 7.99\")  Weight: 83.8 kg (184 lb 11.9 oz)  Body mass index is 28.1 kg/m²., BMI classification: Overweight   Diet/Intake: Regular     Evaluation:   1. Hx of fracture of four ribs of left side, atelectasis bilateral, bilateral renal cysts, AAA, multiple thyroid nodules, laceration of left forearm, chronic use of opiate drug for therapeutic purpose   2. Per ADLs, majority of meals %, no indication of poor appetite prior to admission nor during hospitalization. PO intake of ~50% from meal trays likely sufficient given pt's advanced age  3. Per chart review, pt weighed 86.5 kg/ 190 lbs on 9/02/20 and 84.8 kg/ 187 lbs on 8/10/20, indicating likelihood of little to no weight changes within past 5 months. Pt weight currently within stated UBW.   4. Labs: Bun 27  5. Meds: colace-refused, Neurontin, Proscar, milk of magnesia/ miralax- refused, warfarin-future  6. Last BM: 1/21/21- large/brown/loose    Malnutrition Risk: No risk identified, does not appear to present with malnutrition at this time    Recommendations/Plan:  1. Encourage intake of meals  2. Document intake of all meals  as % taken in ADL's to provide interdisciplinary communication across all shifts.   3. Monitor weight.  4. Nutrition rep will continue to see patient for ongoing meal and snack preferences.     RD available PRN, will monitor weekly per department policy. Consult RD as needed.       "

## 2021-01-22 NOTE — PROGRESS NOTES
Bedside report received.  Assessment complete.  A&O x 4. Patient calls appropriately.  Patient ambulates with standby assist, and FWW.    Patient has 4/10 pain. Currently declines interventions  Denies N&V. Tolerating diet.  Lac to left FA, dressed with biotain. Old shadowing noted.  + void, + flatus, + BM.  Patient denies SOB.  Patient pleasant but short with staff..  Review plan with of care with patient. Call light and personal belongings with in reach. Hourly rounding in place. All needs met at this time.

## 2021-01-22 NOTE — PROGRESS NOTES
Trauma / Surgical Daily Progress Note    Date of Service  1/22/2021    Chief Complaint  85 y.o. male admitted 1/17/2021 with rib fractures after a ground level fall    Interval Events    Transfer from ICU to [facility]  Pain control adequate, with movement only  Therapy recommendations reviewed  Continues to refuse rehab    - Increase mobility, out of bed for all meals  - Home with home health when medically clear     Review of Systems  Review of Systems   Constitutional: Negative for chills and fever.   HENT: Negative for hearing loss.    Eyes: Negative for blurred vision and double vision.   Respiratory: Negative for cough and shortness of breath.    Cardiovascular: Negative for chest pain.   Gastrointestinal: Negative for abdominal pain, nausea and vomiting.        1/21 BM   Genitourinary: Negative for dysuria.        Voiding   Musculoskeletal: Positive for joint pain and myalgias. Negative for back pain and neck pain.   Neurological: Negative for tingling, speech change and focal weakness.        Vital Signs  Temp:  [35.9 °C (96.6 °F)-36.7 °C (98.1 °F)] 35.9 °C (96.6 °F)  Pulse:  [59-85] 73  Resp:  [16-22] 18  BP: (100-162)/(56-83) 162/83  SpO2:  [92 %-96 %] 92 %    Physical Exam  Physical Exam  Vitals signs and nursing note reviewed.   Constitutional:       General: He is not in acute distress.  HENT:      Head: Normocephalic.      Nose: Nose normal.   Eyes:      Extraocular Movements: Extraocular movements intact.      Conjunctiva/sclera: Conjunctivae normal.   Neck:      Musculoskeletal: Neck supple. No muscular tenderness.   Cardiovascular:      Rate and Rhythm: Normal rate.      Pulses: Normal pulses.      Comments: Pacer   Pulmonary:      Effort: Pulmonary effort is normal. No tachypnea, accessory muscle usage or respiratory distress.      Breath sounds: Examination of the left-middle field reveals decreased breath sounds. Examination of the left-lower field reveals decreased breath sounds. Decreased breath  sounds present.      Comments: IS [id number]  Chest:      Chest wall: Tenderness present.   Abdominal:      General: There is no distension.      Palpations: Abdomen is soft.      Tenderness: There is no abdominal tenderness.   Musculoskeletal:      Comments: Moves all extremities   Skin:     General: Skin is warm.      Capillary Refill: Capillary refill takes less than 2 seconds.      Findings: Bruising present.      Comments: Left forearm wound approximated with sutures   Neurological:      Mental Status: He is alert and oriented to person, place, and time.      Sensory: Sensation is intact.      Motor: Motor function is intact.   Psychiatric:         Behavior: Behavior normal.         Laboratory  Recent Results (from the past 24 hour(s))   PROTHROMBIN TIME    Collection Time: 01/22/21  5:36 AM   Result Value Ref Range    PT 33.3 (H) 12.0 - 14.6 sec    INR 3.15 (H) 0.87 - 1.13       Fluids  No intake or output data in the 24 hours ending 01/22/21 1144    Core Measures & Quality Metrics  Labs reviewed, Medications reviewed and Radiology images reviewed  Mancilla catheter: No Mancilla      DVT Prophylaxis: Warfarin (Coumadin)  DVT prophylaxis - mechanical: SCDs  Ulcer prophylaxis: Not indicated    Assessed for rehab: Patient was assess for and/or received rehabilitation services during this hospitalization and Patient/family refusal    RAP Score Total: 5    Assesment ETOH: No CAGE         Assessment/Plan  Hypoxia- (present on admission)  Assessment & Plan  Desaturates without supplemental oxygen to 86%  Supplemental oxygen to maintain O2 saturations greater than 92%      Fracture of four ribs of left side- (present on admission)  Assessment & Plan  Fractures of the left fifth, sixth, seventh and eighth ribs.  Aggressive multimodal pain management, and pulmonary hygiene.   Serial chest radiographs.       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  1/20 No longer  noting extra beats with activity  Follow up with Cardiology at discharge      Discharge planning issues- (present on admission)  Assessment & Plan  Date of admission: 1/17/2021  Date: 1/18 Transfer orders from SICU pending  Date: 1/18  Rehab/SNF consult   Cleared for discharge: Yes - Date: 1/20/21  Discharge delayed: No    Discharge date: TBD      Anticoagulated on Coumadin- (present on admission)  Assessment & Plan  Chronic Coumadin use  1/17 INR on admission  2.63   1/18 Coumadin per pharmacy protocol  Pharmacist suggested discharge dosing: warfarin 2.5 mg every Sunday, Tuesday, and Thursday; 5 mg all other days.       Atelectasis, bilateral- (present on admission)  Assessment & Plan  Bibasilar atelectasis, left greater than right.  Aggressive pulmonary hygiene. Serial chest radiographs.     Atrial fibrillation (HCC)- (present on admission)  Assessment & Plan  Chronic condition treated with coumadin.  1/18 Coumadin per pharmacy protocol.     Laceration of left forearm- (present on admission)  Assessment & Plan  No acute left forearm fracture.  Complex laceration closed in ED.  Sutures out in 7-10 days. (1/24-1/27)    Other emphysema (HCC)- (present on admission)  Assessment & Plan  Emphysematous changes on CT  Aggressive pulmonary hygiene      Multiple thyroid nodules- (present on admission)  Assessment & Plan  Thyroid nodules on the right. Evaluation with thyroid ultrasound is recommended     AAA (abdominal aortic aneurysm) (HCC)- (present on admission)  Assessment & Plan  Aortobiiliac stent graft placed in 2004  Abdominal aortic aneurysm measures 6.7 x 6.8 cm compared to 6.6 x 6.7 cm previously.      Bilateral renal cysts- (present on admission)  Assessment & Plan  Left renal cyst and small hypodense bilateral renal lesions which are too small to characterize but likely represent small cysts.  Stable since initially seen on CT 2012.     Trauma- (present on admission)  Assessment & Plan  GLF  T-5000  Activation.  Scott Hurley MD. Trauma Surgery.     Screening examination for infectious disease- (present on admission)  Assessment & Plan  Two SARS-CoV-2 tests negative. Isolation precautions de-escalated.         Discussed patient condition with RN, Patient and trauma surgery, Dr. MARIANA Hurley.    Patient data reviewed and discussed.  Agree with assessment and plan.  [facility]

## 2021-01-22 NOTE — PROGRESS NOTES
Inpatient Anticoagulation Service Note    Date: 1/22/2021    Reason for Anticoagulation: Atrial Fibrillation   Target INR: 2.0 to 3.0  [id number] VASc Score: 3  HAS-BLED Score: 2   Hemoglobin Value: 14.1  Hematocrit Value: 44.5  Lab Platelet Value: 170    INR from last 7 days     Date/Time INR Value    01/22/21 0536  (!) 3.15    01/21/21 0410  (!) 3.13    01/20/21 0501  (!) 3.01    01/19/21 0545  (!) 3.02    01/18/21 1000  (!) 2.68    01/17/21 1614  (!) 2.63        Dose from last 7 days     Date/Time Dose (mg)    01/22/21 0949  2.5    01/21/21 0959  2.5    01/20/21 1333  2.5    01/19/21 1121  2.5    01/18/21 1103  5        Average Dose (mg): (Per Anticoagulation Clinic: 2.5mg every Smith/Tu/Th and 5 mg AOD)  Significant Interactions: Not Applicable  Bridge Therapy: No (If less than 5 days and overlap therapy discontinued -- document reason (i.e. Bleed Risk))    (If still on overlap therapy, if No -- document reason (i.e. Bleed Risk))    Reversal Agent Administered: Not Applicable  Comments: INR remains elevated at 3.15, goal 2-3. NNNL to assess H/h; however, no overt s/sx of bleeding documented in chart. No new DDI. Patient's INR with slight up-trend. Will give decreased dose of warfarin 2.5 mg PO and repeat INR in AM. Usual dose is 2.5 mg Sun/Tu/Thurs and 5 mg ROW. Pharmacy will continue to monitor.    Plan:  Warfarin 2.5 mg PO, repeat INR in AM.  Education Material Provided?: No([facility] warfarin patient)  Pharmacist suggested discharge dosing: Would recommend patient resume home dose of warfarin 2.5 mg PO q Sun/Tu/Thurs and 5 mg ROW w/ repeat INR w/i 48 hours of discharge.     Rekha Guevara, PharmD

## 2021-01-22 NOTE — CARE PLAN
Problem: Communication  Goal: The ability to communicate needs accurately and effectively will improve  Outcome: PROGRESSING AS EXPECTED     Problem: Safety  Goal: Will remain free from falls  Outcome: PROGRESSING AS EXPECTED     Problem: Bowel/Gastric:  Goal: Normal bowel function is maintained or improved  Outcome: PROGRESSING AS EXPECTED     Problem: Knowledge Deficit  Goal: Knowledge of disease process/condition, treatment plan, diagnostic tests, and medications will improve  Outcome: PROGRESSING AS EXPECTED     Problem: Discharge Barriers/Planning  Goal: Patient's continuum of care needs will be met  Outcome: PROGRESSING AS EXPECTED

## 2021-01-23 ENCOUNTER — HOME HEALTH ADMISSION (OUTPATIENT)
Dept: HOME HEALTH SERVICES | Facility: HOME HEALTHCARE | Age: 86
End: 2021-01-23
Payer: MEDICARE

## 2021-01-23 LAB
INR PPP: 3.39 (ref 0.87–1.13)
PROTHROMBIN TIME: 35.3 SEC (ref 12–14.6)

## 2021-01-23 PROCEDURE — 97530 THERAPEUTIC ACTIVITIES: CPT | Mod: CQ

## 2021-01-23 PROCEDURE — A9270 NON-COVERED ITEM OR SERVICE: HCPCS | Performed by: SURGERY

## 2021-01-23 PROCEDURE — 770006 HCHG ROOM/CARE - MED/SURG/GYN SEMI*

## 2021-01-23 PROCEDURE — 700102 HCHG RX REV CODE 250 W/ 637 OVERRIDE(OP): Performed by: SURGERY

## 2021-01-23 PROCEDURE — 700101 HCHG RX REV CODE 250: Performed by: SURGERY

## 2021-01-23 PROCEDURE — A9270 NON-COVERED ITEM OR SERVICE: HCPCS | Performed by: NURSE PRACTITIONER

## 2021-01-23 PROCEDURE — 85610 PROTHROMBIN TIME: CPT

## 2021-01-23 PROCEDURE — 36415 COLL VENOUS BLD VENIPUNCTURE: CPT

## 2021-01-23 PROCEDURE — 700102 HCHG RX REV CODE 250 W/ 637 OVERRIDE(OP): Performed by: NURSE PRACTITIONER

## 2021-01-23 PROCEDURE — 97116 GAIT TRAINING THERAPY: CPT | Mod: CQ

## 2021-01-23 RX ADMIN — ACETAMINOPHEN 650 MG: 325 TABLET ORAL at 12:43

## 2021-01-23 RX ADMIN — OXYCODONE 5 MG: 5 TABLET ORAL at 00:15

## 2021-01-23 RX ADMIN — ACETAMINOPHEN 650 MG: 325 TABLET ORAL at 00:19

## 2021-01-23 RX ADMIN — OXYCODONE 5 MG: 5 TABLET ORAL at 04:57

## 2021-01-23 RX ADMIN — LIDOCAINE 2 PATCH: 50 PATCH TOPICAL at 18:24

## 2021-01-23 RX ADMIN — GABAPENTIN 100 MG: 100 CAPSULE ORAL at 12:43

## 2021-01-23 RX ADMIN — ACETAMINOPHEN 650 MG: 325 TABLET ORAL at 04:56

## 2021-01-23 RX ADMIN — GABAPENTIN 100 MG: 100 CAPSULE ORAL at 04:56

## 2021-01-23 RX ADMIN — Medication 1 EACH: at 18:24

## 2021-01-23 RX ADMIN — METAXALONE 400 MG: 800 TABLET ORAL at 12:43

## 2021-01-23 RX ADMIN — OXYCODONE 5 MG: 5 TABLET ORAL at 08:45

## 2021-01-23 RX ADMIN — OXYCODONE 5 MG: 5 TABLET ORAL at 18:24

## 2021-01-23 RX ADMIN — OXYCODONE 5 MG: 5 TABLET ORAL at 12:43

## 2021-01-23 RX ADMIN — FINASTERIDE 5 MG: 5 TABLET, FILM COATED ORAL at 04:57

## 2021-01-23 RX ADMIN — GABAPENTIN 100 MG: 100 CAPSULE ORAL at 18:24

## 2021-01-23 RX ADMIN — ACETAMINOPHEN 650 MG: 325 TABLET ORAL at 18:24

## 2021-01-23 ASSESSMENT — COGNITIVE AND FUNCTIONAL STATUS - GENERAL
SUGGESTED CMS G CODE MODIFIER MOBILITY: CL
MOBILITY SCORE: 11
MOVING TO AND FROM BED TO CHAIR: UNABLE
MOVING FROM LYING ON BACK TO SITTING ON SIDE OF FLAT BED: UNABLE
CLIMB 3 TO 5 STEPS WITH RAILING: A LOT
WALKING IN HOSPITAL ROOM: A LITTLE
STANDING UP FROM CHAIR USING ARMS: A LITTLE
TURNING FROM BACK TO SIDE WHILE IN FLAT BAD: UNABLE

## 2021-01-23 ASSESSMENT — ENCOUNTER SYMPTOMS
SHORTNESS OF BREATH: 0
DOUBLE VISION: 0
BLURRED VISION: 0
VOMITING: 0
CHILLS: 0
NECK PAIN: 0
SPEECH CHANGE: 0
COUGH: 0
BACK PAIN: 0
ROS GI COMMENTS: 1/22 BM
ABDOMINAL PAIN: 0
NAUSEA: 0
FOCAL WEAKNESS: 0
FEVER: 0
TINGLING: 0
MYALGIAS: 1

## 2021-01-23 ASSESSMENT — GAIT ASSESSMENTS
GAIT LEVEL OF ASSIST: MINIMAL ASSIST
DISTANCE (FEET): 150
DEVIATION: BRADYKINETIC;SHUFFLED GAIT
ASSISTIVE DEVICE: FRONT WHEEL WALKER

## 2021-01-23 ASSESSMENT — PAIN DESCRIPTION - PAIN TYPE
TYPE: ACUTE PAIN
TYPE: ACUTE PAIN

## 2021-01-23 ASSESSMENT — FIBROSIS 4 INDEX: FIB4 SCORE: 2.125

## 2021-01-23 NOTE — THERAPY
Physical Therapy   Daily Treatment     Patient Name: Gennaro Richardson  Age:  85 y.o., Sex:  male  Medical Record #: [id number]  Today's Date: 1/23/2021     Precautions: Fall Risk    Assessment    Pt was pleasant up in chair and agreeable. He presents with decreased awareness to deficits and focused on needing to get home to take care of his sister. He required modA for lift off from low chair surface. He was able to ambulate increased gait distance with improvement in galdino. He required Fiona for safety presenting with short shuffling steps, bilateral knee flexion and trunk flexion. He was adamant about walking without fww in room for short distance but pt demonstrated multiple lob and furniture walking requiring assist from therapist. He did complete three stairs but with Fiona for balance. Extensive discussion regarding home versus rehab. He was receptive to education but continues to state he is adamant about going home. Therefore if pt refuses he would at least benefit from home health at this time.     Plan    Continue current treatment plan.    DC Equipment Recommendations: Unable to determine at this time  Discharge Recommendations: Recommend post-acute placement for additional physical therapy services prior to discharge home         01/23/21 1029   Other Treatments   Other Treatments Provided extensive discussion regarding rehab versus home health.    Balance   Sitting Balance (Static) Fair +   Sitting Balance (Dynamic) Fair   Standing Balance (Static) Fair -   Standing Balance (Dynamic) Poor +   Weight Shift Sitting Good   Weight Shift Standing Fair   Skilled Intervention Verbal Cuing;Sequencing;Compensatory Strategies   Comments with fww    Gait Analysis   Gait Level Of Assist Minimal Assist   Assistive Device Front Wheel Walker   Distance (Feet) 150   # of Times Distance was Traveled 1   Deviation Bradykinetic;Shuffled Gait   # of Stairs Climbed 3   Level of Assist with Stairs Minimal Assist   Skilled  Intervention Sequencing;Postural Facilitation;Tactile Cuing   Comments Increased galdino but still requiring hands on for safety. Short shuffling steps, forward flexed posture.    Bed Mobility    Scooting Minimal Assist   Comments declined practicing bed mobility    Functional Mobility   Sit to Stand Moderate Assist  (from low chair surface)   Bed, Chair, Wheelchair Transfer Moderate Assist   Skilled Intervention Compensatory Strategies;Facilitation;Sequencing   Comments STS with modA for lift off from low chair surface, multiple attempts to reach upright. When transferring back to chair pt wanting to walk to window didn't use fww, furniture walking and required modA for balance when turning.    Short Term Goals    Short Term Goal # 1 pt will perform supine <> sit without bed features with SPV in 6 visits to be able to get in/out of bed at home   Goal Outcome # 1 goal not met   Short Term Goal # 2 pt will perform all functional xfrs with SPV in 6 visits for improved independence   Goal Outcome # 2 Goal not met   Short Term Goal # 3 pt will ambulate 150ft with SPV and SPC or FWW in 6 visits to access home environment   Goal Outcome # 3 Goal not met   Short Term Goal # 4 pt will negotiate 3 steps with SPV in 6 visits to access home   Goal Outcome # 4 Goal not met

## 2021-01-23 NOTE — DISCHARGE PLANNING
Received Choice form at 2749   Agency/Facility Name: [facility]  Referral sent per Choice form @ 3800

## 2021-01-23 NOTE — FACE TO FACE
Face to Face Supporting Documentation - Home Health    The encounter with this patient was in whole or in part the primary reason for home health admission.    Date of encounter:   Patient:                    MRN:                       YOB: 2021  Gennaro Richardson  [id number]  1935     Home health to see patient for:  Skilled Nursing care for assessment, interventions & education, Wound Care, Physical Therapy evaluation and treatment and Occupational therapy evaluation and treatment    Skilled need for:  New Onset Medical Diagnosis rib fractures, forearm laceration    Skilled nursing interventions to include:  Wound Care and Comment: assessment    Homebound status evidenced by:  Need the aid of supportive devices such as crutches, canes, wheelchairs or walkers or Needs the assistance of another person in order to leave the home. Leaving home requires a considerable and taxing effort. There is a normal inability to leave the home.    Community Physician to provide follow up care: Angelica Hauser P.A.-C.     Optional Interventions? No      I certify the face to face encounter for this home health care referral meets the CMS requirements and the encounter/clinical assessment with the patient was, in whole, or in part, for the medical condition(s) listed above, which is the primary reason for home health care. Based on my clinical findings: the service(s) are medically necessary, support the need for home health care, and the homebound criteria are met.  I certify that this patient has had a face to face encounter by the nurse practitioner working collaboratively with me.  THOMAS Felipe - NPI: [id number]

## 2021-01-23 NOTE — PROGRESS NOTES
Inpatient Anticoagulation Service Note    Date: 1/23/2021    Reason for Anticoagulation: Atrial Fibrillation   Target INR: 2.0 to 3.0  [id number] VASc Score: 3  HAS-BLED Score: 2   Hemoglobin Value: 14.1  Hematocrit Value: 44.5  Lab Platelet Value: 170    INR from last 7 days     Date/Time INR Value    01/23/21 0348  (!) 3.39    01/22/21 0536  (!) 3.15    01/21/21 0410  (!) 3.13    01/20/21 0501  (!) 3.01    01/19/21 0545  (!) 3.02    01/18/21 1000  (!) 2.68    01/17/21 1614  (!) 2.63        Dose from last 7 days     Date/Time Dose (mg)    01/23/21 0920  0    01/22/21 0949  2.5    01/21/21 0959  2.5    01/20/21 1333  2.5    01/19/21 1121  2.5    01/18/21 1103  5        Average Dose (mg): (Per Anticoagulation Clinic: 2.5mg every Su/Tu/Th and 5 mg AOD)  Significant Interactions: Not Applicable  Bridge Therapy: No (If less than 5 days and overlap therapy discontinued -- document reason (i.e. Bleed Risk))    (If still on overlap therapy, if No -- document reason (i.e. Bleed Risk))    Reversal Agent Administered: Not Applicable  Comments: INR trending up at 3.39, goal 2-3. NNL to assess hh; however, no overt s/sx of bleeding documented in chart. No new DDI. As INR is trending up depiste reduced dose of warfarin, will hold dose of warfarin and repeat INR in AM. Pharmacy will continue to monitor.    Plan:  HOLD warfarin, repeat INR in AM  Education Material Provided?: No(Chornic warfarin patient)  Pharmacist suggested discharge dosing: HOLD warfarin and repeat INR w/i 24 hours of discharge.     Rekha Guevara, PharmD

## 2021-01-23 NOTE — PROGRESS NOTES
Bedside report received.  Assessment completed.  A&O x 4. Patient hard of hearing. Patient calls appropriately.  Patient ambulates with SB assist and FWW.    Patient has 9/10 pain with movement  Pain managed with prescribed medications.  Denies N&V. Tolerating regular diet.  Laceration with sutures to left FA, approximated and scabbed, OBINNA.  + void, + flatus, - BM.  Patient denies SOB.  SCD's off.    Review plan with of care with patient. Call light and personal belongings with in reach. Hourly rounding in place. All needs met at this time.

## 2021-01-23 NOTE — PROGRESS NOTES
Trauma / Surgical Daily Progress Note    Date of Service  1/23/2021    Chief Complaint  85 y.o. male admitted 1/17/2021 with rib fractures following a ground level fall    Interval Events    Remains on room air  IS 1500 cc  Continues to refuse rehab    - Needs to mobilize more  - Updated PT recommendations requested  - Home health order placed     Review of Systems  Review of Systems   Constitutional: Negative for chills and fever.   HENT: Negative for hearing loss.    Eyes: Negative for blurred vision and double vision.   Respiratory: Negative for cough and shortness of breath.    Cardiovascular: Negative for chest pain.   Gastrointestinal: Negative for abdominal pain, nausea and vomiting.        1/22 BM   Genitourinary: Negative for dysuria.        Voiding    Musculoskeletal: Positive for joint pain and myalgias. Negative for back pain and neck pain.   Neurological: Negative for tingling, speech change and focal weakness.        Vital Signs  Temp:  [36 °C (96.8 °F)-36.5 °C (97.7 °F)] 36 °C (96.8 °F)  Pulse:  [61-81] 61  Resp:  [16-18] 18  BP: (104-146)/(46-77) 146/77  SpO2:  [92 %-97 %] 93 %    Physical Exam  Physical Exam  Vitals signs and nursing note reviewed.   Constitutional:       General: He is not in acute distress.  HENT:      Head: Normocephalic.      Nose: Nose normal.   Eyes:      Extraocular Movements: Extraocular movements intact.      Conjunctiva/sclera: Conjunctivae normal.   Neck:      Musculoskeletal: Neck supple. No muscular tenderness.   Cardiovascular:      Rate and Rhythm: Normal rate.      Pulses: Normal pulses.      Comments: Pacer  Pulmonary:      Effort: Pulmonary effort is normal. No tachypnea, accessory muscle usage or respiratory distress.      Breath sounds: Examination of the left-middle field reveals decreased breath sounds. Examination of the left-lower field reveals decreased breath sounds. Decreased breath sounds present.      Comments: IS [id number]  Chest:      Chest wall:  Tenderness present.   Abdominal:      General: There is no distension.      Palpations: Abdomen is soft.      Tenderness: There is no abdominal tenderness.   Musculoskeletal:      Comments: Moves all extremities    Skin:     General: Skin is warm.      Capillary Refill: Capillary refill takes less than 2 seconds.      Findings: Bruising present.      Comments: Left forearm wound approximated with sutures    Neurological:      Mental Status: He is alert and oriented to person, place, and time.      Sensory: Sensation is intact.      Motor: Motor function is intact.   Psychiatric:         Behavior: Behavior normal.         Laboratory  Recent Results (from the past 24 hour(s))   PROTHROMBIN TIME    Collection Time: 01/23/21  3:48 AM   Result Value Ref Range    PT 35.3 (H) 12.0 - 14.6 sec    INR 3.39 (H) 0.87 - 1.13       Fluids    Intake/Output Summary (Last 24 hours) at 1/23/2021 0910  Last data filed at 1/22/2021 1800  Gross per 24 hour   Intake 480 ml   Output no documentation   Net 480 ml       Core Measures & Quality Metrics  Medications reviewed and Radiology images reviewed  Mancilla catheter: No Mancilla      DVT Prophylaxis: Warfarin (Coumadin)  DVT prophylaxis - mechanical: SCDs  Ulcer prophylaxis: Not indicated    Assessed for rehab: Patient/family refusal    RAP Score Total: 5    Assesment ETOH: No CAGE         Assessment/Plan  Hypoxia- (present on admission)  Assessment & Plan  Desaturates without supplemental oxygen to 86%  Supplemental oxygen to maintain O2 saturations greater than 92%       Fracture of four ribs of left side- (present on admission)  Assessment & Plan  Fractures of the left fifth, sixth, seventh and eighth ribs.  Aggressive multimodal pain management, and pulmonary hygiene.   Serial chest radiographs.        Pacemaker- (present on admission)  Assessment & Plan  Dermira   1/19 Interrogation completed.  1/20 No longer noting extra beats with activity  Follow up with Cardiology at  discharge      Discharge planning issues- (present on admission)  Assessment & Plan  Date of admission: 1/17/2021  Date: 1/18 Transfer orders from SICU pending  Date: 1/18  Rehab/SNF consult   Cleared for discharge: Yes - Date: 1/20/21  Discharge delayed: No    Discharge date: TBD      Anticoagulated on Coumadin- (present on admission)  Assessment & Plan  Chronic Coumadin use  1/17 INR on admission  2.63   1/18 Coumadin per pharmacy protocol  Pharmacist suggested discharge dosing: warfarin 2.5 mg every Sunday, Tuesday, and Thursday; 5 mg all other days.       Atelectasis, bilateral- (present on admission)  Assessment & Plan  Bibasilar atelectasis, left greater than right.  Aggressive pulmonary hygiene. Serial chest radiographs.     Atrial fibrillation (HCC)- (present on admission)  Assessment & Plan  Chronic condition treated with coumadin.  1/18 Coumadin per pharmacy protocol.     Laceration of left forearm- (present on admission)  Assessment & Plan  No acute left forearm fracture.  Complex laceration closed in ED.  Sutures out in 7-10 days. (1/24-1/27)    Other emphysema (HCC)- (present on admission)  Assessment & Plan  Emphysematous changes on CT  Aggressive pulmonary hygiene      Multiple thyroid nodules- (present on admission)  Assessment & Plan  Thyroid nodules on the right. Evaluation with thyroid ultrasound is recommended     AAA (abdominal aortic aneurysm) (HCC)- (present on admission)  Assessment & Plan  Aortobiiliac stent graft placed in 2004  Abdominal aortic aneurysm measures 6.7 x 6.8 cm compared to 6.6 x 6.7 cm previously.      Bilateral renal cysts- (present on admission)  Assessment & Plan  Left renal cyst and small hypodense bilateral renal lesions which are too small to characterize but likely represent small cysts.  Stable since initially seen on CT 2012.     Trauma- (present on admission)  Assessment & Plan  GLF  T-5000 Activation.  Scott Hurley MD. Trauma Surgery.     Screening  examination for infectious disease- (present on admission)  Assessment & Plan  Two SARS-CoV-2 tests negative. Isolation precautions de-escalated.         Discussed patient condition with RN, , Patient and trauma surgery, Dr. MARIANA Hurley.    Patient seen, data reviewed and discussed.  Agree with assessment and plan.  SONY

## 2021-01-23 NOTE — DISCHARGE PLANNING
Anticipated Discharge Disposition: Home to his prior living arrangement with home health.     Action: This RN,  was contacted by Provider, Blaire LUCIANO for a home health referral. Choice document was faxed to Savana SANDOVAL for [facility].     Barriers to Discharge: Home health acceptance    Plan:  to follow up for home health acceptance with [facility].     Tina Person RN,

## 2021-01-23 NOTE — DISCHARGE PLANNING
ATTN: Case Management  RE: Referral for Home Health    As of 1/23/21, we have accepted the Home Health referral for the patient listed above.    A [facility] clinician will be out to see the patient within 48 hours. If you have any questions or concerns regarding the patient's transition to Home Health, please do not hesitate to contact us at [phone].      We look forward to collaborating with you,  [facility] Home Health Team

## 2021-01-23 NOTE — PROGRESS NOTES
Pt AO x 4  Vitals signs stable   Pt denies chest pain or SOB  O2 sat >90% on RA breathing unlabored  Pt endorses moderate L flank/rib cage pain, medicated per MAR. Educated pt on PRN's are upon request and not scheduled, pt verbalized understanding.    Pt denies N/V/D  + voiding, + flatus, + BM on 1/22.   Pt ambulates with FWW and standby assistance.    Pt has L FA lac with dressing in place, to be changed in AM.     Updated plan of care discussed with pt. Safety education done. Falls precautions in place.   Pt safety maintained. Hourly rounding done.

## 2021-01-24 ENCOUNTER — APPOINTMENT (OUTPATIENT)
Dept: RADIOLOGY | Facility: MEDICAL CENTER | Age: 86
DRG: 184 | End: 2021-01-24
Attending: NURSE PRACTITIONER
Payer: MEDICARE

## 2021-01-24 LAB
BASOPHILS # BLD AUTO: 0.4 % (ref 0–1.8)
BASOPHILS # BLD: 0.05 K/UL (ref 0–0.12)
EOSINOPHIL # BLD AUTO: 0.27 K/UL (ref 0–0.51)
EOSINOPHIL NFR BLD: 2 % (ref 0–6.9)
ERYTHROCYTE [DISTWIDTH] IN BLOOD BY AUTOMATED COUNT: 49.6 FL (ref 35.9–50)
HCT VFR BLD AUTO: 43.6 % (ref 42–52)
HGB BLD-MCNC: 13.8 G/DL (ref 14–18)
IMM GRANULOCYTES # BLD AUTO: 0.08 K/UL (ref 0–0.11)
IMM GRANULOCYTES NFR BLD AUTO: 0.6 % (ref 0–0.9)
INR PPP: 3.49 (ref 0.87–1.13)
LYMPHOCYTES # BLD AUTO: 1.82 K/UL (ref 1–4.8)
LYMPHOCYTES NFR BLD: 13.5 % (ref 22–41)
MCH RBC QN AUTO: 30.7 PG (ref 27–33)
MCHC RBC AUTO-ENTMCNC: 31.7 G/DL (ref 33.7–35.3)
MCV RBC AUTO: 96.9 FL (ref 81.4–97.8)
MONOCYTES # BLD AUTO: 1.21 K/UL (ref 0–0.85)
MONOCYTES NFR BLD AUTO: 8.9 % (ref 0–13.4)
NEUTROPHILS # BLD AUTO: 10.09 K/UL (ref 1.82–7.42)
NEUTROPHILS NFR BLD: 74.6 % (ref 44–72)
NRBC # BLD AUTO: 0 K/UL
NRBC BLD-RTO: 0 /100 WBC
PLATELET # BLD AUTO: 204 K/UL (ref 164–446)
PMV BLD AUTO: 10 FL (ref 9–12.9)
PROTHROMBIN TIME: 36.2 SEC (ref 12–14.6)
RBC # BLD AUTO: 4.5 M/UL (ref 4.7–6.1)
WBC # BLD AUTO: 13.5 K/UL (ref 4.8–10.8)

## 2021-01-24 PROCEDURE — 94760 N-INVAS EAR/PLS OXIMETRY 1: CPT

## 2021-01-24 PROCEDURE — 700102 HCHG RX REV CODE 250 W/ 637 OVERRIDE(OP): Performed by: NURSE PRACTITIONER

## 2021-01-24 PROCEDURE — 85610 PROTHROMBIN TIME: CPT

## 2021-01-24 PROCEDURE — 71045 X-RAY EXAM CHEST 1 VIEW: CPT

## 2021-01-24 PROCEDURE — 770006 HCHG ROOM/CARE - MED/SURG/GYN SEMI*

## 2021-01-24 PROCEDURE — A9270 NON-COVERED ITEM OR SERVICE: HCPCS | Performed by: SURGERY

## 2021-01-24 PROCEDURE — 81001 URINALYSIS AUTO W/SCOPE: CPT

## 2021-01-24 PROCEDURE — 36415 COLL VENOUS BLD VENIPUNCTURE: CPT

## 2021-01-24 PROCEDURE — A9270 NON-COVERED ITEM OR SERVICE: HCPCS | Performed by: NURSE PRACTITIONER

## 2021-01-24 PROCEDURE — 94669 MECHANICAL CHEST WALL OSCILL: CPT

## 2021-01-24 PROCEDURE — 700102 HCHG RX REV CODE 250 W/ 637 OVERRIDE(OP): Performed by: SURGERY

## 2021-01-24 PROCEDURE — 85025 COMPLETE CBC W/AUTO DIFF WBC: CPT

## 2021-01-24 RX ADMIN — ACETAMINOPHEN 650 MG: 325 TABLET ORAL at 05:04

## 2021-01-24 RX ADMIN — ACETAMINOPHEN 650 MG: 325 TABLET ORAL at 23:54

## 2021-01-24 RX ADMIN — OXYCODONE 5 MG: 5 TABLET ORAL at 15:33

## 2021-01-24 RX ADMIN — FINASTERIDE 5 MG: 5 TABLET, FILM COATED ORAL at 05:04

## 2021-01-24 RX ADMIN — GABAPENTIN 100 MG: 100 CAPSULE ORAL at 05:04

## 2021-01-24 RX ADMIN — OXYCODONE 5 MG: 5 TABLET ORAL at 10:44

## 2021-01-24 RX ADMIN — GABAPENTIN 100 MG: 100 CAPSULE ORAL at 18:04

## 2021-01-24 RX ADMIN — ACETAMINOPHEN 650 MG: 325 TABLET ORAL at 18:04

## 2021-01-24 RX ADMIN — GABAPENTIN 100 MG: 100 CAPSULE ORAL at 13:02

## 2021-01-24 RX ADMIN — ACETAMINOPHEN 650 MG: 325 TABLET ORAL at 13:02

## 2021-01-24 RX ADMIN — OXYCODONE 5 MG: 5 TABLET ORAL at 05:04

## 2021-01-24 ASSESSMENT — PAIN DESCRIPTION - PAIN TYPE
TYPE: ACUTE PAIN
TYPE: ACUTE PAIN

## 2021-01-24 ASSESSMENT — ENCOUNTER SYMPTOMS
TINGLING: 0
SPEECH CHANGE: 0
FEVER: 0
FOCAL WEAKNESS: 0
BLURRED VISION: 0
ROS GI COMMENTS: 1/23 BM
NAUSEA: 0
NECK PAIN: 0
COUGH: 0
BACK PAIN: 0
CHILLS: 0
VOMITING: 0
MYALGIAS: 1
SHORTNESS OF BREATH: 0
DOUBLE VISION: 0
ABDOMINAL PAIN: 0

## 2021-01-24 ASSESSMENT — FIBROSIS 4 INDEX: FIB4 SCORE: 1.770833333333333333

## 2021-01-24 ASSESSMENT — PATIENT HEALTH QUESTIONNAIRE - PHQ9
1. LITTLE INTEREST OR PLEASURE IN DOING THINGS: NOT AT ALL
2. FEELING DOWN, DEPRESSED, IRRITABLE, OR HOPELESS: NOT AT ALL
SUM OF ALL RESPONSES TO PHQ9 QUESTIONS 1 AND 2: 0

## 2021-01-24 NOTE — PROGRESS NOTES
Trauma / Surgical Daily Progress Note    Date of Service  1/24/2021    Chief Complaint  85 y.o. male admitted 1/17/2021 with rib fractures following a ground level fall    Interval Events    WBC trend up, afebrile, nontoxic appearance  Chest x-ray with stable atelectasis within the left lung base  Continue to refuse rehab    - UA pending  - Mobilize, out of bed for all meals  - Continue aggressive pulmonary hygiene  - Home health in process  - Anticipate discharge home with home health once medically clear    Review of Systems  Review of Systems   Constitutional: Negative for chills and fever.   HENT: Negative for hearing loss.    Eyes: Negative for blurred vision and double vision.   Respiratory: Negative for cough and shortness of breath.    Cardiovascular: Negative for chest pain.   Gastrointestinal: Negative for abdominal pain, nausea and vomiting.        1/23 BM   Genitourinary: Negative for dysuria.        Voiding   Musculoskeletal: Positive for joint pain and myalgias. Negative for back pain and neck pain.   Neurological: Negative for tingling, speech change and focal weakness.        Vital Signs  Temp:  [36 °C (96.8 °F)-36.5 °C (97.7 °F)] 36.5 °C (97.7 °F)  Pulse:  [59-73] 60  Resp:  [16-18] 17  BP: (102-124)/(48-65) 119/61  SpO2:  [90 %-98 %] 98 %    Physical Exam  Physical Exam  Vitals signs and nursing note reviewed.   Constitutional:       General: He is not in acute distress.  HENT:      Head: Normocephalic.      Nose: Nose normal.   Eyes:      Extraocular Movements: Extraocular movements intact.      Conjunctiva/sclera: Conjunctivae normal.   Neck:      Musculoskeletal: Neck supple. No muscular tenderness.   Cardiovascular:      Rate and Rhythm: Normal rate.      Pulses: Normal pulses.      Comments: Pacer   Pulmonary:      Effort: Pulmonary effort is normal. No tachypnea, accessory muscle usage or respiratory distress.      Breath sounds: Examination of the left-middle field reveals decreased breath  sounds. Examination of the left-lower field reveals decreased breath sounds. Decreased breath sounds present.      Comments: IS [id number]   Chest:      Chest wall: Tenderness present.   Abdominal:      General: There is no distension.      Palpations: Abdomen is soft.      Tenderness: There is no abdominal tenderness.   Musculoskeletal:      Comments: Moves all extremities   Skin:     General: Skin is warm.      Capillary Refill: Capillary refill takes less than 2 seconds.      Findings: Bruising present.      Comments: Left forearm wound approximated with sutures   Neurological:      Mental Status: He is alert and oriented to person, place, and time.      Sensory: Sensation is intact.      Motor: Motor function is intact.   Psychiatric:         Behavior: Behavior normal.         Laboratory  Recent Results (from the past 24 hour(s))   CBC WITH DIFFERENTIAL    Collection Time: 01/24/21  2:45 AM   Result Value Ref Range    WBC 13.5 (H) 4.8 - 10.8 K/uL    RBC 4.50 (L) 4.70 - 6.10 M/uL    Hemoglobin 13.8 (L) 14.0 - 18.0 g/dL    Hematocrit 43.6 42.0 - 52.0 %    MCV 96.9 81.4 - 97.8 fL    MCH 30.7 27.0 - 33.0 pg    MCHC 31.7 (L) 33.7 - 35.3 g/dL    RDW 49.6 35.9 - 50.0 fL    Platelet Count 204 164 - 446 K/uL    MPV 10.0 9.0 - 12.9 fL    Neutrophils-Polys 74.60 (H) 44.00 - 72.00 %    Lymphocytes 13.50 (L) 22.00 - 41.00 %    Monocytes 8.90 0.00 - 13.40 %    Eosinophils 2.00 0.00 - 6.90 %    Basophils 0.40 0.00 - 1.80 %    Immature Granulocytes 0.60 0.00 - 0.90 %    Nucleated RBC 0.00 /100 WBC    Neutrophils (Absolute) 10.09 (H) 1.82 - 7.42 K/uL    Lymphs (Absolute) 1.82 1.00 - 4.80 K/uL    Monos (Absolute) 1.21 (H) 0.00 - 0.85 K/uL    Eos (Absolute) 0.27 0.00 - 0.51 K/uL    Baso (Absolute) 0.05 0.00 - 0.12 K/uL    Immature Granulocytes (abs) 0.08 0.00 - 0.11 K/uL    NRBC (Absolute) 0.00 K/uL       Fluids    Intake/Output Summary (Last 24 hours) at 1/24/2021 1103  Last data filed at 1/23/2021 1241  Gross per 24 hour   Intake  120 ml   Output no documentation   Net 120 ml       Core Measures & Quality Metrics  Medications reviewed, Radiology images reviewed and Labs reviewed  Mancilla catheter: No Mancilla      DVT Prophylaxis: Warfarin (Coumadin)  DVT prophylaxis - mechanical: SCDs  Ulcer prophylaxis: Not indicated    Assessed for rehab: Patient/family refusal    RAP Score Total: 5    Assesment ETOH: No CAGE         Assessment/Plan  Hypoxia- (present on admission)  Assessment & Plan  Desaturates without supplemental oxygen to 86%  Supplemental oxygen to maintain O2 saturations greater than 92%     Fracture of four ribs of left side- (present on admission)  Assessment & Plan  Fractures of the left fifth, sixth, seventh and eighth ribs.  Aggressive multimodal pain management, and pulmonary hygiene.   Serial chest radiographs.    Pacemaker- (present on admission)  Assessment & Plan  Digidentity   1/19 Interrogation completed.  1/20 No longer noting extra beats with activity  Follow up with Cardiology at discharge      Discharge planning issues- (present on admission)  Assessment & Plan  Date of admission: 1/17/2021  Date: 1/18 Transfer orders from SICU pending  Date: 1/18  Rehab/SNF consult   Cleared for discharge: Yes - Date: 1/20/21  Discharge delayed: No    Discharge date: TBD      Anticoagulated on Coumadin- (present on admission)  Assessment & Plan  Chronic Coumadin use  1/17 INR on admission  2.63   1/18 Coumadin per pharmacy protocol  Pharmacist suggested discharge dosing: warfarin 2.5 mg every Sunday, Tuesday, and Thursday; 5 mg all other days.     Atelectasis, bilateral- (present on admission)  Assessment & Plan  Bibasilar atelectasis, left greater than right.  Aggressive pulmonary hygiene. Serial chest radiographs.      Atrial fibrillation (HCC)- (present on admission)  Assessment & Plan  Chronic condition treated with coumadin.  1/18 Coumadin per pharmacy protocol.     Laceration of left forearm- (present on  admission)  Assessment & Plan  No acute left forearm fracture.  Complex laceration closed in ED.  Sutures out in 7-10 days. (1/24-1/27)    Other emphysema (HCC)- (present on admission)  Assessment & Plan  Emphysematous changes on CT  Aggressive pulmonary hygiene      Multiple thyroid nodules- (present on admission)  Assessment & Plan  Thyroid nodules on the right. Evaluation with thyroid ultrasound is recommended     AAA (abdominal aortic aneurysm) (HCC)- (present on admission)  Assessment & Plan  Aortobiiliac stent graft placed in 2004  Abdominal aortic aneurysm measures 6.7 x 6.8 cm compared to 6.6 x 6.7 cm previously.      Bilateral renal cysts- (present on admission)  Assessment & Plan  Left renal cyst and small hypodense bilateral renal lesions which are too small to characterize but likely represent small cysts.  Stable since initially seen on CT 2012.     Trauma- (present on admission)  Assessment & Plan  GLF  T-5000 Activation.  Scott Hurley MD. Trauma Surgery.     Screening examination for infectious disease- (present on admission)  Assessment & Plan  Two SARS-CoV-2 tests negative. Isolation precautions de-escalated.         Discussed patient condition with RN, , Patient and trauma surgery, Dr. MARIANA Hurley.    Patient seen, data reviewed and discussed.  Agree with assessment and plan.  SONY

## 2021-01-24 NOTE — PROGRESS NOTES
Pt AO x 4  Vitals signs stable   Pt denies chest pain or SOB  O2 sat >90% on RA breathing unlabored  Pt endorses moderate L flank/rib cage pain, medicated per MAR.  Pt denies N/V/D  + voiding, + flatus, + BM on 1/23 PM.   Pt ambulates with FWW and standby assistance.    Pt has L FA lac OBINNA with sutures     Updated plan of care discussed with pt. Safety education done. Falls precautions in place.   Pt safety maintained. Hourly rounding done.

## 2021-01-24 NOTE — PROGRESS NOTES
Bedside report received.  Assessment complete.  A&O x 4. Patient calls appropriately.  Patient ambulates with x1 assist and FWW. Bed alarm on.   Patient has 9/10 pain with movement. Pain managed with prescribed medications.  Denies N&V. Tolerating regular diet.  - void, - flatus, - BM.  Patient denies SOB.  SCD's off.    Review plan with of care with patient. Call light and personal belongings with in reach. Hourly rounding in place. All needs met at this time.

## 2021-01-24 NOTE — PROGRESS NOTES
Inpatient Anticoagulation Service Note    Date: 1/24/2021    Reason for Anticoagulation: Atrial Fibrillation   Target INR: 2.0 to 3.0  [id number] VASc Score: 3  HAS-BLED Score: 2   Hemoglobin Value: (!) 13.8  Hematocrit Value: 43.6  Lab Platelet Value: 204    INR from last 7 days     Date/Time INR Value    01/24/21 0245  (!) 3.49    01/23/21 0348  (!) 3.39    01/22/21 0536  (!) 3.15    01/21/21 0410  (!) 3.13    01/20/21 0501  (!) 3.01    01/19/21 0545  (!) 3.02    01/18/21 1000  (!) 2.68    01/17/21 1614  (!) 2.63        Dose from last 7 days     Date/Time Dose (mg)    01/24/21 1211  0    01/23/21 0920  0    01/22/21 0949  2.5    01/21/21 0959  2.5    01/20/21 1333  2.5    01/19/21 1121  2.5    01/18/21 1103  5        Average Dose (mg): (Per Anticoagulation Clinic: 2.5mg every Su/Tu/Th and 5 mg AOD)  Significant Interactions: Not Applicable  Bridge Therapy: No    Reversal Agent Administered: Not Applicable  Comments: INR remains supratherapeutic.  Will continue to hold warfarin.  No bleeding found in chart review; H/H stable.  No DDI. Follow INR trend in AM.    Plan:  Warfarin held. INR in AM.  Education Material Provided?: No([facility] warfarin patient)  Pharmacist suggested discharge dosing: Consider 2.5mg daily once INR returns to therapeutic level; follow up within 3 days of discharge.     Gerber Latif, ShilaD

## 2021-01-25 ENCOUNTER — APPOINTMENT (OUTPATIENT)
Dept: RADIOLOGY | Facility: MEDICAL CENTER | Age: 86
DRG: 184 | End: 2021-01-25
Attending: NURSE PRACTITIONER
Payer: MEDICARE

## 2021-01-25 LAB
APPEARANCE UR: CLEAR
BACTERIA #/AREA URNS HPF: NEGATIVE /HPF
BASOPHILS # BLD AUTO: 0.4 % (ref 0–1.8)
BASOPHILS # BLD: 0.06 K/UL (ref 0–0.12)
BILIRUB UR QL STRIP.AUTO: NEGATIVE
COLOR UR: YELLOW
EOSINOPHIL # BLD AUTO: 0.31 K/UL (ref 0–0.51)
EOSINOPHIL NFR BLD: 2.3 % (ref 0–6.9)
EPI CELLS #/AREA URNS HPF: NEGATIVE /HPF
ERYTHROCYTE [DISTWIDTH] IN BLOOD BY AUTOMATED COUNT: 50 FL (ref 35.9–50)
GLUCOSE UR STRIP.AUTO-MCNC: NEGATIVE MG/DL
HCT VFR BLD AUTO: 46.9 % (ref 42–52)
HGB BLD-MCNC: 14.6 G/DL (ref 14–18)
HYALINE CASTS #/AREA URNS LPF: ABNORMAL /LPF
IMM GRANULOCYTES # BLD AUTO: 0.11 K/UL (ref 0–0.11)
IMM GRANULOCYTES NFR BLD AUTO: 0.8 % (ref 0–0.9)
INR PPP: 2.64 (ref 0.87–1.13)
KETONES UR STRIP.AUTO-MCNC: ABNORMAL MG/DL
LEUKOCYTE ESTERASE UR QL STRIP.AUTO: NEGATIVE
LYMPHOCYTES # BLD AUTO: 1.73 K/UL (ref 1–4.8)
LYMPHOCYTES NFR BLD: 12.6 % (ref 22–41)
MCH RBC QN AUTO: 30.5 PG (ref 27–33)
MCHC RBC AUTO-ENTMCNC: 31.1 G/DL (ref 33.7–35.3)
MCV RBC AUTO: 97.9 FL (ref 81.4–97.8)
MICRO URNS: ABNORMAL
MONOCYTES # BLD AUTO: 1.06 K/UL (ref 0–0.85)
MONOCYTES NFR BLD AUTO: 7.7 % (ref 0–13.4)
NEUTROPHILS # BLD AUTO: 10.43 K/UL (ref 1.82–7.42)
NEUTROPHILS NFR BLD: 76.2 % (ref 44–72)
NITRITE UR QL STRIP.AUTO: NEGATIVE
NRBC # BLD AUTO: 0 K/UL
NRBC BLD-RTO: 0 /100 WBC
PH UR STRIP.AUTO: 5 [PH] (ref 5–8)
PLATELET # BLD AUTO: 220 K/UL (ref 164–446)
PMV BLD AUTO: 9.7 FL (ref 9–12.9)
PROT UR QL STRIP: NEGATIVE MG/DL
PROTHROMBIN TIME: 29 SEC (ref 12–14.6)
RBC # BLD AUTO: 4.79 M/UL (ref 4.7–6.1)
RBC # URNS HPF: ABNORMAL /HPF
RBC UR QL AUTO: ABNORMAL
SP GR UR STRIP.AUTO: 1.03
UROBILINOGEN UR STRIP.AUTO-MCNC: 0.2 MG/DL
WBC # BLD AUTO: 13.7 K/UL (ref 4.8–10.8)
WBC #/AREA URNS HPF: ABNORMAL /HPF

## 2021-01-25 PROCEDURE — 85025 COMPLETE CBC W/AUTO DIFF WBC: CPT

## 2021-01-25 PROCEDURE — 71045 X-RAY EXAM CHEST 1 VIEW: CPT

## 2021-01-25 PROCEDURE — 85610 PROTHROMBIN TIME: CPT

## 2021-01-25 PROCEDURE — 700101 HCHG RX REV CODE 250: Performed by: SURGERY

## 2021-01-25 PROCEDURE — A9270 NON-COVERED ITEM OR SERVICE: HCPCS | Performed by: SURGERY

## 2021-01-25 PROCEDURE — 36415 COLL VENOUS BLD VENIPUNCTURE: CPT

## 2021-01-25 PROCEDURE — 94760 N-INVAS EAR/PLS OXIMETRY 1: CPT

## 2021-01-25 PROCEDURE — A9270 NON-COVERED ITEM OR SERVICE: HCPCS | Performed by: NURSE PRACTITIONER

## 2021-01-25 PROCEDURE — 94669 MECHANICAL CHEST WALL OSCILL: CPT

## 2021-01-25 PROCEDURE — 700102 HCHG RX REV CODE 250 W/ 637 OVERRIDE(OP): Performed by: SURGERY

## 2021-01-25 PROCEDURE — 700102 HCHG RX REV CODE 250 W/ 637 OVERRIDE(OP): Performed by: NURSE PRACTITIONER

## 2021-01-25 PROCEDURE — 770006 HCHG ROOM/CARE - MED/SURG/GYN SEMI*

## 2021-01-25 RX ORDER — WARFARIN SODIUM 2 MG/1
2 TABLET ORAL DAILY
Status: DISCONTINUED | OUTPATIENT
Start: 2021-01-25 | End: 2021-01-26 | Stop reason: HOSPADM

## 2021-01-25 RX ADMIN — GABAPENTIN 100 MG: 100 CAPSULE ORAL at 05:20

## 2021-01-25 RX ADMIN — OXYCODONE 5 MG: 5 TABLET ORAL at 22:59

## 2021-01-25 RX ADMIN — LIDOCAINE 2 PATCH: 50 PATCH TOPICAL at 17:51

## 2021-01-25 RX ADMIN — ACETAMINOPHEN 650 MG: 325 TABLET ORAL at 22:59

## 2021-01-25 RX ADMIN — OXYCODONE 5 MG: 5 TABLET ORAL at 00:02

## 2021-01-25 RX ADMIN — ACETAMINOPHEN 650 MG: 325 TABLET ORAL at 05:20

## 2021-01-25 RX ADMIN — WARFARIN SODIUM 2 MG: 2 TABLET ORAL at 18:21

## 2021-01-25 RX ADMIN — OXYCODONE 5 MG: 5 TABLET ORAL at 10:27

## 2021-01-25 RX ADMIN — OXYCODONE 5 MG: 5 TABLET ORAL at 05:21

## 2021-01-25 RX ADMIN — ACETAMINOPHEN 650 MG: 325 TABLET ORAL at 17:49

## 2021-01-25 RX ADMIN — OXYCODONE 5 MG: 5 TABLET ORAL at 17:49

## 2021-01-25 RX ADMIN — OXYCODONE 5 MG: 5 TABLET ORAL at 13:50

## 2021-01-25 RX ADMIN — FINASTERIDE 5 MG: 5 TABLET, FILM COATED ORAL at 05:20

## 2021-01-25 RX ADMIN — GABAPENTIN 100 MG: 100 CAPSULE ORAL at 17:49

## 2021-01-25 RX ADMIN — GABAPENTIN 100 MG: 100 CAPSULE ORAL at 12:47

## 2021-01-25 RX ADMIN — ACETAMINOPHEN 650 MG: 325 TABLET ORAL at 12:47

## 2021-01-25 ASSESSMENT — ENCOUNTER SYMPTOMS
MYALGIAS: 1
FOCAL WEAKNESS: 0
SPEECH CHANGE: 0
SHORTNESS OF BREATH: 0
NAUSEA: 0
BACK PAIN: 0
VOMITING: 0
COUGH: 0
TINGLING: 0
NECK PAIN: 0
BLURRED VISION: 0
FEVER: 0
CHILLS: 0
ROS GI COMMENTS: 1/23 BM
DOUBLE VISION: 0
ABDOMINAL PAIN: 0

## 2021-01-25 ASSESSMENT — PAIN DESCRIPTION - PAIN TYPE: TYPE: ACUTE PAIN

## 2021-01-25 NOTE — DISCHARGE PLANNING
.Anticipated Discharge Disposition:   Home with Home Health    Action:   This RN CM is following the case. Informed MUSTAPHA Perdomo that Pt has been  accepted by [facility].     Barriers to Discharge:   Pending Medical Clearance    Plan:   Will continue to assist Pt with discharge as needed.

## 2021-01-25 NOTE — PROGRESS NOTES
Bedside report received.  Assessment complete.  A&O x 4. Patient calls appropriately.  Patient ambulates with SB assist and FWW. Bed alarm on.   Patient has 9/10 pain with movement. Pain managed with prescribed medications.  Denies N&V. Tolerating regular diet.  Buttocks red with blanching, OBINNA  - void, + flatus, - BM.  Patient denies SOB.  SCD's off.    Review plan with of care with patient. Call light and personal belongings with in reach. Hourly rounding in place. All needs met at this time.

## 2021-01-25 NOTE — PROGRESS NOTES
Inpatient Anticoagulation Service Note    Date: 1/25/2021    Reason for Anticoagulation: Atrial Fibrillation   Target INR: 2.0 to 3.0  [id number] VASc Score: 3  HAS-BLED Score: 2   Hemoglobin Value: 14.6  Hematocrit Value: 46.9  Lab Platelet Value: 220    INR from last 7 days     Date/Time INR Value    01/25/21 0309  (!) 2.64    01/24/21 0245  (!) 3.49    01/23/21 0348  (!) 3.39    01/22/21 0536  (!) 3.15    01/21/21 0410  (!) 3.13    01/20/21 0501  (!) 3.01    01/19/21 0545  (!) 3.02        Dose from last 7 days     Date/Time Dose (mg)    01/25/21 1356  2    01/24/21 1211  0    01/23/21 0920  0    01/22/21 0949  2.5    01/21/21 0959  2.5    01/20/21 1333  2.5    01/19/21 1121  2.5        Average Dose (mg): (Per Anticoagulation Clinic: 2.5mg every Su/Tu/Th and 5 mg AOD)  Significant Interactions: Not Applicable  Bridge Therapy: No    Reversal Agent Administered: Not Applicable  Comments: INR now corrected to within goal range after holding warfarin for 2 days.  Will further reduce previous home regimen. No s/sx of bleeding; no significant DDI.    Plan:  Warfarin 2mg. INR in AM.  Education Material Provided?: No([facility] warfarin patient)  Pharmacist suggested discharge dosing: Warfarin 2mg daily with follow up within 3 days of discharge.     Gerber Latif, ShilaD

## 2021-01-25 NOTE — PROGRESS NOTES
Bedside report received from day shift RN.   Assessment complete.   A&O x 4. Patient calls appropriately.  Patient ambulates with stand by assist with front wheel walker. Bed alarm on and active.   Patient has 8/10 pain. Pain managed with prescribed medications.  Denies N&V. Tolerating Regular diet.  + void, + flatus, - BM.  Patient denies SOB.    Reviewed plan with of care with patient. Call light and personal belongings with in reach. Hourly rounding in place. All needs met at this time.      Patient completed urine sample and sent down to lab.

## 2021-01-25 NOTE — PROGRESS NOTES
Trauma / Surgical Daily Progress Note    Date of Service  1/25/2021    Chief Complaint  85 y.o. male admitted 1/17/2021 with rib fractures following a ground level fall    Interval Events    WBC remains elevated, nontoxic appearance  UA negative  Chest x-ray with stable bibasilar opacities    - Continue aggressive pulmonary hygiene  - Mobilize, out of bed for all meals  - AM chest x-ray and labs  - Home health in process     Review of Systems  Review of Systems   Constitutional: Negative for chills and fever.   HENT: Negative for hearing loss.    Eyes: Negative for blurred vision and double vision.   Respiratory: Negative for cough and shortness of breath.    Cardiovascular: Negative for chest pain.   Gastrointestinal: Negative for abdominal pain, nausea and vomiting.        1/23 BM    Genitourinary: Negative for dysuria.        Voiding    Musculoskeletal: Positive for joint pain and myalgias. Negative for back pain and neck pain.   Neurological: Negative for tingling, speech change and focal weakness.        Vital Signs  Temp:  [36.1 °C (96.9 °F)-36.8 °C (98.3 °F)] 36.1 °C (96.9 °F)  Pulse:  [60-68] 60  Resp:  [17-20] 17  BP: (105-138)/(50-75) 138/75  SpO2:  [90 %-95 %] 95 %    Physical Exam  Physical Exam  Vitals signs and nursing note reviewed.   Constitutional:       General: He is not in acute distress.  HENT:      Head: Normocephalic.      Nose: Nose normal.   Eyes:      Extraocular Movements: Extraocular movements intact.      Conjunctiva/sclera: Conjunctivae normal.   Neck:      Musculoskeletal: Neck supple. No muscular tenderness.   Cardiovascular:      Rate and Rhythm: Normal rate.      Pulses: Normal pulses.      Comments: Pacer  Pulmonary:      Effort: Pulmonary effort is normal. No tachypnea, accessory muscle usage or respiratory distress.      Breath sounds: Examination of the left-middle field reveals decreased breath sounds. Examination of the left-lower field reveals decreased breath sounds.  Decreased breath sounds present.      Comments: [id number]   Chest:      Chest wall: Tenderness present.   Abdominal:      General: There is no distension.      Palpations: Abdomen is soft.      Tenderness: There is no abdominal tenderness.   Musculoskeletal:      Comments: Moves all extremities    Skin:     General: Skin is warm.      Capillary Refill: Capillary refill takes less than 2 seconds.      Findings: Bruising present.      Comments: Left forearm wound approximated with sutures    Neurological:      Mental Status: He is alert and oriented to person, place, and time.      Sensory: Sensation is intact.      Motor: Motor function is intact.   Psychiatric:         Behavior: Behavior normal.         Laboratory  Recent Results (from the past 24 hour(s))   URINALYSIS    Collection Time: 01/24/21 11:55 PM    Specimen: Urine, Clean Catch   Result Value Ref Range    Color Yellow     Character Clear     Specific Gravity 1.026 <1.035    Ph 5.0 5.0 - 8.0    Glucose Negative Negative mg/dL    Ketones Trace (A) Negative mg/dL    Protein Negative Negative mg/dL    Bilirubin Negative Negative    Urobilinogen, Urine 0.2 Negative    Nitrite Negative Negative    Leukocyte Esterase Negative Negative    Occult Blood Small (A) Negative    Micro Urine Req Microscopic    URINE MICROSCOPIC (W/UA)    Collection Time: 01/24/21 11:55 PM   Result Value Ref Range    WBC 0-2 (A) /hpf    RBC 5-10 (A) /hpf    Bacteria Negative None /hpf    Epithelial Cells Negative /hpf    Hyaline Cast 0-2 /lpf   PROTHROMBIN TIME    Collection Time: 01/25/21  3:09 AM   Result Value Ref Range    PT 29.0 (H) 12.0 - 14.6 sec    INR 2.64 (H) 0.87 - 1.13   CBC WITH DIFFERENTIAL    Collection Time: 01/25/21  8:58 AM   Result Value Ref Range    WBC 13.7 (H) 4.8 - 10.8 K/uL    RBC 4.79 4.70 - 6.10 M/uL    Hemoglobin 14.6 14.0 - 18.0 g/dL    Hematocrit 46.9 42.0 - 52.0 %    MCV 97.9 (H) 81.4 - 97.8 fL    MCH 30.5 27.0 - 33.0 pg    MCHC 31.1 (L) 33.7 - 35.3 g/dL     RDW 50.0 35.9 - 50.0 fL    Platelet Count 220 164 - 446 K/uL    MPV 9.7 9.0 - 12.9 fL    Neutrophils-Polys 76.20 (H) 44.00 - 72.00 %    Lymphocytes 12.60 (L) 22.00 - 41.00 %    Monocytes 7.70 0.00 - 13.40 %    Eosinophils 2.30 0.00 - 6.90 %    Basophils 0.40 0.00 - 1.80 %    Immature Granulocytes 0.80 0.00 - 0.90 %    Nucleated RBC 0.00 /100 WBC    Neutrophils (Absolute) 10.43 (H) 1.82 - 7.42 K/uL    Lymphs (Absolute) 1.73 1.00 - 4.80 K/uL    Monos (Absolute) 1.06 (H) 0.00 - 0.85 K/uL    Eos (Absolute) 0.31 0.00 - 0.51 K/uL    Baso (Absolute) 0.06 0.00 - 0.12 K/uL    Immature Granulocytes (abs) 0.11 0.00 - 0.11 K/uL    NRBC (Absolute) 0.00 K/uL       Fluids    Intake/Output Summary (Last 24 hours) at 1/25/2021 1104  Last data filed at 1/25/2021 0850  Gross per 24 hour   Intake 360 ml   Output 600 ml   Net -240 ml       Core Measures & Quality Metrics  Labs reviewed, Medications reviewed and Radiology images reviewed  Mancilla catheter: No Mancilla      DVT Prophylaxis: Warfarin (Coumadin)  DVT prophylaxis - mechanical: SCDs  Ulcer prophylaxis: Not indicated    Assessed for rehab: Patient/family refusal    RAP Score Total: 5    Assesment ETOH: No CAGE         Assessment/Plan  Fracture of four ribs of left side- (present on admission)  Assessment & Plan  Fractures of the left fifth, sixth, seventh and eighth ribs.  Aggressive multimodal pain management, and pulmonary hygiene.   Serial chest radiographs.     Discharge planning issues- (present on admission)  Assessment & Plan  Date of admission: 1/17/2021  Date: 1/18 Transfer orders from SICU pending  Date: 1/18  Rehab/SNF consult   Cleared for discharge: Yes - Date: 1/20/21  Discharge delayed: No    Discharge date: TBD      Hypoxia- (present on admission)  Assessment & Plan  Desaturates without supplemental oxygen to 86%  Supplemental oxygen to maintain O2 saturations greater than 92%     Anticoagulated on Coumadin- (present on admission)  Assessment & Plan  Chronic  Coumadin use  1/17 INR on admission  2.63   1/18 Coumadin per pharmacy protocol  Pharmacist suggested discharge dosing: warfarin 2.5 mg every Sunday, Tuesday, and Thursday; 5 mg all other days.     Atelectasis, bilateral- (present on admission)  Assessment & Plan  Bibasilar atelectasis, left greater than right.  Aggressive pulmonary hygiene. Serial chest radiographs.      Atrial fibrillation (HCC)- (present on admission)  Assessment & Plan  Chronic condition treated with coumadin.  1/18 Coumadin per pharmacy protocol.     Pacemaker- (present on admission)  Assessment & Plan  [employer]   1/19 Interrogation completed.  1/20 No longer noting extra beats with activity  Follow up with Cardiology at discharge      Laceration of left forearm- (present on admission)  Assessment & Plan  No acute left forearm fracture.  Complex laceration closed in ED.  Sutures out in 7-10 days. (1/24-1/27)    Other emphysema (HCC)- (present on admission)  Assessment & Plan  Emphysematous changes on CT  Aggressive pulmonary hygiene      Multiple thyroid nodules- (present on admission)  Assessment & Plan  Thyroid nodules on the right. Evaluation with thyroid ultrasound is recommended     AAA (abdominal aortic aneurysm) (HCC)- (present on admission)  Assessment & Plan  Aortobiiliac stent graft placed in 2004  Abdominal aortic aneurysm measures 6.7 x 6.8 cm compared to 6.6 x 6.7 cm previously.      Bilateral renal cysts- (present on admission)  Assessment & Plan  Left renal cyst and small hypodense bilateral renal lesions which are too small to characterize but likely represent small cysts.  Stable since initially seen on CT 2012.     Trauma- (present on admission)  Assessment & Plan  GLF  T-5000 Activation.  Scott Hurley MD. Trauma Surgery.     Screening examination for infectious disease- (present on admission)  Assessment & Plan  Two SARS-CoV-2 tests negative. Isolation precautions de-escalated.         Discussed patient  condition with RN, Patient and trauma surgery, Dr. MARIANA Hurley.    Patient seen, data reviewed and discussed.  Agree with assessment and plan.  SONY

## 2021-01-25 NOTE — CARE PLAN
Problem: Safety  Goal: Will remain free from falls  Outcome: PROGRESSING AS EXPECTED  Note: Bed alarm on, call light within reach, bed locked and in lowest position, walker out of sight, patient educated to call for assistance      Problem: Pain Management  Goal: Pain level will decrease to patient's comfort goal  Outcome: PROGRESSING AS EXPECTED  Note: Assess pain Q2-4H, implement mulit-modal pain control, educate patient to notify staff of pain

## 2021-01-26 ENCOUNTER — APPOINTMENT (OUTPATIENT)
Dept: RADIOLOGY | Facility: MEDICAL CENTER | Age: 86
DRG: 184 | End: 2021-01-26
Attending: NURSE PRACTITIONER
Payer: MEDICARE

## 2021-01-26 ENCOUNTER — PHARMACY VISIT (OUTPATIENT)
Dept: PHARMACY | Facility: MEDICAL CENTER | Age: 86
End: 2021-01-26
Payer: COMMERCIAL

## 2021-01-26 ENCOUNTER — PATIENT OUTREACH (OUTPATIENT)
Dept: HEALTH INFORMATION MANAGEMENT | Facility: OTHER | Age: 86
End: 2021-01-26

## 2021-01-26 VITALS
HEART RATE: 77 BPM | WEIGHT: 178.13 LBS | SYSTOLIC BLOOD PRESSURE: 122 MMHG | BODY MASS INDEX: 27 KG/M2 | DIASTOLIC BLOOD PRESSURE: 55 MMHG | RESPIRATION RATE: 17 BRPM | OXYGEN SATURATION: 98 % | HEIGHT: 68 IN | TEMPERATURE: 97.8 F

## 2021-01-26 LAB
BASOPHILS # BLD AUTO: 0.3 % (ref 0–1.8)
BASOPHILS # BLD: 0.04 K/UL (ref 0–0.12)
EOSINOPHIL # BLD AUTO: 0.26 K/UL (ref 0–0.51)
EOSINOPHIL NFR BLD: 2.1 % (ref 0–6.9)
ERYTHROCYTE [DISTWIDTH] IN BLOOD BY AUTOMATED COUNT: 49.3 FL (ref 35.9–50)
HCT VFR BLD AUTO: 41.8 % (ref 42–52)
HGB BLD-MCNC: 13.6 G/DL (ref 14–18)
IMM GRANULOCYTES # BLD AUTO: 0.08 K/UL (ref 0–0.11)
IMM GRANULOCYTES NFR BLD AUTO: 0.6 % (ref 0–0.9)
INR PPP: 2.24 (ref 0.87–1.13)
LYMPHOCYTES # BLD AUTO: 1.51 K/UL (ref 1–4.8)
LYMPHOCYTES NFR BLD: 11.9 % (ref 22–41)
MCH RBC QN AUTO: 31.3 PG (ref 27–33)
MCHC RBC AUTO-ENTMCNC: 32.5 G/DL (ref 33.7–35.3)
MCV RBC AUTO: 96.3 FL (ref 81.4–97.8)
MONOCYTES # BLD AUTO: 0.99 K/UL (ref 0–0.85)
MONOCYTES NFR BLD AUTO: 7.8 % (ref 0–13.4)
NEUTROPHILS # BLD AUTO: 9.78 K/UL (ref 1.82–7.42)
NEUTROPHILS NFR BLD: 77.3 % (ref 44–72)
NRBC # BLD AUTO: 0 K/UL
NRBC BLD-RTO: 0 /100 WBC
PLATELET # BLD AUTO: 203 K/UL (ref 164–446)
PMV BLD AUTO: 9.5 FL (ref 9–12.9)
PROTHROMBIN TIME: 25.4 SEC (ref 12–14.6)
RBC # BLD AUTO: 4.34 M/UL (ref 4.7–6.1)
WBC # BLD AUTO: 12.7 K/UL (ref 4.8–10.8)

## 2021-01-26 PROCEDURE — 94760 N-INVAS EAR/PLS OXIMETRY 1: CPT

## 2021-01-26 PROCEDURE — 71045 X-RAY EXAM CHEST 1 VIEW: CPT

## 2021-01-26 PROCEDURE — A9270 NON-COVERED ITEM OR SERVICE: HCPCS | Performed by: SURGERY

## 2021-01-26 PROCEDURE — 94669 MECHANICAL CHEST WALL OSCILL: CPT

## 2021-01-26 PROCEDURE — RXMED WILLOW AMBULATORY MEDICATION CHARGE: Performed by: NURSE PRACTITIONER

## 2021-01-26 PROCEDURE — 700101 HCHG RX REV CODE 250: Performed by: SURGERY

## 2021-01-26 PROCEDURE — 85610 PROTHROMBIN TIME: CPT

## 2021-01-26 PROCEDURE — A9270 NON-COVERED ITEM OR SERVICE: HCPCS | Performed by: NURSE PRACTITIONER

## 2021-01-26 PROCEDURE — 85025 COMPLETE CBC W/AUTO DIFF WBC: CPT

## 2021-01-26 PROCEDURE — 97530 THERAPEUTIC ACTIVITIES: CPT | Mod: CQ

## 2021-01-26 PROCEDURE — 97116 GAIT TRAINING THERAPY: CPT | Mod: CQ

## 2021-01-26 PROCEDURE — 36415 COLL VENOUS BLD VENIPUNCTURE: CPT

## 2021-01-26 PROCEDURE — 700102 HCHG RX REV CODE 250 W/ 637 OVERRIDE(OP): Performed by: NURSE PRACTITIONER

## 2021-01-26 PROCEDURE — 700102 HCHG RX REV CODE 250 W/ 637 OVERRIDE(OP): Performed by: SURGERY

## 2021-01-26 RX ORDER — OXYCODONE HYDROCHLORIDE 5 MG/1
5 TABLET ORAL
Qty: 35 TAB | Refills: 0 | Status: SHIPPED | OUTPATIENT
Start: 2021-01-26 | End: 2021-02-02

## 2021-01-26 RX ORDER — GABAPENTIN 100 MG/1
100 CAPSULE ORAL 3 TIMES DAILY
Qty: 42 CAP | Refills: 0 | Status: SHIPPED | OUTPATIENT
Start: 2021-01-26 | End: 2021-02-09

## 2021-01-26 RX ORDER — ACETAMINOPHEN 325 MG/1
650 TABLET ORAL EVERY 6 HOURS
Qty: 30 TAB | Refills: 0 | COMMUNITY
Start: 2021-01-26 | End: 2021-02-06 | Stop reason: RX

## 2021-01-26 RX ORDER — LIDOCAINE HYDROCHLORIDE 30 MG/G
1 CREAM TOPICAL
Qty: 28.35 G | Refills: 0 | Status: SHIPPED | OUTPATIENT
Start: 2021-01-26

## 2021-01-26 RX ORDER — POLYETHYLENE GLYCOL 3350 17 G/17G
17 POWDER, FOR SOLUTION ORAL 2 TIMES DAILY
Refills: 3 | COMMUNITY
Start: 2021-01-26 | End: 2021-02-06

## 2021-01-26 RX ADMIN — OXYCODONE 5 MG: 5 TABLET ORAL at 12:39

## 2021-01-26 RX ADMIN — ACETAMINOPHEN 650 MG: 325 TABLET ORAL at 12:39

## 2021-01-26 RX ADMIN — ACETAMINOPHEN 650 MG: 325 TABLET ORAL at 04:49

## 2021-01-26 RX ADMIN — POLYETHYLENE GLYCOL 3350 1 PACKET: 17 POWDER, FOR SOLUTION ORAL at 04:55

## 2021-01-26 RX ADMIN — GABAPENTIN 100 MG: 100 CAPSULE ORAL at 12:39

## 2021-01-26 RX ADMIN — FINASTERIDE 5 MG: 5 TABLET, FILM COATED ORAL at 04:48

## 2021-01-26 RX ADMIN — GABAPENTIN 100 MG: 100 CAPSULE ORAL at 04:49

## 2021-01-26 ASSESSMENT — COGNITIVE AND FUNCTIONAL STATUS - GENERAL
MOVING TO AND FROM BED TO CHAIR: UNABLE
CLIMB 3 TO 5 STEPS WITH RAILING: A LOT
SUGGESTED CMS G CODE MODIFIER MOBILITY: CL
STANDING UP FROM CHAIR USING ARMS: A LITTLE
WALKING IN HOSPITAL ROOM: A LITTLE
TURNING FROM BACK TO SIDE WHILE IN FLAT BAD: UNABLE
MOVING FROM LYING ON BACK TO SITTING ON SIDE OF FLAT BED: UNABLE
MOBILITY SCORE: 11

## 2021-01-26 ASSESSMENT — ENCOUNTER SYMPTOMS
SPEECH CHANGE: 0
FEVER: 0
COUGH: 0
TINGLING: 0
MYALGIAS: 1
NECK PAIN: 0
ABDOMINAL PAIN: 0
VOMITING: 0
BLURRED VISION: 0
ROS GI COMMENTS: 1/23 BM
NAUSEA: 0
SHORTNESS OF BREATH: 0
CHILLS: 0
BACK PAIN: 0
DOUBLE VISION: 0
FOCAL WEAKNESS: 0

## 2021-01-26 ASSESSMENT — PAIN DESCRIPTION - PAIN TYPE
TYPE: ACUTE PAIN
TYPE: ACUTE PAIN

## 2021-01-26 ASSESSMENT — GAIT ASSESSMENTS
DEVIATION: BRADYKINETIC;SHUFFLED GAIT;OTHER (COMMENT)
ASSISTIVE DEVICE: FRONT WHEEL WALKER
DISTANCE (FEET): 100
GAIT LEVEL OF ASSIST: MINIMAL ASSIST

## 2021-01-26 NOTE — PROGRESS NOTES
Trauma / Surgical Daily Progress Note    Date of Service  1/26/2021    Chief Complaint  85 y.o. male admitted 1/17/2021 with rib fractures following a ground level fall    Interval Events  WBC trend down  Up in chair    - Discharge home today with home health    Review of Systems  Review of Systems   Constitutional: Negative for chills and fever.   HENT: Negative for hearing loss.    Eyes: Negative for blurred vision and double vision.   Respiratory: Negative for cough and shortness of breath.    Cardiovascular: Negative for chest pain.   Gastrointestinal: Negative for abdominal pain, nausea and vomiting.        1/23 BM   Genitourinary: Negative for dysuria.        Voiding   Musculoskeletal: Positive for joint pain and myalgias. Negative for back pain and neck pain.   Neurological: Negative for tingling, speech change and focal weakness.        Vital Signs  Temp:  [36.3 °C (97.3 °F)-37.3 °C (99.1 °F)] 36.6 °C (97.8 °F)  Pulse:  [60-77] 77  Resp:  [14-20] 17  BP: ()/(51-68) 122/55  SpO2:  [93 %-100 %] 98 %    Physical Exam  Physical Exam  Vitals signs and nursing note reviewed.   Constitutional:       General: He is not in acute distress.     Comments: Up to chair   HENT:      Head: Normocephalic.      Nose: Nose normal.   Eyes:      Extraocular Movements: Extraocular movements intact.      Conjunctiva/sclera: Conjunctivae normal.   Neck:      Musculoskeletal: Neck supple. No muscular tenderness.   Cardiovascular:      Rate and Rhythm: Normal rate.      Pulses: Normal pulses.      Comments: Pacer   Pulmonary:      Effort: Pulmonary effort is normal. No tachypnea, accessory muscle usage or respiratory distress.      Breath sounds: Examination of the left-middle field reveals decreased breath sounds. Examination of the left-lower field reveals decreased breath sounds. Decreased breath sounds present.      Comments: IS 1000  Chest:      Chest wall: Tenderness present.   Abdominal:      General: There is no  distension.      Palpations: Abdomen is soft.      Tenderness: There is no abdominal tenderness.   Musculoskeletal:      Comments: Moves all extremities     Skin:     General: Skin is warm.      Capillary Refill: Capillary refill takes less than 2 seconds.      Findings: Bruising present.      Comments: Left forearm wound approximated with sutures   Neurological:      Mental Status: He is alert and oriented to person, place, and time.      Sensory: Sensation is intact.      Motor: Motor function is intact.   Psychiatric:         Behavior: Behavior normal.         Laboratory  Recent Results (from the past 24 hour(s))   PROTHROMBIN TIME    Collection Time: 01/26/21  7:56 AM   Result Value Ref Range    PT 25.4 (H) 12.0 - 14.6 sec    INR 2.24 (H) 0.87 - 1.13   CBC WITH DIFFERENTIAL    Collection Time: 01/26/21  7:56 AM   Result Value Ref Range    WBC 12.7 (H) 4.8 - 10.8 K/uL    RBC 4.34 (L) 4.70 - 6.10 M/uL    Hemoglobin 13.6 (L) 14.0 - 18.0 g/dL    Hematocrit 41.8 (L) 42.0 - 52.0 %    MCV 96.3 81.4 - 97.8 fL    MCH 31.3 27.0 - 33.0 pg    MCHC 32.5 (L) 33.7 - 35.3 g/dL    RDW 49.3 35.9 - 50.0 fL    Platelet Count 203 164 - 446 K/uL    MPV 9.5 9.0 - 12.9 fL    Neutrophils-Polys 77.30 (H) 44.00 - 72.00 %    Lymphocytes 11.90 (L) 22.00 - 41.00 %    Monocytes 7.80 0.00 - 13.40 %    Eosinophils 2.10 0.00 - 6.90 %    Basophils 0.30 0.00 - 1.80 %    Immature Granulocytes 0.60 0.00 - 0.90 %    Nucleated RBC 0.00 /100 WBC    Neutrophils (Absolute) 9.78 (H) 1.82 - 7.42 K/uL    Lymphs (Absolute) 1.51 1.00 - 4.80 K/uL    Monos (Absolute) 0.99 (H) 0.00 - 0.85 K/uL    Eos (Absolute) 0.26 0.00 - 0.51 K/uL    Baso (Absolute) 0.04 0.00 - 0.12 K/uL    Immature Granulocytes (abs) 0.08 0.00 - 0.11 K/uL    NRBC (Absolute) 0.00 K/uL       Fluids    Intake/Output Summary (Last 24 hours) at 1/26/2021 1034  Last data filed at 1/26/2021 0855  Gross per 24 hour   Intake 720 ml   Output no documentation   Net 720 ml       Core Measures & Quality  Metrics  Labs reviewed, Medications reviewed and Radiology images reviewed  Mancilla catheter: No Mancilla      DVT Prophylaxis: Warfarin (Coumadin)  DVT prophylaxis - mechanical: SCDs  Ulcer prophylaxis: Not indicated    Assessed for rehab: Patient/family refusal    RAP Score Total: 5    Assesment ETOH: No CAGE         Assessment/Plan  Fracture of four ribs of left side- (present on admission)  Assessment & Plan  Fractures of the left fifth, sixth, seventh and eighth ribs.  Aggressive multimodal pain management, and pulmonary hygiene.   Serial chest radiographs.    Discharge planning issues- (present on admission)  Assessment & Plan  Date of admission: 1/17/2021  Date: 1/18 Transfer orders from SICU pending  Date: 1/18  Rehab/SNF consult   Refusing rehab, home health arranged   Cleared for discharge: Yes - Date: 1/20/21  Discharge delayed: No    Discharge date: TBD    Hypoxia- (present on admission)  Assessment & Plan  Desaturates without supplemental oxygen to 86%  Supplemental oxygen to maintain O2 saturations greater than 92%     Anticoagulated on Coumadin- (present on admission)  Assessment & Plan  Chronic Coumadin use  1/17 INR on admission  2.63   1/18 Coumadin per pharmacy protocol  Pharmacist suggested discharge dosing: warfarin 2.5 mg every Sunday, Tuesday, and Thursday; 5 mg all other days.      Atelectasis, bilateral- (present on admission)  Assessment & Plan  Bibasilar atelectasis, left greater than right.  Aggressive pulmonary hygiene. Serial chest radiographs.       Atrial fibrillation (HCC)- (present on admission)  Assessment & Plan  Chronic condition treated with coumadin.  1/18 Coumadin per pharmacy protocol.      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  1/20 No longer noting extra beats with activity  Follow up with Cardiology at discharge      Laceration of left forearm- (present on admission)  Assessment & Plan  No acute left forearm  fracture.  Complex laceration closed in ED.  Sutures out in 7-10 days. (1/24-1/27)    Other emphysema (HCC)- (present on admission)  Assessment & Plan  Emphysematous changes on CT  Aggressive pulmonary hygiene      Multiple thyroid nodules- (present on admission)  Assessment & Plan  Thyroid nodules on the right. Evaluation with thyroid ultrasound is recommended     AAA (abdominal aortic aneurysm) (HCC)- (present on admission)  Assessment & Plan  Aortobiiliac stent graft placed in 2004  Abdominal aortic aneurysm measures 6.7 x 6.8 cm compared to 6.6 x 6.7 cm previously.      Bilateral renal cysts- (present on admission)  Assessment & Plan  Left renal cyst and small hypodense bilateral renal lesions which are too small to characterize but likely represent small cysts.  Stable since initially seen on CT 2012.     Trauma- (present on admission)  Assessment & Plan  GLF  T-5000 Activation.  Scott Hurley MD. Trauma Surgery.     Screening examination for infectious disease- (present on admission)  Assessment & Plan  Two SARS-CoV-2 tests negative. Isolation precautions de-escalated.         Discussed patient condition with RN, , Patient and trauma surgery, Dr. MARIANA Hurley.    Patient seen, data reviewed and discussed.  Agree with assessment and plan.  SONY

## 2021-01-26 NOTE — DISCHARGE INSTRUCTIONS
Discharge Instructions    Discharged to home by car with relative. Discharged via wheelchair, hospital escort: Yes.  Special equipment needed: Not Applicable     Be sure to schedule a follow-up appointment with your primary care doctor or any specialists as instructed.     Discharge Plan:   Diet Plan: Discussed  Activity Level: Discussed  Confirmed Follow up Appointment: Appointment Scheduled  Confirmed Symptoms Management: Discussed  Medication Reconciliation Updated: Yes  Influenza Vaccine Indication: Not indicated: Previously immunized this influenza season and > 8 years of age    I understand that a diet low in cholesterol, fat, and sodium is recommended for good health. Unless I have been given specific instructions below for another diet, I accept this instruction as my diet prescription.   Other diet: regular diet as tolerated      Special Instructions: None    · Is patient discharged on Warfarin / Coumadin?   Yes            Laceration Care, Adult  A laceration is a cut that may go through all layers of the skin. The cut may also go into the tissue that is right under the skin. Some cuts heal on their own. Others need to be closed with stitches (sutures), staples, skin adhesive strips, or skin glue. Taking care of your injury lowers your risk of infection, helps your injury to heal better, and may prevent scarring.  Supplies needed:  · Soap.  · Water.  · Hand .  · Bandage (dressing).  · Antibiotic ointment.  · Clean towel.  How to take care of your cut  Wash your hands with soap and water before touching your wound or changing your bandage. If soap and water are not available, use hand .  If your doctor used stitches or staples:  · Keep the wound clean and dry.  · If you were given a bandage, change it at least once a day as told by your doctor. You should also change it if it gets wet or dirty.  · Keep the wound completely dry for the first 24 hours, or as told by your doctor. After that,  you may take a shower or a bath. Do not get the wound soaked in water until after the stitches or staples have been removed.  · Clean the wound once a day, or as told by your doctor:  ? Wash the wound with soap and water.  ? Rinse the wound with water to remove all soap.  ? Pat the wound dry with a clean towel. Do not rub the wound.  · After you clean the wound, put a thin layer of antibiotic ointment on it as told by your doctor. This ointment:  ? Helps to prevent infection.  ? Keeps the bandage from sticking to the wound.  · Have your stitches or staples removed as told by your doctor.  If your doctor used skin adhesive strips:  · Keep the wound clean and dry.  · If you were given a bandage, you should change it at least once a day as told by your doctor. You should also change it if it gets wet or dirty.  · Do not get the skin adhesive strips wet. You can take a shower or a bath, but keep the wound dry.  · If the wound gets wet, pat it dry with a clean towel. Do not rub the wound.  · Skin adhesive strips fall off on their own. You can trim the strips as the wound heals. Do not remove any strips that are still stuck to the wound. They will fall off after a while.  If your doctor used skin glue:  · Try to keep your wound dry, but you may briefly wet it in the shower or bath. Do not soak the wound in water, such as by swimming.  · After you take a shower or a bath, gently pat the wound dry with a clean towel. Do not rub the wound.  · Do not do any activities that will make you really sweaty until the skin glue has fallen off on its own.  · Do not apply liquid, cream, or ointment medicine to your wound while the skin glue is still on.  · If you were given a bandage, you should change it at least once a day or as told by your doctor. You should also change it if it gets dirty or wet.  · If a bandage is placed over the wound, do not let the tape touch the skin glue.  · Do not pick at the glue. The skin glue usually  stays on for 5-10 days. Then, it falls off the skin.  General instructions    · Take over-the-counter and prescription medicines only as told by your doctor.  · If you were given antibiotic medicine or ointment, take or apply it as told by your doctor. Do not stop using it even if your condition improves.  · Do not scratch or pick at the wound.  · Check your wound every day for signs of infection. Watch for:  ? Redness, swelling, or pain.  ? Fluid, blood, or pus.  · Raise (elevate) the injured area above the level of your heart while you are sitting or lying down.  · If directed, put ice on the affected area:  ? Put ice in a plastic bag.  ? Place a towel between your skin and the bag.  ? Leave the ice on for 20 minutes, 2-3 times a day.  · Prevent scarring by covering your wound with sunscreen of at least 30 SPF whenever you are outside after your wound has healed.  · Keep all follow-up visits as told by your doctor. This is important.  Get help if:  · You got a tetanus shot and you have any of these problems at the injection site:  ? Swelling.  ? Very bad pain.  ? Redness.  ? Bleeding.  · You have a fever.  · A wound that was closed breaks open.  · You notice a bad smell coming from your wound or your bandage.  · You notice something coming out of the wound, such as wood or glass.  · Medicine does not relieve your pain.  · You have more redness, swelling, or pain at the site of your wound.  · You have fluid, blood, or pus coming from your wound.  · You notice a change in the color of your skin near your wound.  · You need to change the bandage often because fluid, blood, or pus is coming from the wound.  · You start to have a new rash.  · You start to have numbness around the wound.  Get help right away if:  · You have very bad swelling around the wound.  · Your pain suddenly gets worse and is very bad.  · You notice painful lumps near the wound or anywhere on your body.  · You have a red streak going away from  your wound.  · The wound is on your hand or foot, and:  ? You cannot move a finger or toe.  ? Your fingers or toes look pale or bluish.  Summary  · A laceration is a cut that may go through all layers of the skin. The cut may also go into the tissue right under the skin.  · Some cuts heal on their own. Others need to be closed with stitches, staples, skin adhesive strips, or skin glue.  · Follow your doctor's instructions for caring for your cut. Proper care of a cut lowers the risk of infection, helps the cut heal better, and prevents scarring.  This information is not intended to replace advice given to you by your health care provider. Make sure you discuss any questions you have with your health care provider.  Document Released: 06/05/2009 Document Revised: 02/15/2019 Document Reviewed: 01/07/2019  Elsevier Patient Education © 2020 [employer]    Rib Fracture    A rib fracture is a break or crack in one of the bones of the ribs. The ribs are like a cage that goes around your upper chest. A broken or cracked rib is often painful, but most do not cause other problems. Most rib fractures usually heal on their own in 1-3 months.  Follow these instructions at home:  Managing pain, stiffness, and swelling  · If directed, apply ice to the injured area.  ? Put ice in a plastic bag.  ? Place a towel between your skin and the bag.  ? Leave the ice on for 20 minutes, 2-3 times a day.  · Take over-the-counter and prescription medicines only as told by your doctor.  Activity  · Avoid activities that cause pain to the injured area. Protect your injured area.  · Slowly increase activity as told by your doctor.  General instructions  · Do deep breathing as told by your doctor. You may be told to:  ? Take deep breaths many times a day.  ? Cough many times a day while hugging a pillow.  ? Use a device (incentive spirometer) to do deep breathing many times a day.  · Drink enough fluid to keep your pee (urine) clear or pale  yellow.  · Do not wear a rib belt or binder. These do not allow you to breathe deeply.  · Keep all follow-up visits as told by your doctor. This is important.  Contact a doctor if:  · You have a fever.  Get help right away if:  · You have trouble breathing.  · You are short of breath.  · You cannot stop coughing.  · You cough up thick or bloody spit (sputum).  · You feel sick to your stomach (nauseous), throw up (vomit), or have belly (abdominal) pain.  · Your pain gets worse and medicine does not help.  Summary  · A rib fracture is a break or crack in one of the bones of the ribs.  · Apply ice to the injured area and take medicines for pain as told by your doctor.  · Take deep breaths and cough many times a day. Hug a pillow every time you cough.  This information is not intended to replace advice given to you by your health care provider. Make sure you discuss any questions you have with your health care provider.  Document Released: 09/26/2009 Document Revised: 11/30/2018 Document Reviewed: 03/20/2018  [employer] Patient Education © 2020 [employer]      Depression / Suicide Risk    As you are discharged from this [facility] facility, it is important to learn how to keep safe from harming yourself.    Recognize the warning signs:  · Abrupt changes in personality, positive or negative- including increase in energy   · Giving away possessions  · Change in eating patterns- significant weight changes-  positive or negative  · Change in sleeping patterns- unable to sleep or sleeping all the time   · Unwillingness or inability to communicate  · Depression  · Unusual sadness, discouragement and loneliness  · Talk of wanting to die  · Neglect of personal appearance   · Rebelliousness- reckless behavior  · Withdrawal from people/activities they love  · Confusion- inability to concentrate     If you or a loved one observes any of these behaviors or has concerns about self-harm, here's what you can do:  · Talk about it-  your feelings and reasons for harming yourself  · Remove any means that you might use to hurt yourself (examples: pills, rope, extension cords, firearm)  · Get professional help from the community (Mental Health, Substance Abuse, psychological counseling)  · Do not be alone:Call your Safe Contact- someone whom you trust who will be there for you.  · Call your local CRISIS HOTLINE 212-7003 or 699-285-5705  · Call your local Children's Mobile Crisis Response Team [place] (458) 176-2994 or www.[employer]  · Call the toll free National Suicide Prevention Hotlines   · National Suicide Prevention Lifeline 575-655-VGDY (3001)  · National Hope Line Network 800-SUICIDE (394-0492)

## 2021-01-26 NOTE — PROGRESS NOTES
Patient refusing morning labs, but is agreeable to having them drawn at 0800. Lab instructed to return at that time.

## 2021-01-26 NOTE — PROGRESS NOTES
Report received. Assessment complete.  AAOx4. Patient calls appropriately.  Pt reports pain 1/10. Declines interventions at this time. Will continue to monitor.   Pt denies N/V, SOB, or chest pain.  IS at 750  TYSON, ambulatory xSBA with FWW.  + void, LBM 01/23 - refusing stool softeners at this time, pt educated  Pt is tolerating regular diet.   LFA sutures in place, OBINNA.     Plan of care discussed with patient. Fall precautions in place. Belongings and call light within reach. Educated patient to call for assistance as needed. All needs met at this time.

## 2021-01-26 NOTE — DISCHARGE PLANNING
Care Transition Team Discharge Planning    Anticipates discharge disposition:  Home with Home Health    Action:  • Pt has been accepted by [facility] . Requested Jeremías SANDOVAL to inform [facility] Admissions.     Barriers to Discharge:  • None    Plan:  • Will continue to assist Pt with discharge as needed.     Addendum:  This RN CM provided a taxi voucher for Pt's transport to home.

## 2021-01-26 NOTE — CARE PLAN
Problem: Safety  Goal: Will remain free from injury  Outcome: PROGRESSING AS EXPECTED  Note: Pt calls appropriately.     Problem: Venous Thromboembolism (VTW)/Deep Vein Thrombosis (DVT) Prevention:  Goal: Patient will participate in Venous Thrombosis (VTE)/Deep Vein Thrombosis (DVT)Prevention Measures  Outcome: PROGRESSING AS EXPECTED  Note: Pt up for meals, pt up in chair during day. Anticoagulation as prescribed

## 2021-01-26 NOTE — PROGRESS NOTES
Bedside report received. Assessment completed.  Pt is A&O x4. Pt on room air.   Medicating for pain PRN per MAR Pain 10/10  Denies nausea.   - numbness, - tingling.  Skin LFA lac with sutures is red and swollen. Bottom is red and blanching  LDA 20G RFA   Last BM 1/23/21 . +flatus,   +void.  Regular diet. Tolerates well.   Pt up SBA w FWW.  Call light and belongings within reach. All needs met at this time. Fall Precautions and hourly rounding in place.

## 2021-01-26 NOTE — DISCHARGE PLANNING
Agency/Facility Name: [facility]  Spoke To: Intake   Outcome: CCA informed [facility] that PT is D/Cing today

## 2021-01-26 NOTE — DISCHARGE SUMMARY
Trauma Discharge Summary    DATE OF ADMISSION: 1/17/2021    DATE OF DISCHARGE: 1/26/2021    LENGTH OF STAY: 9 days    ATTENDING PHYSICIAN: Scott Hurley M.D.    CONSULTING PHYSICIAN:   Jaquelin Malhotra MD. Physiatry    DISCHARGE DIAGNOSIS:  1.  Fracture of 4 ribs of left side  2.  Atelectasis, bilateral  3.  Hypoxia  4.  Laceration of left forearm   5.  Anticoagulation on Coumadin  6.  Abdominal aortic aneurysm  7.  Atrial fibrillation  8.  Bilateral renal cysts  9.  Multiple thyroid nodules  10. Pacemaker  11. Chronic use of opiate drugs therapeutic purpose  12. Other emphysema  13. Screening examination for infectious disease  14. Trauma    PROCEDURES:  No procedures during this hospital course    HISTORY OF PRESENT ILLNESS: The patient is a 85 y.o. male who was injured in a ground-level fall.  He was subsequently transferred to [facility] for definite trauma care.  He was triaged as a Trauma in accordance with established pre-hospital protocols.    HOSPITAL COURSE: On arrival, he underwent extensive radiographic and laboratory studies and was admitted to the critical care team under the direction and supervision of Dr. Hurley.  He sustained the listed injuries and incurred the listed diagnosis during his stay.  On admission SARS-COV-2 testing was negative for COVID-19.    Imaging demonstrated fractures of the left fifth sixth seventh and eighth ribs.  He had associated bibasilar atelectasis and hypoxia.  He did receive aggressive pulmonary hygiene and multimodal pain management during his hospital course.  On day of discharge his oxygen saturations greater than 90% on room air.    He also sustained a laceration to the left forearm which was closed with suture in the emergency department.  The sutures should be removed on or around January 31.    The patient had multiple comorbid issues including atrial fibrillation, a pacemaker, and abdominal aortic aneurysm.  He was anticoagulated on  Coumadin prior to arrival.  His Coumadin was not reversed and was resumed on his second day of admission.  His INR was trended during hospital course and was therapeutic on day of discharge.    Imaging demonstrated small bilateral renal cysts, these were stable since the initial CT they were noted on CT 2012.  His aneurysm was also noted to be stable to previous CT noted.    He was also noted to have multiple thyroid nodules.  It was discussed that he should obtain a ultrasound on follow-up with his outpatient provider.  Patient verbalized understanding.    He was transferred from the emergency department to the intensive care unit where a tertiary exam was performed.     He was medically stable for [facility].  He was evaluated by physical, occupational therapy and physiatry and deemed a good candidate for inpatient rehab.  Patient refused physiatry as well as skilled nursing facility.  Home health was arranged for the patient.    On the day of discharge he is ambulating well, his oxygen saturations greater than 90% on room air and he reports adequate pain control.    DISCHARGE PHYSICAL EXAM: See epic physical exam dated 1/26/2021    DISCHARGE MEDICATIONS:  I reviewed the patients controlled substance history and obtained a controlled substance use informed consent (if applicable) provided by [facility] and the patient has been prescribed.       Medication List      Start taking these medications      Instructions   acetaminophen 325 MG Tabs  Commonly known as: Tylenol   Take 2 Tabs by mouth every 6 hours.  Dose: 650 mg     gabapentin 100 MG Caps  Commonly known as: NEURONTIN   Take 1 Cap by mouth 3 times a day for 14 days.  Dose: 100 mg     Lidocaine HCl 3 % Crea   Apply 1 Each topically 1 time a day as needed.  Dose: 1 Each     oxyCODONE immediate-release 5 MG Tabs  Commonly known as: ROXICODONE   Take 1 Tab by mouth every 3 hours as needed for up to 7  days.  Dose: 5 mg     polyethylene glycol/lytes 17 g Pack  Commonly known as: MIRALAX   Take 1 Packet by mouth 2 Times a Day.  Dose: 17 g        Change how you take these medications      Instructions   finasteride 5 MG Tabs  What changed: when to take this  Commonly known as: PROSCAR   TAKE ONE TABLET BY MOUTH ONE TIME DAILY     warfarin 5 MG Tabs  What changed: See the new instructions.  Commonly known as: COUMADIN   Doctor's comments: This rx was submitted by a pharmacist working under a collaborative practice agreement.  TAKE one-HALF TO ONE TABLET BY MOUTH DAILY or as instructed by clinic        Continue taking these medications      Instructions   Bromfenac Sodium 0.075 % Soln   Administer 1 Drop into the left eye 2 (two) times a day.  Dose: 1 Drop     ofloxacin 0.3 % Soln  Commonly known as: OCUFLOX   Administer 1 Drop into the left eye 4 times a day.  Dose: 1 Drop        Stop taking these medications    clobetasol 0.05 % Crea  Commonly known as: TEMOVATE     HYDROcodone/acetaminophen  MG Tabs  Commonly known as: NORCO            DISPOSITION: The patient will be discharged home in stable condition on 1/26/2021.  He will follow up with Dr. Hurley in 1 week. He will follow-up with his primary care provider in 5 days for staple removal and follow-up of the thyroid nodules as well as his other comorbid issues.  He will be followed by home health within 24 hours from discharge.    The patient has been extensively counseled and all questions have been answered. Special attention was paid to respiratory decompensation, increasing shortness of breath, increasing pain and signs and symptoms of infection and to seek immediate medical attention if these develop. The patient demonstrates understanding and gives verbal compliance with discharge instructions.    TIME SPENT ON DISCHARGE: 35 minutes      ____________________________________________  THOMAS Felipe    DD: 1/26/2021 2:07 PM

## 2021-01-27 ENCOUNTER — PATIENT OUTREACH (OUTPATIENT)
Dept: HEALTH INFORMATION MANAGEMENT | Facility: OTHER | Age: 86
End: 2021-01-27

## 2021-01-27 NOTE — DISCHARGE PLANNING
Meds-to-Beds: Discharge prescription orders listed below delivered to patient's bedside. RN Fani notified. Patient counseled. Patient elected to have co-payment billed to patient account. Patient presented valid photo ID.      Gennaro Richardson   Home Medication Instructions BRICE:[id number]    Printed on:01/26/21 8153   Medication Information                      gabapentin (NEURONTIN) 100 MG Cap  Take 1 capsule by mouth 3 times a day for 14 days.             Lidocaine HCl 3 % Cream  Apply topically once daily as needed             oxyCODONE immediate-release (ROXICODONE) 5 MG Tab  Take 1 Tablet by mouth every 3 hours as needed for up to 7 days.               Ingris Rojas, PharmD

## 2021-01-27 NOTE — THERAPY
"Physical Therapy   Daily Treatment     Patient Name: Gennaro Richardson  Age:  85 y.o., Sex:  male  Medical Record #: [id number]  Today's Date: 1/26/2021     Precautions: Fall Risk    Assessment    Pt progressing as expected w/ therapy. Pt w/ poor attention and insight into his deficits. Pt asking why he needs a FWW if he is leaving the hospital. During gait, pt having a harder time advancing the L LE. No overt LOB but did appear unsteady at times. Pt continually taking hands off the FWW and reaching for items. He needs Joe for STS d/t pain in ribs. Pt perseverative on going home and would not focus until his clothes were donned. Pt w/ one large LOB to the R while pulling pants up. Pt stating that \"me and my sister help each other, I will be fine.\"    Plan    Continue current treatment plan.    DC Equipment Recommendations: Unable to determine at this time  Discharge Recommendations: Recommend post-acute placement for additional physical therapy services prior to discharge home      Subjective    \"I haven't had a pain med since yesterday!\"     Objective       01/26/21 1334   Precautions   Precautions Fall Risk   Gait Analysis   Gait Level Of Assist Minimal Assist   Assistive Device Front Wheel Walker   Distance (Feet) 100   # of Times Distance was Traveled 1   Deviation Bradykinetic;Shuffled Gait;Other (Comment)  (L delayed advancement)   Comments Pt w/ a stutter step on the L w/ equinovarus to advance the LE. Pt attempting to ambulate w/out the FWW.   Bed Mobility    Comments NT up in chair pre/post. Pt stating he could perform by himself but they don't let him. Pt stating he has a recliner he can sleep in.   Functional Mobility   Sit to Stand Minimal Assist   Bed, Chair, Wheelchair Transfer Minimal Assist   Transfer Method Other (Comments)  (Ambulating)   Mobility With FWW. Pt needing more assist from lower surface d/t pain in ribs.   Short Term Goals    Short Term Goal # 1 pt will perform supine <> sit without " bed features with SPV in 6 visits to be able to get in/out of bed at home   Goal Outcome # 1 Other (see comments)  (NT)   Short Term Goal # 2 pt will perform all functional xfrs with SPV in 6 visits for improved independence   Goal Outcome # 2 Goal not met   Short Term Goal # 3 pt will ambulate 150ft with SPV and SPC or FWW in 6 visits to access home environment   Goal Outcome # 3 Goal not met   Short Term Goal # 4 pt will negotiate 3 steps with SPV in 6 visits to access home   Goal Outcome # 4 Goal not met

## 2021-01-27 NOTE — PROGRESS NOTES
Discharging Patient home per physician order.  Discharged with self.  Demonstrated understanding of discharge instructions, follow up appointments, home medications, prescriptions, home care for wound and nursing care instructions for rib fractures and lacerations.  Ambulating with x1 assistance and FWW, voiding without difficulty, pain well controlled, tolerating oral medications, oxygen saturation greater than 90%, tolerating diet.  Educational handouts with discharge instructions and follow up appointments given and discussed.  Verbalized understanding of discharge instructions and educational handouts.  All questions answered.  Belongings with patient at time of discharge.

## 2021-01-27 NOTE — PROGRESS NOTES
CHW Vinson called the patient mobile phone number which was answered by his sister. His sister asked CHW to call him on his bedside phone. CLAUDIAW called the patient bedside phone and Gennaro answered. Gennaro asked ABDOUL Oropeza to call him back at a later date. CHW will call patient again at a later date and introduce CCM services as well as [facility].

## 2021-01-28 ENCOUNTER — HOME CARE VISIT (OUTPATIENT)
Dept: HOME HEALTH SERVICES | Facility: HOME HEALTHCARE | Age: 86
End: 2021-01-28

## 2021-01-28 NOTE — PROGRESS NOTES
Community Health Worker Intake  • Social determinates of health intake incomplete.    • Identified barriers to none.   • Contact information provided to Gennaro Richardson   • Has PCP appointment scheduled for 2-2-2021  • Outpatient assessment attempted.     CHW Vinson called the patient to introduce Community Care [facility] as requested per patient yesterday. Patient states that he is doing fine and is not interested in any services. ABDOUL Oropeza asked the patient if he had a few moments & he stated that he did not. The patient states he is eating dinner and does not need CHW to call back at a later date. Patient is scheduled for a hospital follow up on 2/2/2021 @ 10:30am with Angelica Hauser P.A.-C.    Plan: ABDOUL Oropeza will reach out to [facility]. ABDOUL Oropeza will then remove the patient from [facility] caseload as patient is declining services.

## 2021-01-29 NOTE — PROGRESS NOTES
Mr. Gennaro Richardson refused home health services today and after attempting to communicate with this [facility] individual I was able to confirm the decline in services with his sister Carmen.  Both people stated there was no need for HH services and that the home was in no way able to accomodate visitors.

## 2021-02-01 ENCOUNTER — TELEPHONE (OUTPATIENT)
Dept: HEALTH INFORMATION MANAGEMENT | Facility: OTHER | Age: 86
End: 2021-02-01

## 2021-02-01 ENCOUNTER — HOME HEALTH ADMISSION (OUTPATIENT)
Dept: HOME HEALTH SERVICES | Facility: HOME HEALTHCARE | Age: 86
End: 2021-02-01
Payer: MEDICARE

## 2021-02-01 ENCOUNTER — TELEPHONE (OUTPATIENT)
Dept: MEDICAL GROUP | Facility: MEDICAL CENTER | Age: 86
End: 2021-02-01

## 2021-02-01 DIAGNOSIS — Z92.89 HOSPITALIZATION WITHIN LAST 30 DAYS: ICD-10-CM

## 2021-02-01 DIAGNOSIS — Z91.81 AT RISK FOR FALLING: ICD-10-CM

## 2021-02-01 DIAGNOSIS — T14.90XA TRAUMA: ICD-10-CM

## 2021-02-01 NOTE — TELEPHONE ENCOUNTER
Provider messaged that she would send new referral to [facility]. Contacted Philip again @ 1050 informed him that [facility] should be contacting him today or tomorrow to set up a time for a nurse to come to his home to admit him he again agreed to this. I also asked if he would like to be added for Case managment services w/SCP as me as his CM he agreed. I explained I would followup with him again on Friday to ensure he was able to see Angelica Hauser PA-C  tomorrow and that HH was set up.

## 2021-02-02 ENCOUNTER — OFFICE VISIT (OUTPATIENT)
Dept: MEDICAL GROUP | Facility: MEDICAL CENTER | Age: 86
End: 2021-02-02
Payer: MEDICARE

## 2021-02-02 VITALS
WEIGHT: 192.6 LBS | SYSTOLIC BLOOD PRESSURE: 110 MMHG | DIASTOLIC BLOOD PRESSURE: 62 MMHG | BODY MASS INDEX: 29.19 KG/M2 | TEMPERATURE: 98.8 F | HEIGHT: 68 IN | HEART RATE: 71 BPM | OXYGEN SATURATION: 92 %

## 2021-02-02 DIAGNOSIS — S51.812S FOREARM LACERATION, LEFT, SEQUELA: ICD-10-CM

## 2021-02-02 DIAGNOSIS — E04.2 MULTIPLE THYROID NODULES: ICD-10-CM

## 2021-02-02 DIAGNOSIS — Z09 HOSPITAL DISCHARGE FOLLOW-UP: ICD-10-CM

## 2021-02-02 DIAGNOSIS — Z91.81 AT RISK FOR FALLING: ICD-10-CM

## 2021-02-02 DIAGNOSIS — S22.42XD CLOSED FRACTURE OF FOUR RIBS OF LEFT SIDE WITH ROUTINE HEALING, SUBSEQUENT ENCOUNTER: ICD-10-CM

## 2021-02-02 DIAGNOSIS — R09.02 HYPOXIA: ICD-10-CM

## 2021-02-02 PROCEDURE — 99214 OFFICE O/P EST MOD 30 MIN: CPT | Performed by: PHYSICIAN ASSISTANT

## 2021-02-02 ASSESSMENT — FIBROSIS 4 INDEX: FIB4 SCORE: 1.78

## 2021-02-02 ASSESSMENT — PATIENT HEALTH QUESTIONNAIRE - PHQ9: CLINICAL INTERPRETATION OF PHQ2 SCORE: 0

## 2021-02-03 PROBLEM — Z91.81 AT RISK FOR FALLING: Status: ACTIVE | Noted: 2021-02-03

## 2021-02-03 NOTE — PROGRESS NOTES
Subjective:   Gennaro Richardson is a 85 y.o. male here today for hospital discharge follow up. Here with his sister who drove. States he is feeling ok. Taking meds as prescribed. I asked patient and sister if they had declined home health and they denied this but the notes from the RN are pretty clear that they did decline but now have changed their mind and would actually like Home Health to come out for evaluation. I sent a message to home health as well as a new referral.     Trauma Discharge Summary     DATE OF ADMISSION: 1/17/2021     DATE OF DISCHARGE: 1/26/2021     LENGTH OF STAY: 9 days     ATTENDING PHYSICIAN: Scott Hurley M.D.     CONSULTING PHYSICIAN:   Concetta. Clemente Malhotra MD. Physiatry     DISCHARGE DIAGNOSIS:  1.  Fracture of 4 ribs of left side  2.  Atelectasis, bilateral  3.  Hypoxia  4.  Laceration of left forearm   5.  Anticoagulation on Coumadin  6.  Abdominal aortic aneurysm  7.  Atrial fibrillation  8.  Bilateral renal cysts  9.  Multiple thyroid nodules  10. Pacemaker  11. Chronic use of opiate drugs therapeutic purpose  12. Other emphysema  13. Screening examination for infectious disease  14. Trauma     PROCEDURES:  No procedures during this hospital course     HISTORY OF PRESENT ILLNESS: The patient is a 85 y.o. male who was injured in a ground-level fall.  He was subsequently transferred to [facility] for definite trauma care.  He was triaged as a Trauma in accordance with established pre-hospital protocols.     HOSPITAL COURSE: On arrival, he underwent extensive radiographic and laboratory studies and was admitted to the critical care team under the direction and supervision of Dr. Hurley.  He sustained the listed injuries and incurred the listed diagnosis during his stay.  On admission SARS-COV-2 testing was negative for COVID-19.     Imaging demonstrated fractures of the left fifth sixth seventh and eighth ribs.  He had associated bibasilar atelectasis and  hypoxia.  He did receive aggressive pulmonary hygiene and multimodal pain management during his hospital course.  On day of discharge his oxygen saturations greater than 90% on room air.     He also sustained a laceration to the left forearm which was closed with suture in the emergency department.  The sutures should be removed on or around January 31.     The patient had multiple comorbid issues including atrial fibrillation, a pacemaker, and abdominal aortic aneurysm.  He was anticoagulated on Coumadin prior to arrival.  His Coumadin was not reversed and was resumed on his second day of admission.  His INR was trended during hospital course and was therapeutic on day of discharge.     Imaging demonstrated small bilateral renal cysts, these were stable since the initial CT they were noted on CT 2012.  His aneurysm was also noted to be stable to previous CT noted.     He was also noted to have multiple thyroid nodules.  It was discussed that he should obtain a ultrasound on follow-up with his outpatient provider.  Patient verbalized understanding.     He was transferred from the emergency department to the intensive care unit where a tertiary exam was performed.      He was medically stable for transfer to the [facility] on 1/18/2021.  He was evaluated by physical, occupational therapy and physiatry and deemed a good candidate for inpatient rehab.  Patient refused physiatry as well as skilled nursing facility.  Home health was arranged for the patient.     On the day of discharge he is ambulating well, his oxygen saturations greater than 90% on room air and he reports adequate pain control.     DISCHARGE PHYSICAL EXAM: See epic physical exam dated 1/26/2021     DISCHARGE MEDICATIONS:  I reviewed the patients controlled substance history and obtained a controlled substance use informed consent (if applicable) provided by [facility] and the patient has been prescribed.       Fracture of  four ribs of left side  No new SOB. Ribs hurt but getting better over time    Hypoxia  92% on room air in office today       Current medicines (including changes today)  Current Outpatient Medications   Medication Sig Dispense Refill   • acetaminophen (TYLENOL) 325 MG Tab Take 2 Tabs by mouth every 6 hours. 30 Tab 0   • gabapentin (NEURONTIN) 100 MG Cap Take 1 capsule by mouth 3 times a day for 14 days. 42 Cap 0   • Lidocaine HCl 3 % Cream Apply topically once daily as needed 28.35 g 0   • polyethylene glycol/lytes (MIRALAX) 17 g Pack Take 1 Packet by mouth 2 Times a Day.  3   • ofloxacin (OCUFLOX) 0.3 % Solution Administer 1 Drop into the left eye 4 times a day.     • Bromfenac Sodium 0.075 % Solution Administer 1 Drop into the left eye 2 (two) times a day.     • warfarin (COUMADIN) 5 MG Tab TAKE one-HALF TO ONE TABLET BY MOUTH DAILY or as instructed by clinic  (Patient taking differently: Take 2.5-5 mg by mouth every bedtime. 2.5 mg on Tuesday, Thursday, and Sunday  5 mg all other days) 90 Tab 1   • finasteride (PROSCAR) 5 MG TABS TAKE ONE TABLET BY MOUTH ONE TIME DAILY (Patient taking differently: Take 5 mg by mouth every day.) 90 Tab 2     No current facility-administered medications for this visit.      He  has a past medical history of A-fib ([facility]) (6/28/2011), Arteriosclerosis, CAD (coronary artery disease), CAD (coronary artery disease) (6/28/2011), Cardiac pacemaker in situ (11/19/2013), Colonic polyps, Diverticulosis, Dizzy spells (11/20/2012), HERZOG (dyspnea on exertion) (9/7/2014), Dyslipidemia (6/28/2011), Hematoma of abdominal wall, Hematoma of rectus sheath (6/13/2012), Myocardial infarct ([facility]) (1989), Pacer at end of battery life (6/28/2011), PAF (paroxysmal atrial fibrillation) ([facility]) (9/7/2014), Paroxysmal atrial fibrillation ([facility]), Peripheral vascular disease ([facility]) (10/31/2011), and PVD (peripheral vascular disease) ([facility]).    ROS   No fever/chills. No weight change. No headache/dizziness.  No sore  "throat, nasal congestion, ear pain. No chest pain, no shortness of breath, difficulty breathing. No n/v/d/c or abdominal pain. No urinary complaint.        Objective:     /62 (BP Location: Left arm, Patient Position: Sitting, BP Cuff Size: Adult long)   Pulse 71   Temp 37.1 °C (98.8 °F) (Temporal)   Ht 1.727 m (5' 8\")   Wt 87.4 kg (192 lb 9.6 oz)   SpO2 92%  Body mass index is 29.28 kg/m².   Physical Exam:  Constitutional: WDWN, NAD  Skin: Warm, dry, good turgor, no rashes in visible areas. Left forearm with large scabbing and 8 intact sutures. Removed in office. Advise given on keeping wound clean and dry  Respiratory: Unlabored respiratory effort, lungs clear to auscultation, no wheezes, no ronchi.  Cardiovascular: Normal S1, S2, no murmur, no leg edema.  Psych: Alert and oriented x3, normal affect and mood.    Assessment and Plan:   The following treatment plan was discussed    1. Hospital discharge follow-up      2. Multiple thyroid nodules  Noted in hospital, rec ultrasound in follow up  - US-THYROID; Future    3. Forearm laceration, left, sequela  sutues removed in office without complication    4. Closed fracture of four ribs of left side with routine healing, subsequent encounter      5. Hypoxia        Followup: 1 month         "

## 2021-02-05 ENCOUNTER — TELEPHONE (OUTPATIENT)
Dept: HEALTH INFORMATION MANAGEMENT | Facility: OTHER | Age: 86
End: 2021-02-05

## 2021-02-05 NOTE — TELEPHONE ENCOUNTER
2/5/2021 Called Philip today, he was able to see Angelica Hauser PA-C on Tuesday  and that [facility] is coming tomorrow to admit him to . Explained that it is important to let them help him and explain what he wants to accomplish ie regaining some strength and healing the ribs. He agreed. Discussed his medications he stated he is good adn understands his medications currently. Will followup with him next Friday 2-.

## 2021-02-06 ENCOUNTER — ANTICOAGULATION MONITORING (OUTPATIENT)
Dept: VASCULAR LAB | Facility: MEDICAL CENTER | Age: 86
End: 2021-02-06

## 2021-02-06 ENCOUNTER — HOME CARE VISIT (OUTPATIENT)
Dept: HOME HEALTH SERVICES | Facility: HOME HEALTHCARE | Age: 86
End: 2021-02-06
Payer: MEDICARE

## 2021-02-06 DIAGNOSIS — I48.91 ATRIAL FIBRILLATION, UNSPECIFIED TYPE (HCC): ICD-10-CM

## 2021-02-06 DIAGNOSIS — Z79.01 CHRONIC ANTICOAGULATION: ICD-10-CM

## 2021-02-06 LAB
INR PPP: 2.9 (ref 2–3.5)
INR PPP: 2.9 (ref 2–3.5)

## 2021-02-06 PROCEDURE — A6250 SKIN SEAL PROTECT MOISTURIZR: HCPCS

## 2021-02-06 PROCEDURE — G0493 RN CARE EA 15 MIN HH/HOSPICE: HCPCS

## 2021-02-06 PROCEDURE — 665001 SOC-HOME HEALTH

## 2021-02-06 ASSESSMENT — PATIENT HEALTH QUESTIONNAIRE - PHQ9: CLINICAL INTERPRETATION OF PHQ2 SCORE: 0

## 2021-02-06 ASSESSMENT — FIBROSIS 4 INDEX: FIB4 SCORE: 1.78

## 2021-02-06 NOTE — PROGRESS NOTES
[facility] [place] [facility]    Received call from [facility] nurse who will be seeing patient today and pt is overdue for INR.  Last INR due patient was admitted.   Will send orders to have INR drawn today.       Roxane FuchsD

## 2021-02-06 NOTE — PROGRESS NOTES
OP   Telephone Anticoagulation Service Note      Anticoagulation Summary  As of 2021    INR goal:  2.0-3.0   TTR:  72.0 % (5.6 y)   INR used for dosin.90 (2021)   Warfarin maintenance plan:  2.5 mg (5 mg x 0.5) every Sun, Tue, Thu; 5 mg (5 mg x 1) all other days   Weekly warfarin total:  27.5 mg   Plan last modified:  Leah Navarro (2021)   Next INR check:  2021   Target end date:  Indefinite    Indications    A-fib (HCC) (Resolved) [I48.91]             Anticoagulation Episode Summary     INR check location:  Clinic Lab    Preferred lab:  [facility]    Send INR reminders to:      Comments:  [phone]        Anticoagulation Care Providers     Provider Role Specialty Phone number    Anastacio Sunshine M.D. Referring Cardiology [phone]    Mahin Valdez Responsible          Anticoagulation Patient Findings    Spoke with the patient's HH nurse on the phone today, reporting a therapeutic INR of 2.9.   The nurse was able to help verify dosing and communicate with the patient and his sister as they are [facility].   Confirmed the current warfarin dosing regimen and patient compliance.  Patient has been taking same weekly dosing as we have documented.   Patient denies any interval changes to diet and/or medications. Patient denies any signs/symptoms of bleeding or clotting.  Patient instructed to continue with the current warfarin dosing regimen, and asked to follow up again in 3 days.     Roxane FuchsD

## 2021-02-07 VITALS
DIASTOLIC BLOOD PRESSURE: 61 MMHG | HEART RATE: 70 BPM | OXYGEN SATURATION: 95 % | RESPIRATION RATE: 18 BRPM | BODY MASS INDEX: 28.4 KG/M2 | TEMPERATURE: 99 F | WEIGHT: 186.8 LBS | SYSTOLIC BLOOD PRESSURE: 115 MMHG

## 2021-02-07 ASSESSMENT — ENCOUNTER SYMPTOMS
NAUSEA: DENIES
VOMITING: DENIES

## 2021-02-07 ASSESSMENT — PATIENT HEALTH QUESTIONNAIRE - PHQ9
1. LITTLE INTEREST OR PLEASURE IN DOING THINGS: 00
2. FEELING DOWN, DEPRESSED, IRRITABLE, OR HOPELESS: 00

## 2021-02-07 ASSESSMENT — ACTIVITIES OF DAILY LIVING (ADL): OASIS_M1830: 03

## 2021-02-08 ENCOUNTER — HOME CARE VISIT (OUTPATIENT)
Dept: HOME HEALTH SERVICES | Facility: HOME HEALTHCARE | Age: 86
End: 2021-02-08
Payer: MEDICARE

## 2021-02-08 ENCOUNTER — HOME CARE VISIT (OUTPATIENT)
Dept: HOME HEALTH SERVICES | Facility: HOME HEALTHCARE | Age: 86
End: 2021-02-08

## 2021-02-08 ENCOUNTER — ANTICOAGULATION MONITORING (OUTPATIENT)
Dept: MEDICAL GROUP | Facility: PHYSICIAN GROUP | Age: 86
End: 2021-02-08

## 2021-02-08 NOTE — PROGRESS NOTES
Medication chart review for [facility] services    PCP:  Angelica Hauser P.A.-C.  [address]  [place] [address]  Fax: [phone]    Current medication list     Current Outpatient Medications:   •  oxyCODONE immediate-release, 5 mg, Oral, Q3HRS PRN  •  gabapentin, 100 mg, Oral, TID  •  Lidocaine HCl, 1 Each, Apply externally, QDAY PRN  •  ofloxacin, 1 Drop, Left Eye, 4XDAY  •  warfarin, TAKE one-HALF TO ONE TABLET BY MOUTH DAILY or as instructed by clinic   •  finasteride, TAKE ONE TABLET BY MOUTH ONE TIME DAILY    Allergies   Allergen Reactions   • No Known Drug Allergy        Medications on discharge summary that are not on their home medication list (or the dosing interval differs from the discharge summary)        Start taking these medications      Instructions   acetaminophen 325 MG Tabs  Commonly known as: Tylenol    Take 2 Tabs by mouth every 6 hours.  Dose: 650 mg                  polyethylene glycol/lytes 17 g Pack  Commonly known as: MIRALAX    Take 1 Packet by mouth 2 Times a Day.  Dose: 17 g                                            Continue taking these medications      Instructions   Bromfenac Sodium 0.075 % Soln    Administer 1 Drop into the left eye 2 (two) times a day.  Dose: 1 Drop          Medications on home medication list not listed on their discharge summary     none     Labs / Images Reviewed:     Lab Results   Component Value Date/Time    SODIUM 137 01/20/2021 05:16 AM    POTASSIUM 4.3 01/20/2021 05:16 AM    CHLORIDE 105 01/20/2021 05:16 AM    CO2 24 01/20/2021 05:16 AM    GLUCOSE 94 01/20/2021 05:16 AM    BUN 27 (H) 01/20/2021 05:16 AM    CREATININE 0.86 01/20/2021 05:16 AM    CREATININE 1.3 12/01/2008 10:45 AM      Lab Results   Component Value Date/Time    ALKPHOSPHAT 67 01/20/2021 05:16 AM    ASTSGOT 17 01/20/2021 05:16 AM    ALTSGPT 16 01/20/2021 05:16 AM    TBILIRUBIN 0.9 01/20/2021 05:16 AM    ALBUMIN 3.3 01/20/2021 05:16 AM    INR 2.90 02/06/2021 01:55  PM          Potential drug interactions:         Assessment and Plan:   • Received referral from [facility]. Medications reviewed. No clinically significant interactions noted.         Mahin Valdez, PharmD, MS, BCACP, [facility]  Phone [phone] fax [phone]    This note was created using voice recognition software (Dragon). The accuracy of the dictation is limited by the abilities of the software. I have reviewed the note prior to signing, however some errors in grammar and context are still possible. If you have any questions related to this note please do not hesitate to contact our office.

## 2021-02-09 ENCOUNTER — HOME CARE VISIT (OUTPATIENT)
Dept: HOME HEALTH SERVICES | Facility: HOME HEALTHCARE | Age: 86
End: 2021-02-09
Payer: MEDICARE

## 2021-02-09 ENCOUNTER — HOME CARE VISIT (OUTPATIENT)
Dept: HOME HEALTH SERVICES | Facility: HOME HEALTHCARE | Age: 86
End: 2021-02-09

## 2021-02-09 NOTE — PROGRESS NOTES
"Called patient this morning, refused visit today. He states they are going grocery shopping today. This SN offered visit before or after they go/went shopping, but patient states \" no, I don't need the visit today, we don't know what time we are coming back \". This SN asked If I can talk to her sister, patient states \" she's still sleeping, I don't want to wake her up \". Patient made aware that this SN will be checking his INR. Patient states \" I can take care of my INR, don't worry about it \". Patient is [place] but was able to manage conversation over the phone. "

## 2021-02-10 DIAGNOSIS — Z79.01 CHRONIC ANTICOAGULATION: ICD-10-CM

## 2021-02-11 ENCOUNTER — HOME CARE VISIT (OUTPATIENT)
Dept: HOME HEALTH SERVICES | Facility: HOME HEALTHCARE | Age: 86
End: 2021-02-11
Payer: MEDICARE

## 2021-02-11 VITALS
SYSTOLIC BLOOD PRESSURE: 120 MMHG | DIASTOLIC BLOOD PRESSURE: 60 MMHG | HEART RATE: 70 BPM | OXYGEN SATURATION: 97 % | TEMPERATURE: 98.7 F | RESPIRATION RATE: 16 BRPM

## 2021-02-11 PROCEDURE — G0151 HHCP-SERV OF PT,EA 15 MIN: HCPCS

## 2021-02-11 SDOH — ECONOMIC STABILITY: HOUSING INSECURITY: HOME SAFETY: AT NIGHT W/ CANE, MAY BENEFIT FROM WALKER SUPPORT WE HAVE HIM TURN ON THE LIGHTS AND THAT HELPS""

## 2021-02-11 SDOH — ECONOMIC STABILITY: HOUSING INSECURITY
HOME SAFETY: ENCOURAGED WEARING SUPPORTIVE SHOES FOR BALANCE CONTROL, CONSISTENCY OF CANE USE  PATIENT STRONGLY DECLINES FWW USE DESPITE BEING AVAILABLE IN APT (DESPITE RISK/BENEFIT EDUC) I ONLY HAVE IT IF I NEED IT" - DESPITE VISUAL DEFICIT, PT AMB TO BATHROOM "

## 2021-02-11 ASSESSMENT — ACTIVITIES OF DAILY LIVING (ADL): ADLS_COMMENTS: PEND OT EVAL

## 2021-02-12 ENCOUNTER — HOME CARE VISIT (OUTPATIENT)
Dept: HOME HEALTH SERVICES | Facility: HOME HEALTHCARE | Age: 86
End: 2021-02-12
Payer: MEDICARE

## 2021-02-12 PROCEDURE — G0152 HHCP-SERV OF OT,EA 15 MIN: HCPCS

## 2021-02-16 ENCOUNTER — ANTICOAGULATION MONITORING (OUTPATIENT)
Dept: VASCULAR LAB | Facility: MEDICAL CENTER | Age: 86
End: 2021-02-16

## 2021-02-16 ENCOUNTER — HOME CARE VISIT (OUTPATIENT)
Dept: HOME HEALTH SERVICES | Facility: HOME HEALTHCARE | Age: 86
End: 2021-02-16
Payer: MEDICARE

## 2021-02-16 ENCOUNTER — TELEPHONE (OUTPATIENT)
Dept: VASCULAR LAB | Facility: MEDICAL CENTER | Age: 86
End: 2021-02-16

## 2021-02-16 DIAGNOSIS — Z79.01 CHRONIC ANTICOAGULATION: ICD-10-CM

## 2021-02-16 DIAGNOSIS — Z79.01 CHRONIC ANTICOAGULATION: Primary | ICD-10-CM

## 2021-02-16 LAB
INR PPP: 4 (ref 2–3.5)
INR PPP: 4 - CRITICAL LOW: < 0.8, CRITICAL HIGH: > 6.5 (ref 2–3.5)

## 2021-02-16 PROCEDURE — G0493 RN CARE EA 15 MIN HH/HOSPICE: HCPCS

## 2021-02-16 PROCEDURE — A6214 FOAM DRG > 48 SQ IN W/BORDER: HCPCS

## 2021-02-16 PROCEDURE — A6402 STERILE GAUZE <= 16 SQ IN: HCPCS

## 2021-02-16 PROCEDURE — A4216 STERILE WATER/SALINE, 10 ML: HCPCS

## 2021-02-16 ASSESSMENT — ENCOUNTER SYMPTOMS: DIFFICULTY THINKING: 1

## 2021-02-16 NOTE — TELEPHONE ENCOUNTER
Spoke with [facility]. Confirmed a visit is taking place today and reordered INR for today's visit.     Will follow up with pt for warfarin instructions once INR results are received.    Wilder Lawrence, ShilaD

## 2021-02-16 NOTE — PROGRESS NOTES
Anticoagulation Summary  As of 2021    INR goal:  2.0-3.0   TTR:  71.7 % (5.6 y)   INR used for dosin.00 (2021)   Warfarin maintenance plan:  2.5 mg (5 mg x 0.5) every Sun, Tue, Thu; 5 mg (5 mg x 1) all other days   Weekly warfarin total:  27.5 mg   Plan last modified:  Leah Navarro (2021)   Next INR check:  2021   Target end date:  Indefinite    Indications    A-fib (HCC) (Resolved) [I48.91]             Anticoagulation Episode Summary     INR check location:  Clinic Lab    Preferred lab:  [facility]    Send INR reminders to:      Comments:  [facility]    [phone]        Anticoagulation Care Providers     Provider Role Specialty Phone number    Anastacio Sunshine M.D. Referring Cardiology [phone]    Mahin Valdez Responsible          Anticoagulation Patient Findings  Patient Findings     Negatives:  Signs/symptoms of thrombosis, Signs/symptoms of bleeding, Laboratory test error suspected, Change in health, Change in alcohol use, Change in activity, Upcoming invasive procedure, Emergency department visit, Upcoming dental procedure, Missed doses, Extra doses, Change in medications, Change in diet/appetite, Hospital admission, Bruising, Other complaints         Spoke with patient today regarding supratherapeutic INR of 4.0.  Patient denies any signs/symptoms of bruising or bleeding or any changes in diet and medications.  Instructed patient to call clinic with any questions or concerns.  Instructed patient to HOLD X 1, then resume current warfarin regimen.  Follow up in 3 days, to reduce risk of adverse events related to this high risk medication,  Warfarin.    Antwon Dejesus, PharmD, BCACP

## 2021-02-17 VITALS
DIASTOLIC BLOOD PRESSURE: 68 MMHG | TEMPERATURE: 98.5 F | OXYGEN SATURATION: 97 % | RESPIRATION RATE: 17 BRPM | HEART RATE: 71 BPM | SYSTOLIC BLOOD PRESSURE: 136 MMHG

## 2021-02-17 PROCEDURE — G0180 MD CERTIFICATION HHA PATIENT: HCPCS | Performed by: PHYSICIAN ASSISTANT

## 2021-02-17 ASSESSMENT — ENCOUNTER SYMPTOMS
VOMITING: DENIES
NAUSEA: DENIES
MUSCLE WEAKNESS: 1
SHORTNESS OF BREATH: T

## 2021-02-17 ASSESSMENT — ACTIVITIES OF DAILY LIVING (ADL)
AMBULATION ASSISTANCE: STAND BY ASSIST
CURRENT_FUNCTION: STAND BY ASSIST

## 2021-02-18 ENCOUNTER — ANTICOAGULATION MONITORING (OUTPATIENT)
Dept: MEDICAL GROUP | Facility: PHYSICIAN GROUP | Age: 86
End: 2021-02-18

## 2021-02-18 VITALS
DIASTOLIC BLOOD PRESSURE: 72 MMHG | TEMPERATURE: 98.9 F | HEART RATE: 68 BPM | SYSTOLIC BLOOD PRESSURE: 136 MMHG | RESPIRATION RATE: 17 BRPM | OXYGEN SATURATION: 98 %

## 2021-02-18 DIAGNOSIS — I48.0 PAF (PAROXYSMAL ATRIAL FIBRILLATION) (HCC): Primary | ICD-10-CM

## 2021-02-18 ASSESSMENT — ACTIVITIES OF DAILY LIVING (ADL)
FEEDING ASSESSED: 1
LIGHT HOUSEKEEPING: DEPENDENT
USING THE TELPHONE: INDEPENDENT
LAUNDRY ASSESSED: 1
TRANSPORTATION: INDEPENDENT
DRESSING_LB_CURRENT_FUNCTION: INDEPENDENT
SHOPPING ASSESSED: 1
AMBULATION ASSISTANCE: 1
AMBULATION ASSISTANCE: INDEPENDENT
SHOPPING: NEEDS ASSISTANCE
BATHING ASSESSED: 1
HOUSEKEEPING ASSESSED: 1
DRESSING_UB_CURRENT_FUNCTION: INDEPENDENT
TRANSPORTATION ASSESSED: 1
GROOMING ASSESSED: 1
TOILETING: 1
TOILETING: INDEPENDENT
FEEDING: INDEPENDENT
ORAL_CARE_CURRENT_FUNCTION: INDEPENDENT
GROOMING_CURRENT_FUNCTION: INDEPENDENT
LAUNDRY: NEEDS ASSISTANCE
TELEPHONE USE ASSESSED: 1
BATHING_CURRENT_FUNCTION: SUPERVISION
ORAL_CARE_ASSESSED: 1

## 2021-02-18 ASSESSMENT — ENCOUNTER SYMPTOMS
POOR JUDGMENT: 1
DIFFICULTY THINKING: 1

## 2021-02-19 ENCOUNTER — HOME CARE VISIT (OUTPATIENT)
Dept: HOME HEALTH SERVICES | Facility: HOME HEALTHCARE | Age: 86
End: 2021-02-19
Payer: MEDICARE

## 2021-02-19 DIAGNOSIS — Z79.01 CHRONIC ANTICOAGULATION: ICD-10-CM

## 2021-02-20 NOTE — PROGRESS NOTES
"[facility] [place] [facility]      ______________________________________________    INR  Received: Today  Message Contents   Digna Pacheco R.N. sent to [facility]   Hello,   This patient has refused to let us come out and do his INR this week. He said \"maybe Monday.\" They have also said they will only let us come once a week. Please send us a new order for next week and we will try again next week.   Thank you   ______________________________________________    New HH orders to have INR drawn on Monday, 2/22/21      Roxane Navarro PharmD    "

## 2021-02-22 ENCOUNTER — HOME CARE VISIT (OUTPATIENT)
Dept: HOME HEALTH SERVICES | Facility: HOME HEALTHCARE | Age: 86
End: 2021-02-22
Payer: MEDICARE

## 2021-02-22 DIAGNOSIS — Z79.01 CHRONIC ANTICOAGULATION: ICD-10-CM

## 2021-02-23 ENCOUNTER — HOME CARE VISIT (OUTPATIENT)
Dept: HOME HEALTH SERVICES | Facility: HOME HEALTHCARE | Age: 86
End: 2021-02-23
Payer: MEDICARE

## 2021-02-24 ENCOUNTER — TELEPHONE (OUTPATIENT)
Dept: VASCULAR LAB | Facility: MEDICAL CENTER | Age: 86
End: 2021-02-24

## 2021-02-24 ENCOUNTER — HOME CARE VISIT (OUTPATIENT)
Dept: HOME HEALTH SERVICES | Facility: HOME HEALTHCARE | Age: 86
End: 2021-02-24
Payer: MEDICARE

## 2021-02-24 DIAGNOSIS — Z79.01 CHRONIC ANTICOAGULATION: ICD-10-CM

## 2021-02-24 NOTE — TELEPHONE ENCOUNTER
Received following message from [facility] .    Digna J Maricao, R.N.  [facility]   Hello,   This patient will not let us come see him. He says he can take care of his own INRs. I'm not sure if that is true, but I am just updating you. A nurse is going to try to see him on Friday.   Thanks     S/w patient, who is amenable to having HH come to his home on Friday 2-26-21 for INR check.    Antwon Dejesus, PharmD, BCACP

## 2021-02-25 ENCOUNTER — HOME CARE VISIT (OUTPATIENT)
Dept: HOME HEALTH SERVICES | Facility: HOME HEALTHCARE | Age: 86
End: 2021-02-25
Payer: MEDICARE

## 2021-02-25 NOTE — PROGRESS NOTES
Quality Review for Feb 6, 2021 SOC OASIS Completed by NATHAN Wu RN on Feb 16, 2021:    Edits completed by NATHAN Wu RN:  1.  is 2/6 for LSOC ordered  2.  is #3,  is 1/26  3.  also checked #1, 9  4.  is 0, unable to locate supportive documentation  5.  is daily, chronic pain on long term opiates for OA knees, back  6.  is minimal exertion per PT eval on 2/11.  changed to 2.11 for collaboration  7.  is 2 he walks with a cane  8.  is NA, no supportive documentation found  9. [id number] D is partial/mod per narrative  10. Added ambulate only with assist, bleeding precautions, and F2F to 485 forms  11. Added HTN to care plan    Case Communication response indicates above clinician approves the OASIS edits completed by NATHAN Wu RN

## 2021-02-26 ENCOUNTER — HOME CARE VISIT (OUTPATIENT)
Dept: HOME HEALTH SERVICES | Facility: HOME HEALTHCARE | Age: 86
End: 2021-02-26
Payer: MEDICARE

## 2021-02-26 ENCOUNTER — ANTICOAGULATION MONITORING (OUTPATIENT)
Dept: MEDICAL GROUP | Facility: PHYSICIAN GROUP | Age: 86
End: 2021-02-26

## 2021-02-26 VITALS
DIASTOLIC BLOOD PRESSURE: 66 MMHG | HEART RATE: 76 BPM | SYSTOLIC BLOOD PRESSURE: 136 MMHG | OXYGEN SATURATION: 92 % | RESPIRATION RATE: 16 BRPM | TEMPERATURE: 98.8 F

## 2021-02-26 DIAGNOSIS — Z09 HOSPITAL DISCHARGE FOLLOW-UP: ICD-10-CM

## 2021-02-26 DIAGNOSIS — I48.0 PAF (PAROXYSMAL ATRIAL FIBRILLATION) (HCC): ICD-10-CM

## 2021-02-26 LAB
INR PPP: 4.4 (ref 2–3.5)
INR PPP: 4.4 - CRITICAL LOW: < 0.8, CRITICAL HIGH: > 6.5 (ref 2–3.5)

## 2021-02-26 PROCEDURE — G0493 RN CARE EA 15 MIN HH/HOSPICE: HCPCS

## 2021-02-26 PROCEDURE — A6212 FOAM DRG <=16 SQ IN W/BORDER: HCPCS

## 2021-02-26 ASSESSMENT — ENCOUNTER SYMPTOMS
VOMITING: DENIES
DIFFICULTY THINKING: 1
LIMITED RANGE OF MOTION: 1
NAUSEA: DENIES
SHORTNESS OF BREATH: T
MUSCLE WEAKNESS: 1

## 2021-02-26 ASSESSMENT — ACTIVITIES OF DAILY LIVING (ADL)
CURRENT_FUNCTION: STAND BY ASSIST
AMBULATION ASSISTANCE: STAND BY ASSIST

## 2021-02-26 NOTE — PROGRESS NOTES
I agree with the edits made    ----- Message -----  From: Chantel Wu R.N.  Sent: 2/25/2021   1:57 PM PST  To: Ruth Alston R.N.      Quality Review for Feb 6, 2021 SOC OASIS Completed by NATHAN Wu RN on Feb 16, 2021:    Edits completed by NATHAN Wu RN:  1.  is 2/6 for LSOC ordered  2.  is #3,  is 1/26  3.  also checked #1, 9  4.  is 0, unable to locate supportive documentation  5.  is daily, chronic pain on long term opiates for OA knees, back  6.  is minimal exertion per PT eval on 2/11.  changed to 2.11 for collaboration  7.  is 2 he walks with a cane  8.  is NA, no supportive documentation found  9. [id number] D is partial/mod per narrative  10. Added ambulate only with assist, bleeding precautions, and F2F to 485 forms  11. Added HTN to care plan    Case Communication response indicates above clinician approves the OASIS edits completed by NATHAN Wu RN

## 2021-02-27 NOTE — PROGRESS NOTES
Anticoagulation Summary  As of 2021    INR goal:  2.0-3.0   TTR:  71.4 % (5.6 y)   INR used for dosin.40 (2021)   Warfarin maintenance plan:  2.5 mg (5 mg x 0.5) every Sun, Tue, Thu; 5 mg (5 mg x 1) all other days   Weekly warfarin total:  27.5 mg   Plan last modified:  Leah Navarro (2021)   Next INR check:  3/3/2021   Target end date:  Indefinite    Indications    A-fib (HCC) (Resolved) [I48.91]             Anticoagulation Episode Summary     INR check location:  Clinic Lab    Preferred lab:  [facility]    Send INR reminders to:      Comments:  [facility]    [phone]        Anticoagulation Care Providers     Provider Role Specialty Phone number    Anastacio Sunshine M.D. Referring Cardiology [phone]    Mahin Valdez Responsible          Anticoagulation Patient Findings     HPI:  Gennaro Richardson, on anticoagulation therapy with warfarin for Afib.   Changes to current medical/health status since last appt: none  Denies signs/symptoms of bleeding and/or thrombosis since the last appt.    Denies any interval changes to diet  Denies any interval changes to medications since last appt.   Denies any complications or cost restrictions with current therapy.     A/P   INR  SUPRA-therapeutic.   Hold today, begin 18% reduced regimen.     Next INR in 5 days.     Jus Young, PharmD

## 2021-03-02 ENCOUNTER — OFFICE VISIT (OUTPATIENT)
Dept: MEDICAL GROUP | Facility: MEDICAL CENTER | Age: 86
End: 2021-03-02
Payer: MEDICARE

## 2021-03-02 VITALS
WEIGHT: 184 LBS | HEART RATE: 72 BPM | HEIGHT: 68 IN | SYSTOLIC BLOOD PRESSURE: 112 MMHG | DIASTOLIC BLOOD PRESSURE: 62 MMHG | TEMPERATURE: 97.6 F | OXYGEN SATURATION: 92 % | BODY MASS INDEX: 27.89 KG/M2

## 2021-03-02 DIAGNOSIS — M47.26 OSTEOARTHRITIS OF SPINE WITH RADICULOPATHY, LUMBAR REGION: ICD-10-CM

## 2021-03-02 DIAGNOSIS — M17.11 PRIMARY OSTEOARTHRITIS OF RIGHT KNEE: ICD-10-CM

## 2021-03-02 DIAGNOSIS — F11.20 OPIOID TYPE DEPENDENCE, CONTINUOUS (HCC): ICD-10-CM

## 2021-03-02 PROCEDURE — 99213 OFFICE O/P EST LOW 20 MIN: CPT | Performed by: PHYSICIAN ASSISTANT

## 2021-03-02 RX ORDER — HYDROCODONE BITARTRATE AND ACETAMINOPHEN 10; 325 MG/1; MG/1
1 TABLET ORAL EVERY 6 HOURS PRN
Qty: 120 TABLET | Refills: 0 | Status: SHIPPED | OUTPATIENT
Start: 2021-05-02 | End: 2021-06-01 | Stop reason: SDUPTHER

## 2021-03-02 RX ORDER — HYDROCODONE BITARTRATE AND ACETAMINOPHEN 10; 325 MG/1; MG/1
1 TABLET ORAL EVERY 6 HOURS PRN
Qty: 120 TABLET | Refills: 0 | Status: SHIPPED | OUTPATIENT
Start: 2021-03-02 | End: 2021-06-01 | Stop reason: SDUPTHER

## 2021-03-02 RX ORDER — NALOXONE HYDROCHLORIDE 4 MG/.1ML
SPRAY NASAL
Qty: 1 EACH | Refills: 0 | Status: SHIPPED | OUTPATIENT
Start: 2021-03-02

## 2021-03-02 RX ORDER — HYDROCODONE BITARTRATE AND ACETAMINOPHEN 10; 325 MG/1; MG/1
1 TABLET ORAL EVERY 6 HOURS PRN
Qty: 120 TABLET | Refills: 0 | Status: SHIPPED | OUTPATIENT
Start: 2021-04-02 | End: 2021-06-01 | Stop reason: SDUPTHER

## 2021-03-02 ASSESSMENT — FIBROSIS 4 INDEX: FIB4 SCORE: 1.78

## 2021-03-03 ENCOUNTER — ANTICOAGULATION MONITORING (OUTPATIENT)
Dept: VASCULAR LAB | Facility: MEDICAL CENTER | Age: 86
End: 2021-03-03

## 2021-03-03 ENCOUNTER — HOME CARE VISIT (OUTPATIENT)
Dept: HOME HEALTH SERVICES | Facility: HOME HEALTHCARE | Age: 86
End: 2021-03-03
Payer: MEDICARE

## 2021-03-03 VITALS
OXYGEN SATURATION: 95 % | TEMPERATURE: 98.4 F | HEART RATE: 69 BPM | RESPIRATION RATE: 17 BRPM | SYSTOLIC BLOOD PRESSURE: 137 MMHG | DIASTOLIC BLOOD PRESSURE: 72 MMHG

## 2021-03-03 DIAGNOSIS — Z79.01 CHRONIC ANTICOAGULATION: ICD-10-CM

## 2021-03-03 LAB
INR PPP: 2.9 (ref 2–3.5)
INR PPP: 2.9 - CRITICAL LOW: < 0.8, CRITICAL HIGH: > 6.5 (ref 2–3.5)

## 2021-03-03 PROCEDURE — G0493 RN CARE EA 15 MIN HH/HOSPICE: HCPCS

## 2021-03-03 ASSESSMENT — ENCOUNTER SYMPTOMS
VOMITING: DENIES
NAUSEA: DENIES
MUSCLE WEAKNESS: 1
SHORTNESS OF BREATH: T

## 2021-03-03 ASSESSMENT — ACTIVITIES OF DAILY LIVING (ADL)
CURRENT_FUNCTION: STAND BY ASSIST
AMBULATION ASSISTANCE: STAND BY ASSIST

## 2021-03-03 NOTE — PROGRESS NOTES
Subjective:   Gennaro Richardson is a 85 y.o. male here today for pain medication follow up    Opioid type dependence, continuous  Chronic, stable, did have a fall. Due for refill.  verified. Due for UDS, controlled substance agreement. Here with sister who is the . Patient is unable to take NSAIDs d/t chronic anticoagulation with coumadin. Chronic back and knee pain. No other controlled meds. No other prescribers.       Current medicines (including changes today)  Current Outpatient Medications   Medication Sig Dispense Refill   • [START ON 5/2/2021] HYDROcodone/acetaminophen (NORCO)  MG Tab Take 1 tablet by mouth every 6 hours as needed for up to 30 days. 120 tablet 0   • [START ON 4/2/2021] HYDROcodone/acetaminophen (NORCO)  MG Tab Take 1 tablet by mouth every 6 hours as needed for up to 30 days. 120 tablet 0   • HYDROcodone/acetaminophen (NORCO)  MG Tab Take 1 tablet by mouth every 6 hours as needed for up to 30 days. 120 tablet 0   • Naloxone (NARCAN) 4 MG/0.1ML Liquid One spray in one nostril for overdose and call 911. 1 Each 0   • oxyCODONE immediate-release (ROXICODONE) 5 MG Tab Take 5 mg by mouth every 3 hours as needed for Severe Pain.     • Lidocaine HCl 3 % Cream Apply topically once daily as needed 28.35 g 0   • ofloxacin (OCUFLOX) 0.3 % Solution Administer 1 Drop into the left eye 4 times a day.     • warfarin (COUMADIN) 5 MG Tab TAKE one-HALF TO ONE TABLET BY MOUTH DAILY or as instructed by clinic  (Patient taking differently: Take 2.5-5 mg by mouth every bedtime. 2.5 mg on Tuesday, Thursday, and Sunday  5 mg all other days) 90 Tab 1   • finasteride (PROSCAR) 5 MG TABS TAKE ONE TABLET BY MOUTH ONE TIME DAILY 90 Tab 2     No current facility-administered medications for this visit.     He  has a past medical history of A-fib (HCC) (6/28/2011), Arteriosclerosis, CAD (coronary artery disease), CAD (coronary artery disease) (6/28/2011), Cardiac pacemaker in situ (11/19/2013),  "Colonic polyps, Diverticulosis, Dizzy spells (11/20/2012), HERZOG (dyspnea on exertion) (9/7/2014), Dyslipidemia (6/28/2011), Hematoma of abdominal wall, Hematoma of rectus sheath (6/13/2012), Myocardial infarct ([facility]) (1989), Pacer at end of battery life (6/28/2011), PAF (paroxysmal atrial fibrillation) ([facility]) (9/7/2014), Paroxysmal atrial fibrillation ([facility]), Peripheral vascular disease ([facility]) (10/31/2011), and PVD (peripheral vascular disease) ([facility]).    ROS   No fever/chills. No weight change. No headache/dizziness. No focal weakness. No sore throat, nasal congestion, ear pain. No chest pain, no shortness of breath, difficulty breathing. No n/v/d/c or abdominal pain. No urinary complaint. No rash or skin lesion.      Objective:     /62 (BP Location: Right arm, Patient Position: Sitting, BP Cuff Size: Adult long)   Pulse 72   Temp 36.4 °C (97.6 °F) (Temporal)   Ht 1.727 m (5' 8\")   Wt 83.5 kg (184 lb)   SpO2 92%  Body mass index is 27.98 kg/m².   Physical Exam:  Constitutional: WDWN, NAD  Skin: Warm, dry, good turgor, no rashes in visible areas.  Psych: Alert and oriented x3, normal affect and mood.    Assessment and Plan:   The following treatment plan was discussed    1. Opioid type dependence, continuous (HCC)    - HYDROcodone/acetaminophen (NORCO)  MG Tab; Take 1 tablet by mouth every 6 hours as needed for up to 30 days.  Dispense: 120 tablet; Refill: 0  - HYDROcodone/acetaminophen (NORCO)  MG Tab; Take 1 tablet by mouth every 6 hours as needed for up to 30 days.  Dispense: 120 tablet; Refill: 0  - HYDROcodone/acetaminophen (NORCO)  MG Tab; Take 1 tablet by mouth every 6 hours as needed for up to 30 days.  Dispense: 120 tablet; Refill: 0  - Controlled Substance Treatment Agreement    2. Primary osteoarthritis of right knee    - HYDROcodone/acetaminophen (NORCO)  MG Tab; Take 1 tablet by mouth every 6 hours as needed for up to 30 days.  Dispense: 120 tablet; Refill: 0  - " HYDROcodone/acetaminophen (NORCO)  MG Tab; Take 1 tablet by mouth every 6 hours as needed for up to 30 days.  Dispense: 120 tablet; Refill: 0  - HYDROcodone/acetaminophen (NORCO)  MG Tab; Take 1 tablet by mouth every 6 hours as needed for up to 30 days.  Dispense: 120 tablet; Refill: 0  - Controlled Substance Treatment Agreement    3. Osteoarthritis of spine with radiculopathy, lumbar region    - HYDROcodone/acetaminophen (NORCO)  MG Tab; Take 1 tablet by mouth every 6 hours as needed for up to 30 days.  Dispense: 120 tablet; Refill: 0  - HYDROcodone/acetaminophen (NORCO)  MG Tab; Take 1 tablet by mouth every 6 hours as needed for up to 30 days.  Dispense: 120 tablet; Refill: 0  - HYDROcodone/acetaminophen (NORCO)  MG Tab; Take 1 tablet by mouth every 6 hours as needed for up to 30 days.  Dispense: 120 tablet; Refill: 0  - Controlled Substance Treatment Agreement      Followup: 3 months

## 2021-03-03 NOTE — ASSESSMENT & PLAN NOTE
Chronic, stable, did have a fall. Due for refill.  verified. Due for UDS, controlled substance agreement. Here with sister who is the . Patient is unable to take NSAIDs d/t chronic anticoagulation with coumadin. Chronic back and knee pain. No other controlled meds. No other prescribers.

## 2021-03-03 NOTE — PROGRESS NOTES
Anticoagulation Summary  As of 3/3/2021    INR goal:  2.0-3.0   TTR:  71.2 % (5.6 y)   INR used for dosin.90 (3/3/2021)   Warfarin maintenance plan:  5 mg (5 mg x 1) every Mon; 2.5 mg (5 mg x 0.5) all other days   Weekly warfarin total:  20 mg   Plan last modified:  Antwon Dejesus PharmD (3/3/2021)   Next INR check:  3/10/2021   Target end date:  Indefinite    Indications    A-fib (HCC) (Resolved) [I48.91]             Anticoagulation Episode Summary     INR check location:  Clinic Lab    Preferred lab:  [facility]    Send INR reminders to:      Comments:  [facility]    [phone]        Anticoagulation Care Providers     Provider Role Specialty Phone number    Anastacio Sunshine M.D. Referring Cardiology [phone]    Mahin Valdez Responsible          Anticoagulation Patient Findings  Patient Findings     Negatives:  Signs/symptoms of thrombosis, Signs/symptoms of bleeding, Laboratory test error suspected, Change in health, Change in alcohol use, Change in activity, Upcoming invasive procedure, Emergency department visit, Upcoming dental procedure, Missed doses, Extra doses, Change in medications, Change in diet/appetite, Hospital admission, Bruising, Other complaints        Spoke with patient today regarding therapeutic INR of 2.9.  Patient denies any signs/symptoms of bruising or bleeding or any changes in diet and medications.  Instructed patient to call clinic with any questions or concerns.  Instructed patient to decrease weekly warfarin regimen as detailed above.  Follow up in 1 weeks, to reduce risk of adverse events related to this high risk medication,  Warfarin.    Antwon Dejesus, ShilaD, BCACP

## 2021-03-10 ENCOUNTER — ANTICOAGULATION MONITORING (OUTPATIENT)
Dept: VASCULAR LAB | Facility: MEDICAL CENTER | Age: 86
End: 2021-03-10

## 2021-03-10 ENCOUNTER — HOME CARE VISIT (OUTPATIENT)
Dept: HOME HEALTH SERVICES | Facility: HOME HEALTHCARE | Age: 86
End: 2021-03-10
Payer: MEDICARE

## 2021-03-10 VITALS
RESPIRATION RATE: 16 BRPM | SYSTOLIC BLOOD PRESSURE: 123 MMHG | HEART RATE: 63 BPM | OXYGEN SATURATION: 96 % | DIASTOLIC BLOOD PRESSURE: 70 MMHG | TEMPERATURE: 98.6 F

## 2021-03-10 DIAGNOSIS — Z79.01 CHRONIC ANTICOAGULATION: ICD-10-CM

## 2021-03-10 LAB
INR PPP: 2.1 (ref 2–3.5)
INR PPP: 2.1 - CRITICAL LOW: < 0.8, CRITICAL HIGH: > 6.5 (ref 2–3.5)

## 2021-03-10 PROCEDURE — 665001 SOC-HOME HEALTH

## 2021-03-10 PROCEDURE — G0493 RN CARE EA 15 MIN HH/HOSPICE: HCPCS

## 2021-03-10 SDOH — ECONOMIC STABILITY: HOUSING INSECURITY: HOME SAFETY: PT LIVES IN APT COMPLEX WITH MULTIPLE FIRE ALARMS AND EXITS

## 2021-03-10 ASSESSMENT — ACTIVITIES OF DAILY LIVING (ADL)
HOME_HEALTH_OASIS: 01
OASIS_M1830: 02

## 2021-03-10 ASSESSMENT — PATIENT HEALTH QUESTIONNAIRE - PHQ9: CLINICAL INTERPRETATION OF PHQ2 SCORE: 0

## 2021-03-11 NOTE — PROGRESS NOTES
Anticoagulation Summary  As of 3/10/2021    INR goal:  2.0-3.0   TTR:  71.3 % (5.7 y)   INR used for dosin.10 (3/10/2021)   Warfarin maintenance plan:  5 mg (5 mg x 1) every Mon; 2.5 mg (5 mg x 0.5) all other days   Weekly warfarin total:  20 mg   Plan last modified:  Shila MillerD (3/3/2021)   Next INR check:  3/24/2021   Target end date:  Indefinite    Indications    A-fib (HCC) (Resolved) [I48.91]             Anticoagulation Episode Summary     INR check location:  Clinic Lab    Preferred lab:  [facility]    Send INR reminders to:      Comments:  [facility]    [phone]        Anticoagulation Care Providers     Provider Role Specialty Phone number    Anastacio Sunshine M.D. Referring Cardiology [phone]    Mahin Valdez Responsible          Anticoagulation Patient Findings  Patient Findings     Negatives:  Signs/symptoms of thrombosis, Signs/symptoms of bleeding, Laboratory test error suspected, Change in health, Change in alcohol use, Change in activity, Upcoming invasive procedure, Emergency department visit, Upcoming dental procedure, Missed doses, Extra doses, Change in medications, Change in diet/appetite, Hospital admission, Bruising, Other complaints        Spoke with patient today regarding therapeutic INR of 2.1.  Patient denies any signs/symptoms of bruising or bleeding or any changes in diet and medications.  Instructed patient to call clinic with any questions or concerns.  Pt is to continue with current warfarin dosing regimen.  Discharged from [facility] today, SO placed for lab f/u at patient request.  Follow up in 2 weeks, to reduce risk of adverse events related to this high risk medication,  Warfarin.    Antwon Dejesus, PharmD, BCACP

## 2021-03-12 ENCOUNTER — HOME CARE VISIT (OUTPATIENT)
Dept: HOME HEALTH SERVICES | Facility: HOME HEALTHCARE | Age: 86
End: 2021-03-12
Payer: MEDICARE

## 2021-03-12 NOTE — PROGRESS NOTES
Agree with changes  ----- Message -----  From: Erika Hernandez R.N.  Sent: 3/12/2021   1:12 PM PST  To: Nhi Quan R.N.         Quality Review for 3/10/2021 DC OASIS by JESSIE Hernandez RN on March 12, 2021        Edits completed by JESSIE Hernandez RN:  1.  C and E are NA

## 2021-03-12 NOTE — PROGRESS NOTES
Quality Review for 3/10/2021 OK OASIS by JESSIE Hernandez RN on March 12, 2021        Edits completed by JESSIE Hernandez RN:  1.  C and E are NA

## 2021-03-15 NOTE — PROGRESS NOTES
I agree with these changes.     ----- Message -----  From: Chantel Wu R.N.  Sent: 2/25/2021   1:57 PM PDT  To: Ruth Alston R.N.      Quality Review for Feb 6, 2021 SOC OASIS Completed by NATHAN Wu RN on Feb 16, 2021:    Edits completed by NATHAN Wu RN:  1.  is 2/6 for LSOC ordered  2.  is #3,  is 1/26  3.  also checked #1, 9  4.  is 0, unable to locate supportive documentation  5.  is daily, chronic pain on long term opiates for OA knees, back  6.  is minimal exertion per PT eval on 2/11.  changed to 2.11 for collaboration  7.  is 2 he walks with a cane  8.  is NA, no supportive documentation found  9. [id number] D is partial/mod per narrative  10. Added ambulate only with assist, bleeding precautions, and F2F to 485 forms  11. Added HTN to care plan    Case Communication response indicates above clinician approves the OASIS edits completed by NATHAN Wu RN

## 2021-03-22 ENCOUNTER — ANTICOAGULATION MONITORING (OUTPATIENT)
Dept: VASCULAR LAB | Facility: MEDICAL CENTER | Age: 86
End: 2021-03-22

## 2021-03-22 ENCOUNTER — HOSPITAL ENCOUNTER (OUTPATIENT)
Dept: LAB | Facility: MEDICAL CENTER | Age: 86
End: 2021-03-22
Attending: NURSE PRACTITIONER
Payer: MEDICARE

## 2021-03-22 DIAGNOSIS — Z79.01 CHRONIC ANTICOAGULATION: ICD-10-CM

## 2021-03-22 LAB
INR PPP: 1.98 (ref 0.87–1.13)
PROTHROMBIN TIME: 23.1 SEC (ref 12–14.6)

## 2021-03-22 PROCEDURE — 36415 COLL VENOUS BLD VENIPUNCTURE: CPT

## 2021-03-22 PROCEDURE — 85610 PROTHROMBIN TIME: CPT

## 2021-03-22 NOTE — PROGRESS NOTES
Attempted to call patient about INR, but n/a and no VM available. Will try again later    3/22/21 4:37pm

## 2021-03-23 NOTE — PROGRESS NOTES
OP Telephone Anticoagulation Service Note    Date: 3/22/2021      Anticoagulation Summary  As of 3/22/2021    INR goal:  2.0-3.0   TTR:  71.4 % (5.7 y)   INR used for dosin.98 (3/22/2021)   Warfarin maintenance plan:  5 mg (5 mg x 1) every Mon, Thu; 2.5 mg (5 mg x 0.5) all other days   Weekly warfarin total:  22.5 mg   Plan last modified:  Ga Perales PharmD (3/22/2021)   Next INR check:  2021   Target end date:  Indefinite    Indications    A-fib (HCC) (Resolved) [I48.91]             Anticoagulation Episode Summary     INR check location:  Clinic Lab    Preferred lab:  [facility]    Send INR reminders to:      Comments:  [facility]    [phone]        Anticoagulation Care Providers     Provider Role Specialty Phone number    Anastacio Sunshine M.D. Referring Cardiology [phone]    Mahin Valdez Responsible          Anticoagulation Patient Findings      INR SUB-therapeutic at 1.98.  Spoke w/ pt on phone.  Verified regimen w/ pt.  Instructed pt to bolus x 1 dose w/ 7.5 mg and to then continue on w/ his current regimen.  NO s/s bleeding reported per pt.  NO changes in diet reported per pt.  NO changes in medications reported per pt.  Check INR in 3 week(s) per pt.  Instructed pt to call clinic at [phone] if there are any questions.  Pt stated understanding.    Shila YorkD

## 2021-03-26 DIAGNOSIS — Z79.01 CHRONIC ANTICOAGULATION: ICD-10-CM

## 2021-03-26 RX ORDER — CLOBETASOL PROPIONATE 0.5 MG/G
CREAM TOPICAL
Qty: 60 G | Refills: 2 | Status: SHIPPED | OUTPATIENT
Start: 2021-03-26

## 2021-03-26 RX ORDER — WARFARIN SODIUM 5 MG/1
TABLET ORAL
Qty: 90 TABLET | Refills: 1 | Status: SHIPPED | OUTPATIENT
Start: 2021-03-26

## 2021-04-13 ENCOUNTER — ANTICOAGULATION MONITORING (OUTPATIENT)
Dept: VASCULAR LAB | Facility: MEDICAL CENTER | Age: 86
End: 2021-04-13

## 2021-04-13 ENCOUNTER — HOSPITAL ENCOUNTER (OUTPATIENT)
Dept: LAB | Facility: MEDICAL CENTER | Age: 86
End: 2021-04-13
Attending: NURSE PRACTITIONER
Payer: MEDICARE

## 2021-04-13 DIAGNOSIS — Z79.01 CHRONIC ANTICOAGULATION: ICD-10-CM

## 2021-04-13 LAB
INR PPP: 1.58 (ref 0.87–1.13)
PROTHROMBIN TIME: 19.4 SEC (ref 12–14.6)

## 2021-04-13 PROCEDURE — 36415 COLL VENOUS BLD VENIPUNCTURE: CPT

## 2021-04-13 PROCEDURE — 85610 PROTHROMBIN TIME: CPT

## 2021-04-13 NOTE — PROGRESS NOTES
Anticoagulation Summary  As of 2021    INR goal:  2.0-3.0   TTR:  70.6 % (5.8 y)   INR used for dosin.58 (2021)   Warfarin maintenance plan:  5 mg (5 mg x 1) every Mon, Wed, Fri; 2.5 mg (5 mg x 0.5) all other days   Weekly warfarin total:  25 mg   Plan last modified:  Marylou Leung PharmD (2021)   Next INR check:  2021   Target end date:  Indefinite    Indications    A-fib (HCC) (Resolved) [I48.91]             Anticoagulation Episode Summary     INR check location:  Clinic Lab    Preferred lab:  [facility]    Send INR reminders to:      Comments:      [phone]        Anticoagulation Care Providers     Provider Role Specialty Phone number    Anastacio Sunshine M.D. Referring Cardiology [phone]    Mahin Valdez Responsible          Anticoagulation Patient Findings      Spoke with pt.  INR is subtherapeutic.   Pt denies any unusual s/s of bleeding, bruising, clotting or any changes to diet or medications. Denies any etoh, cranberries, supplements, or illness.   Pt verifies warfarin weekly dosing    Will have pt increase weekly regimen by 11%    Repeat INR in 3 week(s).     Marylou Leung, PharmD

## 2021-05-04 ENCOUNTER — ANTICOAGULATION MONITORING (OUTPATIENT)
Dept: VASCULAR LAB | Facility: MEDICAL CENTER | Age: 86
End: 2021-05-04

## 2021-05-04 ENCOUNTER — HOSPITAL ENCOUNTER (OUTPATIENT)
Dept: LAB | Facility: MEDICAL CENTER | Age: 86
End: 2021-05-04
Attending: NURSE PRACTITIONER
Payer: MEDICARE

## 2021-05-04 DIAGNOSIS — Z79.01 CHRONIC ANTICOAGULATION: ICD-10-CM

## 2021-05-04 LAB
INR PPP: 2.34 (ref 0.87–1.13)
PROTHROMBIN TIME: 26.4 SEC (ref 12–14.6)

## 2021-05-04 PROCEDURE — 36415 COLL VENOUS BLD VENIPUNCTURE: CPT

## 2021-05-04 PROCEDURE — 85610 PROTHROMBIN TIME: CPT

## 2021-06-01 ENCOUNTER — OFFICE VISIT (OUTPATIENT)
Dept: MEDICAL GROUP | Facility: MEDICAL CENTER | Age: 86
End: 2021-06-01
Payer: MEDICARE

## 2021-06-01 ENCOUNTER — HOSPITAL ENCOUNTER (OUTPATIENT)
Dept: LAB | Facility: MEDICAL CENTER | Age: 86
End: 2021-06-01
Attending: NURSE PRACTITIONER
Payer: MEDICARE

## 2021-06-01 VITALS
WEIGHT: 180.8 LBS | DIASTOLIC BLOOD PRESSURE: 62 MMHG | HEIGHT: 68 IN | HEART RATE: 63 BPM | TEMPERATURE: 99 F | BODY MASS INDEX: 27.4 KG/M2 | OXYGEN SATURATION: 90 % | SYSTOLIC BLOOD PRESSURE: 118 MMHG

## 2021-06-01 DIAGNOSIS — M17.11 PRIMARY OSTEOARTHRITIS OF RIGHT KNEE: ICD-10-CM

## 2021-06-01 DIAGNOSIS — F11.20 OPIOID TYPE DEPENDENCE, CONTINUOUS (HCC): ICD-10-CM

## 2021-06-01 DIAGNOSIS — Z79.01 CHRONIC ANTICOAGULATION: ICD-10-CM

## 2021-06-01 DIAGNOSIS — L60.2 HYPERTROPHIC TOENAIL: ICD-10-CM

## 2021-06-01 DIAGNOSIS — M47.26 OSTEOARTHRITIS OF SPINE WITH RADICULOPATHY, LUMBAR REGION: ICD-10-CM

## 2021-06-01 LAB
INR PPP: 2.97 (ref 0.87–1.13)
PROTHROMBIN TIME: 31.8 SEC (ref 12–14.6)

## 2021-06-01 PROCEDURE — 85610 PROTHROMBIN TIME: CPT

## 2021-06-01 PROCEDURE — 36415 COLL VENOUS BLD VENIPUNCTURE: CPT

## 2021-06-01 PROCEDURE — 99214 OFFICE O/P EST MOD 30 MIN: CPT | Performed by: PHYSICIAN ASSISTANT

## 2021-06-01 RX ORDER — HYDROCODONE BITARTRATE AND ACETAMINOPHEN 10; 325 MG/1; MG/1
1 TABLET ORAL EVERY 6 HOURS PRN
Qty: 120 TABLET | Refills: 0 | Status: SHIPPED | OUTPATIENT
Start: 2021-06-01 | End: 2021-07-01

## 2021-06-01 RX ORDER — HYDROCODONE BITARTRATE AND ACETAMINOPHEN 10; 325 MG/1; MG/1
1 TABLET ORAL EVERY 6 HOURS PRN
Qty: 120 TABLET | Refills: 0 | Status: SHIPPED | OUTPATIENT
Start: 2021-07-01 | End: 2021-07-31

## 2021-06-01 RX ORDER — HYDROCODONE BITARTRATE AND ACETAMINOPHEN 10; 325 MG/1; MG/1
1 TABLET ORAL EVERY 6 HOURS PRN
Qty: 120 TABLET | Refills: 0 | Status: SHIPPED | OUTPATIENT
Start: 2021-08-01 | End: 2021-08-31

## 2021-06-01 ASSESSMENT — FIBROSIS 4 INDEX: FIB4 SCORE: 1.78

## 2021-06-02 ENCOUNTER — ANTICOAGULATION MONITORING (OUTPATIENT)
Dept: VASCULAR LAB | Facility: MEDICAL CENTER | Age: 86
End: 2021-06-02

## 2021-06-02 PROBLEM — L60.2 HYPERTROPHIC TOENAIL: Status: ACTIVE | Noted: 2021-06-02

## 2021-06-02 NOTE — ASSESSMENT & PLAN NOTE
Chronic issue - thick, long toenails, they have tried to find a podiatrist but have been unsuccessful. Request referral

## 2021-06-02 NOTE — ASSESSMENT & PLAN NOTE
Chronic, stable on current. No new concern. No other controlled meds. No other prescriber.  verified. No falls since our last visit. No constipation. Would like to continue current. Unable to take NSAIDs, on anticoagulant, declines further evaluation, specialist or treatment option.

## 2021-06-02 NOTE — PROGRESS NOTES
Anticoagulation Summary  As of 2021    INR goal:  2.0-3.0   TTR:  70.8 % (5.9 y)   INR used for dosin.97 (2021)   Warfarin maintenance plan:  5 mg (5 mg x 1) every Mon, Wed, Fri; 2.5 mg (5 mg x 0.5) all other days   Weekly warfarin total:  25 mg   Plan last modified:  Marylou Leung, PharmD (2021)   Next INR check:  2021   Target end date:  Indefinite    Indications    A-fib (HCC) (Resolved) [I48.91]             Anticoagulation Episode Summary     INR check location:  Clinic Lab    Preferred lab:  [facility]    Send INR reminders to:      Comments:      [phone]        Anticoagulation Care Providers     Provider Role Specialty Phone number    Anastacio Sunshine M.D. Referring Cardiology [phone]    Mahin Valdez Responsible          Anticoagulation Patient Findings  Patient Findings     Negatives:  Signs/symptoms of thrombosis, Signs/symptoms of bleeding, Laboratory test error suspected, Change in health, Change in alcohol use, Change in activity, Upcoming invasive procedure, Emergency department visit, Upcoming dental procedure, Missed doses, Extra doses, Change in medications, Change in diet/appetite, Hospital admission, Bruising, Other complaints              Spoke with patient today regarding therapeutic INR of 2.97.  Patient denies any signs/symptoms of bruising or bleeding or any changes in diet and medications.  Instructed patient to call clinic with any questions or concerns.    Pt is to continue with current warfarin dosing regimen.    Follow up in 4 weeks, to reduce risk of adverse events related to this high risk medication,  Warfarin.    Dontrell Brown, Pharmacy Intern

## 2021-06-02 NOTE — PROGRESS NOTES
Subjective:   Gennaro Richardson is a 85 y.o. male here today with his sister for med refill. No new concerns other than with toenails. No falls. Patient doesn't drive, sister drives their car.    Hypertrophic toenail  Chronic issue - thick, long toenails, they have tried to find a podiatrist but have been unsuccessful. Request referral    Opioid type dependence, continuous  Chronic, stable on current. No new concern. No other controlled meds. No other prescriber.  verified. No falls since our last visit. No constipation. Would like to continue current. Unable to take NSAIDs, on anticoagulant, declines further evaluation, specialist or treatment option.        Current medicines (including changes today)  Current Outpatient Medications   Medication Sig Dispense Refill   • [START ON 8/1/2021] HYDROcodone/acetaminophen (NORCO)  MG Tab Take 1 tablet by mouth every 6 hours as needed for up to 30 days. 120 tablet 0   • [START ON 7/1/2021] HYDROcodone/acetaminophen (NORCO)  MG Tab Take 1 tablet by mouth every 6 hours as needed for up to 30 days. 120 tablet 0   • HYDROcodone/acetaminophen (NORCO)  MG Tab Take 1 tablet by mouth every 6 hours as needed for up to 30 days. 120 tablet 0   • warfarin (COUMADIN) 5 MG Tab Take 1/2 to 1 tablet by mouth one time daily OR AS INSTRUCTED BY CLINIC 90 tablet 1   • clobetasol (TEMOVATE) 0.05 % Cream apply topically to the affected area(S) twice daily  60 g 2   • Naloxone (NARCAN) 4 MG/0.1ML Liquid One spray in one nostril for overdose and call 911. 1 Each 0   • Lidocaine HCl 3 % Cream Apply topically once daily as needed 28.35 g 0   • finasteride (PROSCAR) 5 MG TABS TAKE ONE TABLET BY MOUTH ONE TIME DAILY 90 Tab 2   • oxyCODONE immediate-release (ROXICODONE) 5 MG Tab Take 5 mg by mouth every 3 hours as needed for Severe Pain. (Patient not taking: Reported on 6/1/2021)     • ofloxacin (OCUFLOX) 0.3 % Solution Administer 1 Drop into the left eye 4 times a day.  "(Patient not taking: Reported on 6/1/2021)       No current facility-administered medications for this visit.     He  has a past medical history of A-fib ([facility]) (6/28/2011), Arteriosclerosis, CAD (coronary artery disease), CAD (coronary artery disease) (6/28/2011), Cardiac pacemaker in situ (11/19/2013), Colonic polyps, Diverticulosis, Dizzy spells (11/20/2012), HERZOG (dyspnea on exertion) (9/7/2014), Dyslipidemia (6/28/2011), Hematoma of abdominal wall, Hematoma of rectus sheath (6/13/2012), Myocardial infarct ([facility]) (1989), Pacer at end of battery life (6/28/2011), PAF (paroxysmal atrial fibrillation) ([facility]) (9/7/2014), Paroxysmal atrial fibrillation ([facility]), Peripheral vascular disease ([facility]) (10/31/2011), and PVD (peripheral vascular disease) ([facility]).    ROS   No fever/chills.  No headache/dizziness. No focal weakness. No sore throat, nasal congestion, ear pain. No chest pain, no shortness of breath, difficulty breathing. No n/v/d/c or abdominal pain. No urinary complaint.        Objective:     /62 (BP Location: Left arm, Patient Position: Sitting, BP Cuff Size: Adult long)   Pulse 63   Temp 37.2 °C (99 °F) (Temporal)   Ht 1.727 m (5' 8\")   Wt 82 kg (180 lb 12.8 oz)   SpO2 90%  Body mass index is 27.49 kg/m².   Physical Exam:  Constitutional: WDWN, NAD  Skin: Warm, dry, good turgor, no rashes in visible areas.  Respiratory: Unlabored respiratory effort, lungs clear to auscultation, no wheezes, no ronchi.  Cardiovascular: Normal S1, S2, no murmur, no leg edema.  Psych: Alert and oriented x3, normal affect and mood.    Assessment and Plan:   The following treatment plan was discussed    1. Opioid type dependence, continuous (HCC)    - HYDROcodone/acetaminophen (NORCO)  MG Tab; Take 1 tablet by mouth every 6 hours as needed for up to 30 days.  Dispense: 120 tablet; Refill: 0  - HYDROcodone/acetaminophen (NORCO)  MG Tab; Take 1 tablet by mouth every 6 hours as needed for up to 30 days.  Dispense: 120 " tablet; Refill: 0  - HYDROcodone/acetaminophen (NORCO)  MG Tab; Take 1 tablet by mouth every 6 hours as needed for up to 30 days.  Dispense: 120 tablet; Refill: 0    2. Hypertrophic toenail    - REFERRAL TO PODIATRY    3. Primary osteoarthritis of right knee    - HYDROcodone/acetaminophen (NORCO)  MG Tab; Take 1 tablet by mouth every 6 hours as needed for up to 30 days.  Dispense: 120 tablet; Refill: 0  - HYDROcodone/acetaminophen (NORCO)  MG Tab; Take 1 tablet by mouth every 6 hours as needed for up to 30 days.  Dispense: 120 tablet; Refill: 0  - HYDROcodone/acetaminophen (NORCO)  MG Tab; Take 1 tablet by mouth every 6 hours as needed for up to 30 days.  Dispense: 120 tablet; Refill: 0    4. Osteoarthritis of spine with radiculopathy, lumbar region    - HYDROcodone/acetaminophen (NORCO)  MG Tab; Take 1 tablet by mouth every 6 hours as needed for up to 30 days.  Dispense: 120 tablet; Refill: 0  - HYDROcodone/acetaminophen (NORCO)  MG Tab; Take 1 tablet by mouth every 6 hours as needed for up to 30 days.  Dispense: 120 tablet; Refill: 0  - HYDROcodone/acetaminophen (NORCO)  MG Tab; Take 1 tablet by mouth every 6 hours as needed for up to 30 days.  Dispense: 120 tablet; Refill: 0      Followup: 3 months and as needed

## 2021-07-07 ENCOUNTER — PATIENT OUTREACH (OUTPATIENT)
Dept: HEALTH INFORMATION MANAGEMENT | Facility: OTHER | Age: 86
End: 2021-07-07

## 2021-07-07 NOTE — NON-PROVIDER
Outcome: Declined Comprehensive Health Assessment.    Mbr inbound call with sister Pat. Mbr and Pat called requesting assistance for transportation to  groceries. Educated mbr on [facility] [employer] services. Provided mbr with [employer] application information for reduced taxi fares. Pat stated she had Medicaid-Medicare insurance and needs assistance with dental care. I encouraged her to call the number on the back of insurance card for benefits and provider information. Provided mbr with my contact information. Mbr declined Comprehensive Health Assessment. Mbr reports no further needs.    Attempt # 1

## 2021-08-11 ENCOUNTER — HOSPITAL ENCOUNTER (EMERGENCY)
Facility: MEDICAL CENTER | Age: 86
End: 2021-08-11
Attending: EMERGENCY MEDICINE
Payer: MEDICARE

## 2021-08-11 VITALS
HEART RATE: 60 BPM | OXYGEN SATURATION: 94 % | TEMPERATURE: 98.5 F | SYSTOLIC BLOOD PRESSURE: 153 MMHG | HEIGHT: 68 IN | RESPIRATION RATE: 18 BRPM | WEIGHT: 182 LBS | BODY MASS INDEX: 27.58 KG/M2 | DIASTOLIC BLOOD PRESSURE: 68 MMHG

## 2021-08-11 DIAGNOSIS — R33.9 URINARY RETENTION: ICD-10-CM

## 2021-08-11 LAB
ALBUMIN SERPL BCP-MCNC: 3.6 G/DL (ref 3.2–4.9)
ALBUMIN/GLOB SERPL: 1 G/DL
ALP SERPL-CCNC: 77 U/L (ref 30–99)
ALT SERPL-CCNC: 16 U/L (ref 2–50)
ANION GAP SERPL CALC-SCNC: 12 MMOL/L (ref 7–16)
APPEARANCE UR: CLEAR
AST SERPL-CCNC: 17 U/L (ref 12–45)
BACTERIA #/AREA URNS HPF: NEGATIVE /HPF
BASOPHILS # BLD AUTO: 0.3 % (ref 0–1.8)
BASOPHILS # BLD: 0.03 K/UL (ref 0–0.12)
BILIRUB SERPL-MCNC: 1.2 MG/DL (ref 0.1–1.5)
BILIRUB UR QL STRIP.AUTO: NEGATIVE
BUN SERPL-MCNC: 21 MG/DL (ref 8–22)
CALCIUM SERPL-MCNC: 8.9 MG/DL (ref 8.5–10.5)
CHLORIDE SERPL-SCNC: 107 MMOL/L (ref 96–112)
CO2 SERPL-SCNC: 24 MMOL/L (ref 20–33)
COLOR UR: YELLOW
CREAT SERPL-MCNC: 0.85 MG/DL (ref 0.5–1.4)
EOSINOPHIL # BLD AUTO: 0.06 K/UL (ref 0–0.51)
EOSINOPHIL NFR BLD: 0.6 % (ref 0–6.9)
EPI CELLS #/AREA URNS HPF: NEGATIVE /HPF
ERYTHROCYTE [DISTWIDTH] IN BLOOD BY AUTOMATED COUNT: 52.1 FL (ref 35.9–50)
GLOBULIN SER CALC-MCNC: 3.7 G/DL (ref 1.9–3.5)
GLUCOSE SERPL-MCNC: 92 MG/DL (ref 65–99)
GLUCOSE UR STRIP.AUTO-MCNC: NEGATIVE MG/DL
HCT VFR BLD AUTO: 41.4 % (ref 42–52)
HGB BLD-MCNC: 13 G/DL (ref 14–18)
HYALINE CASTS #/AREA URNS LPF: ABNORMAL /LPF
IMM GRANULOCYTES # BLD AUTO: 0.05 K/UL (ref 0–0.11)
IMM GRANULOCYTES NFR BLD AUTO: 0.5 % (ref 0–0.9)
KETONES UR STRIP.AUTO-MCNC: ABNORMAL MG/DL
LEUKOCYTE ESTERASE UR QL STRIP.AUTO: NEGATIVE
LYMPHOCYTES # BLD AUTO: 1.02 K/UL (ref 1–4.8)
LYMPHOCYTES NFR BLD: 9.9 % (ref 22–41)
MCH RBC QN AUTO: 31.2 PG (ref 27–33)
MCHC RBC AUTO-ENTMCNC: 31.4 G/DL (ref 33.7–35.3)
MCV RBC AUTO: 99.3 FL (ref 81.4–97.8)
MICRO URNS: ABNORMAL
MONOCYTES # BLD AUTO: 0.6 K/UL (ref 0–0.85)
MONOCYTES NFR BLD AUTO: 5.8 % (ref 0–13.4)
NEUTROPHILS # BLD AUTO: 8.52 K/UL (ref 1.82–7.42)
NEUTROPHILS NFR BLD: 82.9 % (ref 44–72)
NITRITE UR QL STRIP.AUTO: NEGATIVE
NRBC # BLD AUTO: 0 K/UL
NRBC BLD-RTO: 0 /100 WBC
PH UR STRIP.AUTO: 5 [PH] (ref 5–8)
PLATELET # BLD AUTO: 207 K/UL (ref 164–446)
PMV BLD AUTO: 9.8 FL (ref 9–12.9)
POTASSIUM SERPL-SCNC: 3.4 MMOL/L (ref 3.6–5.5)
PROT SERPL-MCNC: 7.3 G/DL (ref 6–8.2)
PROT UR QL STRIP: NEGATIVE MG/DL
RBC # BLD AUTO: 4.17 M/UL (ref 4.7–6.1)
RBC # URNS HPF: ABNORMAL /HPF
RBC UR QL AUTO: ABNORMAL
SODIUM SERPL-SCNC: 143 MMOL/L (ref 135–145)
SP GR UR STRIP.AUTO: 1.02
URATE CRY #/AREA URNS HPF: POSITIVE /HPF
UROBILINOGEN UR STRIP.AUTO-MCNC: 1 MG/DL
WBC # BLD AUTO: 10.3 K/UL (ref 4.8–10.8)
WBC #/AREA URNS HPF: ABNORMAL /HPF

## 2021-08-11 PROCEDURE — 51702 INSERT TEMP BLADDER CATH: CPT

## 2021-08-11 PROCEDURE — 303105 HCHG CATHETER EXTRA

## 2021-08-11 PROCEDURE — 85025 COMPLETE CBC W/AUTO DIFF WBC: CPT

## 2021-08-11 PROCEDURE — 99284 EMERGENCY DEPT VISIT MOD MDM: CPT

## 2021-08-11 PROCEDURE — 36415 COLL VENOUS BLD VENIPUNCTURE: CPT

## 2021-08-11 PROCEDURE — 80053 COMPREHEN METABOLIC PANEL: CPT

## 2021-08-11 PROCEDURE — 81001 URINALYSIS AUTO W/SCOPE: CPT

## 2021-08-11 ASSESSMENT — FIBROSIS 4 INDEX: FIB4 SCORE: 1.78

## 2021-08-11 NOTE — ED NOTES
Transportation set up with [facility]. Pt wheeled to exit to wait for ride.

## 2021-08-11 NOTE — ED PROVIDER NOTES
"ED Provider Note    Scribed for Gaston Lloyd M.D. by Anastacio Dave. 8/11/2021  9:36 AM    Primary care provider: Angelica Hauser P.A.-C.  Means of arrival: EMS  History obtained from: Patient  History limited by: None    CHIEF COMPLAINT  Chief Complaint   Patient presents with   • Urinary Retention     pt BIB [facility] for urinary retention; pt states he has not been able to urinate for several days; bladder scan showed 797mL upon arrival       HPI  Gennaro Richardson is a 85 y.o. male who presents to the Emergency Department for urinary retention for onset several days prior to arrival. Per the patient, he has not been able to urinate at all for several days. He states he lives with sister, who is his caretaker.  He states that he would like to be \"knocked out \"for my catheter.  He denies any previous issues with his prostate.  He does not have a urologist.  He received 100 mcg of fentanyl at 0915.    REVIEW OF SYSTEMS  Pertinent negatives include no fever. As above, all other systems reviewed and are negative.   See HPI for further details.     PAST MEDICAL HISTORY   has a past medical history of A-fib ([facility]) (6/28/2011), Arteriosclerosis, CAD (coronary artery disease), CAD (coronary artery disease) (6/28/2011), Cardiac pacemaker in situ (11/19/2013), Colonic polyps, Diverticulosis, Dizzy spells (11/20/2012), HERZOG (dyspnea on exertion) (9/7/2014), Dyslipidemia (6/28/2011), Hematoma of abdominal wall, Hematoma of rectus sheath (6/13/2012), Myocardial infarct ([facility]) (1989), Pacer at end of battery life (6/28/2011), PAF (paroxysmal atrial fibrillation) ([facility]) (9/7/2014), Paroxysmal atrial fibrillation ([facility]), Peripheral vascular disease ([facility]) (10/31/2011), and PVD (peripheral vascular disease) ([facility]).    SURGICAL HISTORY   has a past surgical history that includes pacemaker insertion; abdominal aortic aneurysm; colonoscopy with polyp (7/24/08); and exploratory laparotomy (6/14/2012).    SOCIAL HISTORY  Social History " "    Tobacco Use   • Smoking status: Former Smoker     Years: 35.00     Quit date: 1988     Years since quittin.6   • Smokeless tobacco: Never Used   Vaping Use   • Vaping Use: Never used   Substance Use Topics   • Alcohol use: No     Alcohol/week: 0.0 oz   • Drug use: No      Social History     Substance and Sexual Activity   Drug Use No       FAMILY HISTORY  Family History   Problem Relation Age of Onset   • Cancer Mother        CURRENT MEDICATIONS  Home Medications    **Home medications have not yet been reviewed for this encounter**         ALLERGIES  Allergies   Allergen Reactions   • No Known Drug Allergy        PHYSICAL EXAM  VITAL SIGNS: /67   Pulse 63   Temp 37.1 °C (98.7 °F) (Temporal)   Resp 18   Ht 1.727 m (5' 8\")   Wt 82.6 kg (182 lb)   SpO2 93%   BMI 27.67 kg/m²   Constitutional: Well developed, Well nourished, No acute distress, uncomfortable appearance.   HENT: Normocephalic, Atraumatic, Bilateral external ears normal, Oropharynx is clear mucous membranes are moist. No oral exudates or nasal discharge.   Eyes: Pupils are equal round and reactive, EOMI, Conjunctiva normal, No discharge.   Neck: Normal range of motion, No tenderness, Supple, No stridor. No meningismus.  Lymphatic: No lymphadenopathy noted.   Cardiovascular: Regular rate and rhythm without murmur rub or gallop.  Thorax & Lungs: Clear breath sounds bilaterally without wheezes, rhonchi or rales. There is no chest wall tenderness.   Abdomen: Soft non-tender non-distended. There is no rebound or guarding. No organomegaly is appreciated. Bowel sounds are normal.  Skin: Normal without rash.   Back: No CVA or spinal tenderness.   Extremities: Intact distal pulses, No edema, No tenderness, No cyanosis, No clubbing. Capillary refill is less than 2 seconds.  Musculoskeletal: Good range of motion in all major joints. No tenderness to palpation or major deformities noted.   Neurologic: Alert & oriented x 3, Normal motor " function, Normal sensory function, No focal deficits noted. Reflexes are normal.  Psychiatric: Affect normal, Judgment normal, Mood normal. There is no suicidal ideation or patient reported hallucinations.     DIAGNOSTIC STUDIES / PROCEDURES    LABS  Labs Reviewed   CBC WITH DIFFERENTIAL - Abnormal; Notable for the following components:       Result Value    RBC 4.17 (*)     Hemoglobin 13.0 (*)     Hematocrit 41.4 (*)     MCV 99.3 (*)     MCHC 31.4 (*)     RDW 52.1 (*)     Neutrophils-Polys 82.90 (*)     Lymphocytes 9.90 (*)     Neutrophils (Absolute) 8.52 (*)     All other components within normal limits   COMP METABOLIC PANEL - Abnormal; Notable for the following components:    Potassium 3.4 (*)     Globulin 3.7 (*)     All other components within normal limits   URINALYSIS,CULTURE IF INDICATED - Abnormal; Notable for the following components:    Ketones Trace (*)     Occult Blood Small (*)     All other components within normal limits    Narrative:     Indication for culture:->Patient WITHOUT an indwelling Mancilla  catheter in place with new onset of Dysuria, Frequency,  Urgency, and/or Suprapubic pain   URINE MICROSCOPIC (W/UA) - Abnormal; Notable for the following components:    WBC 0-2 (*)     RBC 0-2 (*)     All other components within normal limits    Narrative:     Indication for culture:->Patient WITHOUT an indwelling Mancilla  catheter in place with new onset of Dysuria, Frequency,  Urgency, and/or Suprapubic pain   ESTIMATED GFR      All labs reviewed by me.    RADIOLOGY  No orders to display   Bladder scan shows almost 800 cc in the bladder  The radiologist's interpretation of all radiological studies have been reviewed by me.    COURSE & MEDICAL DECISION MAKING  Nursing notes, VS, PMSFHx reviewed in chart.    10:00 AM Patient seen and examined at bedside. Ordered for labs to evaluate.    Laboratory evaluation reveals no leukocytosis, minimal shift of 83% PMNs and mildly low hemoglobin at 13.  No electrolyte  derangements or evidence of obstructive renal failure.  Urinalysis shows no definitive evidence of infection but he does have uric acid crystals    11:52 AM - Patient seen at bedside. Discussed lab results with the patient and updated them on the plan for discharge. I answered all questions regarding their care and discussed strict return precautions for new or changing symptoms. Patient verbalizes understanding and agreement to this plan of care.     Patient has had high blood pressure while in the emergency department, felt likely secondary to medical condition. Counseled patient to monitor blood pressure at home and follow up with primary care physician.    The patient will return for new or worsening symptoms and is stable at the time of discharge.    DISPOSITION:  Patient will be discharged home in stable condition.  The patient is fairly cantankerous when I am explaining to him his need for follow-up with urology and care for a leg bag    FINAL IMPRESSION  1. Urinary retention          Anastacio COOPER ([facility]), am scribing for, and in the presence of, Gaston Lloyd M.D..    Electronically signed by: Anastacio Dave ([place]), 8/11/2021    Gaston COOPER M.D. personally performed the services described in this documentation, as scribed by Anastacio Dave in my presence, and it is both accurate and complete. C    The note accurately reflects work and decisions made by me.  Gaston Lloyd M.D.  8/11/2021  12:07 PM

## 2021-08-31 ENCOUNTER — TELEPHONE (OUTPATIENT)
Dept: MEDICAL GROUP | Facility: MEDICAL CENTER | Age: 86
End: 2021-08-31

## 2021-08-31 NOTE — TELEPHONE ENCOUNTER
Pt no showed mabel't, not usual for this pt to miss an appointment.  Homer tried calling pt 8/30/21 to confirm pt's appt, no answer phone just rang.  Per Angelica asked me to try calling pt today (8/31/21), I called pt several times no answer phone just rang.  Angelica advised me to contact [facility] to see if pt may have been admitted there.  I called [facility] stated pt was seen on the 8/16/21 but was discharged, I also called pt's apartment complex to see if they would be able to check on the pt and they stated that they would go and check on him but is not able to let me know that pt's resides there or give any other info.  Angelica then advised me to call [place] police (non emergent number), I called and spoke with dispatched and informed them who I was and where I was calling from, informed them of pt's situation and we are worried for him and could they please do a welfare check ?  Dispatched informed me that they would send an officer out to check on pt and contact me to let me know if pt is ok, I informed dispatch vm was ok.

## 2021-09-01 ENCOUNTER — HOSPITAL ENCOUNTER (EMERGENCY)
Facility: MEDICAL CENTER | Age: 86
End: 2021-09-01
Attending: EMERGENCY MEDICINE
Payer: MEDICARE

## 2021-09-01 VITALS
RESPIRATION RATE: 18 BRPM | OXYGEN SATURATION: 96 % | SYSTOLIC BLOOD PRESSURE: 117 MMHG | TEMPERATURE: 97.4 F | HEART RATE: 60 BPM | HEIGHT: 68 IN | WEIGHT: 146.6 LBS | DIASTOLIC BLOOD PRESSURE: 57 MMHG | BODY MASS INDEX: 22.22 KG/M2

## 2021-09-01 DIAGNOSIS — R30.0 DYSURIA: ICD-10-CM

## 2021-09-01 LAB
APPEARANCE UR: ABNORMAL
BACTERIA #/AREA URNS HPF: NEGATIVE /HPF
BILIRUB UR QL STRIP.AUTO: NEGATIVE
COLOR UR: YELLOW
EPI CELLS #/AREA URNS HPF: NEGATIVE /HPF
GLUCOSE UR STRIP.AUTO-MCNC: NEGATIVE MG/DL
HYALINE CASTS #/AREA URNS LPF: ABNORMAL /LPF
KETONES UR STRIP.AUTO-MCNC: NEGATIVE MG/DL
LEUKOCYTE ESTERASE UR QL STRIP.AUTO: ABNORMAL
MICRO URNS: ABNORMAL
NITRITE UR QL STRIP.AUTO: NEGATIVE
PH UR STRIP.AUTO: 5 [PH] (ref 5–8)
PROT UR QL STRIP: NEGATIVE MG/DL
RBC # URNS HPF: ABNORMAL /HPF
RBC UR QL AUTO: ABNORMAL
SP GR UR STRIP.AUTO: 1.02
URATE CRY #/AREA URNS HPF: POSITIVE /HPF
UROBILINOGEN UR STRIP.AUTO-MCNC: 0.2 MG/DL
WBC #/AREA URNS HPF: ABNORMAL /HPF

## 2021-09-01 PROCEDURE — 700101 HCHG RX REV CODE 250: Performed by: EMERGENCY MEDICINE

## 2021-09-01 PROCEDURE — 99284 EMERGENCY DEPT VISIT MOD MDM: CPT

## 2021-09-01 PROCEDURE — 81001 URINALYSIS AUTO W/SCOPE: CPT

## 2021-09-01 RX ORDER — PHENAZOPYRIDINE HYDROCHLORIDE 200 MG/1
200 TABLET, FILM COATED ORAL 3 TIMES DAILY PRN
Qty: 6 TABLET | Refills: 0 | Status: SHIPPED | OUTPATIENT
Start: 2021-09-01

## 2021-09-01 RX ORDER — LIDOCAINE HYDROCHLORIDE 20 MG/ML
JELLY TOPICAL ONCE
Status: COMPLETED | OUTPATIENT
Start: 2021-09-01 | End: 2021-09-01

## 2021-09-01 RX ADMIN — LIDOCAINE HYDROCHLORIDE 5 ML: 20 JELLY TOPICAL at 02:15

## 2021-09-01 ASSESSMENT — FIBROSIS 4 INDEX: FIB4 SCORE: 1.77

## 2021-09-01 NOTE — ED NOTES
Pt provided with breakfast tray, pt frequently yelling out stating he wants to go home. Spoke with Kellen  re: pending transfer and awaiting answer from pt care facility for discharge back. Pt updated on this, no evidence of understanding.

## 2021-09-01 NOTE — ED TRIAGE NOTES
Chief Complaint   Patient presents with   • Urinary Pain     burning sensation at ruggiero       Patient BIB EMS from [facility] for complaints of burning pain at the tip of his penis. Patient pain not managed by staff at [facility] at this time.    Vitals:    09/01/21 0045   BP: 133/66   Pulse: 64   Resp: 18   Temp: 36.3 °C (97.3 °F)   SpO2: 94%

## 2021-09-01 NOTE — ED NOTES
Discharge orders received, monitor discontinued, instructions and education given, provided to transport at bedside for transfer back to facility. Patient ruggiero drainage bag changed and replaced r/t hole in tubing, draining to bag without difficulty at this time.

## 2021-09-01 NOTE — ED PROVIDER NOTES
ER Provider Note     Scribed for Brandon Rivera M.D. by Marina Curran. 2021, 12:50 AM.    Primary Care Provider: Angelica Hauser P.A.-C.  Means of Arrival: EMS   History obtained from: Patient  History limited by: None     CHIEF COMPLAINT  Chief Complaint   Patient presents with    Urinary Pain     burning sensation at [facility]  Gennaro Richardson is a 86 y.o. male who presents to the Emergency Department via EMS from [facility] following complaints of burning sensation to the ruggiero region. The initial onset was noted 1 month ago. He has associated pain to the area as well. Pain is exacerbated when he urinates, causing discomfort. He notes that the region was burning during his visit, but is subsided at the moment. The burning and pain is intermittent.      REVIEW OF SYSTEMS  See [facility] for further details.   E    PAST MEDICAL HISTORY   has a past medical history of A-fib ([facility]) (2011), Arteriosclerosis, CAD (coronary artery disease), CAD (coronary artery disease) (2011), Cardiac pacemaker in situ (2013), Colonic polyps, Diverticulosis, Dizzy spells (2012), HERZOG (dyspnea on exertion) (2014), Dyslipidemia (2011), Hematoma of abdominal wall, Hematoma of rectus sheath (2012), Myocardial infarct ([facility]) (), Pacer at end of battery life (2011), PAF (paroxysmal atrial fibrillation) ([facility]) (2014), Paroxysmal atrial fibrillation ([facility]), Peripheral vascular disease ([facility]) (10/31/2011), and PVD (peripheral vascular disease) ([facility]).    SURGICAL HISTORY   has a past surgical history that includes pacemaker insertion; abdominal aortic aneurysm; colonoscopy with polyp (08); and exploratory laparotomy (2012).    SOCIAL HISTORY  Social History     Tobacco Use    Smoking status: Former Smoker     Years: 35.00     Quit date: 1988     Years since quittin.6    Smokeless tobacco: Never Used   Vaping Use    Vaping Use: Never used   Substance  "Use Topics    Alcohol use: No     Alcohol/week: 0.0 oz    Drug use: No      Social History     Substance and Sexual Activity   Drug Use No       FAMILY HISTORY  Family History   Problem Relation Age of Onset    Cancer Mother        CURRENT MEDICATIONS  Home Medications       Reviewed by Nils Holm R.N. (Registered Nurse) on 09/01/21 at 0046  Med List Status: <None>     Medication Last Dose Status   clobetasol (TEMOVATE) 0.05 % Cream  Active   finasteride (PROSCAR) 5 MG TABS  Active   Lidocaine HCl 3 % Cream  Active   Naloxone (NARCAN) 4 MG/0.1ML Liquid  Active   warfarin (COUMADIN) 5 MG Tab  Active                    ALLERGIES  Allergies   Allergen Reactions    No Known Drug Allergy      PHYSICAL EXAM  VITAL SIGNS: /66   Pulse 64   Temp 36.3 °C (97.3 °F) (Temporal)   Resp 18   Ht 1.727 m (5' 8\")   Wt 66.5 kg (146 lb 9.6 oz)   SpO2 94%   BMI 22.29 kg/m²    Constitutional: Alert in no apparent distress.  HENT: No signs of trauma, Bilateral external ears normal, Nose normal.   Eyes: Pupils are equal and reactive, Conjunctiva normal, Non-icteric.   Neck: Normal range of motion, No tenderness, Supple, No stridor.   Lymphatic: No lymphadenopathy noted.   Cardiovascular: Regular rate and rhythm, no palpable thrill  Thorax & Lungs: No respiratory distress,  No chest tenderness.   Abdomen: Bowel sounds normal, Soft, No tenderness, No masses, No pulsatile masses. No peritoneal signs.  Skin: Warm, Dry, No erythema, No rash.   : Mancilla Catheter in place. Tip of the penis is not tender on palpation, there is no maceration or swelling.   Back: No bony tenderness, No CVA tenderness.   Extremities: Intact distal pulses, No edema, No tenderness, No cyanosis.  Musculoskeletal: Good range of motion in all major joints. No tenderness to palpation or major deformities noted.   Neurologic: Alert , Normal motor function, Normal sensory function, No focal deficits noted.   Psychiatric: Affect normal, Judgment " normal, Mood normal.     DIAGNOSTIC STUDIES / PROCEDURES    LABS  Labs Reviewed   URINALYSIS,CULTURE IF INDICATED - Abnormal; Notable for the following components:       Result Value    Character Turbid (*)     Leukocyte Esterase Trace (*)     Occult Blood Moderate (*)     All other components within normal limits    Narrative:     Indication for culture:->Patient WITHOUT an indwelling Mancilla  catheter in place with new onset of Dysuria, Frequency,  Urgency, and/or Suprapubic pain   URINE MICROSCOPIC (W/UA) - Abnormal; Notable for the following components:    WBC 5-10 (*)     RBC  (*)     Hyaline Cast 3-5 (*)     All other components within normal limits    Narrative:     Indication for culture:->Patient WITHOUT an indwelling Mancilla  catheter in place with new onset of Dysuria, Frequency,  Urgency, and/or Suprapubic pain     All labs reviewed by me.    COURSE & MEDICAL DECISION MAKING  Pertinent Labs & Imaging studies reviewed. (See chart for details)    This is a 86 y.o. male that presents With urinary pain.  Will evaluate the patient for infection.  Believe this is likely Mancilla related we will put Urojet in the area.  We will reassess after this..     12:50 AM - Patient seen and examined at bedside. I verified that the patient was wearing a mask and I was wearing appropriate PPE every time I entered the room. The patient's mask was on the patient at all times during my encounter except for a brief view of the oropharynx. Ordered urine microscopic and UA.  Patient will be medicated with lidocaine jelly for his symptoms. I informed the patient that I would discharge if urine was non concerning. Patient verbalizes understanding and agreement to this plan of care.     The patient will return for new or worsening symptoms and is stable at the time of discharge.    Patient does have some symptom improvement.  He has no infection in his urine.  We also given Pyridium to help with symptoms.  We will discharge him home  with strict return precautions and follow-up.    The patient is referred to a primary physician for blood pressure management, diabetic screening, and for all other preventative health concerns.    DISPOSITION:  Patient will be discharged home in stable condition.    FOLLOW UP:  Angelica Hauser P.A.-C.  [address]  [address]  [facility] [facility] [address]  [phone]        OUTPATIENT MEDICATIONS:  New Prescriptions    PHENAZOPYRIDINE (PYRIDIUM) 200 MG TAB    Take 1 Tablet by mouth 3 times a day as needed.     FINAL IMPRESSION  1. Dysuria          Marina COOPER ([facility]), am scribing for, and in the presence of, Brandon Rivera M.D..    Electronically signed by: Marina Curran ([place]), 9/1/2021    Brandon COOPER M.D. personally performed the services described in this documentation, as scribed by Marina Curran in my presence, and it is both accurate and complete.     The note accurately reflects work and decisions made by me.  Brandon Rivera M.D.  9/1/2021  5:04 AM

## 2021-09-01 NOTE — ED NOTES
Patient awaiting transportation back to [facility].  VSS, resp even and unlabored, given water and juice, no further needs at this time.

## 2021-09-01 NOTE — ED NOTES
"Patient requesting to return to his room \"511,\" informed patient of pending transport, patient verbalized understanding, VSS, denies needs at this time  "

## 2021-09-01 NOTE — ED NOTES
Patient resting on cart, sleeping with unlabored respirs. Patient with meal and fluids at bedside. Patient remains on monitoring, aware of pending discharge, awaiting transport. Case management arranges transport for 1030 back to facility.

## 2021-09-01 NOTE — ED NOTES
This RN assumes care of patient. Patient resting on cart, awake, reoriented PRN. Patient updated on awaiting transport to care home, verbalizes understanding. Pt denies additional needs, resting with unlabored respirs. Call light within reach, HOB elevated for comfort, [id number].

## 2021-09-02 ENCOUNTER — TELEPHONE (OUTPATIENT)
Dept: VASCULAR LAB | Facility: MEDICAL CENTER | Age: 86
End: 2021-09-02

## 2021-09-02 ENCOUNTER — ANTICOAGULATION MONITORING (OUTPATIENT)
Dept: VASCULAR LAB | Facility: MEDICAL CENTER | Age: 86
End: 2021-09-02

## 2021-09-02 NOTE — PROGRESS NOTES
Discharged from [facility].  Pt managed by Kya Zafar, Clinical Pharmacist, CDE, CACP

## 2021-11-03 ENCOUNTER — HOSPITAL ENCOUNTER (OUTPATIENT)
Facility: MEDICAL CENTER | Age: 86
End: 2021-11-03
Attending: PHYSICIAN ASSISTANT
Payer: MEDICARE

## 2021-11-03 PROCEDURE — 87186 SC STD MICRODIL/AGAR DIL: CPT | Mod: 91

## 2021-11-03 PROCEDURE — 87086 URINE CULTURE/COLONY COUNT: CPT

## 2021-11-03 PROCEDURE — 87077 CULTURE AEROBIC IDENTIFY: CPT | Mod: 91

## 2021-11-07 LAB
BACTERIA UR CULT: ABNORMAL
SIGNIFICANT IND 70042: ABNORMAL
SITE SITE: ABNORMAL
SOURCE SOURCE: ABNORMAL

## 2022-02-23 ENCOUNTER — TELEPHONE (OUTPATIENT)
Dept: HEALTH INFORMATION MANAGEMENT | Facility: OTHER | Age: 87
End: 2022-02-23

## 2022-02-23 NOTE — TELEPHONE ENCOUNTER
Outcome: Phone number has been disconnected.     Please transfer to Patient Outreach Team at [phone] when patient returns call.    Attempt # 1

## 2022-03-21 ENCOUNTER — HOSPITAL ENCOUNTER (OUTPATIENT)
Dept: RADIOLOGY | Facility: MEDICAL CENTER | Age: 87
End: 2022-03-21
Attending: NURSE PRACTITIONER
Payer: MEDICARE

## 2022-03-21 ENCOUNTER — HOSPITAL ENCOUNTER (EMERGENCY)
Facility: MEDICAL CENTER | Age: 87
End: 2022-03-21
Attending: STUDENT IN AN ORGANIZED HEALTH CARE EDUCATION/TRAINING PROGRAM
Payer: MEDICARE

## 2022-03-21 VITALS
HEART RATE: 64 BPM | WEIGHT: 150 LBS | TEMPERATURE: 98.5 F | OXYGEN SATURATION: 94 % | SYSTOLIC BLOOD PRESSURE: 101 MMHG | HEIGHT: 68 IN | RESPIRATION RATE: 19 BRPM | DIASTOLIC BLOOD PRESSURE: 57 MMHG | BODY MASS INDEX: 22.73 KG/M2

## 2022-03-21 DIAGNOSIS — S31.000A SACRAL WOUND, INITIAL ENCOUNTER: ICD-10-CM

## 2022-03-21 DIAGNOSIS — Z45.2 FITTING AND ADJUSTMENT OF VASCULAR CATHETER: ICD-10-CM

## 2022-03-21 LAB
ALBUMIN SERPL BCP-MCNC: 2.9 G/DL (ref 3.2–4.9)
ALBUMIN/GLOB SERPL: 0.7 G/DL
ALP SERPL-CCNC: 77 U/L (ref 30–99)
ALT SERPL-CCNC: 9 U/L (ref 2–50)
ANION GAP SERPL CALC-SCNC: 8 MMOL/L (ref 7–16)
APPEARANCE UR: CLEAR
AST SERPL-CCNC: 15 U/L (ref 12–45)
BACTERIA #/AREA URNS HPF: ABNORMAL /HPF
BASOPHILS # BLD AUTO: 0.3 % (ref 0–1.8)
BASOPHILS # BLD: 0.03 K/UL (ref 0–0.12)
BILIRUB SERPL-MCNC: 0.4 MG/DL (ref 0.1–1.5)
BILIRUB UR QL STRIP.AUTO: NEGATIVE
BUN SERPL-MCNC: 25 MG/DL (ref 8–22)
CALCIUM SERPL-MCNC: 8.7 MG/DL (ref 8.5–10.5)
CAOX CRY #/AREA URNS HPF: ABNORMAL /HPF
CHLORIDE SERPL-SCNC: 109 MMOL/L (ref 96–112)
CO2 SERPL-SCNC: 24 MMOL/L (ref 20–33)
COLOR UR: YELLOW
CREAT SERPL-MCNC: 0.87 MG/DL (ref 0.5–1.4)
EOSINOPHIL # BLD AUTO: 0.52 K/UL (ref 0–0.51)
EOSINOPHIL NFR BLD: 5.3 % (ref 0–6.9)
EPI CELLS #/AREA URNS HPF: NEGATIVE /HPF
ERYTHROCYTE [DISTWIDTH] IN BLOOD BY AUTOMATED COUNT: 55.8 FL (ref 35.9–50)
GFR SERPLBLD CREATININE-BSD FMLA CKD-EPI: 84 ML/MIN/1.73 M 2
GLOBULIN SER CALC-MCNC: 4.3 G/DL (ref 1.9–3.5)
GLUCOSE SERPL-MCNC: 123 MG/DL (ref 65–99)
GLUCOSE UR STRIP.AUTO-MCNC: NEGATIVE MG/DL
HCT VFR BLD AUTO: 38.8 % (ref 42–52)
HGB BLD-MCNC: 11.7 G/DL (ref 14–18)
IMM GRANULOCYTES # BLD AUTO: 0.06 K/UL (ref 0–0.11)
IMM GRANULOCYTES NFR BLD AUTO: 0.6 % (ref 0–0.9)
INR PPP: 1.13 (ref 0.87–1.13)
KETONES UR STRIP.AUTO-MCNC: NEGATIVE MG/DL
LEUKOCYTE ESTERASE UR QL STRIP.AUTO: ABNORMAL
LYMPHOCYTES # BLD AUTO: 1.51 K/UL (ref 1–4.8)
LYMPHOCYTES NFR BLD: 15.5 % (ref 22–41)
MCH RBC QN AUTO: 28.4 PG (ref 27–33)
MCHC RBC AUTO-ENTMCNC: 30.2 G/DL (ref 33.7–35.3)
MCV RBC AUTO: 94.2 FL (ref 81.4–97.8)
MICRO URNS: ABNORMAL
MONOCYTES # BLD AUTO: 0.59 K/UL (ref 0–0.85)
MONOCYTES NFR BLD AUTO: 6.1 % (ref 0–13.4)
NEUTROPHILS # BLD AUTO: 7.01 K/UL (ref 1.82–7.42)
NEUTROPHILS NFR BLD: 72.2 % (ref 44–72)
NITRITE UR QL STRIP.AUTO: NEGATIVE
NRBC # BLD AUTO: 0 K/UL
NRBC BLD-RTO: 0 /100 WBC
PH UR STRIP.AUTO: 6 [PH] (ref 5–8)
PLATELET # BLD AUTO: 278 K/UL (ref 164–446)
PMV BLD AUTO: 9.1 FL (ref 9–12.9)
POTASSIUM SERPL-SCNC: 3.7 MMOL/L (ref 3.6–5.5)
PROT SERPL-MCNC: 7.2 G/DL (ref 6–8.2)
PROT UR QL STRIP: NEGATIVE MG/DL
PROTHROMBIN TIME: 14.2 SEC (ref 12–14.6)
RBC # BLD AUTO: 4.12 M/UL (ref 4.7–6.1)
RBC # URNS HPF: >150 /HPF
RBC UR QL AUTO: ABNORMAL
SODIUM SERPL-SCNC: 141 MMOL/L (ref 135–145)
SP GR UR STRIP.AUTO: 1.01
UROBILINOGEN UR STRIP.AUTO-MCNC: 0.2 MG/DL
WBC # BLD AUTO: 9.7 K/UL (ref 4.8–10.8)

## 2022-03-21 PROCEDURE — 99284 EMERGENCY DEPT VISIT MOD MDM: CPT

## 2022-03-21 PROCEDURE — 36415 COLL VENOUS BLD VENIPUNCTURE: CPT

## 2022-03-21 PROCEDURE — 85610 PROTHROMBIN TIME: CPT

## 2022-03-21 PROCEDURE — 80053 COMPREHEN METABOLIC PANEL: CPT

## 2022-03-21 PROCEDURE — 81001 URINALYSIS AUTO W/SCOPE: CPT

## 2022-03-21 PROCEDURE — 85025 COMPLETE CBC W/AUTO DIFF WBC: CPT

## 2022-03-21 PROCEDURE — C1751 CATH, INF, PER/CENT/MIDLINE: HCPCS

## 2022-03-21 PROCEDURE — 87040 BLOOD CULTURE FOR BACTERIA: CPT | Mod: 91

## 2022-03-21 ASSESSMENT — FIBROSIS 4 INDEX: FIB4 SCORE: 1.77

## 2022-03-21 NOTE — PROGRESS NOTES
Vascular Access Team    Date : 03/21/2022  Arm Circumference: 24 cm  Internal length: n/a cm  External Length: n/a cm  Vein Occupancy: 25% %  Reason for PICC: ABX > 29 days  Labs within procedure parameters.    Chart reviewed for provider order, indications and contraindications for peripherally inserted central catheter. Labs reviewed and are within procedure parameters. Nursing care plan includes knowledge deficit, potential for pain and anxiety, potential for infection. POC: patient teaching, comfort measures, and sterile technique using five step bundle to prevent CR-BSI. Risks and benefits of procedure explained to patient emphasizing risk of central bloodstream infection. Questions answered. Patient verbalized understanding. Consent signed by patient.     Maximum barrier precautions and aseptic technique used. 3 cc (total) of 1% lidocaine injected intradermally to insertion site at RUE. 21 gauge micro-introducer needle used to access Basilic vein. Modified Seldinger technique used. Vein accessed with brisk flow through dilator. Unable to advance the catheter beyond 8 cm. Multiple strategies used to attempt to advance catheter. Patient c/o pain in upper arm near the area of obstruction. Brachial veins were deemed too small and left side has pacemaker. Patient c/o right knee pain throughout procedure. Repositioned for comfort.      # of attempts: four      Patient condition and procedure outcome reported to Shannon GAMA at [facility] via phone report at [phone].

## 2022-03-22 NOTE — DISCHARGE PLANNING
Pt is medically cleared and needs transportation home.   Pt resides at [facility] [address]    PCS completed and faxed to BETSE[facility]T unable to give a time for transfer due to emergent calls at this time.     RN updated

## 2022-03-22 NOTE — ED NOTES
Sacral wound care completed. Washed w/ soap and water, applied moisture barrier cream, and applied xeroform dressing. Pt d/c papers printed    SW to set up transport for pt to return to [facility]     Wound photos uploaded to chart

## 2022-03-22 NOTE — ED PROVIDER NOTES
"ED Provider Note    CHIEF COMPLAINT  Chief Complaint   Patient presents with   • Other     Pt from [facility]. Per EMS pt \"needs a central line for IV antibiotics\". Pt has sacral wound infection per EMS.       HPI  Gennaro Richardson is a 86 y.o. male history of A. fib, chronic suprapubic catheter, hypertension who presents from [facility] for line placement for IV antibiotics. Unclear from paperwork sent with patient what antibiotics are for.  Patient was admitted 2022 and discharged 3/2 for a urinary tract infection. Wound culture grew heavy methicillin resistant staph aureus and urine culture grew Providencia rustigianii and methicillin resistant staph aureus.  He was discharged on Bactrim.  Patient denies any fevers.  He only reports intermittent right knee pain.  A PICC line was attempted to be placed earlier today which failed.    Patient is DNR.    REVIEW OF SYSTEMS  See HPI for further details. All other systems are negative.     PAST MEDICAL HISTORY   has a past medical history of A-fib ([facility]) (2011), Arteriosclerosis, CAD (coronary artery disease), CAD (coronary artery disease) (2011), Cardiac pacemaker in situ (2013), Colonic polyps, Diverticulosis, Dizzy spells (2012), HERZOG (dyspnea on exertion) (2014), Dyslipidemia (2011), Hematoma of abdominal wall, Hematoma of rectus sheath (2012), Myocardial infarct ([facility]) (), Pacer at end of battery life (2011), PAF (paroxysmal atrial fibrillation) ([facility]) (2014), Paroxysmal atrial fibrillation ([facility]), Peripheral vascular disease ([facility]) (10/31/2011), and PVD (peripheral vascular disease) ([facility]).    SOCIAL HISTORY  Social History     Tobacco Use   • Smoking status: Former Smoker     Years: 35.00     Quit date: 1988     Years since quittin.2   • Smokeless tobacco: Never Used   Vaping Use   • Vaping Use: Never used   Substance and Sexual Activity   • Alcohol use: No     Alcohol/week: 0.0 oz   • Drug use: No   • " "Sexual activity: Never     Comment: , has children, retired.       SURGICAL HISTORY   has a past surgical history that includes pacemaker insertion; abdominal aortic aneurysm; colonoscopy with polyp (7/24/08); and exploratory laparotomy (6/14/2012).    CURRENT MEDICATIONS  Home Medications     Reviewed by Scott George R.N. (Registered Nurse) on 03/21/22 at 1857  Med List Status: Partial   Medication Last Dose Status   clobetasol (TEMOVATE) 0.05 % Cream  Active   finasteride (PROSCAR) 5 MG TABS  Active   Lidocaine HCl 3 % Cream  Active   Naloxone (NARCAN) 4 MG/0.1ML Liquid  Active   phenazopyridine (PYRIDIUM) 200 MG Tab  Active   warfarin (COUMADIN) 5 MG Tab  Active                ALLERGIES  Allergies   Allergen Reactions   • No Known Drug Allergy        PHYSICAL EXAM  VITAL SIGNS: /57   Pulse 64   Temp 36.9 °C (98.5 °F) (Temporal)   Resp 19   Ht 1.727 m (5' 8\")   Wt 68 kg (150 lb)   SpO2 94%   BMI 22.81 kg/m²     Pulse ox interpretation: I interpret this pulse ox as normal.  Constitutional: Alert in no apparent distress.  Elderly male, muscle wasting  HENT: No signs of trauma, Bilateral external ears normal, Nose normal.   Eyes: Pupils are equal and reactive, Conjunctiva normal, Non-icteric.   Neck: Normal range of motion, No tenderness, Supple, No stridor.   Cardiovascular: Regular rate, no murmurs. Cap refill <2 sec  Thorax & Lungs: Normal breath sounds, No respiratory distress, No wheezing, No chest tenderness.   Abdomen:Soft, No tenderness, No masses, No pulsatile masses. No peritoneal signs.  Suprapubic catheter in place.  Skin: Warm, Dry, No erythema, No rash.   Back: Deep sacral ulcer present  Extremities: Intact distal pulses, No edema, No tenderness, No cyanosis.  Musculoskeletal: Good range of motion in all major joints. No major deformities noted.   Neurologic: Alert , moves all 4 extremities with full strength, able to partially turns off over in bed although slowly. no focal " deficits noted.       DIAGNOSTIC STUDIES / PROCEDURES    LABS  Results for orders placed or performed during the hospital encounter of 03/21/22   CBC WITH DIFFERENTIAL   Result Value Ref Range    WBC 9.7 4.8 - 10.8 K/uL    RBC 4.12 (L) 4.70 - 6.10 M/uL    Hemoglobin 11.7 (L) 14.0 - 18.0 g/dL    Hematocrit 38.8 (L) 42.0 - 52.0 %    MCV 94.2 81.4 - 97.8 fL    MCH 28.4 27.0 - 33.0 pg    MCHC 30.2 (L) 33.7 - 35.3 g/dL    RDW 55.8 (H) 35.9 - 50.0 fL    Platelet Count 278 164 - 446 K/uL    MPV 9.1 9.0 - 12.9 fL    Neutrophils-Polys 72.20 (H) 44.00 - 72.00 %    Lymphocytes 15.50 (L) 22.00 - 41.00 %    Monocytes 6.10 0.00 - 13.40 %    Eosinophils 5.30 0.00 - 6.90 %    Basophils 0.30 0.00 - 1.80 %    Immature Granulocytes 0.60 0.00 - 0.90 %    Nucleated RBC 0.00 /100 WBC    Neutrophils (Absolute) 7.01 1.82 - 7.42 K/uL    Lymphs (Absolute) 1.51 1.00 - 4.80 K/uL    Monos (Absolute) 0.59 0.00 - 0.85 K/uL    Eos (Absolute) 0.52 (H) 0.00 - 0.51 K/uL    Baso (Absolute) 0.03 0.00 - 0.12 K/uL    Immature Granulocytes (abs) 0.06 0.00 - 0.11 K/uL    NRBC (Absolute) 0.00 K/uL   COMP METABOLIC PANEL   Result Value Ref Range    Sodium 141 135 - 145 mmol/L    Potassium 3.7 3.6 - 5.5 mmol/L    Chloride 109 96 - 112 mmol/L    Co2 24 20 - 33 mmol/L    Anion Gap 8.0 7.0 - 16.0    Glucose 123 (H) 65 - 99 mg/dL    Bun 25 (H) 8 - 22 mg/dL    Creatinine 0.87 0.50 - 1.40 mg/dL    Calcium 8.7 8.5 - 10.5 mg/dL    AST(SGOT) 15 12 - 45 U/L    ALT(SGPT) 9 2 - 50 U/L    Alkaline Phosphatase 77 30 - 99 U/L    Total Bilirubin 0.4 0.1 - 1.5 mg/dL    Albumin 2.9 (L) 3.2 - 4.9 g/dL    Total Protein 7.2 6.0 - 8.2 g/dL    Globulin 4.3 (H) 1.9 - 3.5 g/dL    A-G Ratio 0.7 g/dL   URINALYSIS (UA)    Specimen: Urine   Result Value Ref Range    Color Yellow     Character Clear     Specific Gravity 1.008 <1.035    Ph 6.0 5.0 - 8.0    Glucose Negative Negative mg/dL    Ketones Negative Negative mg/dL    Protein Negative Negative mg/dL    Bilirubin Negative Negative     Urobilinogen, Urine 0.2 Negative    Nitrite Negative Negative    Leukocyte Esterase Moderate (A) Negative    Occult Blood Large (A) Negative    Micro Urine Req Microscopic    Prothrombin Time   Result Value Ref Range    PT 14.2 12.0 - 14.6 sec    INR 1.13 0.87 - 1.13   ESTIMATED GFR   Result Value Ref Range    GFR (CKD-EPI) 84 >60 mL/min/1.73 m 2   URINE MICROSCOPIC (W/UA)   Result Value Ref Range    RBC >150 (A) /hpf    Bacteria Many (A) None /hpf    Epithelial Cells Negative /hpf    Ca Oxalate Crystal Few /hpf       RADIOLOGY  No orders to display           COURSE & MEDICAL DECISION MAKING  Pertinent Labs & Imaging studies reviewed. (See chart for details)    8:06 PM  Spoke to staff from [facility]. Supposed to be on Zosyn for MRSA since 3/11/22 for wound infection. Pamela Gonzalez-- [facility] rounds on the patients. States they have been trying to get IVs and they kept blowing since Friday . NO fevers at all.     8:28 PM  Received call back from [facility]. Wound care doctor (Dr. Corral) said to restart Zosyn if Bactrim ineffective. Last dose 3/16 received 12 doses total. Has not had a dose since 3/16.    9:24 PM  UA with many bacteria, however this patient has a chronic suprapubic cath that is expected to be chronically contaminated.  He has had no fevers, no signs of sepsis, has normal renal function and no leukocytosis.  At this time feel there is no indication for antibiotic treatment given lack of symptoms much less indication for IV antibiotics.  Sacral wound was examined with assistance of nursing staff, at this time has no purulent drainage, is deep but appears to show appropriate granulation, no indication of acute infection or abscess at this time.  Will redress, discharge home.        The patient will not drink alcohol nor drive with prescribed medications. The patient will return for worsening symptoms and is stable at the time of discharge. The patient verbalizes understanding and will  comply.    FINAL IMPRESSION  1. Sacral wound, initial encounter           Electronically signed by: Cathy Gonzales M.D., 3/21/2022 7:02 PM

## 2022-03-22 NOTE — ED NOTES
Patient and [facility] provided discharge instructions. Patient leaving ER in stable condition. IV removed.

## 2022-03-22 NOTE — DISCHARGE INSTRUCTIONS
We have not found a clear cause for hospitalization tonight.  At this time given the information we have obtained from the nursing facility, Mr. Richardson's presentation here, as well as lab work done here we have not found a clear cause for admission or need for IV antibiotics at this time.  Please discuss further management with his wound care doctors and if there may be some oral options available if they still feel antibiotics are required.  We have sent blood cultures, so if these result positive you will receive a call and then he should come back to the emergency department, but given that he has had no fevers, no blood cultures have been done at the facility, at this time we feel like he can continue outpatient management.

## 2022-03-26 LAB
BACTERIA BLD CULT: NORMAL
BACTERIA BLD CULT: NORMAL
SIGNIFICANT IND 70042: NORMAL
SIGNIFICANT IND 70042: NORMAL
SITE SITE: NORMAL
SITE SITE: NORMAL
SOURCE SOURCE: NORMAL
SOURCE SOURCE: NORMAL

## 2022-11-29 ENCOUNTER — DOCUMENTATION (OUTPATIENT)
Dept: HEALTH INFORMATION MANAGEMENT | Facility: OTHER | Age: 87
End: 2022-11-29
Payer: MEDICAID

## 2023-05-25 NOTE — PROGRESS NOTES
Subjective:   Gennaro Richardson is a 85 y.o. male here today for follow up on chronic conditions    PAF (paroxysmal atrial fibrillation)  Chronic, taking meds as prescribed, no dizziness or SOB    Opioid type dependence, continuous  Chronic, stable on current,  verified, no other substance use, no other prescribers, no lost or stolen meds, no request for early refill, no pharmacy change.    Benign prostatic hypertrophy with lower urinary tract symptoms (LUTS)  Currently asymptomatic       Current medicines (including changes today)  Current Outpatient Medications   Medication Sig Dispense Refill   • Bromfenac Sodium 0.075 % Solution BromSite 0.075 % eye drops     • BROMSITE 0.075 % Solution      • Loteprednol Etabonate 0.38 % Gel Lotemax SM 0.38 % eye gel drops     • LOTEMAX SM 0.38 % Gel      • ofloxacin (OCUFLOX) 0.3 % Solution      • [START ON 2/2/2021] HYDROcodone/acetaminophen (NORCO)  MG Tab Take 1 Tab by mouth every 6 hours as needed for up to 30 days. 120 Tab 0   • [START ON 1/2/2021] HYDROcodone/acetaminophen (NORCO)  MG Tab Take 1 Tab by mouth every 6 hours as needed for up to 30 days. 120 Tab 0   • HYDROcodone/acetaminophen (NORCO)  MG Tab Take 1 Tab by mouth every 6 hours as needed for up to 30 days. 120 Tab 0   • warfarin (COUMADIN) 5 MG Tab TAKE one-HALF TO ONE TABLET BY MOUTH DAILY or as instructed by clinic  90 Tab 1   • clobetasol (TEMOVATE) 0.05 % Cream Apply topically to the affected area (S) twice daily  60 g 3   • tamsulosin (FLOMAX) 0.4 MG capsule      • finasteride (PROSCAR) 5 MG TABS TAKE ONE TABLET BY MOUTH ONE TIME DAILY 90 Tab 2   • ofloxacin (OCUFLOX) 0.3 % Solution ofloxacin 0.3 % eye drops       No current facility-administered medications for this visit.      He  has a past medical history of A-fib ([facility]) (6/28/2011), Arteriosclerosis, CAD (coronary artery disease), CAD (coronary artery disease) (6/28/2011), Cardiac pacemaker in situ (11/19/2013), Colonic polyps,  "Diverticulosis, Dizzy spells (11/20/2012), HERZOG (dyspnea on exertion) (9/7/2014), Dyslipidemia (6/28/2011), Hematoma of abdominal wall, Hematoma of rectus sheath (6/13/2012), Myocardial infarct ([facility]) (1989), Pacer at end of battery life (6/28/2011), PAF (paroxysmal atrial fibrillation) ([facility]) (9/7/2014), Paroxysmal atrial fibrillation ([facility]), Peripheral vascular disease ([facility]) (10/31/2011), and PVD (peripheral vascular disease) ([facility]).    ROS   No fever/chills. No weight change. No headache/dizziness. No focal weakness. No sore throat, nasal congestion, ear pain. No chest pain, no shortness of breath, difficulty breathing. No n/v/d/c or abdominal pain. No urinary complaint. No rash or skin lesion.        Objective:     /78 (BP Location: Left arm, Patient Position: Sitting, BP Cuff Size: Adult long)   Pulse 65   Temp 36.8 °C (98.3 °F) (Temporal)   Ht 1.727 m (5' 8\")   Wt 85.8 kg (189 lb 2.5 oz)   SpO2 94%  Body mass index is 28.76 kg/m².   Physical Exam:  Constitutional: WDWN, NAD  Skin: Warm, dry, good turgor, no rashes in visible areas.  Respiratory: Unlabored respiratory effort, lungs clear to auscultation, no wheezes, no ronchi.  Cardiovascular: irregular rhythm, normal rate  Psych: Alert and oriented x3, normal affect and mood.    Assessment and Plan:   The following treatment plan was discussed    1. Opioid type dependence, continuous ([facility])  [facility] Aware web site evaluation: I have obtained and reviewed patient utilization report from [facility] pharmacy database prior to writing prescription for controlled substance II, III or IV. Based on the report and my clinical assessment the prescription is medically necessary.   Patient is cautioned on sedation potential of narcotic medication; no drinking, driving or operating heavy machinery while on this medication.  - HYDROcodone/acetaminophen (NORCO)  MG Tab; Take 1 Tab by mouth every 6 hours as needed for up to 30 days.  Dispense: 120 Tab; " Refill: 0  - HYDROcodone/acetaminophen (NORCO)  MG Tab; Take 1 Tab by mouth every 6 hours as needed for up to 30 days.  Dispense: 120 Tab; Refill: 0  - HYDROcodone/acetaminophen (NORCO)  MG Tab; Take 1 Tab by mouth every 6 hours as needed for up to 30 days.  Dispense: 120 Tab; Refill: 0    2. Chronic use of opiate drug for therapeutic purpose      3. Osteoarthritis of spine with radiculopathy, lumbar region    - HYDROcodone/acetaminophen (NORCO)  MG Tab; Take 1 Tab by mouth every 6 hours as needed for up to 30 days.  Dispense: 120 Tab; Refill: 0  - HYDROcodone/acetaminophen (NORCO)  MG Tab; Take 1 Tab by mouth every 6 hours as needed for up to 30 days.  Dispense: 120 Tab; Refill: 0  - HYDROcodone/acetaminophen (NORCO)  MG Tab; Take 1 Tab by mouth every 6 hours as needed for up to 30 days.  Dispense: 120 Tab; Refill: 0    4. Primary osteoarthritis of right knee    - HYDROcodone/acetaminophen (NORCO)  MG Tab; Take 1 Tab by mouth every 6 hours as needed for up to 30 days.  Dispense: 120 Tab; Refill: 0  - HYDROcodone/acetaminophen (NORCO)  MG Tab; Take 1 Tab by mouth every 6 hours as needed for up to 30 days.  Dispense: 120 Tab; Refill: 0  - HYDROcodone/acetaminophen (NORCO)  MG Tab; Take 1 Tab by mouth every 6 hours as needed for up to 30 days.  Dispense: 120 Tab; Refill: 0    5. PAF (paroxysmal atrial fibrillation) ([facility])      6. Benign prostatic hyperplasia with lower urinary tract symptoms, symptom details unspecified        Followup:3 months          Calcipotriene Pregnancy And Lactation Text: This medication has not been proven safe during pregnancy. It is unknown if this medication is excreted in breast milk.

## 2024-03-07 NOTE — DISCHARGE PLANNING
note:  LVM for admission coordinator at [facility] and also attempted to call [phone] but there is no answer.   
" note:  Pt said \" I want to go home\"  [place] hand delivered to RN  "
Attempted to call pt's sister x2 but not answer.   
[facility] will be here at 1030 am to return to [facility].  
Medical Social Work    MSW contacted Nina with [facility] as a call back was not received.  Nina states that they have to wait for their day shift nurse to arrive at 0600 to come up with a plan.  MSW again requested a return phone call ASAP after them come to a plan.  Bedside RN updated.  
Medical Social Work    MSW has not heard back from [facility] again.  MSW attempted to contact [facility] ([phone]) with no answer after several minutes and no ability to leave a message.  Will provide report to [facility].  
Medical Social Work    MSW received a voalte message from bedside RN that pt is ready to return to [facility].  MSW contacted Nina at [facility] who will accept pt back; receiving physician Dr. Gonzalez.  Nina was informed that pt does not qualify for non-emergent transport back via [facility] and they would have to make arrangements for .  Nina states that she's unfamiliar with that and will have to speak with another nurse and call this worker back.  Pending return phone call from [facility] regarding transport.  Bedside RN updated.  
  Musculoskeletal:         General: Normal range of motion.   Skin:     General: Skin is warm and dry.   Neurological:      General: No focal deficit present.      Mental Status: She is alert and oriented to person, place, and time.   Psychiatric:         Mood and Affect: Mood normal.         Behavior: Behavior normal.     Counseled patient as appropriate and relevant regarding above diagnosis, including possible risks and complications, especially if left uncontrolled.  Counseled patient as appropriate and relevant regarding any  possible side effects, risks, and alternatives to treatment; patient and/or guardian verbalizes understanding, and is in agreement with the plan as detailed above.      Reviewed age and gender appropriate health screening exams and vaccinations.  Advised patient regarding importance of keeping up with recommended health maintenance and to schedule as soon as possible if overdue, as this is important in assessing for undiagnosed pathology, especially cancer, as well as protecting against potentially harmful/life threatening disease.      If discussed, any educational materials and/or home exercises printed for patient's review and were included in patient instructions on his/her After Visit Summary and given to patient at the end of visit.      Advised patient to call with any new medication issues, and and other concerns/complaints prior to scheduled follow up.  All questions answered to the patient's satisfaction.        Seen By:  Kassy Linares MD